# Patient Record
Sex: FEMALE | Race: WHITE | NOT HISPANIC OR LATINO | Employment: OTHER | URBAN - METROPOLITAN AREA
[De-identification: names, ages, dates, MRNs, and addresses within clinical notes are randomized per-mention and may not be internally consistent; named-entity substitution may affect disease eponyms.]

---

## 2017-02-03 ENCOUNTER — GENERIC CONVERSION - ENCOUNTER (OUTPATIENT)
Dept: OTHER | Facility: OTHER | Age: 82
End: 2017-02-03

## 2017-02-13 ENCOUNTER — ALLSCRIPTS OFFICE VISIT (OUTPATIENT)
Dept: OTHER | Facility: OTHER | Age: 82
End: 2017-02-13

## 2017-02-13 DIAGNOSIS — R73.01 IMPAIRED FASTING GLUCOSE: ICD-10-CM

## 2017-02-16 ENCOUNTER — GENERIC CONVERSION - ENCOUNTER (OUTPATIENT)
Dept: OTHER | Facility: OTHER | Age: 82
End: 2017-02-16

## 2017-07-24 ENCOUNTER — GENERIC CONVERSION - ENCOUNTER (OUTPATIENT)
Dept: OTHER | Facility: OTHER | Age: 82
End: 2017-07-24

## 2017-08-15 ENCOUNTER — GENERIC CONVERSION - ENCOUNTER (OUTPATIENT)
Dept: OTHER | Facility: OTHER | Age: 82
End: 2017-08-15

## 2017-08-21 ENCOUNTER — ALLSCRIPTS OFFICE VISIT (OUTPATIENT)
Dept: OTHER | Facility: OTHER | Age: 82
End: 2017-08-21

## 2017-08-21 DIAGNOSIS — Z13.820 ENCOUNTER FOR SCREENING FOR OSTEOPOROSIS: ICD-10-CM

## 2017-08-30 ENCOUNTER — GENERIC CONVERSION - ENCOUNTER (OUTPATIENT)
Dept: OTHER | Facility: OTHER | Age: 82
End: 2017-08-30

## 2017-09-08 ENCOUNTER — GENERIC CONVERSION - ENCOUNTER (OUTPATIENT)
Dept: OTHER | Facility: OTHER | Age: 82
End: 2017-09-08

## 2017-11-09 ENCOUNTER — GENERIC CONVERSION - ENCOUNTER (OUTPATIENT)
Dept: OTHER | Facility: OTHER | Age: 82
End: 2017-11-09

## 2017-11-16 ENCOUNTER — GENERIC CONVERSION - ENCOUNTER (OUTPATIENT)
Dept: OTHER | Facility: OTHER | Age: 82
End: 2017-11-16

## 2018-01-12 NOTE — MISCELLANEOUS
Message   Recorded as Task   Date: 02/29/2016 09:42 AM, Created By: Ambrocio Miranda   Task Name: Medical Complaint Callback   Assigned To: [address] [place] [address]   Regarding Patient: Melvin Glynn, Status: Active   Comment:    Regla Staples - 29 Feb 2016 9:42 AM     TASK CREATED  Caller: Self; Medical Complaint; [phone] (Home)  reporting that her R hand is still swollen and numb    please advise   Alfreda Briseno - 29 Feb 2016 9:48 AM     TASK REPLIED TO: Previously Assigned To [address] [place] [address]  should make appt to see ortho then   Sharmaine Francisco - 29 Feb 2016 10:08 AM     TASK EDITED  Gave numbers to [facility]        Active Problems    1  Acute pain of right wrist (719 43) (M25 531)   2  Colonoscopy (Fiberoptic) Screening   3  Diastolic dysfunction (706 3) (I51 9)   4  Dyslipidemia (272 4) (E78 5)   5  Dysphagia (787 20) (R13 10)   6  Encounter for screening mammogram for malignant neoplasm of breast (V76 12)   (Z12 31)   7  Esophageal reflux (530 81) (K21 9)   8  Glaucoma (365 9) (H40 9)   9  Hashimoto's thyroiditis (245 2) (E06 3)   10  Hypertension (401 9) (I10)   11  Impaired fasting glucose (790 21) (R73 01)   12  Insomnia (780 52) (G47 00)   13  Left ventricular hypertrophy (429 3) (I51 7)   14  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 tuberculosis (795 51) (R76 11)   15  Non-toxic uninodular goiter (241 0) (E04 1)   16  Osteopenia (733 90) (M85 80)   17  Peripheral arterial disease (443 9) (I73 9)    Current Meds   1  AmLODIPine Besylate 10 MG Oral Tablet; take 1 tablet by mouth every day; Therapy: 52RBE9353 to (Evaluate:16Jun2016)  Requested for: 43FLI1815; Last   Rx:22Jun2015 Ordered   2  Aspirin 81 MG Oral Tablet; TAKE 1 TABLET DAILY; Therapy: 78MWI6768 to (Evaluate:18Oct2012); Last Rx:26Hug3551 Ordered   3  Atorvastatin Calcium 10 MG Oral Tablet; take 1 tablet by mouth every day;    Therapy: 65LAA3236 to (Evaluate:15Jun2016)  Requested for: 74VYF6254; Last   Rx:23Mar2015 Ordered   4  Biotin 1000 MCG Oral Tablet; 2 TABS DAILY; Therapy: 35QWZ9698 to Recorded   5  Calcium 500 MG Oral Tablet; 1 tab PO BID; Therapy: 72OUF8964 to (Last Rx:43Dkv3688) Ordered   6  Centrum Silver Oral Tablet; TAKE 1 TABLET DAILY; Therapy: 93EQN4829 to Recorded   7  Cilostazol 50 MG Oral Tablet; 1 tab po q day; Therapy: 27BDJ2806 to Recorded   8  Dorzolamide HCl-Timolol Mal 22 3-6 8 MG/ML Ophthalmic Solution; Therapy: 79RDU2015 to (Evaluate:55Rpm2956)  Requested for: 04BXM4753 Recorded   9  EQL Flax Seed Oil 1000 MG Oral Capsule; TAKE AS DIRECTED; Therapy: 43VNN9932 to Recorded   10  Fish Oil 1000 MG Oral Capsule; TAKE 1 CAPSULE DAILY; Therapy: 54EWS0131 to Recorded   11  Lisinopril 40 MG Oral Tablet; TAKE 1/2  TABLET BY MOUTH EVERY DAY; Therapy: 07MRB8770 to ([facility])  Requested for: 63SVY2986 Recorded   12  Metoprolol Tartrate 25 MG Oral Tablet; TAKE 1 & 1/2 TABLET BY MOUTH TWICE A DAY; Therapy: 48CWU6785 to (Evaluate:16Jun2016)  Requested for: 36ODD6015; Last    Rx:22Jun2015 Ordered   13  Omeprazole 20 MG Oral Capsule Delayed Release; TAKE ONE CAPSULE BY MOUTH    DAILY AS NEEDED; Therapy: 83CSN6070 to (Evaluate:77Vzk7913)  Requested for: 72Csf7266 Recorded   14  Synthroid 100 MCG Oral Tablet; TAKE ONE TABLET BY MOUTH EVERY DAY EXCEPT    1/2 tab on Sundays only; Therapy: 95XHD3661 to (Evaluate:60Hwb4946)  Requested for: 08Kmj8983 Recorded   15  Vitamin D3 1000 UNIT Oral Tablet; TAKE 1 TABLET DAILY; Therapy: 75RXL0975 to (Evaluate:91Fbx3627); Last Rx:51Hto4038 Ordered    Allergies    1   No Known Drug Allergies    Signatures   Electronically signed by : Janell Feliz DO; Feb 29 2016 10:20AM EST                       (Author)

## 2018-01-13 VITALS
HEIGHT: 58 IN | HEART RATE: 68 BPM | DIASTOLIC BLOOD PRESSURE: 58 MMHG | TEMPERATURE: 98.5 F | WEIGHT: 126 LBS | SYSTOLIC BLOOD PRESSURE: 106 MMHG | BODY MASS INDEX: 26.45 KG/M2

## 2018-01-14 VITALS
WEIGHT: 125 LBS | HEIGHT: 58 IN | HEART RATE: 66 BPM | DIASTOLIC BLOOD PRESSURE: 60 MMHG | TEMPERATURE: 97.7 F | SYSTOLIC BLOOD PRESSURE: 118 MMHG | BODY MASS INDEX: 26.24 KG/M2

## 2018-02-19 RX ORDER — DORZOLAMIDE HYDROCHLORIDE AND TIMOLOL MALEATE 20; 5 MG/ML; MG/ML
SOLUTION/ DROPS OPHTHALMIC
COMMUNITY
Start: 2011-05-28

## 2018-02-19 RX ORDER — LISINOPRIL 10 MG/1
1 TABLET ORAL DAILY
COMMUNITY
Start: 2017-12-29 | End: 2019-03-17 | Stop reason: SDUPTHER

## 2018-02-19 RX ORDER — CALCIUM CARBONATE 500(1250)
1 TABLET ORAL 3 TIMES WEEKLY
COMMUNITY
Start: 2012-09-18

## 2018-02-19 RX ORDER — MELATONIN
1 DAILY
COMMUNITY
Start: 2012-09-18

## 2018-02-27 ENCOUNTER — OFFICE VISIT (OUTPATIENT)
Dept: FAMILY MEDICINE CLINIC | Facility: HOSPITAL | Age: 83
End: 2018-02-27
Payer: MEDICARE

## 2018-02-27 VITALS
HEIGHT: 58 IN | SYSTOLIC BLOOD PRESSURE: 110 MMHG | HEART RATE: 70 BPM | WEIGHT: 128 LBS | TEMPERATURE: 96.8 F | DIASTOLIC BLOOD PRESSURE: 60 MMHG | BODY MASS INDEX: 26.87 KG/M2

## 2018-02-27 DIAGNOSIS — C43.59 MALIGNANT MELANOMA OF TORSO EXCLUDING BREAST (HCC): ICD-10-CM

## 2018-02-27 DIAGNOSIS — I10 HYPERTENSION, UNSPECIFIED TYPE: ICD-10-CM

## 2018-02-27 DIAGNOSIS — K64.9 BLEEDING HEMORRHOID: Primary | ICD-10-CM

## 2018-02-27 DIAGNOSIS — I73.9 PERIPHERAL ARTERIAL DISEASE (HCC): ICD-10-CM

## 2018-02-27 PROCEDURE — 99214 OFFICE O/P EST MOD 30 MIN: CPT | Performed by: INTERNAL MEDICINE

## 2018-02-27 NOTE — PROGRESS NOTES
Assessment/Plan:    Hypertension  Bp at goal, con't current meds, med list updated    Peripheral arterial disease ([facility] )  Symptomatically controlled with Pletal, on ASA/Statin, does not smoke, con't meds and f/u as per vascular doc    Malignant melanoma of torso excluding breast ([facility] )  Will obtain records from Dr Viktor Costa office, con't f/u as per Derm/Plastics       Diagnoses and all orders for this visit:    Bleeding hemorrhoid  Comments:  Currently having rubber band ligation with Dr Cale Valdivia - con't f/u as directed    Hypertension, unspecified type    Peripheral arterial disease ([facility] )    Malignant melanoma of torso excluding breast ([facility] )      Mammo UTD till Aug 2018 - order at next appt    No further Dexa needed as nml in 2015 at age 80    No further [place] needed    UTD on immunizations    Bw usually done with Endo and Cardio has both appts this summer when labs due - hold off on ordering for now - will review at next appt and order any if needed at that time    Subjective:      Patient ID: Hali Wyman is a 80 y o  female  HPI Pt here for routine follow up appt    She had some bleeding from her hemorrhoids recently and is have them addressed with rubber band ligation with Dr Cale Valdivia  She has had some benefit already  No further colo needed d/t her age  BP at goal today and meds were reviewed and no changes have occurred  She denies missing doses of meds or SE with the meds  She does not check her BP outside the office  She notes no frequent Ha's/dizziness/double vision/CP  She con't to follow with the vascular doctor once a year for her PAD  She notes no current leg pain at all  She is on ASA/Pletal and Atorvastatin  She does not smoke  She con't to follow with Dr Viktor Costa for her skin exam   She had a recent [address] removed from her R cheek  She states she had a melanoma removed from her back last year  She is following with them at least every 6 mos       Mammo done Aug 2017 - [facility] 1    Dexa 2015 was nml    Review of Systems   Constitutional: Negative for chills, fatigue, fever and unexpected weight change  HENT: Negative for congestion and sore throat  Eyes: Negative for pain and discharge  Respiratory: Negative for cough, shortness of breath and wheezing  Cardiovascular: Negative for chest pain, palpitations and leg swelling  Gastrointestinal: Positive for anal bleeding  Negative for abdominal pain, constipation, diarrhea, nausea and vomiting  Endocrine: Negative for polydipsia and polyuria  Genitourinary: Negative for difficulty urinating and dysuria  Musculoskeletal: Positive for arthralgias  Negative for back pain and neck pain  R arm pain just above the elbow when she sleeps on it at [facility]   Skin: Negative for rash and wound  Neurological: Negative for dizziness, syncope, light-headedness and headaches  Hematological: Negative for adenopathy  Psychiatric/Behavioral: Negative for behavioral problems and confusion  Objective:      /60   Pulse 70   Temp (!) 96 8 °F (36 °C)   Ht 4' 10" (1 473 m)   Wt 58 1 kg (128 lb)   BMI 26 75 kg/m²          Physical Exam   Constitutional: She appears well-developed and well-nourished  No distress  HENT:   Head: Normocephalic and atraumatic  Mouth/Throat: No oropharyngeal exudate  Eyes: Pupils are equal, round, and reactive to light  Right eye exhibits no discharge  Left eye exhibits no discharge  Neck: Neck supple  No tracheal deviation present  Cardiovascular: Normal rate, regular rhythm and normal heart sounds  Exam reveals no friction rub  No murmur heard  Pulmonary/Chest: Effort normal and breath sounds normal  No respiratory distress  She has no wheezes  She has no rales  Abdominal: Soft  She exhibits no distension  There is no tenderness  There is no guarding  Musculoskeletal: She exhibits no edema  Neurological: She is alert  She exhibits normal muscle tone  Skin: Skin is warm   No rash noted    Psychiatric: She has a normal mood and affect  Her behavior is normal    Nursing note and vitals reviewed

## 2018-02-27 NOTE — ASSESSMENT & PLAN NOTE
Symptomatically controlled with Pletal, on ASA/Statin, does not smoke, con't meds and f/u as per vascular doc

## 2018-05-08 ENCOUNTER — OFFICE VISIT (OUTPATIENT)
Dept: FAMILY MEDICINE CLINIC | Facility: HOSPITAL | Age: 83
End: 2018-05-08
Payer: MEDICARE

## 2018-05-08 ENCOUNTER — HOSPITAL ENCOUNTER (OUTPATIENT)
Dept: RADIOLOGY | Facility: HOSPITAL | Age: 83
Discharge: HOME | End: 2018-05-08
Payer: MEDICARE

## 2018-05-08 VITALS
DIASTOLIC BLOOD PRESSURE: 68 MMHG | HEART RATE: 75 BPM | WEIGHT: 127 LBS | BODY MASS INDEX: 26.66 KG/M2 | TEMPERATURE: 97.9 F | SYSTOLIC BLOOD PRESSURE: 100 MMHG | HEIGHT: 58 IN

## 2018-05-08 DIAGNOSIS — M25.511 ACUTE PAIN OF RIGHT SHOULDER: Primary | ICD-10-CM

## 2018-05-08 DIAGNOSIS — M25.511 ACUTE PAIN OF RIGHT SHOULDER: ICD-10-CM

## 2018-05-08 PROBLEM — C44.91 BCC (BASAL CELL CARCINOMA): Status: RESOLVED | Noted: 2018-05-08 | Resolved: 2018-05-08

## 2018-05-08 PROBLEM — C44.92 SCCA (SQUAMOUS CELL CARCINOMA) OF SKIN: Status: RESOLVED | Noted: 2018-05-08 | Resolved: 2018-05-08

## 2018-05-08 PROCEDURE — 73030 X-RAY EXAM OF SHOULDER: CPT

## 2018-05-08 PROCEDURE — 99213 OFFICE O/P EST LOW 20 MIN: CPT | Performed by: INTERNAL MEDICINE

## 2018-05-08 NOTE — PROGRESS NOTES
Assessment/Plan:     Diagnoses and all orders for this visit:    Acute pain of right shoulder  Comments:  D/t duration of symptoms will start with Xray of shoulder, suspect this is degenerative joint dz or poss tendonitis, encouraged Tylenol as has some benefit, as well as heat/ice/gentle stretches, if not better in 2 wks will need to see [facility]  Orders:  -     XR shoulder 2+ vw right; Future          Subjective:      Patient ID: Jason Galvez is a 80 y o  female  HPI Pt here today with c/o R upper lateral arm pain x 3 mos  She is not able to id a specific fall/injury/new activity  The pain is described as sharp and is intermittent  The pain occurs only when she moves her arm out to the side and to the back  She has pain when she tries to get dressed and when she irons  She has pain when she lays on her R side in bed as well  The pain does not radiate most of the time but she will have a rare episode it will go down into the forearm/wrist area  She notes no numbness/tingling but she does feel weak in the R hand  She is not dropping objects but feels she has a hard time picking up anything heavy with that hand  She has tried OTC Asper Cream and Salon Pas with some relief  She is using Tylenol which does help as well  She notes no h/o shoulder/RUE problems in the past and denies h/o surgery  Review of Systems   Constitutional: Negative for chills and fever  Musculoskeletal: Positive for arthralgias  Negative for joint swelling  Skin: Negative for rash and wound  Neurological: Positive for weakness  Negative for numbness  Objective:    /68   Pulse 75   Temp 97 9 °F (36 6 °C)   Ht 4' 10" (1 473 m)   Wt 57 6 kg (127 lb)   BMI 26 54 kg/m²      Physical Exam   Cardiovascular: Normal rate, regular rhythm and normal heart sounds  No murmur heard  Pulmonary/Chest: Effort normal and breath sounds normal  She has no wheezes  Musculoskeletal: She exhibits no deformity     No pain with palp of spinous processes of cervical spine, no pain with palp of clavicle or spine of scapula, + crepitus with PROM of R shoulder, some tenderness lateral distal bicep muscle, dec A ROM with internal rotation d/t pain, + drop arm test, 4/5 RUE arm strength   Neurological: She is alert  She exhibits normal muscle tone  Sensation intact to light touch B/L UE   Skin: Skin is warm and dry  No rash noted  No pallor  Psychiatric: She has a normal mood and affect  Her behavior is normal    Nursing note and vitals reviewed

## 2018-06-06 DIAGNOSIS — I10 ESSENTIAL HYPERTENSION: Primary | ICD-10-CM

## 2018-06-06 RX ORDER — AMLODIPINE BESYLATE 10 MG/1
TABLET ORAL
Qty: 90 TABLET | Refills: 1 | Status: SHIPPED | OUTPATIENT
Start: 2018-06-06 | End: 2019-03-31 | Stop reason: SDUPTHER

## 2018-07-09 LAB — HBA1C MFR BLD HPLC: 5.9 %

## 2018-07-27 ENCOUNTER — OFFICE VISIT (OUTPATIENT)
Dept: ENDOCRINOLOGY | Facility: HOSPITAL | Age: 83
End: 2018-07-27
Payer: MEDICARE

## 2018-07-27 ENCOUNTER — TELEPHONE (OUTPATIENT)
Dept: ENDOCRINOLOGY | Facility: HOSPITAL | Age: 83
End: 2018-07-27

## 2018-07-27 VITALS
HEIGHT: 58 IN | SYSTOLIC BLOOD PRESSURE: 116 MMHG | DIASTOLIC BLOOD PRESSURE: 62 MMHG | BODY MASS INDEX: 26.07 KG/M2 | WEIGHT: 124.2 LBS | HEART RATE: 62 BPM

## 2018-07-27 DIAGNOSIS — E04.1 THYROID NODULE: ICD-10-CM

## 2018-07-27 DIAGNOSIS — E06.3 HASHIMOTO'S THYROIDITIS: Primary | ICD-10-CM

## 2018-07-27 DIAGNOSIS — Z86.39 HISTORY OF HYPERPARATHYROIDISM: ICD-10-CM

## 2018-07-27 PROCEDURE — 99204 OFFICE O/P NEW MOD 45 MIN: CPT | Performed by: INTERNAL MEDICINE

## 2018-07-27 RX ORDER — LEVOTHYROXINE SODIUM 0.1 MG/1
TABLET ORAL
Qty: 90 TABLET | Refills: 3 | Status: SHIPPED | OUTPATIENT
Start: 2018-07-27 | End: 2019-10-18 | Stop reason: SDUPTHER

## 2018-07-27 NOTE — TELEPHONE ENCOUNTER
Pt had a thyroid nodule biopsy in the past   Can we acquire the results from her [facility] chart? Thanks

## 2018-07-27 NOTE — PROGRESS NOTES
7/27/2018    Assessment/Plan      Diagnoses and all orders for this visit:    Hashimoto's thyroiditis  -     levothyroxine (SYNTHROID) 100 mcg tablet; 1 tab daily  BRAND NECESSARY  -     US thyroid; Future  -     TSH, 3rd generation Lab Collect; Future  -     T4, free Lab Collect; Future  -     Comprehensive metabolic panel Lab Collect; Future    History of hyperparathyroidism  -     Comprehensive metabolic panel Lab Collect; Future  -     PTH, intact- Lab Collect; Future  -     Vitamin D 25 hydroxy Lab Collect; Future    Thyroid nodule  -     US thyroid; Future        Assessment/Plan:  1  Hypothyroidism due to Hashimoto's thyroiditis:  Clinically and biochemically euthyroid on Synthroid brand 100 mcg daily  I refilled this prescription for her  We will plan to recheck blood work next year before next appointment  2   Thyroid nodule: This was reportedly biopsied and was benign in the past   I will acquire this biopsy report  Will update her with an ultrasound now and call her with the results  If this looks stable, we will discuss repeat ultrasound testing if warranted at her next appointment  3   History of hyperparathyroidism status post surgery: Will continue to monitor calcium and PTH levels periodically the next being with her next appointment  Addendum:  12/23/2004 at [facility]:  Fine-needle aspiration biopsy of 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  It does not state specifically which nodule was biopsy  CC:   Hypothyroidism and thyroid nodule    History of Present Illness     HPI: Shahram Trotter is a 80y o  year old female with history of hypertension, hyperlipidemia, hypothyroidism due to Hashimoto's thyroiditis, thyroid nodule, history of hyperparathyroidism who presents to [facility] care  Previous patient of Dr Marlo Sanders  She has had a history of hypothyroidism due to Hashimoto's thyroiditis    Overall she feels well and denies symptoms of hypothyroidism and hyperthyroidism  Denies neck compressive symptoms  Denies known family history of thyroid disorders or nodules or cancer to her knowledge  She denies history of head or neck radiation  She takes Synthroid brand 100 mcg daily  She also reports history of hyperparathyroidism  She states it took 2 surgeries to cure her primary hyperparathyroidism  The 2nd surgery was at the [place] [address]  Review of Systems   Constitutional: Negative for chills, fatigue and fever  HENT: Negative for trouble swallowing and voice change  Eyes: Negative for visual disturbance  Respiratory: Negative for shortness of breath  Cardiovascular: Negative for chest pain, palpitations and leg swelling  Gastrointestinal: Negative for abdominal pain, nausea and vomiting  Endocrine: Negative for cold intolerance, heat intolerance, polydipsia and polyuria  Musculoskeletal: Negative for arthralgias and myalgias  Skin: Negative for rash  Neurological: Negative for dizziness, tremors and weakness  Hematological: Negative for adenopathy  Psychiatric/Behavioral: Negative for agitation and confusion         Historical Information   Past Medical History:   Diagnosis Date    BCC (basal cell carcinoma)     Diastolic dysfunction     Dyslipidemia     Dysphagia     Glaucoma     Hashimoto's thyroiditis     Hyperparathyroidism ([address] )     Hypertension     Impaired fasting glucose     Insomnia     Non-toxic uninodular goiter     Osteopenia     SCCA (squamous cell carcinoma) of skin      Past Surgical History:   Procedure Laterality Date    CATARACT EXTRACTION, BILATERAL      COLONOSCOPY      Complete, Onset - 10/25/05 - 10 years     HYSTERECTOMY      PARATHYROIDECTOMY      DC WRIST [employer] Guitar LIG Right 5/2/2016    Procedure: RELEASE CARPAL TUNNEL ENDOSCOPIC;  Surgeon: Barb Huffman MD;  Location: [facility];  Service: Orthopedics    TONSILLECTOMY       Social History   History   Alcohol Use  Yes     Comment: rarely, social      History   Drug Use No     History   Smoking Status    Never Smoker   Smokeless Tobacco    Never Used     Family History:   Family History   Problem Relation Age of Onset    Stroke Father         CVA    Ovarian cancer Mother        Meds/Allergies   Current Outpatient Prescriptions   Medication Sig Dispense Refill    amLODIPine (NORVASC) 10 mg tablet TAKE 1 TABLET BY MOUTH EVERY DAY 90 tablet 1    aspirin 81 mg chewable tablet Chew 81 mg daily   atorvastatin (LIPITOR) 10 mg tablet Take 10 mg by mouth daily   Biotin 1000 MCG tablet Take 1,000 mcg by mouth 2 (two) times a day   Calcium 500 MG tablet Take 1 tablet by mouth 2 (two) times a day      cholecalciferol (VITAMIN D3) 1,000 units tablet Take 1 tablet by mouth daily      cilostazol (PLETAL) 50 mg tablet Take 50 mg by mouth 2 (two) times a day   dorzolamide-timolol (COSOPT) 22 3-6 8 MG/ML ophthalmic solution Apply to eye      lisinopril (ZESTRIL) 10 mg tablet Take 1 tablet by mouth daily      metoprolol tartrate (LOPRESSOR) 25 mg tablet TAKE 1 & 1/2 TABLET BY MOUTH TWICE A  tablet 1    Multiple Vitamins-Minerals (CENTRUM SILVER ADULT 50+ PO) Take by mouth   Omega-3 Fatty Acids (FISH OIL) 1,000 mg Take 1,000 mg by mouth daily   Flaxseed, Linseed, (FLAX SEED OIL) 1000 MG CAPS Take by mouth   levothyroxine (SYNTHROID) 100 mcg tablet 1 tab daily  BRAND NECESSARY  90 tablet 3     No current facility-administered medications for this visit  Allergies   Allergen Reactions    Cyclogyl [Cyclopentolate Hcl]     Phenylephrine     Pred Forte [Prednisolone Acetate]        Objective   Vitals: Blood pressure 116/62, pulse 62, height 4' 10" (1 473 m), weight 56 3 kg (124 lb 3 2 oz)  Invasive Devices          No matching active lines, drains, or airways          Physical Exam   Constitutional: She is oriented to person, place, and time   She appears well-developed and well-nourished  No distress  HENT:   Head: Normocephalic and atraumatic  Mouth/Throat: No oropharyngeal exudate  Eyes: Conjunctivae and EOM are normal  Pupils are equal, round, and reactive to light  No scleral icterus  Neck: Normal range of motion  Neck supple  No thyromegaly present  Cardiovascular: Normal rate and regular rhythm  No murmur heard  Pulmonary/Chest: Effort normal and breath sounds normal  She has no wheezes  Abdominal: Soft  Bowel sounds are normal  She exhibits no distension  There is no tenderness  Musculoskeletal: Normal range of motion  She exhibits no edema  Neurological: She is alert and oriented to person, place, and time  She exhibits normal muscle tone  Skin: Skin is warm and dry  No rash noted  She is not diaphoretic  Psychiatric: She has a normal mood and affect  Her behavior is normal        The history was obtained from the review of the chart and from the patient  Lab Results:      Recent Results (from the past 50324 hour(s))   Hemoglobin A1C    Collection Time: 07/09/18 12:00 AM   Result Value Ref Range    EXT Hemoglobin A1C 5 9      Other labs from [facility] on 07/09/2018:  TSH 0 563, free thyroxine index 10 3, total T4 9 23, T3 uptake 0 9, sodium 141, potassium 4 9, BUN 22, creatinine 1 15, GFR 45, glucose 94, calcium 10 1, albumin 4 3, bilirubin 0 8, AST 13, ALT 11, alk phos 71, total cholesterol 161, triglycerides 150, LDL 85, HDL 46      Future Appointments  Date Time Provider Cassandra Parekh   8/27/2018 9:20 AM DO DR GERALDINE Hinson [facility]

## 2018-07-27 NOTE — LETTER
July 27, 2018     Kaylee Lyle, [address]  [address] [facility]  [address] [address] [address]    Patient: Dru Cordero   YOB: 1929   Date of Visit: 7/27/2018       Dear Dr Valerio Sharma: Thank you for referring Dru Cordero to me for evaluation  Below are my notes for this consultation  If you have questions, please do not hesitate to call me  I look forward to following your patient along with you  Sincerely,        Felix Dominguez DO        CC: No Recipients  Felix Dominguez DO  7/27/2018 12:05 PM  Sign at close encounter  7/27/2018    Assessment/Plan      Diagnoses and all orders for this visit:    Hashimoto's thyroiditis  -     levothyroxine (SYNTHROID) 100 mcg tablet; 1 tab daily  BRAND NECESSARY  -     US thyroid; Future  -     TSH, 3rd generation Lab Collect; Future  -     T4, free Lab Collect; Future  -     Comprehensive metabolic panel Lab Collect; Future    History of hyperparathyroidism  -     Comprehensive metabolic panel Lab Collect; Future  -     PTH, intact- Lab Collect; Future  -     Vitamin D 25 hydroxy Lab Collect; Future    Thyroid nodule  -     US thyroid; Future        Assessment/Plan:  1  Hypothyroidism due to Hashimoto's thyroiditis:  Clinically and biochemically euthyroid on Synthroid brand 100 mcg daily  I refilled this prescription for her  We will plan to recheck blood work next year before next appointment  2   Thyroid nodule: This was reportedly biopsied and was benign in the past   I will acquire this biopsy report  Will update her with an ultrasound now and call her with the results  If this looks stable, we will discuss repeat ultrasound testing if warranted at her next appointment  3   History of hyperparathyroidism status post surgery: Will continue to monitor calcium and PTH levels periodically the next being with her next appointment        CC:   Hypothyroidism and thyroid nodule    History of Present Illness     HPI: Dru Cordero is a 80 y o  year old female with history of hypertension, hyperlipidemia, hypothyroidism due to Hashimoto's thyroiditis, thyroid nodule, history of hyperparathyroidism who presents to establish care  Previous patient of Dr Swathi Oden  She has had a history of hypothyroidism due to Hashimoto's thyroiditis  Overall she feels well and denies symptoms of hypothyroidism and hyperthyroidism  Denies neck compressive symptoms  Denies known family history of thyroid disorders or nodules or cancer to her knowledge  She denies history of head or neck radiation  She takes Synthroid brand 100 mcg daily  She also reports history of hyperparathyroidism  She states it took 2 surgeries to cure her primary hyperparathyroidism  The 2nd surgery was at the [place] [id number]  Review of Systems   Constitutional: Negative for chills, fatigue and fever  HENT: Negative for trouble swallowing and voice change  Eyes: Negative for visual disturbance  Respiratory: Negative for shortness of breath  Cardiovascular: Negative for chest pain, palpitations and leg swelling  Gastrointestinal: Negative for abdominal pain, nausea and vomiting  Endocrine: Negative for cold intolerance, heat intolerance, polydipsia and polyuria  Musculoskeletal: Negative for arthralgias and myalgias  Skin: Negative for rash  Neurological: Negative for dizziness, tremors and weakness  Hematological: Negative for adenopathy  Psychiatric/Behavioral: Negative for agitation and confusion         Historical Information   Past Medical History:   Diagnosis Date    BCC (basal cell carcinoma)     Diastolic dysfunction     Dyslipidemia     Dysphagia     Glaucoma     Hashimoto's thyroiditis     Hyperparathyroidism ([address] )     Hypertension     Impaired fasting glucose     Insomnia     Non-toxic uninodular goiter     Osteopenia     SCCA (squamous cell carcinoma) of skin      Past Surgical History:   Procedure Laterality Date    CATARACT EXTRACTION, BILATERAL  COLONOSCOPY      Complete, Onset - 10/25/05 - 10 years     HYSTERECTOMY      PARATHYROIDECTOMY      [facility] WRIST Irvin July LIG Right 5/2/2016    Procedure: RELEASE CARPAL TUNNEL ENDOSCOPIC;  Surgeon: Glendale Apley, MD;  Location: [facility];  Service: Orthopedics    TONSILLECTOMY       Social History   History   Alcohol Use    Yes     Comment: rarely, social      History   Drug Use No     History   Smoking Status    Never Smoker   Smokeless Tobacco    Never Used     Family History:   Family History   Problem Relation Age of Onset    Stroke Father         CVA    Ovarian cancer Mother        Meds/Allergies   Current Outpatient Prescriptions   Medication Sig Dispense Refill    amLODIPine (NORVASC) 10 mg tablet TAKE 1 TABLET BY MOUTH EVERY DAY 90 tablet 1    aspirin 81 mg chewable tablet Chew 81 mg daily   atorvastatin (LIPITOR) 10 mg tablet Take 10 mg by mouth daily   Biotin 1000 MCG tablet Take 1,000 mcg by mouth 2 (two) times a day   Calcium 500 MG tablet Take 1 tablet by mouth 2 (two) times a day      cholecalciferol (VITAMIN D3) 1,000 units tablet Take 1 tablet by mouth daily      cilostazol (PLETAL) 50 mg tablet Take 50 mg by mouth 2 (two) times a day   dorzolamide-timolol (COSOPT) 22 3-6 8 MG/ML ophthalmic solution Apply to eye      lisinopril (ZESTRIL) 10 mg tablet Take 1 tablet by mouth daily      metoprolol tartrate (LOPRESSOR) 25 mg tablet TAKE 1 & 1/2 TABLET BY MOUTH TWICE A  tablet 1    Multiple Vitamins-Minerals (CENTRUM SILVER ADULT 50+ PO) Take by mouth   Omega-3 Fatty Acids (FISH OIL) 1,000 mg Take 1,000 mg by mouth daily   Flaxseed, Linseed, (FLAX SEED OIL) 1000 MG CAPS Take by mouth   levothyroxine (SYNTHROID) 100 mcg tablet 1 tab daily  BRAND NECESSARY  90 tablet 3     No current facility-administered medications for this visit        Allergies   Allergen Reactions    Cyclogyl [Cyclopentolate Hcl]     Phenylephrine     Pred Forte [Prednisolone Acetate]        Objective   Vitals: Blood pressure 116/62, pulse 62, height 4' 10" (1 473 m), weight 56 3 kg (124 lb 3 2 oz)  Invasive Devices          No matching active lines, drains, or airways          Physical Exam   Constitutional: She is oriented to person, place, and time  She appears well-developed and well-nourished  No distress  HENT:   Head: Normocephalic and atraumatic  Mouth/Throat: No oropharyngeal exudate  Eyes: Conjunctivae and EOM are normal  Pupils are equal, round, and reactive to light  No scleral icterus  Neck: Normal range of motion  Neck supple  No thyromegaly present  Cardiovascular: Normal rate and regular rhythm  No murmur heard  Pulmonary/Chest: Effort normal and breath sounds normal  She has no wheezes  Abdominal: Soft  Bowel sounds are normal  She exhibits no distension  There is no tenderness  Musculoskeletal: Normal range of motion  She exhibits no edema  Neurological: She is alert and oriented to person, place, and time  She exhibits normal muscle tone  Skin: Skin is warm and dry  No rash noted  She is not diaphoretic  Psychiatric: She has a normal mood and affect  Her behavior is normal        The history was obtained from the review of the chart and from the patient  Lab Results:      Recent Results (from the past [address]21 hour(s))   Hemoglobin A1C    Collection Time: 07/09/18 12:00 AM   Result Value Ref Range    EXT Hemoglobin A1C 5 9      Other labs from [facility] on 07/09/2018:  TSH 0 563, free thyroxine index 10 3, total T4 9 23, T3 uptake 0 9, sodium 141, potassium 4 9, BUN 22, creatinine 1 15, GFR 45, glucose 94, calcium 10 1, albumin 4 3, bilirubin 0 8, AST 13, ALT 11, alk phos 71, total cholesterol 161, triglycerides 150, LDL 85, HDL 46      Future Appointments  Date Time Provider Cassandra Parekh   8/27/2018 9:20 AM DO DR GERALDINE [facility]

## 2018-08-15 ENCOUNTER — TELEPHONE (OUTPATIENT)
Dept: ENDOCRINOLOGY | Facility: HOSPITAL | Age: 83
End: 2018-08-15

## 2018-08-15 DIAGNOSIS — E04.2 MULTIPLE THYROID NODULES: Primary | ICD-10-CM

## 2018-08-15 NOTE — TELEPHONE ENCOUNTER
I am planning to repeat a thyroid ultrasound for the patient in February of 2019  Can we mail her the order form? Thanks  I reviewed the results with her already  Reviewed ultrasound report with the patient from [facility] done on 08/01/2018  Results are listed below  The patient did have a biopsy of a thyroid nodule in 2004, but from the report and records I cannot tell which nodule was biopsied  I called [facility] and they do not have any recent ultrasounds even going back 10 years  In any case, I think the most reasonable option in this 30-year-old female would be to monitor the nodules closely with ultrasound surveillance  I would repeat an ultrasound in 6 months  Thyroid ultrasound from [facility] on 08/01/2018: In the right lobe of the thyroid inferiorly there is a 1 8 x 1 1 x 1 cm nodule  There is a calcification in the right lobe measuring 4 mm  In the left lobe there is a dominant mass measuring 1 5 x 1 1 x 0 9 cm  There is also a 2nd dominant nodule measuring 1 5 x 1 1 x 1 3 cm  Medially in left lobe near the isthmus there is a 7 mm hyperechoic nodule

## 2018-08-27 ENCOUNTER — OFFICE VISIT (OUTPATIENT)
Dept: FAMILY MEDICINE CLINIC | Facility: HOSPITAL | Age: 83
End: 2018-08-27
Payer: MEDICARE

## 2018-08-27 VITALS
HEIGHT: 58 IN | HEART RATE: 57 BPM | WEIGHT: 126 LBS | DIASTOLIC BLOOD PRESSURE: 60 MMHG | SYSTOLIC BLOOD PRESSURE: 108 MMHG | TEMPERATURE: 96.7 F | BODY MASS INDEX: 26.45 KG/M2

## 2018-08-27 DIAGNOSIS — I10 ESSENTIAL HYPERTENSION: ICD-10-CM

## 2018-08-27 DIAGNOSIS — E04.1 THYROID NODULE: ICD-10-CM

## 2018-08-27 DIAGNOSIS — R73.01 IMPAIRED FASTING GLUCOSE: ICD-10-CM

## 2018-08-27 DIAGNOSIS — I73.9 PERIPHERAL ARTERIAL DISEASE (HCC): Primary | ICD-10-CM

## 2018-08-27 PROBLEM — E21.3 HYPERPARATHYROIDISM (HCC): Status: ACTIVE | Noted: 2018-08-27

## 2018-08-27 PROCEDURE — 99214 OFFICE O/P EST MOD 30 MIN: CPT | Performed by: INTERNAL MEDICINE

## 2018-08-27 NOTE — ASSESSMENT & PLAN NOTE
Has had bx in past - did meet criteria for bx again - has repeat US order from Endo for 6 mos - cont' f/u and imaging as per Endo

## 2018-08-27 NOTE — PROGRESS NOTES
Assessment/Plan:    Impaired fasting glucose  FBS wnl but A1c still up a bit at 5 8 - urged low sugar/carb diet and keep active- recheck BW in 6 mos - order given    Thyroid nodule  Has had bx in past - did meet criteria for bx again - has repeat US order from Endo for 6 mos - cont' f/u and imaging as per Endo    Hypertension  BP at goal, con't current meds    Peripheral arterial disease (HCC)  No current symptoms on Cilostazol, on ASA and statin as well, call with new/worse symptoms       Diagnoses and all orders for this visit:    Peripheral arterial disease ([address] )    Thyroid nodule    Impaired fasting glucose  -     Glucose, fasting; Future  -     Hemoglobin A1C; Future    Essential hypertension      No further mammo needed    No further Dexa needed    No further [place] needed    Subjective:      Patient ID: Shawnee Knox is a 80 y o  female  HPI Pt here for follow up appt     Pt had f/u with Cardio last month for her HTN/PVD/diastolic dysfunction  BP good today  She notes no recent HA/dizziness/double vision  Her recent BW ordered by Cardio was reviewed  She notes no recent CP/palp/SOB/edema  No changes to her meds were made  She was told to f/u in 1 yr  She had her thyroid US done and it did meet criteria for biopsy  She has had a biopsy in the past but it was a number of years ago  She saw Dr Onesimo Kauffman in July and was told to f/u with thyroid US in 6 mos  She notes no change in voice/hoarseness/difficulty swallowing  FBS was nml on recent labs but A1C still up a bit at 5 9  Def of nml vs IFG vs DM was d/w pt in detail  Diet/exercise was reviewed - wgt down 2 lbs from Feb 2018  [place] 2017 -no further needed d/t age    [de-identified] done Aug 2017    Dexa - nml in 2015 - no further needed    Review of Systems   Constitutional: Negative for chills, fatigue and fever  HENT: Negative for congestion and sore throat  Eyes: Negative for pain and discharge     Respiratory: Negative for cough, shortness of breath and wheezing  Cardiovascular: Negative for chest pain, palpitations and leg swelling  Gastrointestinal: Negative for abdominal pain, constipation, diarrhea and nausea  Endocrine: Negative for polydipsia and polyuria  Genitourinary: Negative for difficulty urinating and dysuria  Musculoskeletal: Positive for arthralgias  Negative for back pain and neck pain  Skin: Negative for rash and wound  Neurological: Negative for dizziness, syncope, light-headedness and headaches  Hematological: Negative for adenopathy  Psychiatric/Behavioral: Negative for behavioral problems and confusion  Objective:    /60   Pulse 57   Temp (!) 96 7 °F (35 9 °C)   Ht 4' 10" (1 473 m)   Wt 57 2 kg (126 lb)   BMI 26 33 kg/m²      Physical Exam   Constitutional: She appears well-developed and well-nourished  No distress  HENT:   Head: Normocephalic and atraumatic  Eyes: Conjunctivae are normal  Right eye exhibits no discharge  Left eye exhibits no discharge  Neck: Neck supple  No tracheal deviation present  Cardiovascular: Normal rate, regular rhythm and normal heart sounds  Exam reveals no friction rub  No murmur heard  Pulmonary/Chest: Effort normal and breath sounds normal  No respiratory distress  She has no wheezes  She has no rales  Abdominal: Soft  She exhibits no distension  There is no tenderness  There is no guarding  Musculoskeletal: She exhibits no edema  Neurological: She is alert  She exhibits normal muscle tone  Skin: Skin is warm  No rash noted  Psychiatric: She has a normal mood and affect  Her behavior is normal    Nursing note and vitals reviewed

## 2018-08-27 NOTE — ASSESSMENT & PLAN NOTE
FBS wnl but A1c still up a bit at 5 8 - urged low sugar/carb diet and keep active- recheck BW in 6 mos - order given

## 2019-02-26 LAB — HBA1C MFR BLD HPLC: 6 %

## 2019-03-07 ENCOUNTER — OFFICE VISIT (OUTPATIENT)
Dept: FAMILY MEDICINE CLINIC | Facility: HOSPITAL | Age: 84
End: 2019-03-07
Payer: MEDICARE

## 2019-03-07 VITALS
HEART RATE: 60 BPM | BODY MASS INDEX: 26.97 KG/M2 | HEIGHT: 57 IN | TEMPERATURE: 97.8 F | SYSTOLIC BLOOD PRESSURE: 118 MMHG | DIASTOLIC BLOOD PRESSURE: 62 MMHG | WEIGHT: 125 LBS

## 2019-03-07 DIAGNOSIS — E04.1 THYROID NODULE: ICD-10-CM

## 2019-03-07 DIAGNOSIS — I10 ESSENTIAL HYPERTENSION: ICD-10-CM

## 2019-03-07 DIAGNOSIS — K21.9 GASTROESOPHAGEAL REFLUX DISEASE WITHOUT ESOPHAGITIS: ICD-10-CM

## 2019-03-07 DIAGNOSIS — E21.3 HYPERPARATHYROIDISM (HCC): ICD-10-CM

## 2019-03-07 DIAGNOSIS — C43.59 MALIGNANT MELANOMA OF TORSO EXCLUDING BREAST (HCC): ICD-10-CM

## 2019-03-07 DIAGNOSIS — Z00.00 MEDICARE ANNUAL WELLNESS VISIT, SUBSEQUENT: ICD-10-CM

## 2019-03-07 DIAGNOSIS — R73.01 IMPAIRED FASTING GLUCOSE: Primary | ICD-10-CM

## 2019-03-07 DIAGNOSIS — I73.9 PERIPHERAL ARTERIAL DISEASE (HCC): ICD-10-CM

## 2019-03-07 PROCEDURE — 99214 OFFICE O/P EST MOD 30 MIN: CPT | Performed by: INTERNAL MEDICINE

## 2019-03-07 PROCEDURE — G0439 PPPS, SUBSEQ VISIT: HCPCS | Performed by: INTERNAL MEDICINE

## 2019-03-07 RX ORDER — RANITIDINE 150 MG/1
150 TABLET ORAL 2 TIMES DAILY
Qty: 60 TABLET | Refills: 5 | Status: SHIPPED | OUTPATIENT
Start: 2019-03-07 | End: 2019-09-17

## 2019-03-07 NOTE — PATIENT INSTRUCTIONS
Obesity   AMBULATORY CARE:   Obesity  is when your body mass index (BMI) is greater than 30  Your healthcare provider will use your height and weight to measure your BMI  The risks of obesity include  many health problems, such as injuries or physical disability  You may need tests to check for the following:  · Diabetes     · High blood pressure or high cholesterol     · Heart disease     · Gallbladder or liver disease     · Cancer of the colon, breast, prostate, liver, or kidney     · Sleep apnea     · Arthritis or gout  Seek care immediately if:   · You have a severe headache, confusion, or difficulty speaking  · You have weakness on one side of your body  · You have chest pain, sweating, or shortness of breath  Contact your healthcare provider if:   · You have symptoms of gallbladder or liver disease, such as pain in your upper abdomen  · You have knee or hip pain and discomfort while walking  · You have symptoms of diabetes, such as intense hunger and thirst, and frequent urination  · You have symptoms of sleep apnea, such as snoring or daytime sleepiness  · You have questions or concerns about your condition or care  Treatment for obesity  focuses on helping you lose weight to improve your health  Even a small decrease in BMI can reduce the risk for many health problems  Your healthcare provider will help you set a weight-loss goal   · Lifestyle changes  are the first step in treating obesity  These include making healthy food choices and getting regular physical activity  Your healthcare provider may suggest a weight-loss program that involves coaching, education, and therapy  · Medicine  may help you lose weight when it is used with a healthy diet and physical activity  · Surgery  can help you lose weight if you are very obese and have other health problems  There are several types of weight-loss surgery  Ask your healthcare provider for more information    Be successful losing weight:   · Set small, realistic goals  An example of a small goal is to walk for 20 minutes 5 days a week  Anther goal is to lose 5% of your body weight  · Tell friends, family members, and coworkers about your goals  and ask for their support  Ask a friend to lose weight with you, or join a weight-loss support group  · Identify foods or triggers that may cause you to overeat , and find ways to avoid them  Remove tempting high-calorie foods from your home and workplace  Place a bowl of fresh fruit on your kitchen counter  If stress causes you to eat, then find other ways to cope with stress  · Keep a diary to track what you eat and drink  Also write down how many minutes of physical activity you do each day  Weigh yourself once a week and record it in your diary  Eating changes: You will need to eat 500 to 1,000 fewer calories each day than you currently eat to lose 1 to 2 pounds a week  The following changes will help you cut calories:  · Eat smaller portions  Use small plates, no larger than 9 inches in diameter  Fill your plate half full of fruits and vegetables  Measure your food using measuring cups until you know what a serving size looks like  · Eat 3 meals and 1 or 2 snacks each day  Plan your meals in advance  Jacqueline Licona and eat at home most of the time  Eat slowly  · Eat fruits and vegetables at every meal   They are low in calories and high in fiber, which makes you feel full  Do not add butter, margarine, or cream sauce to vegetables  Use herbs to season steamed vegetables  · Eat less fat and fewer fried foods  Eat more baked or grilled chicken and fish  These protein sources are lower in calories and fat than red meat  Limit fast food  Dress your salads with olive oil and vinegar instead of bottled dressing  · Limit the amount of sugar you eat  Do not drink sugary beverages  Limit alcohol  Activity changes:  Physical activity is good for your body in many ways   It helps you burn calories and build strong muscles  It decreases stress and depression, and improves your mood  It can also help you sleep better  Talk to your healthcare provider before you begin an exercise program   · Exercise for at least 30 minutes 5 days a week  Start slowly  Set aside time each day for physical activity that you enjoy and that is convenient for you  It is best to do both weight training and an activity that increases your heart rate, such as walking, bicycling, or swimming  · Find ways to be more active  Do yard work and housecleaning  Walk up the stairs instead of using elevators  Spend your leisure time going to events that require walking, such as outdoor festivals or fairs  This extra physical activity can help you lose weight and keep it off  Follow up with your healthcare provider as directed: You may need to meet with a dietitian  Write down your questions so you remember to ask them during your visits  © 2017 [address] Information is for End User's use only and may not be sold, redistributed or otherwise used for commercial purposes  All illustrations and images included in CareNotes® are the copyrighted property of [employer]  or Ruddy Jiang  The above information is an  only  It is not intended as medical advice for individual conditions or treatments  Talk to your doctor, nurse or pharmacist before following any medical regimen to see if it is safe and effective for you  Urinary Incontinence   WHAT YOU NEED TO KNOW:   What is urinary incontinence? Urinary incontinence (UI) is when you lose control of your bladder  What causes UI? UI occurs because your bladder cannot store or empty urine properly  The following are the most common types of UI:  · Stress incontinence  is when you leak urine due to increased bladder pressure  This may happen when you cough, sneeze, or exercise       · Urge incontinence  is when you feel the need to urinate right away and leak urine accidentally  · Mixed incontinence  is when you have both stress and urge UI  What are the signs and symptoms of UI?   · You feel like your bladder does not empty completely when you urinate  · You urinate often and need to urinate immediately  · You leak urine when you sleep, or you wake up with the urge to urinate  · You leak urine when you cough, sneeze, exercise, or laugh  How is UI diagnosed? Your healthcare provider will ask how often you leak urine and whether you have stress or urge symptoms  Tell him which medicines you take, how often you urinate, and how much liquid you drink each day  You may need any of the following tests:  · Urine tests  may show infection or kidney function  · A pelvic exam  may be done to check for blockages  A pelvic exam will also show if your bladder, uterus, or other organs have moved out of place  · An x-ray, ultrasound, or CT  may show problems with parts of your urinary system  You may be given contrast liquid to help your organs show up better in the pictures  Tell the healthcare provider if you have ever had an allergic reaction to contrast liquid  Do not enter the MRI room with anything metal  Metal can cause serious injury  Tell the healthcare provider if you have any metal in or on your body  · A bladder scan  will show how much urine is left in your bladder after you urinate  You will be asked to urinate and then healthcare providers will use a small ultrasound machine to check the urine left in your bladder  · Cystometry  is used to check the function of your urinary system  Your healthcare provider checks the pressure in your bladder while filling it with fluid  Your bladder pressure may also be tested when your bladder is full and while you urinate  How is UI treated? · Medicines  can help strengthen your bladder control      · Electrical stimulation  is used to send a small amount of electrical energy to your pelvic floor muscles  This helps control your bladder function  Electrodes may be placed outside your body or in your rectum  For women, the electrodes may be placed in the vagina  · A bulking agent  may be injected into the wall of your urethra to make it thicker  This helps keep your urethra closed and decreases urine leakage  · Devices  such as a clamp, pessary, or tampon may help stop urine leaks  Ask your healthcare provider for more information about these and other devices  · Surgery  may be needed if other treatments do not work  Several types of surgery can help improve your bladder control  Ask your healthcare provider for more information about the surgery you may need  How can I manage my symptoms? · Do pelvic muscle exercises often  Your pelvic muscles help you stop urinating  Squeeze these muscles tight for 5 seconds, then relax for 5 seconds  Gradually work up to squeezing for 10 seconds  Do 3 sets of 15 repetitions a day, or as directed  This will help strengthen your pelvic muscles and improve bladder control  · A catheter  may be used to help empty your bladder  A catheter is a tiny, plastic tube that is put into your bladder to drain your urine  Your healthcare provider may tell you to use a catheter to prevent your bladder from getting too full and leaking urine  · Keep a UI record  Write down how often you leak urine and how much you leak  Make a note of what you were doing when you leaked urine  · Train your bladder  Go to the bathroom at set times, such as every 2 hours, even if you do not feel the urge to go  You can also try to hold your urine when you feel the urge to go  For example, hold your urine for 5 minutes when you feel the urge to go  As that becomes easier, hold your urine for 10 minutes  · Drink liquids as directed  Ask your healthcare provider how much liquid to drink each day and which liquids are best for you   You may need to limit the amount of liquid you drink to help control your urine leakage  Limit or do not have drinks that contain caffeine or alcohol  Do not drink any liquid right before you go to bed  · Prevent constipation  Eat a variety of high-fiber foods  Good examples are high-fiber cereals, beans, vegetables, and whole-grain breads  Prune juice may help make your bowel movement softer  Walking is the best way to trigger your intestines to have a bowel movement  · Exercise regularly and maintain a healthy weight  Ask your healthcare provider how much you should weigh and about the best exercise plan for you  Weight loss and exercise will decrease pressure on your bladder and help you control your leakage  Ask him to help you create a weight loss plan if you are overweight  When should I seek immediate care? · You have severe pain  · You are confused or cannot think clearly  When should I contact my healthcare provider? · You have a fever  · You see blood in your urine  · You have pain when you urinate  · You have new or worse pain, even after treatment  · Your mouth feels dry or you have vision changes  · Your urine is cloudy or smells bad  · You have questions or concerns about your condition or care  CARE AGREEMENT:   You have the right to help plan your care  Learn about your health condition and how it may be treated  Discuss treatment options with your caregivers to decide what care you want to receive  You always have the right to refuse treatment  The above information is an  only  It is not intended as medical advice for individual conditions or treatments  Talk to your doctor, nurse or pharmacist before following any medical regimen to see if it is safe and effective for you  © 2017 [address] Information is for End User's use only and may not be sold, redistributed or otherwise used for commercial purposes   All illustrations and images included in CareNotes® are the copyrighted property of [employer] M [employer] Inc  or Ruddy Jiang  Cigarette Smoking and Your Health   AMBULATORY CARE:   Risks to your health if you smoke:  Nicotine and other chemicals found in tobacco damage every cell in your body  Even if you are a light smoker, you have an increased risk for cancer, heart disease, and lung disease  If you are pregnant or have diabetes, smoking increases your risk for complications  Benefits to your health if you stop smoking:   · You decrease respiratory symptoms such as coughing, wheezing, and shortness of breath  · You reduce your risk for cancers of the lung, mouth, throat, kidney, bladder, pancreas, stomach, and cervix  If you already have cancer, you increase the benefits of chemotherapy  You also reduce your risk for cancer returning or a second cancer from developing  · You reduce your risk for heart disease, blood clots, heart attack, and stroke  · You reduce your risk for lung infections, and diseases such as pneumonia, asthma, chronic bronchitis, and emphysema  · Your circulation improves  More oxygen can be delivered to your body  If you have diabetes, you lower your risk for complications, such as kidney, artery, and eye diseases  You also lower your risk for nerve damage  Nerve damage can lead to amputations, poor vision, and blindness  · You improve your body's ability to heal and to fight infections  Benefits to the health of others if you stop smoking:  Tobacco is harmful to nonsmokers who breathe in your secondhand smoke  The following are ways the health of others around you may improve when you stop smoking:  · You lower the risks for lung cancer and heart disease in nonsmoking adults  · If you are pregnant, you lower the risk for miscarriage, early delivery, low birth weight, and stillbirth  You also lower your baby's risk for SIDS, obesity, developmental delay, and neurobehavioral problems, such as ADHD  · If you have children, you lower their risk for ear infections, colds, pneumonia, bronchitis, and asthma  For more information and support to stop smoking:   · Smokefree  gov  Phone: 5- 303 - 106-5292  Web Address: www smokefree  gov  Follow up with your healthcare provider as directed:  Write down your questions so you remember to ask them during your visits  © 2017 [address] Information is for End User's use only and may not be sold, redistributed or otherwise used for commercial purposes  All illustrations and images included in CareNotes® are the copyrighted property of A [employer] M [employer] Inc  or Ruddy Jiang  The above information is an  only  It is not intended as medical advice for individual conditions or treatments  Talk to your doctor, nurse or pharmacist before following any medical regimen to see if it is safe and effective for you  Fall Prevention   AMBULATORY CARE:   Fall prevention  includes ways to make your home and other areas safer  It also includes ways you can move more carefully to prevent a fall  Health conditions that cause changes in your blood pressure, vision, or muscle strength and coordination may increase your risk for falls  Medicines may also increase your risk for falls if they make you dizzy, weak, or sleepy  Call 911 or have someone else call if:   · You have fallen and are unconscious  · You have fallen and cannot move part of your body  Contact your healthcare provider if:   · You have fallen and have pain or a headache  · You have questions or concerns about your condition or care  Fall prevention tips:   · Stand or sit up slowly  This may help you keep your balance and prevent falls  · Use assistive devices as directed  Your healthcare provider may suggest that you use a cane or walker to help you keep your balance  You may need to have grab bars put in your bathroom near the toilet or in the shower      · Wear shoes that fit well and have soles that   Wear shoes both inside and outside  Use slippers with good   Do not wear shoes with high heels  · Wear a personal alarm  This is a device that allows you to call 911 if you fall and need help  Ask your healthcare provider for more information  · Stay active  Exercise can help strengthen your muscles and improve your balance  Your healthcare provider may recommend water aerobics or walking  He or she may also recommend physical therapy to improve your coordination  Never start an exercise program without talking to your healthcare provider first      · Manage your medical conditions  Keep all appointments with your healthcare providers  Visit your eye doctor as directed  Home safety tips:   · Add items to prevent falls in the bathroom  Put nonslip strips on your bath or shower floor to prevent you from slipping  Use a bath mat if you do not have carpet in the bathroom  This will prevent you from falling when you step out of the bath or shower  Use a shower seat so you do not need to stand while you shower  Sit on the toilet or a chair in your bathroom to dry yourself and put on clothing  This will prevent you from losing your balance from drying or dressing yourself while you are standing  · Keep paths clear  Remove books, shoes, and other objects from walkways and stairs  Place cords for telephones and lamps out of the way so that you do not need to walk over them  Tape them down if you cannot move them  Remove small rugs  If you cannot remove a rug, secure it with double-sided tape  This will prevent you from tripping  · Install bright lights in your home  Use night lights to help light paths to the bathroom or kitchen  Always turn on the light before you start walking  · Keep items you use often on shelves within reach  Do not use a step stool to help you reach an item  · Paint or place reflective tape on the edges of your stairs    This will help you see the stairs better  Follow up with your healthcare provider as directed:  Write down your questions so you remember to ask them during your visits  © 2017 [address] Information is for End User's use only and may not be sold, redistributed or otherwise used for commercial purposes  All illustrations and images included in CareNotes® are the copyrighted property of A [employer] M [employer]  or Ruddy Jiang  The above information is an  only  It is not intended as medical advice for individual conditions or treatments  Talk to your doctor, nurse or pharmacist before following any medical regimen to see if it is safe and effective for you  Advance Directives   WHAT YOU NEED TO KNOW:   What are advance directives? Advance directives are legal documents that state your wishes and plans for medical care  These plans are made ahead of time in case you lose your ability to make decisions for yourself  Advance directives can apply to any medical decision, such as the treatments you want, and if you want to donate organs  What are the types of advance directives? There are many types of advance directives, and each state has rules about how to use them  You may choose a combination of any of the following:  · Living will: This is a written record of the treatment you want  You can also choose which treatments you do not want, which to limit, and which to stop at a certain time  This includes surgery, medicine, IV fluid, and tube feedings  · Durable power of  for healthcare [facility]): This is a written record that states who you want to make healthcare choices for you when you are unable to make them for yourself  This person, called a proxy, is usually a family member or a friend  You may choose more than 1 proxy  · Do not resuscitate (DNR) order:  A DNR order is used in case your heart stops beating or you stop breathing   It is a request not to have certain forms of treatment, such as CPR  A DNR order may be included in other types of advance directives  · Medical directive: This covers the care that you want if you are in a coma, near death, or unable to make decisions for yourself  You can list the treatments you want for each condition  Treatment may include pain medicine, surgery, blood transfusions, dialysis, IV or tube feedings, and a ventilator (breathing machine)  · Values history: This document has questions about your views, beliefs, and how you feel and think about life  This information can help others choose the care that you would choose  Why are advance directives important? An advance directive helps you control your care  Although spoken wishes may be used, it is better to have your wishes written down  Spoken wishes can be misunderstood, or not followed  Treatments may be given even if you do not want them  An advance directive may make it easier for your family to make difficult choices about your care  How do I decide what to put in my advance directives? · Make informed decisions:  Make sure you fully understand treatments or care you may receive  Think about the benefits and problems your decisions could cause for you or your family  Talk to healthcare providers if you have concerns or questions before you write down your wishes  You may also want to talk with your [employer] or , or a   Check your state laws to make sure that what you put in your advance directive is legal      · Sign all forms:  Sign and date your advance directive when you have finished  You may also need 2 witnesses to sign the forms  Witnesses cannot be your doctor or his staff, your spouse, heirs or beneficiaries, people you owe money to, or your chosen proxy  Talk to your family, proxy, and healthcare providers about your advance directive  Give each person a copy, and keep one for yourself in a place you can get to easily   Do not keep it hidden or locked away  · Review and revise your plans: You can revise your advance directive at any time, as long as you are able to make decisions  Review your plan every year, and when there are changes in your life, or your health  When you make changes, let your family, proxy, and healthcare providers know  Give each a new copy  Where can I find more information? · [employer]  [address] [place] , [place] [address]  Phone: 3- [phone] - [phone]  Phone: 4- 576 - [phone]  Web Address: http://www  aafp org  · [address] [facility])  [address] [address] [facility], [address] [facility] , [address] [place] [address]  Phone: [phone]  Phone: [phone]  Web Address: Padmini barajas  CARE AGREEMENT:   You have the right to help plan your care  To help with this plan, you must learn about your health condition and treatment options  You must also learn about advance directives and how they are used  Work with your healthcare providers to decide what care will be used to treat you  You always have the right to refuse treatment  The above information is an  only  It is not intended as medical advice for individual conditions or treatments  Talk to your doctor, nurse or pharmacist before following any medical regimen to see if it is safe and effective for you  © 2017 [address] [employer] Information is for End User's use only and may not be sold, redistributed or otherwise used for commercial purposes  All illustrations and images included in CareNotes® are the copyrighted property of A D A M [employer]  or Ruddy Jiang

## 2019-03-07 NOTE — ASSESSMENT & PLAN NOTE
Encouraged to con't low sugar/carb diet and keep active, recheck BW in 6mo - A1C order given to be done with cardiology labs in Aug

## 2019-03-07 NOTE — ASSESSMENT & PLAN NOTE
Notes recent GERD symptoms - better with TUMS, will try a trial of Zantac bid - if symptoms resolve can go to q am and then stop  - call with new/worse symptoms/abd pain/blood in stool or dark stools

## 2019-03-07 NOTE — ASSESSMENT & PLAN NOTE
Pt con't to follow with Dr Viktor Costa every 6 mos - she just had a [address] removed in July but denies any recent melanoma's removed, she wears sunscreen

## 2019-03-07 NOTE — PROGRESS NOTES
Assessment/Plan:    Impaired fasting glucose  Encouraged to con't low sugar/carb diet and keep active, recheck BW in 6mo - A1C order given to be done with cardiology labs in Aug    Peripheral arterial disease ([place] [address] )  Pt w/o current LE symptoms and recent SHIMON showed no significant blockage, pt was taken off her Cilostazol, she is on ASA/statin    Malignant melanoma of torso excluding breast ([place] [address] )  Pt con't to follow with Dr Denilson Singleton every 6 mos - she just had a [address] removed in July but denies any recent melanoma's removed, she wears sunscreen    Thyroid nodule  Nodules being followed with BW/US every 6 mos with Dr Olivia Mckenzie - cont' f/u as directed    Hyperparathyroidism ([place] [address] )  No current symptoms, has f/u Bw with Endo every 6 mos    Hypertension  BP at goal, cont' current meds    Esophageal reflux  Notes recent GERD symptoms - better with TUMS, will try a trial of Zantac bid - if symptoms resolve can go to q am and then stop  - call with new/worse symptoms/abd pain/blood in stool or dark stools       Diagnoses and all orders for this visit:    Impaired fasting glucose  -     Hemoglobin A1C; Future    Peripheral arterial disease (HCC)    Thyroid nodule    Hyperparathyroidism (HCC)    Malignant melanoma of torso excluding breast ([place] [address] )    Essential hypertension    Gastroesophageal reflux disease without esophagitis  -     ranitidine (ZANTAC) 150 mg tablet; Take 1 tablet (150 mg total) by mouth 2 (two) times a day      Sigmoidoscopy was done 2/17 - no further colon cancer screening needed d/t age    [de-identified] - 8/17 - no further needed    Dexa 8/15 - Normal - no further needed    Subjective:      Patient ID: Rachid Hernandez is a 80 y o  female  HPI Pt here for f/u appt/BW results and subsequent AWV    BW results were d/w pt in detail: FBS/A1C 84/6 0  Def of nml vs IFG vs DM was d/w pt in detail  Diet/exercise was reviewed - wgt down 2 lbs from May 2018    She was asked if she watches her sugars/carbs and she states "at times"  She was doing chair yoga but then the class was cancelled  She does walk more in the nicer weather  Pt saw vascular for f/u PVD in Nov 2018  She had her CUS as well as SHIMON done and no significant LE blockages were noted in either study  She was on Cilostazole but this was stopped at her last appt  She is taking ASA and Lipitor  She notes no pain in her legs with ambulation  She notes no stroke/TIA symptoms  She has h/o melanoma and has an appt with Dr Nicola Prescott every 6 mos  She had a BCC removed in July 2018  She is good about wearing sun screen when she is out in the sun  She saw Endo in July for her thyroid and h/o hyperparathyroidism  She had her thyroid US in Aug and still had nodules that were significant enough to warrant biopsy  This was not done as they had been biopsied in the past  She was told to f/u with BW/US and appt in 6 mos  BP at goal today and meds were reviewed and no changes have occurred  She denies missing doses of meds or SE with the meds  She does not check her BP outside the office  She notes no frequent Ha's/dizziness/double vision/CP  She has had intermittent GERD symptoms  A known trigger is overeating  She has tried OTC TUMS with some benefit  Sigmoidoscopy was done 2/17 - no further colon cancer screening needed d/t age    [de-identified] - 8/17 - no further needed    Dexa 8/15 - Normal - no further needed    Review of Systems   Constitutional: Negative for chills and fever  HENT: Negative for congestion and sore throat  Eyes: Negative for pain and discharge  Respiratory: Negative for cough, shortness of breath and wheezing  Cardiovascular: Negative for chest pain, palpitations and leg swelling  Gastrointestinal: Negative for abdominal pain, blood in stool, constipation, diarrhea, nausea and vomiting  Endocrine: Negative for polydipsia and polyuria  Genitourinary: Negative for difficulty urinating and dysuria     Musculoskeletal: Positive for arthralgias  Negative for back pain and neck pain  Skin: Negative for rash and wound  Neurological: Negative for dizziness, syncope, light-headedness and headaches  Hematological: Negative for adenopathy  Psychiatric/Behavioral: Negative for behavioral problems and confusion  Objective:    /62 (BP Location: Right arm, Patient Position: Sitting, Cuff Size: Standard)   Pulse 60   Temp 97 8 °F (36 6 °C)   Ht 4' 9" (1 448 m)   Wt 56 7 kg (125 lb)   BMI 27 05 kg/m²      Physical Exam   Constitutional: She appears well-developed and well-nourished  No distress  HENT:   Head: Normocephalic and atraumatic  Right Ear: External ear normal    Left Ear: External ear normal    Mouth/Throat: Oropharynx is clear and moist    B/L hearing aids removed for exam   Eyes: Conjunctivae are normal  Right eye exhibits no discharge  Left eye exhibits no discharge  Neck: Neck supple  No tracheal deviation present  Cardiovascular: Normal rate, regular rhythm and normal heart sounds  Exam reveals no friction rub  No murmur heard  Neg carotid bruits B/L   Pulmonary/Chest: Effort normal and breath sounds normal  No respiratory distress  She has no wheezes  She has no rales  Abdominal: Soft  She exhibits no distension  There is no tenderness  There is no guarding  Musculoskeletal: She exhibits no edema  Lymphadenopathy:     She has no cervical adenopathy  Neurological: She is alert  She exhibits normal muscle tone  Skin: Skin is warm and dry  No rash noted  Psychiatric: She has a normal mood and affect  Her behavior is normal    Nursing note and vitals reviewed

## 2019-03-07 NOTE — PROGRESS NOTES
Assessment and Plan:  Problem List Items Addressed This Visit        Digestive    Esophageal reflux     Notes recent GERD symptoms - better with TUMS, will try a trial of Zantac bid - if symptoms resolve can go to q am and then stop  - call with new/worse symptoms/abd pain/blood in stool or dark stools         Relevant Medications    ranitidine (ZANTAC) 150 mg tablet       Endocrine    Impaired fasting glucose - Primary     Encouraged to con't low sugar/carb diet and keep active, recheck BW in 6mo - A1C order given to be done with cardiology labs in Aug         Relevant Orders    Hemoglobin A1C    Thyroid nodule     Nodules being followed with BW/US every 6 mos with Dr Jade Calvo - cont' f/u as directed         Hyperparathyroidism ([facility] )     No current symptoms, has f/u Bw with Endo every 6 mos            Cardiovascular and Mediastinum    Hypertension     BP at goal, cont' current meds         Peripheral arterial disease ([facility] )     Pt w/o current LE symptoms and recent SHIMON showed no significant blockage, pt was taken off her Cilostazol, she is on ASA/statin            Musculoskeletal and Integument    Malignant melanoma of torso excluding breast ([facility] )     Pt con't to follow with Dr Katty Conde every 6 mos - she just had a [address] removed in July but denies any recent melanoma's removed, she wears sunscreen           Other Visit Diagnoses     Medicare annual wellness visit, subsequent            Health Maintenance Due   Topic Date Due    Depression Screening PHQ  10/04/1929    Medicare Annual Wellness Visit (AWV)  10/04/1929    BMI: Followup Plan  10/04/1947    DTaP,Tdap,and Td Vaccines (1 - Tdap) 10/04/1950    Fall Risk  10/04/1994    Urinary Incontinence Screening  10/04/1994    INFLUENZA VACCINE  07/01/2018         HPI:  Patient Active Problem List   Diagnosis    De Quervain's tenosynovitis, right    Diastolic dysfunction    Dyslipidemia    Esophageal reflux    Glaucoma    Hashimoto's thyroiditis    Hypertension    Impaired fasting glucose    Insomnia    Left ventricular hypertrophy    Mixed urge and stress incontinence    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 tuberculosis    Thyroid nodule    Peripheral arterial disease (HCC)    Malignant melanoma of torso excluding breast ([facility] )    [facility]athyroidism ([facility] )     Past Medical History:   Diagnosis Date    BCC (basal cell carcinoma)     Diastolic dysfunction     Dyslipidemia     Dysphagia     Glaucoma     Hashimoto's thyroiditis     Hyperparathyroidism ([facility] )     Hypertension     Impaired fasting glucose     Insomnia     Osteopenia     SCCA (squamous cell carcinoma) of skin      Past Surgical History:   Procedure Laterality Date    CATARACT EXTRACTION, BILATERAL      COLONOSCOPY      Complete, Onset - 10/25/05 - 10 years     HYSTERECTOMY      PARATHYROIDECTOMY      [place] WRIST ARTHROSCOP,RELEASE Bedelia Brakeman LIG Right 5/2/2016    Procedure: RELEASE CARPAL TUNNEL ENDOSCOPIC;  Surgeon: Ramiro Saldivar MD;  Location:  MAIN OR;  Service: Orthopedics    TONSILLECTOMY       Family History   Problem Relation Age of Onset    Stroke Father         CVA    Ovarian cancer Mother      Social History     Tobacco Use   Smoking Status Never Smoker   Smokeless Tobacco Never Used     Social History     Substance and Sexual Activity   Alcohol Use Yes    Comment: rarely, social       Social History     Substance and Sexual Activity   Drug Use No         Current Outpatient Medications   Medication Sig Dispense Refill    amLODIPine (NORVASC) 10 mg tablet TAKE 1 TABLET BY MOUTH EVERY DAY 90 tablet 1    aspirin 81 mg chewable tablet Chew 81 mg daily   atorvastatin (LIPITOR) 10 mg tablet Take 10 mg by mouth daily   Biotin 1000 MCG tablet Take 1,000 mcg by mouth 2 (two) times a day        Calcium 500 MG tablet Take 1 tablet by mouth daily        cholecalciferol (VITAMIN D3) 1,000 units tablet Take 1 tablet by mouth daily      dorzolamide-timolol (COSOPT) 22 3-6 8 MG/ML ophthalmic solution Apply to eye      Flaxseed, Linseed, (FLAX SEED OIL) 1000 MG CAPS Take by mouth   levothyroxine (SYNTHROID) 100 mcg tablet 1 tab daily  BRAND NECESSARY  90 tablet 3    lisinopril (ZESTRIL) 10 mg tablet Take 1 tablet by mouth daily      metoprolol tartrate (LOPRESSOR) 25 mg tablet TAKE 1 & 1/2 TABLET BY MOUTH TWICE A  tablet 1    Multiple Vitamins-Minerals (CENTRUM SILVER ADULT 50+ PO) Take by mouth   Omega-3 Fatty Acids (FISH OIL) 1,000 mg Take 1,000 mg by mouth daily   ranitidine (ZANTAC) 150 mg tablet Take 1 tablet (150 mg total) by mouth 2 (two) times a day 60 tablet 5     No current facility-administered medications for this visit  Allergies   Allergen Reactions    Cyclogyl [Cyclopentolate Hcl]     Phenylephrine     Pred Forte [Prednisolone Acetate]      Immunization History   Administered Date(s) Administered    INFLUENZA 12/09/2004, 09/29/2014, 10/16/2015, 09/20/2016, 10/06/2017    Influenza Split High Dose Preservative Free IM 09/27/2012, 09/29/2014, 10/16/2015, 09/20/2016, 10/12/2018    Pneumococcal Conjugate 13-Valent 10/16/2015    Pneumococcal Polysaccharide PPV23 06/14/2000, 04/16/2007    Zoster 05/01/2013       Patient Care Team:  Debbie Frederick DO as PCP - General  Sajan Angelo DO as PCP - Endocrinology (Endocrinology)  MD Dariana Grant DO as Cardiologist (Cardiology)  Anthony Araujo MD as Consulting Physician (Ophthalmology)  Monica Davis MD as Consulting Physician (Vascular Surgery)    Medicare Screening Tests and Risk Assessments:  Janeth Huang is here for her Subsequent Wellness visit  Last Medicare Wellness visit information reviewed, patient interviewed and updates made to the record today  Health Risk Assessment:  Patient rates overall health as very good  Patient feels that their physical health rating is Same  Eyesight was rated as Same   Hearing was rated as Same  Patient feels that their emotional and mental health rating is Same  Pain experienced by patient in the last 7 days has been None  Patient states that she has experienced no weight loss or gain in last 6 months  Emotional/Mental Health:  Patient has been feeling nervous/anxious  PHQ-9 Depression Screening:    Frequency of the following problems over the past two weeks:      1  Little interest or pleasure in doing things: 0 - not at all      2  Feeling down, depressed, or hopeless: 0 - not at all  PHQ-2 Score: 0          Broken Bones/Falls: Fall Risk Assessment:    In the past year, patient has experienced: No history of falling in past year  visual disturbance    Bladder/Bowel:  Patient has leaked urine accidently in the last six months  Patient reports no loss of bowel control  Immunizations:  Patient has had a flu vaccination within the last year  Patient has received a pneumonia shot  Patient has received a shingles shot  Patient has not received tetanus/diphtheria shot  Home Safety:  Patient does not have trouble with stairs inside or outside of their home  Patient currently reports that there are safety hazards present in home , working smoke alarms, working carbon monoxide detectors  Preventative Screenings:   Breast cancer screening performed, 8/30/2017  colon cancer screen completed, 2/16/2017  cholesterol screen completed, 8/8/2018  glaucoma eye exam completed,     Nutrition:  Current diet: Regular, Limited junk food and No Added Salt with servings of the following:    Medications:  Patient is currently taking over-the-counter supplements  List of OTC medications includes: D and calcium, fish oil, multivitamin  Patient is able to manage medications  Lifestyle Choices:  Patient reports no tobacco use  Patient has not smoked or used tobacco in the past   Patient reports alcohol use  Alcohol use per week: 0-1  Patient drives a vehicle    Patient wears seat belt  Current level of exercise of physical activity described by patient as: walks when weather is nice  Activities of Daily Living:  Can get out of bed by his or her self, able to dress self, able to make own meals, able to do own shopping, able to bathe self, can do own laundry/housekeeping, can manage own money, pay bills and track expenses    Previous Hospitalizations:  No hospitalization or ED visit in past 12 months        Advanced Directives:  Patient has decided on a power of   Patient has spoken to designated power of   Patient has completed advanced directive          Preventative Screening/Counseling:      Cardiovascular:      General: Risks and Benefits Discussed and Screening Current      Counseling: Healthy Diet, Healthy Weight and Improve Exercise Tolerance          Diabetes:      General: Risks and Benefits Discussed and Screening Current      Counseling: Improve Physical Activity, Healthy Weight and Healthy Diet          Colorectal Cancer:      General: Risks and Benefits Discussed and Screening Not Indicated          Breast Cancer:      General: Risks and Benefits Discussed and Screening Not Indicated          Cervical Cancer:      General: Risks and Benefits Discussed and Screening Not Indicated          Osteoporosis:      General: Risks and Benefits Discussed and Screening Not Indicated          AAA:      General: Risks and Benefits Discussed and Screening Not Indicated          Glaucoma:      General: Risks and Benefits Discussed and Screening Current          HIV:      General: Risks and Benefits Discussed and Patient Declines          Hepatitis C:      General: Risks and Benefits Discussed and Patient Declines        Advanced Directives:   Patient has living will for healthcare, has durable POA for healthcare,     Immunizations:      Influenza: Risks & Benefits Discussed      Pneumococcal: Risks & Benefits Discussed and Lifetime Vaccine Completed Shingrix: Risks & Benefits Discussed and Shingrix Vaccine Needed Today      Hepatitis B (Medium to high risk patients): Risks & Benefits Discussed and Vaccine Status Unknown      Zostavax: Risks & Benefits Discussed and Zostavax Vaccine UTD      TD: Risks & Benefits Discussed and Patient Declines      Other Preventative Counseling (Non-Medicare): Increase physical activity, Car/seat belt/driving safety reviewed and Sunscreen use           Visual Acuity Screening    Right eye Left eye Both eyes   Without correction:      With correction: 20/40 20/40 20/40       Physical Exam:  Review of Systems   Constitutional: Negative for chills, fever and unexpected weight change  HENT: Negative for congestion and sore throat  Eyes: Negative for pain, discharge and visual disturbance  Respiratory: Negative for cough, shortness of breath and wheezing  Cardiovascular: Negative for chest pain, palpitations and leg swelling  Gastrointestinal: Negative for abdominal pain, blood in stool, bowel incontinence, constipation, diarrhea, nausea and vomiting  Endocrine: Negative for polydipsia and polyuria  Genitourinary: Negative for difficulty urinating and dysuria  Musculoskeletal: Positive for arthralgias  Negative for back pain and neck pain  Skin: Negative for rash and wound  Neurological: Negative for dizziness, syncope, light-headedness and headaches  Hematological: Negative for adenopathy  Psychiatric/Behavioral: Negative for behavioral problems and confusion  The patient is not nervous/anxious  Vitals:    03/07/19 1346   BP: 118/62   BP Location: Right arm   Patient Position: Sitting   Cuff Size: Standard   Pulse: 60   Temp: 97 8 °F (36 6 °C)   Weight: 56 7 kg (125 lb)   Height: 4' 9" (1 448 m)   Body mass index is 27 05 kg/m²  Physical Exam   Constitutional: She appears well-developed and well-nourished  No distress  HENT:   Head: Atraumatic     Right Ear: External ear normal    Left Ear: External ear normal    Mouth/Throat: Oropharynx is clear and moist    B/L hearing aides removed for exam   Eyes: Conjunctivae are normal  Right eye exhibits no discharge  Left eye exhibits no discharge  Neck: Neck supple  No tracheal deviation present  Cardiovascular: Normal rate, regular rhythm and normal heart sounds  No murmur heard  Neg carotid bruits B/L   Pulmonary/Chest: Effort normal and breath sounds normal  No respiratory distress  She has no wheezes  She has no rales  Abdominal: Soft  There is no tenderness  There is no guarding  Musculoskeletal: She exhibits no edema or deformity  Lymphadenopathy:     She has no cervical adenopathy  Neurological: She is alert  She exhibits normal muscle tone  Skin: Skin is warm and dry  No rash noted  Psychiatric: She has a normal mood and affect  Her behavior is normal  Judgment normal    Nursing note and vitals reviewed

## 2019-03-07 NOTE — ASSESSMENT & PLAN NOTE
Pt w/o current LE symptoms and recent SHIMON showed no significant blockage, pt was taken off her Cilostazol, she is on ASA/statin

## 2019-03-17 DIAGNOSIS — E78.5 DYSLIPIDEMIA: Primary | ICD-10-CM

## 2019-03-17 DIAGNOSIS — I10 ESSENTIAL HYPERTENSION: ICD-10-CM

## 2019-03-17 RX ORDER — LISINOPRIL 10 MG/1
TABLET ORAL
Qty: 90 TABLET | Refills: 0 | Status: SHIPPED | OUTPATIENT
Start: 2019-03-17 | End: 2019-06-12 | Stop reason: SDUPTHER

## 2019-03-17 RX ORDER — ATORVASTATIN CALCIUM 10 MG/1
TABLET, FILM COATED ORAL
Qty: 90 TABLET | Refills: 0 | Status: SHIPPED | OUTPATIENT
Start: 2019-03-17 | End: 2019-06-17 | Stop reason: SDUPTHER

## 2019-03-31 DIAGNOSIS — I10 ESSENTIAL HYPERTENSION: ICD-10-CM

## 2019-03-31 RX ORDER — AMLODIPINE BESYLATE 10 MG/1
TABLET ORAL
Qty: 90 TABLET | Refills: 1 | Status: SHIPPED | OUTPATIENT
Start: 2019-03-31 | End: 2019-09-26 | Stop reason: SDUPTHER

## 2019-06-12 DIAGNOSIS — I10 ESSENTIAL HYPERTENSION: ICD-10-CM

## 2019-06-12 RX ORDER — LISINOPRIL 10 MG/1
TABLET ORAL
Qty: 90 TABLET | Refills: 0 | Status: SHIPPED | OUTPATIENT
Start: 2019-06-12 | End: 2019-09-14 | Stop reason: SDUPTHER

## 2019-06-17 DIAGNOSIS — E78.5 DYSLIPIDEMIA: ICD-10-CM

## 2019-06-17 RX ORDER — ATORVASTATIN CALCIUM 10 MG/1
TABLET, FILM COATED ORAL
Qty: 90 TABLET | Refills: 1 | Status: SHIPPED | OUTPATIENT
Start: 2019-06-17 | End: 2019-12-11 | Stop reason: SDUPTHER

## 2019-07-30 ENCOUNTER — OFFICE VISIT (OUTPATIENT)
Dept: FAMILY MEDICINE CLINIC | Facility: HOSPITAL | Age: 84
End: 2019-07-30
Payer: MEDICARE

## 2019-07-30 ENCOUNTER — HOSPITAL ENCOUNTER (OUTPATIENT)
Dept: NON INVASIVE DIAGNOSTICS | Facility: HOSPITAL | Age: 84
Discharge: HOME | End: 2019-07-30
Payer: MEDICARE

## 2019-07-30 ENCOUNTER — OFFICE VISIT (OUTPATIENT)
Dept: ENDOCRINOLOGY | Facility: HOSPITAL | Age: 84
End: 2019-07-30
Payer: MEDICARE

## 2019-07-30 VITALS
SYSTOLIC BLOOD PRESSURE: 122 MMHG | TEMPERATURE: 98 F | HEIGHT: 57 IN | BODY MASS INDEX: 27.18 KG/M2 | HEART RATE: 64 BPM | DIASTOLIC BLOOD PRESSURE: 68 MMHG | WEIGHT: 126 LBS

## 2019-07-30 VITALS
HEART RATE: 68 BPM | BODY MASS INDEX: 27.4 KG/M2 | SYSTOLIC BLOOD PRESSURE: 100 MMHG | DIASTOLIC BLOOD PRESSURE: 50 MMHG | WEIGHT: 127 LBS | HEIGHT: 57 IN

## 2019-07-30 DIAGNOSIS — M79.89 PAIN AND SWELLING OF LEFT LOWER EXTREMITY: ICD-10-CM

## 2019-07-30 DIAGNOSIS — E03.8 HYPOTHYROIDISM DUE TO HASHIMOTO'S THYROIDITIS: ICD-10-CM

## 2019-07-30 DIAGNOSIS — M79.605 PAIN AND SWELLING OF LEFT LOWER EXTREMITY: Primary | ICD-10-CM

## 2019-07-30 DIAGNOSIS — M79.89 PAIN AND SWELLING OF LEFT LOWER EXTREMITY: Primary | ICD-10-CM

## 2019-07-30 DIAGNOSIS — E06.3 HYPOTHYROIDISM DUE TO HASHIMOTO'S THYROIDITIS: ICD-10-CM

## 2019-07-30 DIAGNOSIS — M79.605 PAIN AND SWELLING OF LEFT LOWER EXTREMITY: ICD-10-CM

## 2019-07-30 DIAGNOSIS — Z86.39 HISTORY OF HYPERPARATHYROIDISM: ICD-10-CM

## 2019-07-30 DIAGNOSIS — E04.1 THYROID NODULE: Primary | ICD-10-CM

## 2019-07-30 PROCEDURE — 99214 OFFICE O/P EST MOD 30 MIN: CPT | Performed by: INTERNAL MEDICINE

## 2019-07-30 PROCEDURE — 93971 EXTREMITY STUDY: CPT

## 2019-07-30 NOTE — PROGRESS NOTES
7/30/2019    Assessment/Plan      Diagnoses and all orders for this visit:    Thyroid nodule  -     US thyroid; Future    Hypothyroidism due to Hashimoto's thyroiditis  -     TSH, 3rd generation Lab Collect; Future  -     T4, free Lab Collect; Future    History of hyperparathyroidism  -     Comprehensive metabolic panel Lab Collect; Future  -     Vitamin D 25 hydroxy Lab Collect; Future  -     PTH, intact- Lab Collect; Future        Assessment/Plan:  1  Hypothyroidism due to Hashimoto's thyroiditis:  Clinically and biochemically doing well on current regimen  Follow-up in 1 year with another TSH and free T4 before that appointment  2  Thyroid nodules: The dominant right-sided nodule looks stable on recent ultrasound  The dominant left-sided nodule has grown slightly and only 1 dimension  I have suggested we repeat an ultrasound in September which will be 6 months from prior ultrasound  We will call her with the results  If this looks stable, we will in repeat an ultrasound prior to her next appointment which will be in 1 year  3  History of hyperparathyroidism status post 2 surgeries:  Recent CMP, vitamin-D, PTH level looks stable  Recheck labs in 1 year  CC: Follow-up    History of Present Illness     HPI: Valentine Werner is a 80y o  year old female presents for a follow-up appointment of hypothyroidism due to Hashimoto's thyroiditis, thyroid nodule, history of hyperparathyroidism  She is maintained on Synthroid brand 100 mcg daily  She also has a history of a thyroid nodule without any family history of thyroid nodules or cancer to her knowledge and no personal history of head or neck radiation  In the past she had 2 surgeries to cure primary hyperparathyroidism with a 2nd being at the [place] [id number]  Regarding her thyroid nodule in 2004 at [facility] she had a fine-needle aspiration biopsy of a thyroid nodule with results consistent with cellulitis goiter    The report did not state which nodule specifically biopsy  She presents today overall feeling well without any new health issues or symptoms of concern  No neck compressive symptoms  Review of Systems   Constitutional: Negative for fatigue  HENT: Negative for trouble swallowing and voice change  Eyes: Negative for visual disturbance  Respiratory: Negative for shortness of breath  Cardiovascular: Negative for palpitations and leg swelling  Gastrointestinal: Negative for abdominal pain, nausea and vomiting  Endocrine: Negative for polydipsia and polyuria  Musculoskeletal: Negative for arthralgias and myalgias  Skin: Negative for rash  Neurological: Negative for dizziness, tremors and weakness  Hematological: Negative for adenopathy  Psychiatric/Behavioral: Negative for agitation and confusion         Historical Information   Past Medical History:   Diagnosis Date    BCC (basal cell carcinoma)     Diastolic dysfunction     Dyslipidemia     Dysphagia     Glaucoma     Hashimoto's thyroiditis     Hyperparathyroidism ([address] )     Hypertension     Impaired fasting glucose     Insomnia     Osteopenia     SCCA (squamous cell carcinoma) of skin      Past Surgical History:   Procedure Laterality Date    CATARACT EXTRACTION, BILATERAL      COLONOSCOPY      Complete, Onset - 10/25/05 - 10 years     HYSTERECTOMY      PARATHYROIDECTOMY      AL WRIST Ladean Ryder LIG Right 5/2/2016    Procedure: RELEASE CARPAL TUNNEL ENDOSCOPIC;  Surgeon: Kiana Prajapati MD;  Location: [facility];  Service: Orthopedics    TONSILLECTOMY       Social History   Social History     Substance and Sexual Activity   Alcohol Use Yes    Comment: rarely, social      Social History     Substance and Sexual Activity   Drug Use No     Social History     Tobacco Use   Smoking Status Never Smoker   Smokeless Tobacco Never Used     Family History:   Family History   Problem Relation Age of Onset    Stroke Father         CVA    Ovarian cancer Mother        Meds/Allergies   Current Outpatient Medications   Medication Sig Dispense Refill    amLODIPine (NORVASC) 10 mg tablet TAKE 1 TABLET BY MOUTH EVERY DAY 90 tablet 1    aspirin 81 mg chewable tablet Chew 81 mg daily   atorvastatin (LIPITOR) 10 mg tablet TAKE 1 TABLET BY MOUTH EVERY DAY 90 tablet 1    Biotin 1000 MCG tablet Take 1,000 mcg by mouth 2 (two) times a day   Calcium 500 MG tablet Take 1 tablet by mouth daily        cholecalciferol (VITAMIN D3) 1,000 units tablet Take 1 tablet by mouth daily      dorzolamide-timolol (COSOPT) 22 3-6 8 MG/ML ophthalmic solution Apply to eye      levothyroxine (SYNTHROID) 100 mcg tablet 1 tab daily  BRAND NECESSARY  90 tablet 3    lisinopril (ZESTRIL) 10 mg tablet TAKE 1 TABLET DAILY  90 tablet 0    metoprolol tartrate (LOPRESSOR) 25 mg tablet TAKE 1 & 1/2 TABLET BY MOUTH TWICE A  tablet 1    Multiple Vitamins-Minerals (CENTRUM SILVER ADULT 50+ PO) Take by mouth   Omega-3 Fatty Acids (FISH OIL) 1,000 mg Take 1,000 mg by mouth daily   ranitidine (ZANTAC) 150 mg tablet Take 1 tablet (150 mg total) by mouth 2 (two) times a day 60 tablet 5    Flaxseed, Linseed, (FLAX SEED OIL) 1000 MG CAPS Take by mouth  No current facility-administered medications for this visit  Allergies   Allergen Reactions    Cyclogyl [Cyclopentolate Hcl]     Phenylephrine     Pred Forte [Prednisolone Acetate]        Objective   Vitals: Blood pressure 100/50, pulse 68, height 4' 9" (1 448 m), weight 57 6 kg (127 lb)  Invasive Devices     None                 Physical Exam   Constitutional: She is oriented to person, place, and time  She appears well-developed and well-nourished  No distress  HENT:   Head: Normocephalic and atraumatic  Neck: Normal range of motion  Neck supple  No thyromegaly present  Cardiovascular: Normal rate and regular rhythm  Pulmonary/Chest: Effort normal and breath sounds normal  No respiratory distress  Abdominal: Soft  Bowel sounds are normal    Musculoskeletal: Normal range of motion  She exhibits no edema  Neurological: She is alert and oriented to person, place, and time  She exhibits normal muscle tone  Skin: Skin is warm and dry  No rash noted  She is not diaphoretic  Psychiatric: She has a normal mood and affect  Her behavior is normal    Vitals reviewed  The history was obtained from the review of the chart and from the patient  Lab Results:      Recent Results (from the past 98263 hour(s))   Hemoglobin A1C    Collection Time: 07/09/18 12:00 AM   Result Value Ref Range    Hemoglobin A1C 5 9    Hemoglobin A1C    Collection Time: 02/26/19 12:00 AM   Result Value Ref Range    Hemoglobin A1C 6 0      Recent ultrasound from March of 2019 [facility] she does dominant nodule now measuring 1 8 cm in largest dimension in previously 1 5 cm  Recent labs from [facility] on 07/16/2019:  Sodium 143, potassium 4 8, BUN 23, creatinine 0 99, GFR 53, calcium 10 1, albumin 3 9, liver function within normal limits, TSH 0 504, free T4 1 93, 25 hydroxy vitamin-D 61 5, PTH 48  Future Appointments   Date Time Provider Cassandra Parekh   7/30/2019 12:30 PM QU [facility] 1 [facility]   9/12/2019 10:00 AM DO DR GERALDINE [facility]       Portions of the record may have been created with voice recognition software  Occasional wrong word or "sound a like" substitutions may have occurred due to the inherent limitations of voice recognition software  Read the chart carefully and recognize, using context, where substitutions have occurred

## 2019-07-30 NOTE — PROGRESS NOTES
Assessment/Plan:     Diagnoses and all orders for this visit:    Pain and swelling of left lower extremity  Comments:  More consistent with ruptured Baker's cyst which pt has a h/o but need to eval for DVT/SVT as well, OA of knee is in differential as well, Vascular called and pt scheduled for LLE venous dupex at 1230 this afternoon - will treat accordingly, advised to go to ER with sudden SOB/CP/hemoptysis/dizziness  Orders:  -     VAS lower limb venous duplex study, unilateral/limited; Future          Subjective:      Patient ID: Phillip Monday is a 80 y o  female  HPI Pt here with c/o L posterior knee pain x 2 mos  She notes no specific fall/injury/new acitivty  She states she could have done something to her knee during chair yoga but does not recall a specific event  She has never had knee problems or surgery  The pain is the back of the knee and is a "dull ache" and she states "my leg feels heavy"  Symptoms are worse walking up and down steps  She notes resting helps the pain  She notes no pain at hip or ankle  She notes the knee has felt it may give out but she has not actually fallen  She notes no locking up  She notes the whole leg and foot are swollen  She notes intermittent pain in the calf  She has had no recent long car trips or pane trips prior to the symptoms starting  She has never personally had a blood clot and other then her fathers family with mult strokes she is not aware of any family h/o clots  She has tried an OTC cream with some benefit  She denies any cough/SOB/CP/hemoptysis  Review of Systems   Constitutional: Negative for chills and fever  HENT: Negative for congestion and sore throat  Respiratory: Negative for cough, shortness of breath and wheezing  Cardiovascular: Positive for leg swelling  Negative for chest pain and palpitations  Gastrointestinal: Negative for abdominal pain, diarrhea and nausea     Genitourinary: Negative for difficulty urinating and dysuria  Musculoskeletal: Positive for arthralgias and joint swelling  Negative for back pain and neck pain  Skin: Negative for rash and wound  Neurological: Negative for dizziness, numbness and headaches  Hematological: Negative for adenopathy  Psychiatric/Behavioral: Negative for behavioral problems and confusion  Objective:    /68 (BP Location: Right arm, Patient Position: Sitting, Cuff Size: Standard)   Pulse 64   Temp 98 °F (36 7 °C)   Ht 4' 9" (1 448 m)   Wt 57 2 kg (126 lb)   BMI 27 27 kg/m²      Physical Exam   Constitutional: She appears well-developed and well-nourished  No distress  HENT:   Head: Normocephalic and atraumatic  Eyes: Conjunctivae are normal  Right eye exhibits no discharge  Left eye exhibits no discharge  Neck: Neck supple  No tracheal deviation present  Cardiovascular: Normal rate and regular rhythm  Exam reveals no friction rub  No murmur heard  Trace LLE edema to just below patella   Pulmonary/Chest: Effort normal and breath sounds normal  No respiratory distress  She has no wheezes  She has no rales  Abdominal: Soft  There is no tenderness  There is no guarding  Musculoskeletal:   Ambulates with slight limp unassisted,   L knee: + effusion but no warmth/erythema, neg ant/post drawer and neg valgus/varus stress, + tenderness to palp at popliteal fossa and proximal gastroc region   Neurological: She is alert  She exhibits normal muscle tone  Skin: Skin is warm and dry  No rash noted  Psychiatric: She has a normal mood and affect  Her behavior is normal    Nursing note and vitals reviewed

## 2019-07-31 PROCEDURE — 93971 EXTREMITY STUDY: CPT | Performed by: SURGERY

## 2019-08-27 LAB — HBA1C MFR BLD HPLC: 5.6 %

## 2019-09-13 DIAGNOSIS — I10 ESSENTIAL HYPERTENSION: ICD-10-CM

## 2019-09-14 DIAGNOSIS — I10 ESSENTIAL HYPERTENSION: ICD-10-CM

## 2019-09-15 RX ORDER — LISINOPRIL 10 MG/1
TABLET ORAL
Qty: 90 TABLET | Refills: 1 | Status: SHIPPED | OUTPATIENT
Start: 2019-09-15 | End: 2020-03-07

## 2019-09-16 DIAGNOSIS — E04.1 THYROID NODULE: Primary | ICD-10-CM

## 2019-09-17 ENCOUNTER — OFFICE VISIT (OUTPATIENT)
Dept: FAMILY MEDICINE CLINIC | Facility: HOSPITAL | Age: 84
End: 2019-09-17
Payer: MEDICARE

## 2019-09-17 VITALS
WEIGHT: 126 LBS | SYSTOLIC BLOOD PRESSURE: 128 MMHG | BODY MASS INDEX: 27.18 KG/M2 | HEART RATE: 56 BPM | TEMPERATURE: 97.8 F | HEIGHT: 57 IN | DIASTOLIC BLOOD PRESSURE: 70 MMHG

## 2019-09-17 DIAGNOSIS — E78.5 DYSLIPIDEMIA: ICD-10-CM

## 2019-09-17 DIAGNOSIS — E21.3 HYPERPARATHYROIDISM (HCC): ICD-10-CM

## 2019-09-17 DIAGNOSIS — I10 ESSENTIAL HYPERTENSION: ICD-10-CM

## 2019-09-17 DIAGNOSIS — E66.3 OVERWEIGHT (BMI 25.0-29.9): ICD-10-CM

## 2019-09-17 DIAGNOSIS — E06.3 HASHIMOTO'S THYROIDITIS: ICD-10-CM

## 2019-09-17 DIAGNOSIS — R73.01 IMPAIRED FASTING GLUCOSE: Primary | ICD-10-CM

## 2019-09-17 PROCEDURE — 99214 OFFICE O/P EST MOD 30 MIN: CPT | Performed by: INTERNAL MEDICINE

## 2019-09-17 NOTE — PATIENT INSTRUCTIONS
Weight Management   AMBULATORY CARE:   Why it is important to manage your weight:  Being overweight increases your risk of health conditions such as heart disease, high blood pressure, type 2 diabetes, and certain types of cancer  It can also increase your risk for osteoarthritis, sleep apnea, and other respiratory problems  Aim for a slow, steady weight loss  Even a small amount of weight loss can lower your risk of health problems  How to lose weight safely:  A safe and healthy way to lose weight is to eat fewer calories and get regular exercise  You can lose up about 1 pound a week by decreasing the number of calories you eat by 500 calories each day  You can decrease calories by eating smaller portion sizes or by cutting out high-calorie foods  Read labels to find out how many calories are in the foods you eat  You can also burn calories with exercise such as walking, swimming, or biking  You will be more likely to keep weight off if you make these changes part of your lifestyle  Healthy meal plan for weight management:  A healthy meal plan includes a variety of foods, contains fewer calories, and helps you stay healthy  A healthy meal plan includes the following:  · Eat whole-grain foods more often  A healthy meal plan should contain fiber  Fiber is the part of grains, fruits, and vegetables that is not broken down by your body  Whole-grain foods are healthy and provide extra fiber in your diet  Some examples of whole-grain foods are whole-wheat breads and pastas, oatmeal, brown rice, and bulgur  · Eat a variety of vegetables every day  Include dark, leafy greens such as spinach, kale, zaki greens, and mustard greens  Eat yellow and orange vegetables such as carrots, sweet potatoes, and winter squash  · Eat a variety of fruits every day  Choose fresh or canned fruit (canned in its own juice or light syrup) instead of juice  Fruit juice has very little or no fiber  · Eat low-fat dairy foods  Drink fat-free (skim) milk or 1% milk  Eat fat-free yogurt and low-fat cottage cheese  Try low-fat cheeses such as mozzarella and other reduced-fat cheeses  · Choose meat and other protein foods that are low in fat  Choose beans or other legumes such as split peas or lentils  Choose fish, skinless poultry (chicken or turkey), or lean cuts of red meat (beef or pork)  Before you cook meat or poultry, cut off any visible fat  · Use less fat and oil  Try baking foods instead of frying them  Add less fat, such as margarine, sour cream, regular salad dressing and mayonnaise to foods  Eat fewer high-fat foods  Some examples of high-fat foods include french fries, doughnuts, ice cream, and cakes  · Eat fewer sweets  Limit foods and drinks that are high in sugar  This includes candy, cookies, regular soda, and sweetened drinks  Ways to decrease calories:   · Eat smaller portions  ¨ Use a small plate with smaller servings  ¨ Do not eat second helpings  ¨ When you eat at a restaurant, ask for a box and place half of your meal in the box before you eat  ¨ Share an entrée with someone else  · Replace high-calorie snacks with healthy, low-calorie snacks  ¨ Choose fresh fruit, vegetables, fat-free rice cakes, or air-popped popcorn instead of potato chips, nuts, or chocolate  ¨ Choose water or calorie-free drinks instead of soda or sweetened drinks  · Eat regular meals  Skipping meals can lead to overeating later in the day  Eat a healthy snack in place of a meal if you do not have time to eat a regular meal      · Do not shop for groceries when you are hungry  You may be more likely to make unhealthy food choices  Take a grocery list of healthy foods and shop after you have eaten  Exercise:  Exercise at least 30 minutes per day on most days of the week  Some examples of exercise include walking, biking, dancing, and swimming   You can also fit in more physical activity by taking the stairs instead of the elevator or parking farther away from stores  Ask your healthcare provider about the best exercise plan for you  Other things to consider as you try to lose weight:   · Be aware of situations that may give you the urge to overeat, such as eating while watching television  Find ways to avoid these situations  For example, read a book, go for a walk, or do crafts  · Meet with a weight loss support group or friends who are also trying to lose weight  This may help you stay motivated to continue working on your weight loss goals  © 2017 [facility] Information is for End User's use only and may not be sold, redistributed or otherwise used for commercial purposes  All illustrations and images included in CareNotes® are the copyrighted property of [employer]  or Ruddy Jiang  The above information is an  only  It is not intended as medical advice for individual conditions or treatments  Talk to your doctor, nurse or pharmacist before following any medical regimen to see if it is safe and effective for you  Heart Healthy Diet   AMBULATORY CARE:   A heart healthy diet  is an eating plan low in total fat, unhealthy fats, and sodium (salt)  A heart healthy diet helps decrease your risk for heart disease and stroke  Limit the amount of fat you eat to 25% to 35% of your total daily calories  Limit sodium to less than 2,300 mg each day  Healthy fats:  Healthy fats can help improve cholesterol levels  The risk for heart disease is decreased when cholesterol levels are normal  Choose healthy fats, such as the following:  · Unsaturated fat  is found in foods such as soybean, canola, olive, corn, and safflower oils  It is also found in soft tub margarine that is made with liquid vegetable oil  · Omega-3 fat  is found in certain fish, such as salmon, tuna, and trout, and in walnuts and flaxseed    Unhealthy fats:  Unhealthy fats can cause unhealthy cholesterol levels in your blood and increase your risk of heart disease  Limit unhealthy fats, such as the following:  · Cholesterol  is found in animal foods, such as eggs and lobster, and in dairy products made from whole milk  Limit cholesterol to less than 300 milligrams (mg) each day  You may need to limit cholesterol to 200 mg each day if you have heart disease  · Saturated fat  is found in meats, such as godinez and hamburger  It is also found in chicken or turkey skin, whole milk, and butter  Limit saturated fat to less than 7% of your total daily calories  Limit saturated fat to less than 6% if you have heart disease or are at increased risk for it  · Trans fat  is found in packaged foods, such as potato chips and cookies  It is also in hard margarine, some fried foods, and shortening  Avoid trans fats as much as possible    Heart healthy foods and drinks to include:  Ask your dietitian or healthcare provider how many servings to have from each of the following food groups:  · Grains:      ¨ Whole-wheat breads, cereals, and pastas, and brown rice    ¨ Low-fat, low-sodium crackers and chips    · Vegetables:      ¨ Broccoli, green beans, green peas, and spinach    ¨ Collards, kale, and lima beans    ¨ Carrots, sweet potatoes, tomatoes, and peppers    ¨ Canned vegetables with no salt added    · Fruits:      ¨ Bananas, peaches, pears, and pineapple    ¨ Grapes, raisins, and dates    ¨ Oranges, tangerines, grapefruit, orange juice, and grapefruit juice    ¨ Apricots, mangoes, melons, and papaya    ¨ Raspberries and strawberries    ¨ Canned fruit with no added sugar    · Low-fat dairy products:      ¨ Nonfat (skim) milk, 1% milk, and low-fat almond, cashew, or soy milks fortified with calcium    ¨ Low-fat cheese, regular or frozen yogurt, and cottage cheese    · Meats and proteins , such as lean cuts of beef and pork (loin, leg, round), skinless chicken and turkey, legumes, soy products, egg whites, and nuts  Foods and drinks to limit or avoid:  Ask your dietitian or healthcare provider about these and other foods that are high in unhealthy fat, sodium, and sugar:  · Snack or packaged foods , such as frozen dinners, cookies, macaroni and cheese, and cereals with more than 300 mg of sodium per serving    · Canned or dry mixes  for cakes, soups, sauces, or gravies    · Vegetables with added sodium , such as instant potatoes, vegetables with added sauces, or regular canned vegetables    · Other foods high in sodium , such as ketchup, barbecue sauce, salad dressing, pickles, olives, soy sauce, and miso    · High-fat dairy foods  such as whole or 2% milk, cream cheese, or sour cream, and cheeses     · High-fat protein foods  such as high-fat cuts of beef (T-bone steaks, ribs), chicken or turkey with skin, and organ meats, such as liver    · Cured or smoked meats , such as hot dogs, godinez, and sausage    · Unhealthy fats and oils , such as butter, stick margarine, shortening, and cooking oils such as coconut or palm oil    · Food and drinks high in sugar , such as soft drinks (soda), sports drinks, sweetened tea, candy, cake, cookies, pies, and doughnuts  Other diet guidelines to follow:   · Eat more foods containing omega-3 fats  Eat fish high in omega-3 fats at least 2 times a week  · Limit alcohol  Too much alcohol can damage your heart and raise your blood pressure  Women should limit alcohol to 1 drink a day  Men should limit alcohol to 2 drinks a day  A drink of alcohol is 12 ounces of beer, 5 ounces of wine, or 1½ ounces of liquor  · Choose low-sodium foods  High-sodium foods can lead to high blood pressure  Add little or no salt to food you prepare  Use herbs and spices in place of salt  · Eat more fiber  to help lower cholesterol levels  Eat at least 5 servings of fruits and vegetables each day  Eat 3 ounces of whole-grain foods each day  Legumes (beans) are also a good source of fiber    Lifestyle guidelines: · Do not smoke  Nicotine and other chemicals in cigarettes and cigars can cause lung and heart damage  Ask your healthcare provider for information if you currently smoke and need help to quit  E-cigarettes or smokeless tobacco still contain nicotine  Talk to your healthcare provider before you use these products  · Exercise regularly  to help you maintain a healthy weight and improve your blood pressure and cholesterol levels  Ask your healthcare provider about the best exercise plan for you  Do not start an exercise program without asking your healthcare provider  Follow up with your healthcare provider as directed:  Write down your questions so you remember to ask them during your visits  © 2017 [address] Information is for End User's use only and may not be sold, redistributed or otherwise used for commercial purposes  All illustrations and images included in CareNotes® are the copyrighted property of [employer]  or Ruddy Jiang  The above information is an  only  It is not intended as medical advice for individual conditions or treatments  Talk to your doctor, nurse or pharmacist before following any medical regimen to see if it is safe and effective for you

## 2019-09-17 NOTE — PROGRESS NOTES
Assessment/Plan:    Impaired fasting glucose  A1C improved and just wnl at 5 6 - encouraged watch sugars/carbs and increase activity and get wgt down approx 5-8 lbs, recheck BW in 6 mo - order given, if nml again will go back to annual labs    Dyslipidemia  FLP at goal, con't current statin, again diet/exercise/wgt loss encouraged    Hypertension  BP at goal, con't current meds, diet/exercise/wgt loss encouraged    Hashimoto's thyroiditis  On levothyroxine as per Endo, con't meds and labs as per Endo    Hyperparathyroidism ([facility] [address] )  Order for BW given by endo, con't current meds       Diagnoses and all orders for this visit:    Impaired fasting glucose  -     Glucose, fasting; Future  -     Hemoglobin A1C; Future    Dyslipidemia    Essential hypertension    Hashimoto's thyroiditis    Hyperparathyroidism ([facility] [address] )    Overweight (BMI 25 0-29  9)    BMI 27 0-27 9,adult  Comments:  Diet/exercise/mild wgt loss encouraged      Colonoscopy no longer needed d/t age    Mammo 2017 - pt deferring further imaging    Dexa - 8/15 - nml    BW 8/19      Subjective:      Patient ID: Robbi Mortensen is a 80 y o  female  HPI Pt here for follow up appt and BW results    BW results were d/w pt in detail: A1C 5 6, FLP at goal and CBC wnl  Def of nml vs IFG vs DM was d/w pt in detail  Diet/exercise was reviewed - wgt up 1 lb from March 2019  She was asked if she was watching sugars and carbs and she states "while I try"  She does do chair yoga for 1 hr once a week  BMI was reviewed  Goal FLP was d/w pt in detail  Diet/exercise reviewed as noted above  She is taking her Atorvastatin daily as directed w/o Se  She notes no stroke/TIA symptoms/CP  BP at goal today and meds were reviewed and no changes have occurred  She denies missing doses of meds or SE with the meds  She does not check her BP outside the office  She notes no frequent Ha's/dizziness/double vision/CP       Pt con't to follow with Endo for her thyroid nodule and hypothyroidism d/t hashimoto's thyroiditis  She has h/o hyperparathyroidism and routine labs were ordered  No changes to her thyroid meds were made and an order for thyroid US was given  Pt saw Cardio the end of Aug   Her FLP was reviewed and no meds were changed  She was told to f/u in 1 yr  She currently denies CP/palp/SOB/orthopnea  She still notes mild LLE edema but it has not changed  She is elevating the LE at rest and is watching the sodium in her diet  Colonoscopy no longer needed d/t age    Mammo 2017 - pt deferring further imaging    Dexa - 8/15 - nml    BW 8/19      Review of Systems   Constitutional: Negative for chills, fever and unexpected weight change  HENT: Negative for congestion and sore throat  Eyes: Negative for pain and visual disturbance  Respiratory: Negative for cough, shortness of breath and wheezing  Cardiovascular: Positive for leg swelling  Negative for chest pain and palpitations  Gastrointestinal: Negative for abdominal pain, blood in stool, constipation, diarrhea and nausea  Endocrine: Negative for polydipsia and polyuria  Genitourinary: Negative for difficulty urinating and dysuria  Musculoskeletal: Negative for back pain and neck pain  Skin: Negative for rash and wound  Neurological: Negative for dizziness, light-headedness and headaches  Hematological: Negative for adenopathy  Psychiatric/Behavioral: Negative for behavioral problems and confusion  Objective:    /70 (BP Location: Right arm, Patient Position: Sitting, Cuff Size: Standard)   Pulse 56   Temp 97 8 °F (36 6 °C)   Ht 4' 9" (1 448 m)   Wt 57 2 kg (126 lb)   BMI 27 27 kg/m²      Physical Exam   Constitutional: She appears well-developed and well-nourished  No distress  HENT:   Head: Normocephalic and atraumatic  Eyes: Conjunctivae are normal  Right eye exhibits no discharge  Left eye exhibits no discharge  Neck: Neck supple  No tracheal deviation present  Cardiovascular: Normal rate, regular rhythm and normal heart sounds  Exam reveals no friction rub  No murmur heard  Pulmonary/Chest: Effort normal and breath sounds normal  No respiratory distress  She has no wheezes  She has no rales  Abdominal: Soft  She exhibits no distension  There is no tenderness  There is no guarding  Musculoskeletal: She exhibits no edema  Neurological: She is alert  She exhibits normal muscle tone  Skin: Skin is warm  No rash noted  Psychiatric: She has a normal mood and affect  Her behavior is normal    Nursing note and vitals reviewed  BMI Counseling: Body mass index is 27 27 kg/m²  The BMI is above normal  Nutrition recommendations include reducing portion sizes, 3-5 servings of fruits/vegetables daily, consuming healthier snacks, moderation in carbohydrate intake, increasing intake of lean protein and reducing intake of saturated fat and trans fat  Exercise recommendations include exercising 3-5 times per week

## 2019-09-17 NOTE — ASSESSMENT & PLAN NOTE
A1C improved and just wnl at 5 6 - encouraged watch sugars/carbs and increase activity and get wgt down approx 5-8 lbs, recheck BW in 6 mo - order given, if nml again will go back to annual labs

## 2019-09-26 DIAGNOSIS — I10 ESSENTIAL HYPERTENSION: ICD-10-CM

## 2019-09-26 RX ORDER — AMLODIPINE BESYLATE 10 MG/1
TABLET ORAL
Qty: 90 TABLET | Refills: 1 | Status: SHIPPED | OUTPATIENT
Start: 2019-09-26 | End: 2020-03-21

## 2019-10-18 DIAGNOSIS — E06.3 HASHIMOTO'S THYROIDITIS: ICD-10-CM

## 2019-10-18 RX ORDER — LEVOTHYROXINE SODIUM 100 MCG
TABLET ORAL
Qty: 90 TABLET | Refills: 3 | Status: SHIPPED | OUTPATIENT
Start: 2019-10-18 | End: 2020-08-06

## 2019-12-11 DIAGNOSIS — E78.5 DYSLIPIDEMIA: ICD-10-CM

## 2019-12-11 RX ORDER — ATORVASTATIN CALCIUM 10 MG/1
TABLET, FILM COATED ORAL
Qty: 90 TABLET | Refills: 1 | Status: SHIPPED | OUTPATIENT
Start: 2019-12-11 | End: 2020-06-01

## 2020-03-02 ENCOUNTER — OFFICE VISIT (OUTPATIENT)
Dept: FAMILY MEDICINE CLINIC | Facility: HOSPITAL | Age: 85
End: 2020-03-02
Payer: MEDICARE

## 2020-03-02 VITALS
HEIGHT: 57 IN | WEIGHT: 128 LBS | DIASTOLIC BLOOD PRESSURE: 62 MMHG | TEMPERATURE: 99 F | BODY MASS INDEX: 27.61 KG/M2 | SYSTOLIC BLOOD PRESSURE: 118 MMHG | HEART RATE: 68 BPM | OXYGEN SATURATION: 95 %

## 2020-03-02 DIAGNOSIS — J98.8 BACTERIAL RESPIRATORY INFECTION: Primary | ICD-10-CM

## 2020-03-02 DIAGNOSIS — B96.89 BACTERIAL RESPIRATORY INFECTION: Primary | ICD-10-CM

## 2020-03-02 PROCEDURE — 99213 OFFICE O/P EST LOW 20 MIN: CPT | Performed by: INTERNAL MEDICINE

## 2020-03-02 PROCEDURE — 1160F RVW MEDS BY RX/DR IN RCRD: CPT | Performed by: INTERNAL MEDICINE

## 2020-03-02 PROCEDURE — 4040F PNEUMOC VAC/ADMIN/RCVD: CPT | Performed by: INTERNAL MEDICINE

## 2020-03-02 PROCEDURE — 3074F SYST BP LT 130 MM HG: CPT | Performed by: INTERNAL MEDICINE

## 2020-03-02 PROCEDURE — 3078F DIAST BP <80 MM HG: CPT | Performed by: INTERNAL MEDICINE

## 2020-03-02 PROCEDURE — 1036F TOBACCO NON-USER: CPT | Performed by: INTERNAL MEDICINE

## 2020-03-02 PROCEDURE — 3008F BODY MASS INDEX DOCD: CPT | Performed by: INTERNAL MEDICINE

## 2020-03-02 RX ORDER — BENZONATATE 100 MG/1
100 CAPSULE ORAL 3 TIMES DAILY PRN
Qty: 30 CAPSULE | Refills: 0 | Status: SHIPPED | OUTPATIENT
Start: 2020-03-02 | End: 2020-03-17

## 2020-03-02 RX ORDER — CEFUROXIME AXETIL 250 MG/1
250 TABLET ORAL EVERY 12 HOURS SCHEDULED
Qty: 14 TABLET | Refills: 0 | Status: SHIPPED | OUTPATIENT
Start: 2020-03-02 | End: 2020-03-09

## 2020-03-02 NOTE — PROGRESS NOTES
Assessment/Plan:     Diagnoses and all orders for this visit:    Bacterial respiratory infection  Comments:  D/t severity of symptoms and abnormal exam will start Ceftin bid x 7 days and rx Tessalon Perles for cough,   Rest/fluids/lozenges/hot tea/garles were recommended; d/w pt that cough can last 4-6 wks but call with new/worsening symptoms OR if not any better by end of abx    Orders:  -     cefuroxime (CEFTIN) 250 mg tablet; Take 1 tablet (250 mg total) by mouth every 12 (twelve) hours for 7 days  -     benzonatate (TESSALON PERLES) 100 mg capsule; Take 1 capsule (100 mg total) by mouth 3 (three) times a day as needed for cough          Subjective:      Patient ID: Vanessa Quijano is a 80 y o  female  HPI Pt here with c/o 5 days of cough  She notes no nasal congestion/runny nose and notes very little post nasal drip  She notes no ST/ear pain/N/V/D/urinary symptoms/rashes/HA's/sinus pain  She has some wheezing with the cough but no SOB or F/C  She has had no sick contacts/recent travel  She has not taken anything other then honey and Vicks  Review of Systems   Constitutional: Negative for chills and fever  HENT: Negative for congestion, ear pain and sore throat  Eyes: Negative for redness and itching  Respiratory: Positive for cough and wheezing  Negative for shortness of breath  Cardiovascular: Negative for chest pain and palpitations  Gastrointestinal: Negative for diarrhea and nausea  Genitourinary: Negative for difficulty urinating and dysuria  Musculoskeletal: Negative for arthralgias and myalgias  Skin: Negative for rash and wound  Neurological: Negative for dizziness and headaches  Hematological: Negative for adenopathy  Psychiatric/Behavioral: Negative for behavioral problems and confusion           Objective:    /62 (BP Location: Right arm, Patient Position: Sitting, Cuff Size: Standard)   Pulse 68   Temp 99 °F (37 2 °C)   Ht 4' 9" (1 448 m)   Wt 58 1 kg (128 lb)   SpO2 95%   BMI 27 70 kg/m²      Physical Exam   Constitutional: She appears well-developed and well-nourished  No distress  HENT:   Head: Normocephalic and atraumatic  Mouth/Throat: Oropharynx is clear and moist  No oropharyngeal exudate  B/L TM occluded by cerumen   Eyes: Conjunctivae are normal  Right eye exhibits no discharge  Left eye exhibits no discharge  Neck: Neck supple  No tracheal deviation present  Cardiovascular: Normal rate, regular rhythm and normal heart sounds  Exam reveals no friction rub  No murmur heard  Pulmonary/Chest: Effort normal  No respiratory distress  She has wheezes  She has rales  Abdominal: Soft  She exhibits no distension  There is no tenderness  There is no rebound and no guarding  Musculoskeletal: She exhibits no edema  Lymphadenopathy:     She has no cervical adenopathy  Neurological: She is alert  She exhibits normal muscle tone  Skin: Skin is warm and dry  No rash noted  Psychiatric: She has a normal mood and affect  Her behavior is normal    Nursing note and vitals reviewed

## 2020-03-03 LAB — HBA1C MFR BLD HPLC: 5.9 %

## 2020-03-04 ENCOUNTER — TELEPHONE (OUTPATIENT)
Dept: FAMILY MEDICINE CLINIC | Facility: HOSPITAL | Age: 85
End: 2020-03-04

## 2020-03-04 NOTE — TELEPHONE ENCOUNTER
Pt saw Dr Danny Benjamin on Monday  Antibiotic not working, 99 7 fever, coughing, running nose, tired  Can she get something from [facility]    PCB

## 2020-03-04 NOTE — TELEPHONE ENCOUNTER
Spoke to patient  Started antibiotic on Monday  Recommended letting the antibiotic work and call when finished if not better  Patient agreeable

## 2020-03-07 DIAGNOSIS — I10 ESSENTIAL HYPERTENSION: ICD-10-CM

## 2020-03-07 RX ORDER — LISINOPRIL 10 MG/1
TABLET ORAL
Qty: 90 TABLET | Refills: 1 | Status: SHIPPED | OUTPATIENT
Start: 2020-03-07 | End: 2020-08-30

## 2020-03-17 ENCOUNTER — OFFICE VISIT (OUTPATIENT)
Dept: FAMILY MEDICINE CLINIC | Facility: HOSPITAL | Age: 85
End: 2020-03-17
Payer: MEDICARE

## 2020-03-17 VITALS
TEMPERATURE: 97.8 F | BODY MASS INDEX: 26.75 KG/M2 | HEIGHT: 57 IN | DIASTOLIC BLOOD PRESSURE: 62 MMHG | SYSTOLIC BLOOD PRESSURE: 110 MMHG | OXYGEN SATURATION: 90 % | HEART RATE: 61 BPM | WEIGHT: 124 LBS

## 2020-03-17 DIAGNOSIS — C43.59 MALIGNANT MELANOMA OF TORSO EXCLUDING BREAST (HCC): ICD-10-CM

## 2020-03-17 DIAGNOSIS — E04.1 THYROID NODULE: ICD-10-CM

## 2020-03-17 DIAGNOSIS — Z00.00 MEDICARE ANNUAL WELLNESS VISIT, SUBSEQUENT: ICD-10-CM

## 2020-03-17 DIAGNOSIS — E78.5 DYSLIPIDEMIA: ICD-10-CM

## 2020-03-17 DIAGNOSIS — R73.01 IMPAIRED FASTING GLUCOSE: Primary | ICD-10-CM

## 2020-03-17 DIAGNOSIS — I10 ESSENTIAL HYPERTENSION: ICD-10-CM

## 2020-03-17 PROCEDURE — 1160F RVW MEDS BY RX/DR IN RCRD: CPT | Performed by: INTERNAL MEDICINE

## 2020-03-17 PROCEDURE — 3074F SYST BP LT 130 MM HG: CPT | Performed by: INTERNAL MEDICINE

## 2020-03-17 PROCEDURE — 1125F AMNT PAIN NOTED PAIN PRSNT: CPT | Performed by: INTERNAL MEDICINE

## 2020-03-17 PROCEDURE — 99214 OFFICE O/P EST MOD 30 MIN: CPT | Performed by: INTERNAL MEDICINE

## 2020-03-17 PROCEDURE — 4040F PNEUMOC VAC/ADMIN/RCVD: CPT | Performed by: INTERNAL MEDICINE

## 2020-03-17 PROCEDURE — G0438 PPPS, INITIAL VISIT: HCPCS | Performed by: INTERNAL MEDICINE

## 2020-03-17 PROCEDURE — 1036F TOBACCO NON-USER: CPT | Performed by: INTERNAL MEDICINE

## 2020-03-17 PROCEDURE — 1170F FXNL STATUS ASSESSED: CPT | Performed by: INTERNAL MEDICINE

## 2020-03-17 PROCEDURE — 3078F DIAST BP <80 MM HG: CPT | Performed by: INTERNAL MEDICINE

## 2020-03-17 PROCEDURE — 3008F BODY MASS INDEX DOCD: CPT | Performed by: INTERNAL MEDICINE

## 2020-03-17 NOTE — PROGRESS NOTES
Assessment/Plan:    Impaired fasting glucose  Slightly improved, urged to con't low sugar/carb diet and increase yoga to 3 days a week, recheck BW in 6 mo - order given    Hypertension  BP at goal, con't current meds    Thyroid nodule  Has order from Endo to repeat US in July    Malignant melanoma of torso excluding breast ([facility] [address] )  Follows with Dr Nemo Duncan once a year - pt notes no new skin lesions, encouraged sun avoidance or sun screen use    Dyslipidemia  Order for FLP given to be done with next labs, con't current statin for now       Diagnoses and all orders for this visit:    Impaired fasting glucose  -     CBC and differential; Future  -     Comprehensive metabolic panel; Future  -     Hemoglobin A1C; Future  -     Lipid panel; Future    Essential hypertension  -     CBC and differential; Future  -     Comprehensive metabolic panel; Future  -     Hemoglobin A1C; Future  -     Lipid panel; Future    Medicare annual wellness visit, subsequent  -     CBC and differential; Future  -     Comprehensive metabolic panel; Future  -     Hemoglobin A1C; Future  -     Lipid panel; Future    Dyslipidemia  -     CBC and differential; Future  -     Comprehensive metabolic panel; Future  -     Hemoglobin A1C; Future  -     Lipid panel; Future    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)  -     CBC and differential; Future  -     Comprehensive metabolic panel; Future  -     Hemoglobin A1C; Future  -     Lipid panel; Future    Thyroid nodule      Colonoscopy no longer needed d/t age     Mammo 2017 - pt deferring further imaging    Dexa - 8/15 - nml    She has finished her pneumonia series     She is agreeable to [facility]      Subjective:      Patient ID: Valentino Carls is a 80 y o  female  HPI Pt here for follow up appt and BW results and AWV    BW results were d/w pt in detail: FBS/A1C 109/5 9  Def of nml vs IFG vs DM was d/w pt in detail  Diet/exercise was reviewed - wgt down 2 lbs from Sept 2019    She feels she is watching sugars and carbs but admits she could be better with the junk food  She does chair yoga 1 day a week for an hour  BP at goal today and meds were reviewed and no changes have occurred  She denies missing doses of meds or SE with the meds  She does check her BP outside the office and states it is 120's/60's  She notes no frequent Ha's/dizziness/double vision/CP  Pt con't to follow with Dr Janel Tony for her hypothyroidism and thyroid nodule  She has a repeat US of thyroid set for July  She takes her Synthroid in the am with her lisinopril  Notes no new skin lesions  Follows with Dr Vee Mendoza every year  Wears sun screen if out in sun for prolonged periods of time  She finished abx from recent URI  She still notes a mild runny nose and a rare cough  She notes no F/C/ and notes no SE with the abx  Colonoscopy no longer needed d/t age     Mammo 2017 - pt deferring further imaging    Dexa - 8/15 - nml    She has finished her pneumonia series     She is agreeable to [facility]      Review of Systems   Constitutional: Negative for chills, fever and unexpected weight change  HENT: Positive for hearing loss and rhinorrhea  Negative for congestion and sore throat  Eyes: Negative for pain and visual disturbance  Respiratory: Positive for cough  Negative for shortness of breath and wheezing  Cardiovascular: Positive for leg swelling  Negative for chest pain and palpitations  Gastrointestinal: Negative for abdominal pain, blood in stool, constipation, diarrhea, nausea and vomiting  Endocrine: Negative for polydipsia and polyuria  Genitourinary: Negative for difficulty urinating, dysuria, vaginal bleeding and vaginal pain  Musculoskeletal: Negative for back pain and neck pain  Skin: Negative for rash and wound  Neurological: Negative for dizziness, light-headedness and headaches  Hematological: Negative for adenopathy     Psychiatric/Behavioral: Negative for behavioral problems and confusion  Objective:    /62 (BP Location: Right arm, Patient Position: Sitting, Cuff Size: Standard)   Pulse 61   Temp 97 8 °F (36 6 °C)   Ht 4' 9" (1 448 m)   Wt 56 2 kg (124 lb)   SpO2 90%   BMI 26 83 kg/m²      Physical Exam   Constitutional: She appears well-developed and well-nourished  No distress  HENT:   Head: Normocephalic and atraumatic  Right Ear: External ear normal    Left Ear: External ear normal    Mouth/Throat: Oropharynx is clear and moist    Eyes: Conjunctivae are normal  Right eye exhibits no discharge  Left eye exhibits no discharge  Neck: Neck supple  No tracheal deviation present  Cardiovascular: Normal rate, regular rhythm and normal heart sounds  Exam reveals no friction rub  No murmur heard  Pulmonary/Chest: Effort normal and breath sounds normal  No respiratory distress  She has no wheezes  She has no rales  Abdominal: Soft  She exhibits no distension  There is no tenderness  There is no rebound and no guarding  Musculoskeletal: She exhibits no edema  Lymphadenopathy:     She has no cervical adenopathy  Neurological: She is alert  She exhibits normal muscle tone  Skin: Skin is warm and dry  No rash noted  Psychiatric: She has a normal mood and affect  Her behavior is normal    Nursing note and vitals reviewed

## 2020-03-17 NOTE — PROGRESS NOTES
Assessment and Plan:     Problem List Items Addressed This Visit        Endocrine    Impaired fasting glucose - Primary       Cardiovascular and Mediastinum    Hypertension      Other Visit Diagnoses     Medicare annual wellness visit, subsequent               Preventive health issues were discussed with patient, and age appropriate screening tests were ordered as noted in patient's After Visit Summary  Personalized health advice and appropriate referrals for health education or preventive services given if needed, as noted in patient's After Visit Summary  History of Present Illness:     Patient presents for Welcome to Medicare visit  Patient Care Team:  Louisa Rachel DO as PCP - General  Angela Patel DO as PCP - Endocrinology (Endocrinology)  MD Nick Odonnell DO as Cardiologist (Cardiology)  Alec Lino MD as Consulting Physician (Ophthalmology)  Jennifer Vargas MD as Consulting Physician (Vascular Surgery)     Review of Systems:     Review of Systems   Constitutional: Negative for chills, fever and unexpected weight change  HENT: Positive for hearing loss and rhinorrhea  Negative for congestion and sore throat  Eyes: Negative for pain and visual disturbance  Respiratory: Positive for cough  Negative for shortness of breath and wheezing  Cardiovascular: Positive for leg swelling  Negative for chest pain and palpitations  Gastrointestinal: Negative for abdominal pain, blood in stool, constipation, diarrhea, nausea and vomiting  Endocrine: Negative for polydipsia and polyuria  Genitourinary: Negative for difficulty urinating, dysuria, hematuria, vaginal bleeding and vaginal pain  Musculoskeletal: Negative for back pain and neck pain  Skin: Negative for rash and wound  Neurological: Negative for dizziness, syncope, light-headedness and headaches  Hematological: Negative for adenopathy     Psychiatric/Behavioral: Negative for behavioral problems and confusion        Problem List:     Patient Active Problem List   Diagnosis    De Quervain's tenosynovitis, right    Diastolic dysfunction    Dyslipidemia    Esophageal reflux    Glaucoma    Hashimoto's thyroiditis    Hypertension    Impaired fasting glucose    Insomnia    Left ventricular hypertrophy    Mixed urge and stress incontinence    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 tuberculosis    Thyroid nodule    Peripheral arterial disease (HCC)    Malignant melanoma of torso excluding breast ([facility] )    Hyperparathyroidism ([facility] )      Past Medical and Surgical History:     Past Medical History:   Diagnosis Date    BCC (basal cell carcinoma)     Diastolic dysfunction     Dyslipidemia     Dysphagia     Glaucoma     Hashimoto's thyroiditis     [facility]     Hypertension     Impaired fasting glucose     Insomnia     Osteopenia     SCCA (squamous cell carcinoma) of skin      Past Surgical History:   Procedure Laterality Date    CATARACT EXTRACTION, BILATERAL      COLONOSCOPY      Complete, Onset - 10/25/05 - 10 years     HYSTERECTOMY      PARATHYROIDECTOMY      RI WRIST ARTHROSCOP,RELEASE Lawerance Bloodgood LIG Right 5/2/2016    Procedure: RELEASE CARPAL TUNNEL ENDOSCOPIC;  Surgeon: Todd Yung MD;  Location: [facility];  Service: Orthopedics    TONSILLECTOMY        Family History:     Family History   Problem Relation Age of Onset    Stroke Father         CVA    Ovarian cancer Mother       Social History:        Social History     Socioeconomic History    Marital status: Single     Spouse name: None    Number of children: None    Years of education: None    Highest education level: None   Occupational History    Occupation: Retired   Social Needs    Financial resource strain: None    Food insecurity:     Worry: None     Inability: None    Transportation needs:     Medical: None     Non-medical: None   Tobacco Use    Smoking status: Never Smoker    Smokeless tobacco: Never Used   Substance and Sexual Activity    Alcohol use: Yes     Comment: rarely, social     Drug use: No    Sexual activity: None   Lifestyle    Physical activity:     Days per week: None     Minutes per session: None    Stress: None   Relationships    Social connections:     Talks on phone: None     Gets together: None     Attends [employer] service: None     Active member of club or organization: None     Attends meetings of clubs or organizations: None     Relationship status: None    Intimate partner violence:     Fear of current or ex partner: None     Emotionally abused: None     Physically abused: None     Forced sexual activity: None   Other Topics Concern    None   Social History Narrative     per [employer]       Medications and Allergies:     Current Outpatient Medications   Medication Sig Dispense Refill    amLODIPine (NORVASC) 10 mg tablet TAKE 1 TABLET BY MOUTH EVERY DAY 90 tablet 1    aspirin 81 mg chewable tablet Chew 81 mg daily   atorvastatin (LIPITOR) 10 mg tablet TAKE 1 TABLET BY MOUTH EVERY DAY 90 tablet 1    Biotin 1000 MCG tablet Take 1,000 mcg by mouth 2 (two) times a day   Calcium 500 MG tablet Take 1 tablet by mouth daily        cholecalciferol (VITAMIN D3) 1,000 units tablet Take 1 tablet by mouth daily      dorzolamide-timolol (COSOPT) 22 3-6 8 MG/ML ophthalmic solution Apply to eye      lisinopril (ZESTRIL) 10 mg tablet TAKE 1 TABLET DAILY  90 tablet 1    metoprolol tartrate (LOPRESSOR) 25 mg tablet TAKE 1 & 1/2 TABLET BY MOUTH TWICE A  tablet 1    Multiple Vitamins-Minerals (CENTRUM SILVER ADULT 50+ PO) Take by mouth   Omega-3 Fatty Acids (FISH OIL) 1,000 mg Take 1,000 mg by mouth daily   SYNTHROID 100 MCG tablet TAKE 1 TABLET BY MOUTH DAILY  BRAND NECESSARY  90 tablet 3     No current facility-administered medications for this visit        Allergies   Allergen Reactions    Cyclogyl [Cyclopentolate Hcl]     Phenylephrine     Pred Forte [Prednisolone Acetate]       Immunizations:     Immunization History   Administered Date(s) Administered    INFLUENZA 12/09/2004, 09/29/2014, 10/16/2015, 09/20/2016, 10/06/2017    Influenza Split High Dose Preservative Free IM 09/27/2012, 09/29/2014, 10/16/2015, 09/20/2016, 10/12/2018    Pneumococcal Conjugate 13-Valent 10/16/2015    Pneumococcal Polysaccharide PPV23 06/14/2000, 04/16/2007    Zoster 05/01/2013    influenza, trivalent, adjuvanted 10/11/2019      Health Maintenance: There are no preventive care reminders to display for this patient  Topic Date Due    DTaP,Tdap,and Td Vaccines (1 - Tdap) 10/04/1940    Influenza Vaccine  07/01/2019      Medicare Screening Tests and Risk Assessments:     Checo Alegria is here for her Subsequent Wellness visit  Last Medicare Wellness visit information reviewed, patient interviewed and updates made to the record today  Health Risk Assessment:   Patient rates overall health as good  Patient feels that their physical health rating is same  Eyesight was rated as same  Hearing was rated as slightly worse  Patient feels that their emotional and mental health rating is same  Pain experienced in the last 7 days has been none  Patient states that she has experienced no weight loss or gain in last 6 months  Hearing slightly - she has hearing aides B/L    Depression Screening:   PHQ-2 Score: 0      Fall Risk Screening: In the past year, patient has experienced: no history of falling in past year      Urinary Incontinence Screening:   Patient has leaked urine accidently in the last six months  She notes some leakage with urgency    Home Safety:  Patient does not have trouble with stairs inside or outside of their home  Patient has working smoke alarms and has working carbon monoxide detector  Home safety hazards include: none  Nutrition:   Current diet is Regular, No Added Salt and Limited junk food       Medications: Patient is currently taking over-the-counter supplements  OTC medications include: see medication list  Patient is able to manage medications  Activities of Daily Living (ADLs)/Instrumental Activities of Daily Living (IADLs):   Walk and transfer into and out of bed and chair?: Yes  Dress and groom yourself?: Yes    Bathe or shower yourself?: Yes    Feed yourself? Yes  Do your laundry/housekeeping?: Yes  Manage your money, pay your bills and track your expenses?: Yes  Make your own meals?: Yes    Do your own shopping?: Yes    Previous Hospitalizations:   Any hospitalizations or ED visits within the last 12 months?: No      Advance Care Planning:   Living will: Yes    Durable POA for healthcare: Yes    Advanced directive: Yes    Advanced directive counseling given: Yes      Cognitive Screening:   Provider or family/friend/caregiver concerned regarding cognition?: No    PREVENTIVE SCREENINGS      Cardiovascular Screening:    General: Risks and Benefits Discussed and Screening Current      Diabetes Screening:     General: Screening Current and Risks and Benefits Discussed      Colorectal Cancer Screening:     General: Screening Not Indicated and Risks and Benefits Discussed      Breast Cancer Screening:     General: Risks and Benefits Discussed and Patient Declines      Cervical Cancer Screening:    General: Screening Not Indicated and Risks and Benefits Discussed      Osteoporosis Screening:    General: Risks and Benefits Discussed and Screening Not Indicated      Abdominal Aortic Aneurysm (AAA) Screening:        General: Risks and Benefits Discussed and Screening Not Indicated      Lung Cancer Screening:     General: Screening Not Indicated and Risks and Benefits Discussed      Hepatitis C Screening:    General: Risks and Benefits Discussed and Screening Not Indicated    Hep C Screening Accepted: No     Other Counseling Topics:   Car/seat belt/driving safety and regular weightbearing exercise        Visual Acuity Screening    Right eye Left eye Both eyes   Without correction:      With correction: 20/40 20/40 20/30        Physical Exam:     /62 (BP Location: Right arm, Patient Position: Sitting, Cuff Size: Standard)   Pulse 61   Temp 97 8 °F (36 6 °C)   Ht 4' 9" (1 448 m)   Wt 56 2 kg (124 lb)   SpO2 90%   BMI 26 83 kg/m²     Physical Exam   Constitutional: She appears well-developed and well-nourished  No distress  HENT:   Head: Normocephalic and atraumatic  Right Ear: External ear normal    Left Ear: External ear normal    Mouth/Throat: No oropharyngeal exudate  Eyes: Conjunctivae are normal  Right eye exhibits no discharge  Left eye exhibits no discharge  Neck: Neck supple  No tracheal deviation present  Cardiovascular: Normal rate, regular rhythm and normal heart sounds  Exam reveals no friction rub  No murmur heard  Pulmonary/Chest: Effort normal and breath sounds normal  No respiratory distress  She has no wheezes  She has no rales  Abdominal: Soft  She exhibits no distension  There is no tenderness  There is no rebound and no guarding  Musculoskeletal: She exhibits no edema  Lymphadenopathy:     She has no cervical adenopathy  Neurological: She is alert  She exhibits normal muscle tone  Skin: Skin is warm and dry  No rash noted  Psychiatric: She has a normal mood and affect  Her behavior is normal    Nursing note and vitals reviewed        Fariba Avila DO

## 2020-03-17 NOTE — ASSESSMENT & PLAN NOTE
Follows with Dr Sajan Ledbetter once a year - pt notes no new skin lesions, encouraged sun avoidance or sun screen use

## 2020-03-17 NOTE — ASSESSMENT & PLAN NOTE
Slightly improved, urged to con't low sugar/carb diet and increase yoga to 3 days a week, recheck BW in 6 mo - order given

## 2020-03-17 NOTE — PATIENT INSTRUCTIONS
Medicare Preventive Visit Patient Instructions  Thank you for completing your Welcome to Medicare Visit or Medicare Annual Wellness Visit today  Your next wellness visit will be due in one year (3/17/2021)  The screening/preventive services that you may require over the next 5-10 years are detailed below  Some tests may not apply to you based off risk factors and/or age  Screening tests ordered at today's visit but not completed yet may show as past due  Also, please note that scanned in results may not display below  Preventive Screenings:  Service Recommendations Previous Testing/Comments   Colorectal Cancer Screening  * Colonoscopy    * Fecal Occult Blood Test (FOBT)/Fecal Immunochemical Test (FIT)  * Fecal DNA/Cologuard Test  * Flexible Sigmoidoscopy Age: 54-65 years old   Colonoscopy: every 10 years (may be performed more frequently if at higher risk)  OR  FOBT/FIT: every 1 year  OR  Cologuard: every 3 years  OR  Sigmoidoscopy: every 5 years  Screening may be recommended earlier than age 48 if at higher risk for colorectal cancer  Also, an individualized decision between you and your healthcare provider will decide whether screening between the ages of 74-80 would be appropriate  Colonoscopy: Not on file  FOBT/FIT: Not on file  Cologuard: Not on file  Sigmoidoscopy: Not on file    Screening Not Indicated     Breast Cancer Screening Age: 36 years old  Frequency: every 1-2 years  Not required if history of left and right mastectomy Mammogram: Not on file       Cervical Cancer Screening Between the ages of 21-29, pap smear recommended once every 3 years  Between the ages of 33-67, can perform pap smear with HPV co-testing every 5 years     Recommendations may differ for women with a history of total hysterectomy, cervical cancer, or abnormal pap smears in past  Pap Smear: Not on file    Screening Not Indicated   Hepatitis C Screening Once for adults born between 1945 and 1965  More frequently in patients at high risk for Hepatitis C Hep C Antibody: Not on file       Diabetes Screening 1-2 times per year if you're at risk for diabetes or have pre-diabetes Fasting glucose: No results in last 5 years   A1C: 5 9    Screening Current   Cholesterol Screening Once every 5 years if you don't have a lipid disorder  May order more often based on risk factors  Lipid panel: Not on file         Other Preventive Screenings Covered by Medicare:  1  Abdominal Aortic Aneurysm (AAA) Screening: covered once if your at risk  You're considered to be at risk if you have a family history of AAA  2  Lung Cancer Screening: covers low dose CT scan once per year if you meet all of the following conditions: (1) Age 50-69; (2) No signs or symptoms of lung cancer; (3) Current smoker or have quit smoking within the last 15 years; (4) You have a tobacco smoking history of at least 30 pack years (packs per day multiplied by number of years you smoked); (5) You get a written order from a healthcare provider  3  Glaucoma Screening: covered annually if you're considered high risk: (1) You have diabetes OR (2) Family history of glaucoma OR (3)  aged 48 and older OR (3)  American aged 72 and older  3  Osteoporosis Screening: covered every 2 years if you meet one of the following conditions: (1) You're estrogen deficient and at risk for osteoporosis based off medical history and other findings; (2) Have a vertebral abnormality; (3) On glucocorticoid therapy for more than 3 months; (4) Have primary hyperparathyroidism; (5) On osteoporosis medications and need to assess response to drug therapy  · Last bone density test (DXA Scan): Not on file  5  HIV Screening: covered annually if you're between the age of 12-76  Also covered annually if you are younger than 13 and older than 72 with risk factors for HIV infection  For pregnant patients, it is covered up to 3 times per pregnancy      Immunizations:  Immunization Recommendations will:  This is a written record of the treatment you want  You can also choose which treatments you do not want, which to limit, and which to stop at a certain time  This includes surgery, medicine, IV fluid, and tube feedings  · Durable power of  for healthcare [facility]): This is a written record that states who you want to make healthcare choices for you when you are unable to make them for yourself  This person, called a proxy, is usually a family member or a friend  You may choose more than 1 proxy  · Do not resuscitate (DNR) order:  A DNR order is used in case your heart stops beating or you stop breathing  It is a request not to have certain forms of treatment, such as CPR  A DNR order may be included in other types of advance directives  · Medical directive: This covers the care that you want if you are in a coma, near death, or unable to make decisions for yourself  You can list the treatments you want for each condition  Treatment may include pain medicine, surgery, blood transfusions, dialysis, IV or tube feedings, and a ventilator (breathing machine)  · Values history: This document has questions about your views, beliefs, and how you feel and think about life  This information can help others choose the care that you would choose  Why are advance directives important? An advance directive helps you control your care  Although spoken wishes may be used, it is better to have your wishes written down  Spoken wishes can be misunderstood, or not followed  Treatments may be given even if you do not want them  An advance directive may make it easier for your family to make difficult choices about your care  Urinary Incontinence   Urinary incontinence (UI)  is when you lose control of your bladder  UI develops because your bladder cannot store or empty urine properly  The 3 most common types of UI are stress incontinence, urge incontinence, or both    Medicines:   · May be given to help strengthen your bladder control  Report any side effects of medication to your healthcare provider  Do pelvic muscle exercises often:  Your pelvic muscles help you stop urinating  Squeeze these muscles tight for 5 seconds, then relax for 5 seconds  Gradually work up to squeezing for 10 seconds  Do 3 sets of 15 repetitions a day, or as directed  This will help strengthen your pelvic muscles and improve bladder control  Train your bladder:  Go to the bathroom at set times, such as every 2 hours, even if you do not feel the urge to go  You can also try to hold your urine when you feel the urge to go  For example, hold your urine for 5 minutes when you feel the urge to go  As that becomes easier, hold your urine for 10 minutes  Self-care:   · Keep a UI record  Write down how often you leak urine and how much you leak  Make a note of what you were doing when you leaked urine  · Drink liquids as directed  You may need to limit the amount of liquid you drink to help control your urine leakage  Do not drink any liquid right before you go to bed  Limit or do not have drinks that contain caffeine or alcohol  · Prevent constipation  Eat a variety of high-fiber foods  Good examples are high-fiber cereals, beans, vegetables, and whole-grain breads  Walking is the best way to trigger your intestines to have a bowel movement  · Exercise regularly and maintain a healthy weight  Weight loss and exercise will decrease pressure on your bladder and help you control your leakage  · Use a catheter as directed  to help empty your bladder  A catheter is a tiny, plastic tube that is put into your bladder to drain your urine  · Go to behavior therapy as directed  Behavior therapy may be used to help you learn to control your urge to urinate      Weight Management   Why it is important to manage your weight:  Being overweight increases your risk of health conditions such as heart disease, high blood pressure, type 2 diabetes, and certain types of cancer  It can also increase your risk for osteoarthritis, sleep apnea, and other respiratory problems  Aim for a slow, steady weight loss  Even a small amount of weight loss can lower your risk of health problems  How to lose weight safely:  A safe and healthy way to lose weight is to eat fewer calories and get regular exercise  You can lose up about 1 pound a week by decreasing the number of calories you eat by 500 calories each day  Healthy meal plan for weight management:  A healthy meal plan includes a variety of foods, contains fewer calories, and helps you stay healthy  A healthy meal plan includes the following:  · Eat whole-grain foods more often  A healthy meal plan should contain fiber  Fiber is the part of grains, fruits, and vegetables that is not broken down by your body  Whole-grain foods are healthy and provide extra fiber in your diet  Some examples of whole-grain foods are whole-wheat breads and pastas, oatmeal, brown rice, and bulgur  · Eat a variety of vegetables every day  Include dark, leafy greens such as spinach, kale, zaki greens, and mustard greens  Eat yellow and orange vegetables such as carrots, sweet potatoes, and winter squash  · Eat a variety of fruits every day  Choose fresh or canned fruit (canned in its own juice or light syrup) instead of juice  Fruit juice has very little or no fiber  · Eat low-fat dairy foods  Drink fat-free (skim) milk or 1% milk  Eat fat-free yogurt and low-fat cottage cheese  Try low-fat cheeses such as mozzarella and other reduced-fat cheeses  · Choose meat and other protein foods that are low in fat  Choose beans or other legumes such as split peas or lentils  Choose fish, skinless poultry (chicken or turkey), or lean cuts of red meat (beef or pork)  Before you cook meat or poultry, cut off any visible fat  · Use less fat and oil  Try baking foods instead of frying them   Add less fat, such as margarine, sour cream, regular salad dressing and mayonnaise to foods  Eat fewer high-fat foods  Some examples of high-fat foods include french fries, doughnuts, ice cream, and cakes  · Eat fewer sweets  Limit foods and drinks that are high in sugar  This includes candy, cookies, regular soda, and sweetened drinks  Exercise:  Exercise at least 30 minutes per day on most days of the week  Some examples of exercise include walking, biking, dancing, and swimming  You can also fit in more physical activity by taking the stairs instead of the elevator or parking farther away from stores  Ask your healthcare provider about the best exercise plan for you  © Copyright [employer] 2018 Information is for End User's use only and may not be sold, redistributed or otherwise used for commercial purposes   All illustrations and images included in CareNotes® are the copyrighted property of [employer]  or [address] [place] [address]

## 2020-03-21 DIAGNOSIS — I10 ESSENTIAL HYPERTENSION: ICD-10-CM

## 2020-03-21 RX ORDER — AMLODIPINE BESYLATE 10 MG/1
TABLET ORAL
Qty: 90 TABLET | Refills: 1 | Status: SHIPPED | OUTPATIENT
Start: 2020-03-21 | End: 2020-09-16

## 2020-04-30 LAB — EXT SARS-COV-2: NOT DETECTED

## 2020-06-01 DIAGNOSIS — E78.5 DYSLIPIDEMIA: ICD-10-CM

## 2020-06-01 RX ORDER — ATORVASTATIN CALCIUM 10 MG/1
TABLET, FILM COATED ORAL
Qty: 90 TABLET | Refills: 1 | Status: SHIPPED | OUTPATIENT
Start: 2020-06-01 | End: 2020-12-09

## 2020-07-10 LAB — HBA1C MFR BLD HPLC: 5.8 %

## 2020-08-06 ENCOUNTER — OFFICE VISIT (OUTPATIENT)
Dept: ENDOCRINOLOGY | Facility: HOSPITAL | Age: 85
End: 2020-08-06
Payer: MEDICARE

## 2020-08-06 VITALS
SYSTOLIC BLOOD PRESSURE: 130 MMHG | HEIGHT: 57 IN | WEIGHT: 123.2 LBS | DIASTOLIC BLOOD PRESSURE: 64 MMHG | HEART RATE: 64 BPM | BODY MASS INDEX: 26.58 KG/M2 | TEMPERATURE: 97.8 F

## 2020-08-06 DIAGNOSIS — Z86.39 HISTORY OF HYPERPARATHYROIDISM: ICD-10-CM

## 2020-08-06 DIAGNOSIS — E06.3 HYPOTHYROIDISM DUE TO HASHIMOTO'S THYROIDITIS: ICD-10-CM

## 2020-08-06 DIAGNOSIS — E04.1 THYROID NODULE: Primary | ICD-10-CM

## 2020-08-06 DIAGNOSIS — E03.8 HYPOTHYROIDISM DUE TO HASHIMOTO'S THYROIDITIS: ICD-10-CM

## 2020-08-06 DIAGNOSIS — E06.3 HASHIMOTO'S THYROIDITIS: ICD-10-CM

## 2020-08-06 PROCEDURE — 99214 OFFICE O/P EST MOD 30 MIN: CPT | Performed by: INTERNAL MEDICINE

## 2020-08-06 PROCEDURE — 1160F RVW MEDS BY RX/DR IN RCRD: CPT | Performed by: INTERNAL MEDICINE

## 2020-08-06 PROCEDURE — 3008F BODY MASS INDEX DOCD: CPT | Performed by: INTERNAL MEDICINE

## 2020-08-06 PROCEDURE — 3075F SYST BP GE 130 - 139MM HG: CPT | Performed by: INTERNAL MEDICINE

## 2020-08-06 PROCEDURE — 3078F DIAST BP <80 MM HG: CPT | Performed by: INTERNAL MEDICINE

## 2020-08-06 PROCEDURE — 4040F PNEUMOC VAC/ADMIN/RCVD: CPT | Performed by: INTERNAL MEDICINE

## 2020-08-06 PROCEDURE — 1036F TOBACCO NON-USER: CPT | Performed by: INTERNAL MEDICINE

## 2020-08-06 RX ORDER — LEVOTHYROXINE SODIUM 88 UG/1
TABLET ORAL
Qty: 90 TABLET | Refills: 1 | Status: SHIPPED | OUTPATIENT
Start: 2020-08-06 | End: 2021-06-02

## 2020-08-06 NOTE — PROGRESS NOTES
8/6/2020    Assessment/Plan      Diagnoses and all orders for this visit:    Thyroid nodule  -     US thyroid; Future    Hashimoto's thyroiditis    Hypothyroidism due to Hashimoto's thyroiditis  -     TSH, 3rd generation; Future  -     T4, free; Future  -     levothyroxine (Synthroid) 88 mcg tablet; 1 tab daily  BRAND NECESSARY  -     TSH, 3rd generation; Future  -     T4, free; Future    History of hyperparathyroidism  -     Comprehensive metabolic panel; Future  -     PTH, intact; Future  -     Vitamin D 25 hydroxy; Future        Assessment/Plan:  1  Hypothyroidism:  I will reduce her Synthroid dose to 88 mcg daily  She does have some 100 mcg tablets lab so I will reduce that dose to 1 tablet 6 days a week and no tablet on Sundays which will average out to about 86 mcg per day  I asked her to repeat a TSH and free T4 in about 2 months and we will call her with the results  As long as these levels look stable, we will see her back in the office in 1 year from now  2  Thyroid nodules:  Ultrasound from September of 2019 shows stability of the nodules  Plan to repeat an ultrasound prior to next appointment which will be in 1 year from now  3  History of hyperparathyroidism:  Calcium and PTH remained stable  Repeat labs as ordered above prior to next appointment  CC: Follow-up    History of Present Illness     HPI: Rachid Hernandez is a 80y o  year old female with history of hypothyroidism due Hashimoto's thyroiditis, thyroid nodule, history of hyperparathyroidism  For hypothyroidism, she continues on Synthroid brand 100 mcg daily  In the past, she had 2 surgeries to care primary hyperparathyroidism with 2nd being at the [place] [address]  Regarding her thyroid nodule and [facility] she had a fine-needle aspiration biopsy with results consistent with goiter  She presents today overall feeling well  She denies any palpitations, tachycardia, tremor, shakiness  No neck compressive symptoms    No recent kidney stones  Review of Systems   Constitutional: Negative for fatigue  HENT: Negative for trouble swallowing and voice change  Eyes: Negative for visual disturbance  Respiratory: Negative for shortness of breath  Cardiovascular: Negative for palpitations and leg swelling  Gastrointestinal: Negative for abdominal pain, nausea and vomiting  Endocrine: Negative for polydipsia and polyuria  Musculoskeletal: Negative for arthralgias and myalgias  Skin: Negative for rash  Neurological: Negative for dizziness, tremors and weakness  Hematological: Negative for adenopathy  Psychiatric/Behavioral: Negative for agitation and confusion         Historical Information   Past Medical History:   Diagnosis Date    BCC (basal cell carcinoma)     Diastolic dysfunction     Dyslipidemia     Dysphagia     Glaucoma     Hashimoto's thyroiditis     [facility]par[facility]roidism ([facility] [address] )     Hypertension     Impaired fasting glucose     Insomnia     Osteopenia     SCCA (squamous cell carcinoma) of skin      Past Surgical History:   Procedure Laterality Date    CATARACT EXTRACTION, BILATERAL      COLONOSCOPY      Complete, Onset - 10/25/05 - 10 years     HYSTERECTOMY      PARATHYROIDECTOMY      NV WRIST [place] Karin LIG Right 5/2/2016    Procedure: RELEASE CARPAL TUNNEL ENDOSCOPIC;  Surgeon: Jearldine Goodell, MD;  Location: [facility] OR;  Service: Orthopedics    TONSILLECTOMY       Social History   Social History     Substance and Sexual Activity   Alcohol Use Yes    Comment: rarely, social      Social History     Substance and Sexual Activity   Drug Use No     Social History     Tobacco Use   Smoking Status Never Smoker   Smokeless Tobacco Never Used     Family History:   Family History   Problem Relation Age of Onset    Stroke Father         CVA    Ovarian cancer Mother        Meds/Allergies   Current Outpatient Medications   Medication Sig Dispense Refill    amLODIPine (NORVASC) 10 mg tablet TAKE 1 TABLET BY MOUTH EVERY DAY 90 tablet 1    aspirin 81 mg chewable tablet Chew 81 mg daily   atorvastatin (LIPITOR) 10 mg tablet TAKE 1 TABLET BY MOUTH EVERY DAY 90 tablet 1    Biotin 1000 MCG tablet Take 1,000 mcg by mouth 2 (two) times a day   Calcium 500 MG tablet Take 1 tablet by mouth daily        cholecalciferol (VITAMIN D3) 1,000 units tablet Take 1 tablet by mouth daily      dorzolamide-timolol (COSOPT) 22 3-6 8 MG/ML ophthalmic solution Apply to eye      lisinopril (ZESTRIL) 10 mg tablet TAKE 1 TABLET DAILY  90 tablet 1    metoprolol tartrate (LOPRESSOR) 25 mg tablet TAKE 1 & 1/2 TABLET BY MOUTH TWICE A DAY (Patient taking differently: Take 25 mg by mouth every 12 (twelve) hours ) 270 tablet 1    Multiple Vitamins-Minerals (CENTRUM SILVER ADULT 50+ PO) Take by mouth   Omega-3 Fatty Acids (FISH OIL) 1,000 mg Take 1,000 mg by mouth daily   levothyroxine (Synthroid) 88 mcg tablet 1 tab daily  BRAND NECESSARY  90 tablet 1     No current facility-administered medications for this visit  Allergies   Allergen Reactions    Cyclogyl [Cyclopentolate Hcl]     Phenylephrine     Pred Forte [Prednisolone Acetate]        Objective   Vitals: Blood pressure 130/64, pulse 64, temperature 97 8 °F (36 6 °C), height 4' 9" (1 448 m), weight 55 9 kg (123 lb 3 2 oz)  Invasive Devices     None                 Physical Exam   Constitutional: She is oriented to person, place, and time  She appears well-developed  No distress  HENT:   Head: Normocephalic and atraumatic  Mouth/Throat: No oropharyngeal exudate  Eyes: Pupils are equal, round, and reactive to light  Conjunctivae are normal    Neck: Normal range of motion  Neck supple  No thyromegaly present  Cardiovascular: Normal rate and regular rhythm  Pulmonary/Chest: Effort normal and breath sounds normal  No respiratory distress  Abdominal: Soft  Bowel sounds are normal    Musculoskeletal: Normal range of motion  Neurological: She is alert and oriented to person, place, and time  She exhibits normal muscle tone  Skin: Skin is warm and dry  No rash noted  She is not diaphoretic  Psychiatric: Her behavior is normal    Vitals reviewed  The history was obtained from the review of the chart and from the patient  Lab Results:       Labs from [address] [place] [address] from 07/10/2020:  PTH 58, calcium 9 8, hemoglobin 14 3, sodium 142, potassium 4 5, BUN 24, creatinine 0 98, GFR 53, liver function within normal limits, A1c 5 8, TSH 0 34, free    Future Appointments   Date Time Provider Cassandra Parekh   9/17/2020  9:20 AM DO DR GERALDINE [facility]       Portions of the record may have been created with voice recognition software  Occasional wrong word or "sound a like" substitutions may have occurred due to the inherent limitations of voice recognition software  Read the chart carefully and recognize, using context, where substitutions have occurred

## 2020-08-06 NOTE — PATIENT INSTRUCTIONS
Change Synthroid 100 mcg dose to 1 tablet 6 days of the week and NO tablet on Sundays  Once you are out of 100 mcg tablets, switch to Synthroid 88 mcg tablets and take 1 tablet all 7 days of the week  Repeat thyroid blood work in about 2 months

## 2020-08-30 DIAGNOSIS — I10 ESSENTIAL HYPERTENSION: ICD-10-CM

## 2020-08-30 RX ORDER — LISINOPRIL 10 MG/1
TABLET ORAL
Qty: 90 TABLET | Refills: 1 | Status: SHIPPED | OUTPATIENT
Start: 2020-08-30 | End: 2020-12-09

## 2020-09-16 DIAGNOSIS — I10 ESSENTIAL HYPERTENSION: ICD-10-CM

## 2020-09-16 RX ORDER — AMLODIPINE BESYLATE 10 MG/1
TABLET ORAL
Qty: 90 TABLET | Refills: 1 | Status: SHIPPED | OUTPATIENT
Start: 2020-09-16 | End: 2021-03-11

## 2020-09-17 ENCOUNTER — OFFICE VISIT (OUTPATIENT)
Dept: FAMILY MEDICINE CLINIC | Facility: HOSPITAL | Age: 85
End: 2020-09-17
Payer: MEDICARE

## 2020-09-17 VITALS
SYSTOLIC BLOOD PRESSURE: 124 MMHG | BODY MASS INDEX: 26.97 KG/M2 | HEART RATE: 60 BPM | TEMPERATURE: 96.5 F | HEIGHT: 57 IN | DIASTOLIC BLOOD PRESSURE: 50 MMHG | WEIGHT: 125 LBS

## 2020-09-17 DIAGNOSIS — E78.5 DYSLIPIDEMIA: ICD-10-CM

## 2020-09-17 DIAGNOSIS — I10 ESSENTIAL HYPERTENSION: Primary | ICD-10-CM

## 2020-09-17 DIAGNOSIS — E06.3 HASHIMOTO'S THYROIDITIS: ICD-10-CM

## 2020-09-17 DIAGNOSIS — E21.3 HYPERPARATHYROIDISM (HCC): ICD-10-CM

## 2020-09-17 DIAGNOSIS — E66.3 OVERWEIGHT: ICD-10-CM

## 2020-09-17 DIAGNOSIS — I73.9 PERIPHERAL ARTERIAL DISEASE (HCC): ICD-10-CM

## 2020-09-17 PROCEDURE — 99214 OFFICE O/P EST MOD 30 MIN: CPT | Performed by: INTERNAL MEDICINE

## 2020-09-17 RX ORDER — A/SINGAPORE/GP1908/2015 IVR-180 (AN A/MICHIGAN/45/2015 (H1N1)PDM09-LIKE VIRUS, A/HONG KONG/4801/2014, NYMC X-263B (H3N2) (AN A/HONG KONG/4801/2014-LIKE VIRUS), AND B/BRISBANE/60/2008, WILD TYPE (A B/BRISBANE/60/2008-LIKE VIRUS) 15; 15; 15 UG/.5ML; UG/.5ML; UG/.5ML
INJECTION, SUSPENSION INTRAMUSCULAR
COMMUNITY
Start: 2020-09-02 | End: 2021-09-17

## 2020-09-17 NOTE — PATIENT INSTRUCTIONS
Weight Management   AMBULATORY CARE:   Why it is important to manage your weight:  Being overweight increases your risk of health conditions such as heart disease, high blood pressure, type 2 diabetes, and certain types of cancer  It can also increase your risk for osteoarthritis, sleep apnea, and other respiratory problems  Aim for a slow, steady weight loss  Even a small amount of weight loss can lower your risk of health problems  How to lose weight safely:  A safe and healthy way to lose weight is to eat fewer calories and get regular exercise  You can lose up about 1 pound a week by decreasing the number of calories you eat by 500 calories each day  You can decrease calories by eating smaller portion sizes or by cutting out high-calorie foods  Read labels to find out how many calories are in the foods you eat  You can also burn calories with exercise such as walking, swimming, or biking  You will be more likely to keep weight off if you make these changes part of your lifestyle  Healthy meal plan for weight management:  A healthy meal plan includes a variety of foods, contains fewer calories, and helps you stay healthy  A healthy meal plan includes the following:  · Eat whole-grain foods more often  A healthy meal plan should contain fiber  Fiber is the part of grains, fruits, and vegetables that is not broken down by your body  Whole-grain foods are healthy and provide extra fiber in your diet  Some examples of whole-grain foods are whole-wheat breads and pastas, oatmeal, brown rice, and bulgur  · Eat a variety of vegetables every day  Include dark, leafy greens such as spinach, kale, zaki greens, and mustard greens  Eat yellow and orange vegetables such as carrots, sweet potatoes, and winter squash  · Eat a variety of fruits every day  Choose fresh or canned fruit (canned in its own juice or light syrup) instead of juice  Fruit juice has very little or no fiber  · Eat low-fat dairy foods  Drink fat-free (skim) milk or 1% milk  Eat fat-free yogurt and low-fat cottage cheese  Try low-fat cheeses such as mozzarella and other reduced-fat cheeses  · Choose meat and other protein foods that are low in fat  Choose beans or other legumes such as split peas or lentils  Choose fish, skinless poultry (chicken or turkey), or lean cuts of red meat (beef or pork)  Before you cook meat or poultry, cut off any visible fat  · Use less fat and oil  Try baking foods instead of frying them  Add less fat, such as margarine, sour cream, regular salad dressing and mayonnaise to foods  Eat fewer high-fat foods  Some examples of high-fat foods include french fries, doughnuts, ice cream, and cakes  · Eat fewer sweets  Limit foods and drinks that are high in sugar  This includes candy, cookies, regular soda, and sweetened drinks  Ways to decrease calories:   · Eat smaller portions  ¨ Use a small plate with smaller servings  ¨ Do not eat second helpings  ¨ When you eat at a restaurant, ask for a box and place half of your meal in the box before you eat  ¨ Share an entrée with someone else  · Replace high-calorie snacks with healthy, low-calorie snacks  ¨ Choose fresh fruit, vegetables, fat-free rice cakes, or air-popped popcorn instead of potato chips, nuts, or chocolate  ¨ Choose water or calorie-free drinks instead of soda or sweetened drinks  · Eat regular meals  Skipping meals can lead to overeating later in the day  Eat a healthy snack in place of a meal if you do not have time to eat a regular meal      · Do not shop for groceries when you are hungry  You may be more likely to make unhealthy food choices  Take a grocery list of healthy foods and shop after you have eaten  Exercise:  Exercise at least 30 minutes per day on most days of the week  Some examples of exercise include walking, biking, dancing, and swimming   You can also fit in more physical activity by taking the stairs instead of the elevator or parking farther away from stores  Ask your healthcare provider about the best exercise plan for you  Other things to consider as you try to lose weight:   · Be aware of situations that may give you the urge to overeat, such as eating while watching television  Find ways to avoid these situations  For example, read a book, go for a walk, or do crafts  · Meet with a weight loss support group or friends who are also trying to lose weight  This may help you stay motivated to continue working on your weight loss goals  © 2017 [facility] [facility] Information is for End User's use only and may not be sold, redistributed or otherwise used for commercial purposes  All illustrations and images included in CareNotes® are the copyrighted property of A D A M , Inc  or Ruddy Jiang  The above information is an  only  It is not intended as medical advice for individual conditions or treatments  Talk to your doctor, nurse or pharmacist before following any medical regimen to see if it is safe and effective for you  Heart Healthy Diet   AMBULATORY CARE:   A heart healthy diet  is an eating plan low in total fat, unhealthy fats, and sodium (salt)  A heart healthy diet helps decrease your risk for heart disease and stroke  Limit the amount of fat you eat to 25% to 35% of your total daily calories  Limit sodium to less than 2,300 mg each day  Healthy fats:  Healthy fats can help improve cholesterol levels  The risk for heart disease is decreased when cholesterol levels are normal  Choose healthy fats, such as the following:  · Unsaturated fat  is found in foods such as soybean, canola, olive, corn, and safflower oils  It is also found in soft tub margarine that is made with liquid vegetable oil  · Omega-3 fat  is found in certain fish, such as salmon, tuna, and trout, and in walnuts and flaxseed    Unhealthy fats:  Unhealthy fats can cause unhealthy cholesterol levels in your blood and increase your risk of heart disease  Limit unhealthy fats, such as the following:  · Cholesterol  is found in animal foods, such as eggs and lobster, and in dairy products made from whole milk  Limit cholesterol to less than 300 milligrams (mg) each day  You may need to limit cholesterol to 200 mg each day if you have heart disease  · Saturated fat  is found in meats, such as godinez and hamburger  It is also found in chicken or turkey skin, whole milk, and butter  Limit saturated fat to less than 7% of your total daily calories  Limit saturated fat to less than 6% if you have heart disease or are at increased risk for it  · Trans fat  is found in packaged foods, such as potato chips and cookies  It is also in hard margarine, some fried foods, and shortening  Avoid trans fats as much as possible    Heart healthy foods and drinks to include:  Ask your dietitian or healthcare provider how many servings to have from each of the following food groups:  · Grains:      ¨ Whole-wheat breads, cereals, and pastas, and brown rice    ¨ Low-fat, low-sodium crackers and chips    · Vegetables:      ¨ Broccoli, green beans, green peas, and spinach    ¨ Collards, kale, and lima beans    ¨ Carrots, sweet potatoes, tomatoes, and peppers    ¨ Canned vegetables with no salt added    · Fruits:      ¨ Bananas, peaches, pears, and pineapple    ¨ Grapes, raisins, and dates    ¨ Oranges, tangerines, grapefruit, orange juice, and grapefruit juice    ¨ Apricots, mangoes, melons, and papaya    ¨ Raspberries and strawberries    ¨ Canned fruit with no added sugar    · Low-fat dairy products:      ¨ Nonfat (skim) milk, 1% milk, and low-fat almond, cashew, or soy milks fortified with calcium    ¨ Low-fat cheese, regular or frozen yogurt, and cottage cheese    · Meats and proteins , such as lean cuts of beef and pork (loin, leg, round), skinless chicken and turkey, legumes, soy products, egg whites, and nuts  Foods and drinks to limit or avoid:  Ask your dietitian or healthcare provider about these and other foods that are high in unhealthy fat, sodium, and sugar:  · Snack or packaged foods , such as frozen dinners, cookies, macaroni and cheese, and cereals with more than 300 mg of sodium per serving    · Canned or dry mixes  for cakes, soups, sauces, or gravies    · Vegetables with added sodium , such as instant potatoes, vegetables with added sauces, or regular canned vegetables    · Other foods high in sodium , such as ketchup, barbecue sauce, salad dressing, pickles, olives, soy sauce, and miso    · High-fat dairy foods  such as whole or 2% milk, cream cheese, or sour cream, and cheeses     · High-fat protein foods  such as high-fat cuts of beef (T-bone steaks, ribs), chicken or turkey with skin, and organ meats, such as liver    · Cured or smoked meats , such as hot dogs, godinez, and sausage    · Unhealthy fats and oils , such as butter, stick margarine, shortening, and cooking oils such as coconut or palm oil    · Food and drinks high in sugar , such as soft drinks (soda), sports drinks, sweetened tea, candy, cake, cookies, pies, and doughnuts  Other diet guidelines to follow:   · Eat more foods containing omega-3 fats  Eat fish high in omega-3 fats at least 2 times a week  · Limit alcohol  Too much alcohol can damage your heart and raise your blood pressure  Women should limit alcohol to 1 drink a day  Men should limit alcohol to 2 drinks a day  A drink of alcohol is 12 ounces of beer, 5 ounces of wine, or 1½ ounces of liquor  · Choose low-sodium foods  High-sodium foods can lead to high blood pressure  Add little or no salt to food you prepare  Use herbs and spices in place of salt  · Eat more fiber  to help lower cholesterol levels  Eat at least 5 servings of fruits and vegetables each day  Eat 3 ounces of whole-grain foods each day  Legumes (beans) are also a good source of fiber    Lifestyle guidelines: · Do not smoke  Nicotine and other chemicals in cigarettes and cigars can cause lung and heart damage  Ask your healthcare provider for information if you currently smoke and need help to quit  E-cigarettes or smokeless tobacco still contain nicotine  Talk to your healthcare provider before you use these products  · Exercise regularly  to help you maintain a healthy weight and improve your blood pressure and cholesterol levels  Ask your healthcare provider about the best exercise plan for you  Do not start an exercise program without asking your healthcare provider  Follow up with your healthcare provider as directed:  Write down your questions so you remember to ask them during your visits  © 2017 [address] [employer] Information is for End User's use only and may not be sold, redistributed or otherwise used for commercial purposes  All illustrations and images included in CareNotes® are the copyrighted property of A [employer]  or Ruddy Jiang  The above information is an  only  It is not intended as medical advice for individual conditions or treatments  Talk to your doctor, nurse or pharmacist before following any medical regimen to see if it is safe and effective for you

## 2020-09-17 NOTE — PROGRESS NOTES
Assessment/Plan:    Hypertension  Bp running a bit low recently - Cardio decreased Metoprolol - med list updated today, con't current meds and f/u as per Cardio, call with consistently elevated BP readings at home (>140/90), encouraged to keep active and follow healthy diet and get wgt back down    Peripheral arterial disease ([facility] [address] )  Has tired legs with walking but no pain, nml SHIMON 11/18, on ASA and statin, call with new/worse symptoms - hold on any imaging for now    Dyslipidemia  HDL a bit low but LDL/TC/TG at goal, con't current statin, encouraged to keep active and watch diet and get wgt down a bit, recheck FLP annually - usually done by Cardio    Hashimoto's thyroiditis  FT4 recently elevated - Endo adjusted levothyroxine - med list UTD, has repeat BW as per Endo    [facility] )  Ca a bit elevated, has repeat BW as per Endo       Diagnoses and all orders for this visit:    Essential hypertension  -     metoprolol tartrate (LOPRESSOR) 25 mg tablet; Take 1 tablet (25 mg total) by mouth daily    Peripheral arterial disease (HCC)    Dyslipidemia    Overweight    BMI 27 0-27 9,adult  Comments:  Healthy diet, increase exercise and mild wgt loss encouraged    Hyperparathyroidism ([facility] )    Hashimoto's thyroiditis    Other orders  -     Fluad Quadrivalent 0 5 ML PRSY; PHARMACY ADMINISTERED      Colonoscopy - no longer needed d/t age    Mammo/Dexa - no longer needed d/t age    Had flu vaccine this year    Had first dose of Shingrix      Subjective:      Patient ID: Flaco Alcaraz is a 80 y o  female  HPI Pt here for follow up appt    Pt had BW in July 2020 - results reviewed: BUN/Cr 24/0 98, Ca 9 9, HDL 45, A1C 5 8 and FT 2 01  Rest of BW was wnl  Pt saw Dr Jean Pierre Robertson of [facility] last week  Her BP was a bit low so they said to decrease Metoprolol to 25 mg 1 tab once daily  She has checked her BP at home since the decrease and she is averaging mid 120's/60's    She notes no recent CP/palp/SOB/increased edema/orthopnea  She notes no pain in her legs with ambulation  She states she cannot walk as far as she used to as her "legs get tired"  SHIMON 11/18 was nml B/L  She notes no HA's/dizziness/double vision with the BP  Goal FLP was d/w pt in detail  Diet/exercise reviewed - she is less active during [employer] pandemic  She feels her diet is healthy and balanced  She is taking her Atorvastatin daily as directed w/o Se  She notes no stroke/TIA symptoms/CP  Her FT4 was elevated as was her calcium  Her TSH was wnl  Her levothyroxine was decreased to 88 mcg daily  She has repeat BW for hyperparathyroidism and thyroid studies  Colonoscopy - no longer needed d/t age    Mammo/Dexa - no longer needed d/t age    Had flu vaccine this year    Had first dose of Shingrix        Review of Systems   Constitutional: Negative for chills, fever and unexpected weight change  HENT: Negative for congestion and sore throat  Eyes: Negative for pain and visual disturbance  Respiratory: Negative for cough and shortness of breath  Cardiovascular: Positive for leg swelling  Negative for chest pain and palpitations  Gastrointestinal: Negative for abdominal pain, blood in stool, constipation, diarrhea, nausea and vomiting  Endocrine: Negative for polydipsia and polyuria  Genitourinary: Negative for difficulty urinating and dysuria  Musculoskeletal: Negative for back pain and neck pain  Skin: Negative for rash and wound  Neurological: Negative for dizziness, light-headedness and headaches  Hematological: Negative for adenopathy  Psychiatric/Behavioral: Negative for behavioral problems and confusion  Objective:    /50   Pulse 60   Temp (!) 96 5 °F (35 8 °C) (Tympanic)   Ht 4' 9" (1 448 m)   Wt 56 7 kg (125 lb)   BMI 27 05 kg/m²      Physical Exam  Vitals signs and nursing note reviewed  Constitutional:       General: She is not in acute distress  Appearance: Normal appearance   She is well-developed  She is not ill-appearing  HENT:      Head: Normocephalic and atraumatic  Eyes:      General:         Right eye: No discharge  Left eye: No discharge  Conjunctiva/sclera: Conjunctivae normal    Neck:      Musculoskeletal: Neck supple  Trachea: No tracheal deviation  Cardiovascular:      Rate and Rhythm: Normal rate and regular rhythm  Heart sounds: Normal heart sounds  No murmur  No friction rub  Pulmonary:      Effort: Pulmonary effort is normal  No respiratory distress  Breath sounds: Normal breath sounds  No wheezing, rhonchi or rales  Abdominal:      General: There is no distension  Palpations: Abdomen is soft  Tenderness: There is no abdominal tenderness  There is no guarding or rebound  Musculoskeletal:      Right lower leg: No edema  Left lower leg: Edema present  Skin:     General: Skin is warm  Findings: No rash  Neurological:      General: No focal deficit present  Mental Status: She is alert  Motor: No abnormal muscle tone  Gait: Gait normal    Psychiatric:         Mood and Affect: Mood normal          Behavior: Behavior normal          Thought Content: Thought content normal          Judgment: Judgment normal          BMI Counseling: Body mass index is 27 05 kg/m²  The BMI is above normal  Nutrition recommendations include reducing portion sizes, 3-5 servings of fruits/vegetables daily, consuming healthier snacks, moderation in carbohydrate intake, increasing intake of lean protein and reducing intake of saturated fat and trans fat  Exercise recommendations include exercising 3-5 times per week

## 2020-09-17 NOTE — ASSESSMENT & PLAN NOTE
Bp running a bit low recently - Cardio decreased Metoprolol - med list updated today, con't current meds and f/u as per Cardio, call with consistently elevated BP readings at home (>140/90), encouraged to keep active and follow healthy diet and get wgt back down

## 2020-09-17 NOTE — ASSESSMENT & PLAN NOTE
HDL a bit low but LDL/TC/TG at goal, con't current statin, encouraged to keep active and watch diet and get wgt down a bit, recheck FLP annually - usually done by Cardio

## 2020-09-17 NOTE — ASSESSMENT & PLAN NOTE
Has tired legs with walking but no pain, nml SHIMON 11/18, on ASA and statin, call with new/worse symptoms - hold on any imaging for now

## 2020-12-09 DIAGNOSIS — I10 ESSENTIAL HYPERTENSION: ICD-10-CM

## 2020-12-09 DIAGNOSIS — E78.5 DYSLIPIDEMIA: ICD-10-CM

## 2020-12-09 RX ORDER — LISINOPRIL 10 MG/1
TABLET ORAL
Qty: 90 TABLET | Refills: 1 | Status: SHIPPED | OUTPATIENT
Start: 2020-12-09 | End: 2021-06-03

## 2020-12-09 RX ORDER — ATORVASTATIN CALCIUM 10 MG/1
TABLET, FILM COATED ORAL
Qty: 90 TABLET | Refills: 1 | Status: SHIPPED | OUTPATIENT
Start: 2020-12-09 | End: 2021-06-03

## 2021-01-23 ENCOUNTER — IMMUNIZATIONS (OUTPATIENT)
Dept: FAMILY MEDICINE CLINIC | Facility: HOSPITAL | Age: 86
End: 2021-01-23

## 2021-01-23 DIAGNOSIS — Z23 ENCOUNTER FOR IMMUNIZATION: Primary | ICD-10-CM

## 2021-01-23 PROCEDURE — 0011A SARS-COV-2 / COVID-19 MRNA VACCINE (MODERNA) 100 MCG: CPT

## 2021-01-23 PROCEDURE — 91301 SARS-COV-2 / COVID-19 MRNA VACCINE (MODERNA) 100 MCG: CPT

## 2021-02-20 ENCOUNTER — IMMUNIZATIONS (OUTPATIENT)
Dept: FAMILY MEDICINE CLINIC | Facility: HOSPITAL | Age: 86
End: 2021-02-20

## 2021-02-20 DIAGNOSIS — Z23 ENCOUNTER FOR IMMUNIZATION: Primary | ICD-10-CM

## 2021-02-20 PROCEDURE — 0012A SARS-COV-2 / COVID-19 MRNA VACCINE (MODERNA) 100 MCG: CPT

## 2021-02-20 PROCEDURE — 91301 SARS-COV-2 / COVID-19 MRNA VACCINE (MODERNA) 100 MCG: CPT

## 2021-02-25 DIAGNOSIS — I10 ESSENTIAL HYPERTENSION: ICD-10-CM

## 2021-03-11 DIAGNOSIS — I10 ESSENTIAL HYPERTENSION: ICD-10-CM

## 2021-03-11 RX ORDER — AMLODIPINE BESYLATE 10 MG/1
TABLET ORAL
Qty: 90 TABLET | Refills: 1 | Status: SHIPPED | OUTPATIENT
Start: 2021-03-11 | End: 2021-03-19

## 2021-03-19 ENCOUNTER — OFFICE VISIT (OUTPATIENT)
Dept: FAMILY MEDICINE CLINIC | Facility: HOSPITAL | Age: 86
End: 2021-03-19
Payer: MEDICARE

## 2021-03-19 VITALS
BODY MASS INDEX: 26.71 KG/M2 | DIASTOLIC BLOOD PRESSURE: 50 MMHG | WEIGHT: 123.8 LBS | HEIGHT: 57 IN | SYSTOLIC BLOOD PRESSURE: 116 MMHG | HEART RATE: 66 BPM | TEMPERATURE: 97 F

## 2021-03-19 DIAGNOSIS — E21.3 HYPERPARATHYROIDISM (HCC): ICD-10-CM

## 2021-03-19 DIAGNOSIS — I10 ESSENTIAL HYPERTENSION: ICD-10-CM

## 2021-03-19 DIAGNOSIS — E78.5 DYSLIPIDEMIA: ICD-10-CM

## 2021-03-19 DIAGNOSIS — E66.3 OVERWEIGHT (BMI 25.0-29.9): ICD-10-CM

## 2021-03-19 DIAGNOSIS — C43.59 MALIGNANT MELANOMA OF TORSO EXCLUDING BREAST (HCC): ICD-10-CM

## 2021-03-19 DIAGNOSIS — Z00.00 MEDICARE ANNUAL WELLNESS VISIT, SUBSEQUENT: ICD-10-CM

## 2021-03-19 DIAGNOSIS — R73.01 IMPAIRED FASTING GLUCOSE: ICD-10-CM

## 2021-03-19 DIAGNOSIS — I51.7 LEFT VENTRICULAR HYPERTROPHY: Primary | ICD-10-CM

## 2021-03-19 DIAGNOSIS — I73.9 PERIPHERAL ARTERIAL DISEASE (HCC): ICD-10-CM

## 2021-03-19 PROCEDURE — 1123F ACP DISCUSS/DSCN MKR DOCD: CPT | Performed by: INTERNAL MEDICINE

## 2021-03-19 PROCEDURE — 99214 OFFICE O/P EST MOD 30 MIN: CPT | Performed by: INTERNAL MEDICINE

## 2021-03-19 PROCEDURE — G0439 PPPS, SUBSEQ VISIT: HCPCS | Performed by: INTERNAL MEDICINE

## 2021-03-19 RX ORDER — AMLODIPINE BESYLATE 10 MG/1
5 TABLET ORAL DAILY
Qty: 90 TABLET | Refills: 1
Start: 2021-03-19 | End: 2021-09-02

## 2021-03-19 NOTE — PROGRESS NOTES
Assessment and Plan:     Problem List Items Addressed This Visit        Cardiovascular and Mediastinum    Hypertension    Left ventricular hypertrophy - Primary    Peripheral arterial disease ([facility] )        BMI Counseling: Body mass index is 26 79 kg/m²  The BMI is above normal  Nutrition recommendations include decreasing portion sizes, encouraging healthy choices of fruits and vegetables, consuming healthier snacks, moderation in carbohydrate intake, increasing intake of lean protein and reducing intake of saturated and trans fat  Exercise recommendations include exercising 3-5 times per week  No pharmacotherapy was ordered  Preventive health issues were discussed with patient, and age appropriate screening tests were ordered as noted in patient's After Visit Summary  Personalized health advice and appropriate referrals for health education or preventive services given if needed, as noted in patient's After Visit Summary       History of Present Illness:     Patient presents for Medicare Annual Wellness visit    Patient Care Team:  Marissa Triana DO as PCP - General  Luna Tilley DO as PCP - Endocrinology (Endocrinology)  MD Ryan Eaton DO as Cardiologist (Cardiology)  Gabby Wilder MD as Consulting Physician (Ophthalmology)  Luis Alberto Mandujano MD as Consulting Physician (Vascular Surgery)  Kimmy Silva MD (Plastic Surgery)     Problem List:     Patient Active Problem List   Diagnosis    De Quervain's tenosynovitis, right    Diastolic dysfunction    Dyslipidemia    Esophageal reflux    Glaucoma    Hashimoto's thyroiditis    Hypertension    Impaired fasting glucose    Insomnia    Left ventricular hypertrophy    Mixed urge and stress incontinence    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 tuberculosis    Thyroid nodule    Peripheral arterial disease ([facility] )    Malignant melanoma of torso excluding breast ([facility] )    Hyperparathyroidism [facility])      Past Medical and Surgical History:     Past Medical History:   Diagnosis Date    BCC (basal cell carcinoma)     Diastolic dysfunction     Dyslipidemia     Dysphagia     Glaucoma     Hashimoto's thyroiditis     Hyperparathyroidism ([address] )     Hypertension     Impaired fasting glucose     Insomnia     Osteopenia     SCCA (squamous cell carcinoma) of skin      Past Surgical History:   Procedure Laterality Date    CATARACT EXTRACTION, BILATERAL      COLONOSCOPY      Complete, Onset - 10/25/05 - 10 years     HYSTERECTOMY      PARATHYROIDECTOMY      [place]ST [place] LIG Right 5/2/2016    Procedure: RELEASE CARPAL TUNNEL ENDOSCOPIC;  Surgeon: Jarrod Hernandez MD;  Location: [facility] OR;  Service: Orthopedics    TONSILLECTOMY        Family History:     Family History   Problem Relation Age of Onset    Stroke Father         CVA    Ovarian cancer Mother       Social History:     E-Cigarette/Vaping    E-Cigarette Use Never User      E-Cigarette/Vaping Substances    Nicotine No     Flavoring No      Social History     Socioeconomic History    Marital status: Single     Spouse name: None    Number of children: None    Years of education: None    Highest education level: None   Occupational History    Occupation: Retired   Social Needs    Financial resource strain: None    Food insecurity     Worry: None     Inability: None    Transportation needs     Medical: None     Non-medical: None   Tobacco Use    Smoking status: Never Smoker    Smokeless tobacco: Never Used   Substance and Sexual Activity    Alcohol use: Yes     Frequency: Monthly or less     Comment: rarely, social     Drug use: No    Sexual activity: None   Lifestyle    Physical activity     Days per week: None     Minutes per session: None    Stress: None   Relationships    Social connections     Talks on phone: None     Gets together: None     Attends [employer] service: None Active member of club or organization: None     Attends meetings of clubs or organizations: None     Relationship status: None    Intimate partner violence     Fear of current or ex partner: None     Emotionally abused: None     Physically abused: None     Forced sexual activity: None   Other Topics Concern    None   Social History Narrative     per [employer]       Medications and Allergies:     Current Outpatient Medications   Medication Sig Dispense Refill    amLODIPine (NORVASC) 10 mg tablet TAKE 1 TABLET BY MOUTH EVERY DAY 90 tablet 1    aspirin 81 mg chewable tablet Chew 81 mg daily   atorvastatin (LIPITOR) 10 mg tablet TAKE 1 TABLET BY MOUTH EVERY DAY 90 tablet 1    Biotin 1000 MCG tablet Take 1,000 mcg by mouth 2 (two) times a day   Calcium 500 MG tablet Take 1 tablet by mouth daily        cholecalciferol (VITAMIN D3) 1,000 units tablet Take 1 tablet by mouth daily      dorzolamide-timolol (COSOPT) 22 3-6 8 MG/ML ophthalmic solution Apply to eye      Fluad Quadrivalent 0 5 ML [facility] ADMINISTERED      levothyroxine (Synthroid) 88 mcg tablet 1 tab daily  BRAND NECESSARY  90 tablet 1    lisinopril (ZESTRIL) 10 mg tablet TAKE 1 TABLET BY MOUTH EVERY DAY 90 tablet 1    metoprolol tartrate (LOPRESSOR) 25 mg tablet Take 1 tablet (25 mg total) by mouth 2 (two) times a day 180 tablet 1    Multiple Vitamins-Minerals (CENTRUM SILVER ADULT 50+ PO) Take by mouth   Omega-3 Fatty Acids (FISH OIL) 1,000 mg Take 1,000 mg by mouth daily  No current facility-administered medications for this visit        Allergies   Allergen Reactions    Cyclogyl [Cyclopentolate Hcl]     Phenylephrine     Pred Forte [Prednisolone Acetate]       Immunizations:     Immunization History   Administered Date(s) Administered    INFLUENZA 12/09/2004, 09/29/2014, 10/16/2015, 09/20/2016, 10/06/2017    Influenza Split High Dose Preservative Free IM 09/27/2012, 09/29/2014, 10/16/2015, 09/20/2016, 10/12/2018    Pneumococcal Conjugate 13-Valent 10/16/2015    Pneumococcal Polysaccharide PPV23 06/14/2000, 04/16/2007    SARS-CoV-2 / COVID-19 mRNA IM (Moderna) 01/23/2021, 02/20/2021    Zoster 05/01/2013    Zoster Vaccine Recombinant 07/24/2020, 01/09/2021    influenza, trivalent, adjuvanted 10/11/2019      Health Maintenance: There are no preventive care reminders to display for this patient  Topic Date Due    DTaP,Tdap,and Td Vaccines (1 - Tdap) Never done    Influenza Vaccine (1) 09/01/2020      Medicare Health Risk Assessment:     There were no vitals taken for this visit  Gloria Ledesma is here for her Subsequent Wellness visit  Last Medicare Wellness visit information reviewed, patient interviewed and updates made to the record today  Health Risk Assessment:   Patient rates overall health as good  Patient feels that their physical health rating is same  Patient is satisfied with their life  Eyesight was rated as same  Hearing was rated as same  Patient feels that their emotional and mental health rating is same  Patients states they are never, rarely angry  Patient states they are sometimes unusually tired/fatigued  Pain experienced in the last 7 days has been none  Patient states that she has experienced no weight loss or gain in last 6 months  Depression Screening:   PHQ-2 Score: 0      Fall Risk Screening: In the past year, patient has experienced: no history of falling in past year      Urinary Incontinence Screening:   Patient has leaked urine accidently in the last six months  Some leakage with urge incontinence    Home Safety:  Patient has trouble with stairs inside or outside of their home  Patient has working smoke alarms and has working carbon monoxide detector  Home safety hazards include: loose rugs on the floor  Nutrition:   Current diet is Regular  Medications:   Patient is currently taking over-the-counter supplements   OTC medications include: see medication list  Patient is able to manage medications  Activities of Daily Living (ADLs)/Instrumental Activities of Daily Living (IADLs):   Walk and transfer into and out of bed and chair?: Yes  Dress and groom yourself?: Yes    Bathe or shower yourself?: Yes    Feed yourself? Yes  Do your laundry/housekeeping?: Yes  Manage your money, pay your bills and track your expenses?: Yes  Make your own meals?: Yes    Do your own shopping?: Yes    Previous Hospitalizations:   Any hospitalizations or ED visits within the last 12 months?: No      Advance Care Planning:   Living will: Yes    Durable POA for healthcare: Yes    Advanced directive: Yes      Cognitive Screening:   Provider or family/friend/caregiver concerned regarding cognition?: No    PREVENTIVE SCREENINGS      Cardiovascular Screening:    General: Risks and Benefits Discussed and Screening Current      Diabetes Screening:     General: Screening Current and Risks and Benefits Discussed      Colorectal Cancer Screening:     General: Screening Not Indicated and Risks and Benefits Discussed      Breast Cancer Screening:     General: Risks and Benefits Discussed and Screening Not Indicated      Cervical Cancer Screening:    General: Screening Not Indicated and Risks and Benefits Discussed      Osteoporosis Screening:    General: Risks and Benefits Discussed and Patient Declines      Abdominal Aortic Aneurysm (AAA) Screening:        General: Risks and Benefits Discussed and Screening Not Indicated      Lung Cancer Screening:     General: Screening Not Indicated and Risks and Benefits Discussed      Hepatitis C Screening:    General: Risks and Benefits Discussed and Screening Not Indicated    Screening, Brief Intervention, and Referral to Treatment (SBIRT)    Screening  Typical number of drinks in a day: 0  Typical number of drinks in a week: 1  Interpretation: Low risk drinking behavior      Single Item Drug Screening:  How often have you used an illegal drug (including marijuana) or a prescription medication for non-medical reasons in the past year? never    Single Item Drug Screen Score: 0  Interpretation: Negative screen for possible drug use disorder    Other Counseling Topics:   Car/seat belt/driving safety and regular weightbearing exercise         Kristina Romero, DO

## 2021-03-19 NOTE — PROGRESS NOTES
Assessment/Plan:    Hypertension  Bp at goal despite recent decrease in Amlodipine, med list up dated, con't current regimen and f/u as per Cardio, call with CV symptoms    Left ventricular hypertrophy  Good BP control encouraged, con't current meds    Peripheral arterial disease ([facility] )  No current LE symptoms, on statin and ASA    Hyperparathyroidism ([facility] )  Following with Endo - had OV in 8/20, has labs for 8/21, con't meds and labs and f/u as per Endo    Impaired fasting glucose  BW due in Aug - order given, encouraged healthy diet/exercise/healthy wgt encouraged    Malignant melanoma of torso excluding breast ([facility] )  No current new skin lesions, sun screen use encouraged, con't regular f/u as per Dr Katharine Sewell, call w/any skin concerns    Dyslipidemia  FLP due in Aug - order given, con't current statin for now, diet/exercise/healthy wgt encouraged       Diagnoses and all orders for this visit:    Left ventricular hypertrophy  -     CBC and differential    Peripheral arterial disease (HCC)  -     CBC and differential    Essential hypertension  -     amLODIPine (NORVASC) 10 mg tablet; Take 0 5 tablets (5 mg total) by mouth daily  -     CBC and differential    Hyperparathyroidism (HCC)  -     CBC and differential    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)  -     CBC and differential    Dyslipidemia  -     Lipid panel    Impaired fasting glucose  -     Hemoglobin A1C    Medicare annual wellness visit, subsequent    BMI 26 0-26 9,adult    Overweight (BMI 25 0-29  9)      Colonoscopy - no longer needed d/t age    Mammo/Dexa - no longer needed d/t age    Has had both COVID vaccine's    BW/FLP 7/20        Subjective:      Patient ID: Tj Forrest is a 80 y o  female  HPI Pt here for follow up appt and AWV    Pt saw Cardio (Dr Marissa Sim) yesterday for f/u HTN/LVH/PAD  She was noting increase in LLE edema and had her Amlodipine decreased to 5 mg daily    BP at goal today and meds were reviewed and no changes have occurred  She denies missing doses of meds or SE with the meds  She does not check her BP outside the office  She notes no frequent Ha's/dizziness/double vision/CP  Wgt down 2 lbs from Sept  BMI reviewed  Diet and exercise reviewed  Pt states she saw Derm (Dr Kiana Friedman) for f/u melanoma  She notes no recent biopsies or skin changes  She wears sun screen "usually"  She saw Endo in Aug 2020 for f/u hypothyroidism and hyperparathyroidism - OV note reviewed  She has had some mild hair loss but no other thyroid or hyperparathyroid symptoms  She has labs ordered for Aug 2021  Colonoscopy - no longer needed d/t age    Mammo/Dexa - no longer needed d/t age    Has had both COVID vaccine's    BW/FLP 7/20    Review of Systems   Constitutional: Negative for chills, fatigue, fever and unexpected weight change  HENT: Positive for hearing loss  Negative for congestion and sore throat  Eyes: Negative for pain and visual disturbance  Respiratory: Negative for cough, shortness of breath and wheezing  Cardiovascular: Positive for leg swelling  Negative for chest pain and palpitations  Gastrointestinal: Negative for abdominal pain, blood in stool, constipation, diarrhea, nausea and vomiting  Endocrine: Negative for polydipsia and polyuria  Genitourinary: Negative for difficulty urinating, dysuria, hematuria, vaginal bleeding and vaginal pain  Musculoskeletal: Negative for back pain and neck pain  Skin: Negative for rash and wound  Neurological: Negative for dizziness, light-headedness and headaches  Hematological: Negative for adenopathy  Psychiatric/Behavioral: Negative for behavioral problems, confusion and dysphoric mood  Objective:    /50   Pulse 66   Temp (!) 97 °F (36 1 °C)   Ht 4' 9" (1 448 m)   Wt 56 2 kg (123 lb 12 8 oz)   BMI 26 79 kg/m²      Physical Exam  Vitals signs and nursing note reviewed     Constitutional:       General: She is not in acute distress  Appearance: She is well-developed  She is not ill-appearing  HENT:      Head: Normocephalic and atraumatic  Right Ear: Tympanic membrane normal  There is no impacted cerumen  Left Ear: Tympanic membrane normal  There is no impacted cerumen  Ears:      Comments: B/L hearing aides removed for ENT exam  Eyes:      General:         Right eye: No discharge  Left eye: No discharge  Conjunctiva/sclera: Conjunctivae normal    Neck:      Musculoskeletal: Neck supple  Vascular: No carotid bruit  Trachea: No tracheal deviation  Cardiovascular:      Rate and Rhythm: Normal rate and regular rhythm  Heart sounds: Normal heart sounds  No murmur  No friction rub  Pulmonary:      Effort: Pulmonary effort is normal  No respiratory distress  Breath sounds: Normal breath sounds  No wheezing, rhonchi or rales  Abdominal:      General: There is no distension  Palpations: Abdomen is soft  Tenderness: There is no abdominal tenderness  There is no guarding or rebound  Musculoskeletal:      Right lower leg: No edema  Left lower leg: Edema present  Skin:     General: Skin is warm  Coloration: Skin is not pale  Findings: No rash  Neurological:      General: No focal deficit present  Mental Status: She is alert  Motor: No abnormal muscle tone  Gait: Gait normal    Psychiatric:         Mood and Affect: Mood normal          Behavior: Behavior normal          Thought Content:  Thought content normal          Judgment: Judgment normal

## 2021-03-19 NOTE — ASSESSMENT & PLAN NOTE
Bp at goal despite recent decrease in Amlodipine, med list up dated, con't current regimen and f/u as per Cardio, call with CV symptoms

## 2021-03-19 NOTE — PATIENT INSTRUCTIONS
Medicare Preventive Visit Patient Instructions  Thank you for completing your Welcome to Medicare Visit or Medicare Annual Wellness Visit today  Your next wellness visit will be due in one year (3/20/2022)  The screening/preventive services that you may require over the next 5-10 years are detailed below  Some tests may not apply to you based off risk factors and/or age  Screening tests ordered at today's visit but not completed yet may show as past due  Also, please note that scanned in results may not display below  Preventive Screenings:  Service Recommendations Previous Testing/Comments   Colorectal Cancer Screening  * Colonoscopy    * Fecal Occult Blood Test (FOBT)/Fecal Immunochemical Test (FIT)  * Fecal DNA/Cologuard Test  * Flexible Sigmoidoscopy Age: 54-65 years old   Colonoscopy: every 10 years (may be performed more frequently if at higher risk)  OR  FOBT/FIT: every 1 year  OR  Cologuard: every 3 years  OR  Sigmoidoscopy: every 5 years  Screening may be recommended earlier than age 48 if at higher risk for colorectal cancer  Also, an individualized decision between you and your healthcare provider will decide whether screening between the ages of 74-80 would be appropriate  Colonoscopy: Not on file  FOBT/FIT: Not on file  Cologuard: Not on file  Sigmoidoscopy: Not on file    Screening Not Indicated     Breast Cancer Screening Age: 36 years old  Frequency: every 1-2 years  Not required if history of left and right mastectomy Mammogram: Not on file        Cervical Cancer Screening Between the ages of 21-29, pap smear recommended once every 3 years  Between the ages of 33-67, can perform pap smear with HPV co-testing every 5 years     Recommendations may differ for women with a history of total hysterectomy, cervical cancer, or abnormal pap smears in past  Pap Smear: Not on file    Screening Not Indicated   Hepatitis C Screening Once for adults born between 1945 and 1965  More frequently in patients at high risk for Hepatitis C Hep C Antibody: Not on file        Diabetes Screening 1-2 times per year if you're at risk for diabetes or have pre-diabetes Fasting glucose: No results in last 5 years   A1C: 5 8    Screening Current   Cholesterol Screening Once every 5 years if you don't have a lipid disorder  May order more often based on risk factors  Lipid panel: Not on file          Other Preventive Screenings Covered by Medicare:  1  Abdominal Aortic Aneurysm (AAA) Screening: covered once if your at risk  You're considered to be at risk if you have a family history of AAA  2  Lung Cancer Screening: covers low dose CT scan once per year if you meet all of the following conditions: (1) Age 50-69; (2) No signs or symptoms of lung cancer; (3) Current smoker or have quit smoking within the last 15 years; (4) You have a tobacco smoking history of at least 30 pack years (packs per day multiplied by number of years you smoked); (5) You get a written order from a healthcare provider  3  Glaucoma Screening: covered annually if you're considered high risk: (1) You have diabetes OR (2) Family history of glaucoma OR (3)  aged 48 and older OR (3)  American aged 72 and older  3  Osteoporosis Screening: covered every 2 years if you meet one of the following conditions: (1) You're estrogen deficient and at risk for osteoporosis based off medical history and other findings; (2) Have a vertebral abnormality; (3) On glucocorticoid therapy for more than 3 months; (4) Have primary hyperparathyroidism; (5) On osteoporosis medications and need to assess response to drug therapy  · Last bone density test (DXA Scan): Not on file  5  HIV Screening: covered annually if you're between the age of 12-76  Also covered annually if you are younger than 13 and older than 72 with risk factors for HIV infection  For pregnant patients, it is covered up to 3 times per pregnancy      Immunizations:  Immunization Recommendations Influenza Vaccine Annual influenza vaccination during flu season is recommended for all persons aged >= 6 months who do not have contraindications   Pneumococcal Vaccine (Prevnar and Pneumovax)  * Prevnar = PCV13  * Pneumovax = PPSV23   Adults 25-60 years old: 1-3 doses may be recommended based on certain risk factors  Adults 72 years old: Prevnar (PCV13) vaccine recommended followed by Pneumovax (PPSV23) vaccine  If already received PPSV23 since turning 65, then PCV13 recommended at least one year after PPSV23 dose  Hepatitis B Vaccine 3 dose series if at intermediate or high risk (ex: diabetes, end stage renal disease, liver disease)   Tetanus (Td) Vaccine - COST NOT COVERED BY MEDICARE PART B Following completion of primary series, a booster dose should be given every 10 years to maintain immunity against tetanus  Td may also be given as tetanus wound prophylaxis  Tdap Vaccine - COST NOT COVERED BY MEDICARE PART B Recommended at least once for all adults  For pregnant patients, recommended with each pregnancy  Shingles Vaccine (Shingrix) - COST NOT COVERED BY MEDICARE PART B  2 shot series recommended in those aged 48 and above     Health Maintenance Due:  There are no preventive care reminders to display for this patient  Immunizations Due:      Topic Date Due    DTaP,Tdap,and Td Vaccines (1 - Tdap) Never done    Influenza Vaccine (1) 09/01/2020     Advance Directives   What are advance directives? Advance directives are legal documents that state your wishes and plans for medical care  These plans are made ahead of time in case you lose your ability to make decisions for yourself  Advance directives can apply to any medical decision, such as the treatments you want, and if you want to donate organs  What are the types of advance directives? There are many types of advance directives, and each state has rules about how to use them   You may choose a combination of any of the following:  · Living will:  This is a written record of the treatment you want  You can also choose which treatments you do not want, which to limit, and which to stop at a certain time  This includes surgery, medicine, IV fluid, and tube feedings  · Durable power of  for healthcare [place] [facility]): This is a written record that states who you want to make healthcare choices for you when you are unable to make them for yourself  This person, called a proxy, is usually a family member or a friend  You may choose more than 1 proxy  · Do not resuscitate (DNR) order:  A DNR order is used in case your heart stops beating or you stop breathing  It is a request not to have certain forms of treatment, such as CPR  A DNR order may be included in other types of advance directives  · Medical directive: This covers the care that you want if you are in a coma, near death, or unable to make decisions for yourself  You can list the treatments you want for each condition  Treatment may include pain medicine, surgery, blood transfusions, dialysis, IV or tube feedings, and a ventilator (breathing machine)  · Values history: This document has questions about your views, beliefs, and how you feel and think about life  This information can help others choose the care that you would choose  Why are advance directives important? An advance directive helps you control your care  Although spoken wishes may be used, it is better to have your wishes written down  Spoken wishes can be misunderstood, or not followed  Treatments may be given even if you do not want them  An advance directive may make it easier for your family to make difficult choices about your care  Urinary Incontinence   Urinary incontinence (UI)  is when you lose control of your bladder  UI develops because your bladder cannot store or empty urine properly  The 3 most common types of UI are stress incontinence, urge incontinence, or both    Medicines:   · May be given to help strengthen your bladder control  Report any side effects of medication to your healthcare provider  Do pelvic muscle exercises often:  Your pelvic muscles help you stop urinating  Squeeze these muscles tight for 5 seconds, then relax for 5 seconds  Gradually work up to squeezing for 10 seconds  Do 3 sets of 15 repetitions a day, or as directed  This will help strengthen your pelvic muscles and improve bladder control  Train your bladder:  Go to the bathroom at set times, such as every 2 hours, even if you do not feel the urge to go  You can also try to hold your urine when you feel the urge to go  For example, hold your urine for 5 minutes when you feel the urge to go  As that becomes easier, hold your urine for 10 minutes  Self-care:   · Keep a UI record  Write down how often you leak urine and how much you leak  Make a note of what you were doing when you leaked urine  · Drink liquids as directed  You may need to limit the amount of liquid you drink to help control your urine leakage  Do not drink any liquid right before you go to bed  Limit or do not have drinks that contain caffeine or alcohol  · Prevent constipation  Eat a variety of high-fiber foods  Good examples are high-fiber cereals, beans, vegetables, and whole-grain breads  Walking is the best way to trigger your intestines to have a bowel movement  · Exercise regularly and maintain a healthy weight  Weight loss and exercise will decrease pressure on your bladder and help you control your leakage  · Use a catheter as directed  to help empty your bladder  A catheter is a tiny, plastic tube that is put into your bladder to drain your urine  · Go to behavior therapy as directed  Behavior therapy may be used to help you learn to control your urge to urinate      Weight Management   Why it is important to manage your weight:  Being overweight increases your risk of health conditions such as heart disease, high blood pressure, type 2 diabetes, and certain types of cancer  It can also increase your risk for osteoarthritis, sleep apnea, and other respiratory problems  Aim for a slow, steady weight loss  Even a small amount of weight loss can lower your risk of health problems  How to lose weight safely:  A safe and healthy way to lose weight is to eat fewer calories and get regular exercise  You can lose up about 1 pound a week by decreasing the number of calories you eat by 500 calories each day  Healthy meal plan for weight management:  A healthy meal plan includes a variety of foods, contains fewer calories, and helps you stay healthy  A healthy meal plan includes the following:  · Eat whole-grain foods more often  A healthy meal plan should contain fiber  Fiber is the part of grains, fruits, and vegetables that is not broken down by your body  Whole-grain foods are healthy and provide extra fiber in your diet  Some examples of whole-grain foods are whole-wheat breads and pastas, oatmeal, brown rice, and bulgur  · Eat a variety of vegetables every day  Include dark, leafy greens such as spinach, kale, zaki greens, and mustard greens  Eat yellow and orange vegetables such as carrots, sweet potatoes, and winter squash  · Eat a variety of fruits every day  Choose fresh or canned fruit (canned in its own juice or light syrup) instead of juice  Fruit juice has very little or no fiber  · Eat low-fat dairy foods  Drink fat-free (skim) milk or 1% milk  Eat fat-free yogurt and low-fat cottage cheese  Try low-fat cheeses such as mozzarella and other reduced-fat cheeses  · Choose meat and other protein foods that are low in fat  Choose beans or other legumes such as split peas or lentils  Choose fish, skinless poultry (chicken or turkey), or lean cuts of red meat (beef or pork)  Before you cook meat or poultry, cut off any visible fat  · Use less fat and oil  Try baking foods instead of frying them   Add less fat, such as margarine, sour cream, regular salad dressing and mayonnaise to foods  Eat fewer high-fat foods  Some examples of high-fat foods include french fries, doughnuts, ice cream, and cakes  · Eat fewer sweets  Limit foods and drinks that are high in sugar  This includes candy, cookies, regular soda, and sweetened drinks  Exercise:  Exercise at least 30 minutes per day on most days of the week  Some examples of exercise include walking, biking, dancing, and swimming  You can also fit in more physical activity by taking the stairs instead of the elevator or parking farther away from stores  Ask your healthcare provider about the best exercise plan for you  © Copyright [address] 2018 Information is for End User's use only and may not be sold, redistributed or otherwise used for commercial purposes  All illustrations and images included in CareNotes® are the copyrighted property of [employer]  or [facility] Preventive Visit Patient Instructions  Thank you for completing your Welcome to Medicare Visit or Medicare Annual Wellness Visit today  Your next wellness visit will be due in one year (3/20/2022)  The screening/preventive services that you may require over the next 5-10 years are detailed below  Some tests may not apply to you based off risk factors and/or age  Screening tests ordered at today's visit but not completed yet may show as past due  Also, please note that scanned in results may not display below    Preventive Screenings:  Service Recommendations Previous Testing/Comments   Colorectal Cancer Screening  * Colonoscopy    * Fecal Occult Blood Test (FOBT)/Fecal Immunochemical Test (FIT)  * Fecal DNA/Cologuard Test  * Flexible Sigmoidoscopy Age: 54-65 years old   Colonoscopy: every 10 years (may be performed more frequently if at higher risk)  OR  FOBT/FIT: every 1 year  OR  Cologuard: every 3 years  OR  Sigmoidoscopy: every 5 years  Screening may be recommended earlier than age 48 if at higher risk for colorectal cancer  Also, an individualized decision between you and your healthcare provider will decide whether screening between the ages of 74-80 would be appropriate  Colonoscopy: Not on file  FOBT/FIT: Not on file  Cologuard: Not on file  Sigmoidoscopy: Not on file    Screening Not Indicated     Breast Cancer Screening Age: 36 years old  Frequency: every 1-2 years  Not required if history of left and right mastectomy Mammogram: Not on file        Cervical Cancer Screening Between the ages of 21-29, pap smear recommended once every 3 years  Between the ages of 33-67, can perform pap smear with HPV co-testing every 5 years  Recommendations may differ for women with a history of total hysterectomy, cervical cancer, or abnormal pap smears in past  Pap Smear: Not on file    Screening Not Indicated   Hepatitis C Screening Once for adults born between 1945 and 1965  More frequently in patients at high risk for Hepatitis C Hep C Antibody: Not on file        Diabetes Screening 1-2 times per year if you're at risk for diabetes or have pre-diabetes Fasting glucose: No results in last 5 years   A1C: 5 8    Screening Current   Cholesterol Screening Once every 5 years if you don't have a lipid disorder  May order more often based on risk factors  Lipid panel: Not on file          Other Preventive Screenings Covered by Medicare:  6  Abdominal Aortic Aneurysm (AAA) Screening: covered once if your at risk  You're considered to be at risk if you have a family history of AAA  7  Lung Cancer Screening: covers low dose CT scan once per year if you meet all of the following conditions: (1) Age 50-69; (2) No signs or symptoms of lung cancer; (3) Current smoker or have quit smoking within the last 15 years; (4) You have a tobacco smoking history of at least 30 pack years (packs per day multiplied by number of years you smoked); (5) You get a written order from a healthcare provider    8  Glaucoma Screening: covered annually if you're considered high risk: (1) You have diabetes OR (2) Family history of glaucoma OR (3)  aged 48 and older OR (4)  American aged 72 and older  5  Osteoporosis Screening: covered every 2 years if you meet one of the following conditions: (1) You're estrogen deficient and at risk for osteoporosis based off medical history and other findings; (2) Have a vertebral abnormality; (3) On glucocorticoid therapy for more than 3 months; (4) Have primary hyperparathyroidism; (5) On osteoporosis medications and need to assess response to drug therapy  · Last bone density test (DXA Scan): Not on file  10  HIV Screening: covered annually if you're between the age of 12-76  Also covered annually if you are younger than 13 and older than 72 with risk factors for HIV infection  For pregnant patients, it is covered up to 3 times per pregnancy  Immunizations:  Immunization Recommendations   Influenza Vaccine Annual influenza vaccination during flu season is recommended for all persons aged >= 6 months who do not have contraindications   Pneumococcal Vaccine (Prevnar and Pneumovax)  * Prevnar = PCV13  * Pneumovax = PPSV23   Adults 25-60 years old: 1-3 doses may be recommended based on certain risk factors  Adults 72 years old: Prevnar (PCV13) vaccine recommended followed by Pneumovax (PPSV23) vaccine  If already received PPSV23 since turning 65, then PCV13 recommended at least one year after PPSV23 dose  Hepatitis B Vaccine 3 dose series if at intermediate or high risk (ex: diabetes, end stage renal disease, liver disease)   Tetanus (Td) Vaccine - COST NOT COVERED BY MEDICARE PART B Following completion of primary series, a booster dose should be given every 10 years to maintain immunity against tetanus  Td may also be given as tetanus wound prophylaxis  Tdap Vaccine - COST NOT COVERED BY MEDICARE PART B Recommended at least once for all adults   For pregnant patients, recommended with each pregnancy  Shingles Vaccine (Shingrix) - COST NOT COVERED BY MEDICARE PART B  2 shot series recommended in those aged 48 and above     Health Maintenance Due:  There are no preventive care reminders to display for this patient  Immunizations Due:      Topic Date Due    DTaP,Tdap,and Td Vaccines (1 - Tdap) Never done    Influenza Vaccine (1) 09/01/2020     Advance Directives   What are advance directives? Advance directives are legal documents that state your wishes and plans for medical care  These plans are made ahead of time in case you lose your ability to make decisions for yourself  Advance directives can apply to any medical decision, such as the treatments you want, and if you want to donate organs  What are the types of advance directives? There are many types of advance directives, and each state has rules about how to use them  You may choose a combination of any of the following:  · Living will: This is a written record of the treatment you want  You can also choose which treatments you do not want, which to limit, and which to stop at a certain time  This includes surgery, medicine, IV fluid, and tube feedings  · Durable power of  for healthcare [place] [facility]): This is a written record that states who you want to make healthcare choices for you when you are unable to make them for yourself  This person, called a proxy, is usually a family member or a friend  You may choose more than 1 proxy  · Do not resuscitate (DNR) order:  A DNR order is used in case your heart stops beating or you stop breathing  It is a request not to have certain forms of treatment, such as CPR  A DNR order may be included in other types of advance directives  · Medical directive: This covers the care that you want if you are in a coma, near death, or unable to make decisions for yourself  You can list the treatments you want for each condition   Treatment may include pain medicine, surgery, blood transfusions, dialysis, IV or tube feedings, and a ventilator (breathing machine)  · Values history: This document has questions about your views, beliefs, and how you feel and think about life  This information can help others choose the care that you would choose  Why are advance directives important? An advance directive helps you control your care  Although spoken wishes may be used, it is better to have your wishes written down  Spoken wishes can be misunderstood, or not followed  Treatments may be given even if you do not want them  An advance directive may make it easier for your family to make difficult choices about your care  Urinary Incontinence   Urinary incontinence (UI)  is when you lose control of your bladder  UI develops because your bladder cannot store or empty urine properly  The 3 most common types of UI are stress incontinence, urge incontinence, or both  Medicines:   · May be given to help strengthen your bladder control  Report any side effects of medication to your healthcare provider  Do pelvic muscle exercises often:  Your pelvic muscles help you stop urinating  Squeeze these muscles tight for 5 seconds, then relax for 5 seconds  Gradually work up to squeezing for 10 seconds  Do 3 sets of 15 repetitions a day, or as directed  This will help strengthen your pelvic muscles and improve bladder control  Train your bladder:  Go to the bathroom at set times, such as every 2 hours, even if you do not feel the urge to go  You can also try to hold your urine when you feel the urge to go  For example, hold your urine for 5 minutes when you feel the urge to go  As that becomes easier, hold your urine for 10 minutes  Self-care:   · Keep a UI record  Write down how often you leak urine and how much you leak  Make a note of what you were doing when you leaked urine  · Drink liquids as directed  You may need to limit the amount of liquid you drink to help control your urine leakage   Do not drink any liquid right before you go to bed  Limit or do not have drinks that contain caffeine or alcohol  · Prevent constipation  Eat a variety of high-fiber foods  Good examples are high-fiber cereals, beans, vegetables, and whole-grain breads  Walking is the best way to trigger your intestines to have a bowel movement  · Exercise regularly and maintain a healthy weight  Weight loss and exercise will decrease pressure on your bladder and help you control your leakage  · Use a catheter as directed  to help empty your bladder  A catheter is a tiny, plastic tube that is put into your bladder to drain your urine  · Go to behavior therapy as directed  Behavior therapy may be used to help you learn to control your urge to urinate  Weight Management   Why it is important to manage your weight:  Being overweight increases your risk of health conditions such as heart disease, high blood pressure, type 2 diabetes, and certain types of cancer  It can also increase your risk for osteoarthritis, sleep apnea, and other respiratory problems  Aim for a slow, steady weight loss  Even a small amount of weight loss can lower your risk of health problems  How to lose weight safely:  A safe and healthy way to lose weight is to eat fewer calories and get regular exercise  You can lose up about 1 pound a week by decreasing the number of calories you eat by 500 calories each day  Healthy meal plan for weight management:  A healthy meal plan includes a variety of foods, contains fewer calories, and helps you stay healthy  A healthy meal plan includes the following:  · Eat whole-grain foods more often  A healthy meal plan should contain fiber  Fiber is the part of grains, fruits, and vegetables that is not broken down by your body  Whole-grain foods are healthy and provide extra fiber in your diet  Some examples of whole-grain foods are whole-wheat breads and pastas, oatmeal, brown rice, and bulgur    · Eat a variety of vegetables every day  Include dark, leafy greens such as spinach, kale, zaki greens, and mustard greens  Eat yellow and orange vegetables such as carrots, sweet potatoes, and winter squash  · Eat a variety of fruits every day  Choose fresh or canned fruit (canned in its own juice or light syrup) instead of juice  Fruit juice has very little or no fiber  · Eat low-fat dairy foods  Drink fat-free (skim) milk or 1% milk  Eat fat-free yogurt and low-fat cottage cheese  Try low-fat cheeses such as mozzarella and other reduced-fat cheeses  · Choose meat and other protein foods that are low in fat  Choose beans or other legumes such as split peas or lentils  Choose fish, skinless poultry (chicken or turkey), or lean cuts of red meat (beef or pork)  Before you cook meat or poultry, cut off any visible fat  · Use less fat and oil  Try baking foods instead of frying them  Add less fat, such as margarine, sour cream, regular salad dressing and mayonnaise to foods  Eat fewer high-fat foods  Some examples of high-fat foods include french fries, doughnuts, ice cream, and cakes  · Eat fewer sweets  Limit foods and drinks that are high in sugar  This includes candy, cookies, regular soda, and sweetened drinks  Exercise:  Exercise at least 30 minutes per day on most days of the week  Some examples of exercise include walking, biking, dancing, and swimming  You can also fit in more physical activity by taking the stairs instead of the elevator or parking farther away from stores  Ask your healthcare provider about the best exercise plan for you  © Copyright [employer] 2018 Information is for End User's use only and may not be sold, redistributed or otherwise used for commercial purposes   All illustrations and images included in CareNotes® are the copyrighted property of A D A M , Inc  or [address] [place] [address] [employer] [facility]

## 2021-03-19 NOTE — ASSESSMENT & PLAN NOTE
No current new skin lesions, sun screen use encouraged, con't regular f/u as per Dr Maria Esther Dawn, call w/any skin concerns

## 2021-06-02 DIAGNOSIS — I10 ESSENTIAL HYPERTENSION: ICD-10-CM

## 2021-06-02 DIAGNOSIS — E03.8 HYPOTHYROIDISM DUE TO HASHIMOTO'S THYROIDITIS: ICD-10-CM

## 2021-06-02 DIAGNOSIS — E78.5 DYSLIPIDEMIA: ICD-10-CM

## 2021-06-02 DIAGNOSIS — E06.3 HYPOTHYROIDISM DUE TO HASHIMOTO'S THYROIDITIS: ICD-10-CM

## 2021-06-02 RX ORDER — LEVOTHYROXINE SODIUM 88 MCG
TABLET ORAL
Qty: 90 TABLET | Refills: 1 | Status: SHIPPED | OUTPATIENT
Start: 2021-06-02 | End: 2021-10-23

## 2021-06-03 RX ORDER — ATORVASTATIN CALCIUM 10 MG/1
TABLET, FILM COATED ORAL
Qty: 90 TABLET | Refills: 1 | Status: SHIPPED | OUTPATIENT
Start: 2021-06-03 | End: 2021-11-29

## 2021-06-03 RX ORDER — LISINOPRIL 10 MG/1
TABLET ORAL
Qty: 90 TABLET | Refills: 1 | Status: SHIPPED | OUTPATIENT
Start: 2021-06-03 | End: 2022-02-01

## 2021-08-03 ENCOUNTER — LAB (OUTPATIENT)
Dept: LAB | Facility: HOSPITAL | Age: 86
End: 2021-08-03
Attending: INTERNAL MEDICINE
Payer: MEDICARE

## 2021-08-03 DIAGNOSIS — Z86.39 HISTORY OF HYPERPARATHYROIDISM: ICD-10-CM

## 2021-08-03 DIAGNOSIS — E06.3 HYPOTHYROIDISM DUE TO HASHIMOTO'S THYROIDITIS: ICD-10-CM

## 2021-08-03 DIAGNOSIS — E03.8 HYPOTHYROIDISM DUE TO HASHIMOTO'S THYROIDITIS: ICD-10-CM

## 2021-08-03 LAB
25(OH)D3 SERPL-MCNC: 55.7 NG/ML (ref 30–100)
ALBUMIN SERPL BCP-MCNC: 3.1 G/DL (ref 3.5–5)
ALP SERPL-CCNC: 61 U/L (ref 46–116)
ALT SERPL W P-5'-P-CCNC: 17 U/L (ref 12–78)
ANION GAP SERPL CALCULATED.3IONS-SCNC: 5 MMOL/L (ref 4–13)
AST SERPL W P-5'-P-CCNC: 10 U/L (ref 5–45)
BASOPHILS # BLD AUTO: 0.07 THOUSANDS/ΜL (ref 0–0.1)
BASOPHILS NFR BLD AUTO: 1 % (ref 0–1)
BILIRUB SERPL-MCNC: 0.54 MG/DL (ref 0.2–1)
BUN SERPL-MCNC: 24 MG/DL (ref 5–25)
CALCIUM ALBUM COR SERPL-MCNC: 10.1 MG/DL (ref 8.3–10.1)
CALCIUM SERPL-MCNC: 9.4 MG/DL (ref 8.3–10.1)
CHLORIDE SERPL-SCNC: 108 MMOL/L (ref 100–108)
CHOLEST SERPL-MCNC: 148 MG/DL (ref 50–200)
CO2 SERPL-SCNC: 26 MMOL/L (ref 21–32)
CREAT SERPL-MCNC: 0.98 MG/DL (ref 0.6–1.3)
EOSINOPHIL # BLD AUTO: 0.43 THOUSAND/ΜL (ref 0–0.61)
EOSINOPHIL NFR BLD AUTO: 7 % (ref 0–6)
ERYTHROCYTE [DISTWIDTH] IN BLOOD BY AUTOMATED COUNT: 14.5 % (ref 11.6–15.1)
EST. AVERAGE GLUCOSE BLD GHB EST-MCNC: 111 MG/DL
GFR SERPL CREATININE-BSD FRML MDRD: 51 ML/MIN/1.73SQ M
GLUCOSE P FAST SERPL-MCNC: 107 MG/DL (ref 65–99)
HBA1C MFR BLD: 5.5 %
HCT VFR BLD AUTO: 38.4 % (ref 34.8–46.1)
HDLC SERPL-MCNC: 43 MG/DL
HGB BLD-MCNC: 11.6 G/DL (ref 11.5–15.4)
IMM GRANULOCYTES # BLD AUTO: 0.01 THOUSAND/UL (ref 0–0.2)
IMM GRANULOCYTES NFR BLD AUTO: 0 % (ref 0–2)
LDLC SERPL CALC-MCNC: 73 MG/DL (ref 0–100)
LYMPHOCYTES # BLD AUTO: 1.33 THOUSANDS/ΜL (ref 0.6–4.47)
LYMPHOCYTES NFR BLD AUTO: 22 % (ref 14–44)
MCH RBC QN AUTO: 27.5 PG (ref 26.8–34.3)
MCHC RBC AUTO-ENTMCNC: 30.2 G/DL (ref 31.4–37.4)
MCV RBC AUTO: 91 FL (ref 82–98)
MONOCYTES # BLD AUTO: 0.59 THOUSAND/ΜL (ref 0.17–1.22)
MONOCYTES NFR BLD AUTO: 10 % (ref 4–12)
NEUTROPHILS # BLD AUTO: 3.76 THOUSANDS/ΜL (ref 1.85–7.62)
NEUTS SEG NFR BLD AUTO: 60 % (ref 43–75)
NONHDLC SERPL-MCNC: 105 MG/DL
NRBC BLD AUTO-RTO: 0 /100 WBCS
PLATELET # BLD AUTO: 237 THOUSANDS/UL (ref 149–390)
PMV BLD AUTO: 12 FL (ref 8.9–12.7)
POTASSIUM SERPL-SCNC: 4.2 MMOL/L (ref 3.5–5.3)
PROT SERPL-MCNC: 6.6 G/DL (ref 6.4–8.2)
PTH-INTACT SERPL-MCNC: 63 PG/ML (ref 18.4–80.1)
RBC # BLD AUTO: 4.22 MILLION/UL (ref 3.81–5.12)
SODIUM SERPL-SCNC: 139 MMOL/L (ref 136–145)
T4 FREE SERPL-MCNC: 1.4 NG/DL (ref 0.76–1.46)
TRIGL SERPL-MCNC: 161 MG/DL
TSH SERPL DL<=0.05 MIU/L-ACNC: 0.73 UIU/ML (ref 0.36–3.74)
WBC # BLD AUTO: 6.19 THOUSAND/UL (ref 4.31–10.16)

## 2021-08-03 PROCEDURE — 83036 HEMOGLOBIN GLYCOSYLATED A1C: CPT | Performed by: INTERNAL MEDICINE

## 2021-08-03 PROCEDURE — 85025 COMPLETE CBC W/AUTO DIFF WBC: CPT | Performed by: INTERNAL MEDICINE

## 2021-08-03 PROCEDURE — 82306 VITAMIN D 25 HYDROXY: CPT

## 2021-08-03 PROCEDURE — 36415 COLL VENOUS BLD VENIPUNCTURE: CPT | Performed by: INTERNAL MEDICINE

## 2021-08-03 PROCEDURE — 84439 ASSAY OF FREE THYROXINE: CPT

## 2021-08-03 PROCEDURE — 80053 COMPREHEN METABOLIC PANEL: CPT

## 2021-08-03 PROCEDURE — 84443 ASSAY THYROID STIM HORMONE: CPT

## 2021-08-03 PROCEDURE — 83970 ASSAY OF PARATHORMONE: CPT

## 2021-08-03 PROCEDURE — 80061 LIPID PANEL: CPT | Performed by: INTERNAL MEDICINE

## 2021-08-07 ENCOUNTER — HOSPITAL ENCOUNTER (OUTPATIENT)
Dept: ULTRASOUND IMAGING | Facility: HOSPITAL | Age: 86
Discharge: HOME | End: 2021-08-07
Attending: INTERNAL MEDICINE
Payer: MEDICARE

## 2021-08-07 DIAGNOSIS — E04.1 THYROID NODULE: ICD-10-CM

## 2021-08-07 PROCEDURE — 76536 US EXAM OF HEAD AND NECK: CPT

## 2021-08-12 ENCOUNTER — TELEPHONE (OUTPATIENT)
Dept: ENDOCRINOLOGY | Facility: HOSPITAL | Age: 86
End: 2021-08-12

## 2021-08-12 NOTE — TELEPHONE ENCOUNTER
Noted   Suspicious nodules noted on thyroid ultrasound  She does have an upcoming appointment on August 16, 2021 to discuss results

## 2021-08-16 ENCOUNTER — OFFICE VISIT (OUTPATIENT)
Dept: ENDOCRINOLOGY | Facility: HOSPITAL | Age: 86
End: 2021-08-16
Payer: MEDICARE

## 2021-08-16 VITALS
DIASTOLIC BLOOD PRESSURE: 52 MMHG | SYSTOLIC BLOOD PRESSURE: 108 MMHG | HEIGHT: 57 IN | WEIGHT: 123.2 LBS | HEART RATE: 67 BPM | BODY MASS INDEX: 26.58 KG/M2

## 2021-08-16 DIAGNOSIS — E21.3 HYPERPARATHYROIDISM (HCC): ICD-10-CM

## 2021-08-16 DIAGNOSIS — E03.9 HYPOTHYROIDISM, UNSPECIFIED TYPE: ICD-10-CM

## 2021-08-16 DIAGNOSIS — E04.1 THYROID NODULE: Primary | ICD-10-CM

## 2021-08-16 PROCEDURE — 99214 OFFICE O/P EST MOD 30 MIN: CPT | Performed by: INTERNAL MEDICINE

## 2021-08-16 NOTE — PROGRESS NOTES
8/16/2021    Assessment/Plan      Diagnoses and all orders for this visit:    Thyroid nodule  -     US thyroid; Future    Hyperparathyroidism ([facility] )  -     Comprehensive metabolic panel; Future  -     PTH, intact; Future  -     Vitamin D 25 hydroxy; Future    Hypothyroidism, unspecified type  -     TSH, 3rd generation; Future  -     T4, free; Future        Assessment/Plan:    1  Hypothyroidism:  Clinically and biochemically doing well on current regimen  Follow-up in 1 year with labs as ordered above just prior  2  History of hyperparathyroidism:  Recent calcium, vitamin-D, PTH are normal   Monitor annually  3  Thyroid nodules:  Recent ultrasound done at [address] [place] [address]  Will reach out to Radiology to ensure comparison looks stable from prior ultrasounds  Plan to continue to monitor conservatively  CC: Follow-up    History of Present Illness     HPI: Wili Buchanan is a 80y o  year old female with history of   Hypothyroidism due to Hashimoto's thyroiditis, thyroid nodule, history of hyperparathyroidism  She presents for a follow-up appointment  For hypothyroidism she continues on Synthroid brand 100 mcg daily  In the past she had 2 surgeries for primary hyperparathyroidism with the 2nd at the [place] [address]  Regarding history of thyroid nodules, in 2004 at [facility] she had a fine-needle aspiration biopsy with results consistent with goiter  She presents today overall feeling well  No new health issues or symptoms of concern  No neck compressive symptoms  Review of Systems   Constitutional: Negative for fatigue  HENT: Negative for trouble swallowing and voice change  Eyes: Negative for visual disturbance  Respiratory: Negative for shortness of breath  Cardiovascular: Negative for palpitations and leg swelling  Gastrointestinal: Negative for abdominal pain, nausea and vomiting  Endocrine: Negative for polydipsia and polyuria     Musculoskeletal: Negative for arthralgias and myalgias  Skin: Negative for rash  Neurological: Negative for dizziness, tremors and weakness  Hematological: Negative for adenopathy  Psychiatric/Behavioral: Negative for agitation and confusion  Historical Information   Past Medical History:   Diagnosis Date    BCC (basal cell carcinoma)     Diastolic dysfunction     Dyslipidemia     Dysphagia     Glaucoma     Hashimoto's thyroiditis     Hyperparathyroidism ([address] )     Hypertension     Impaired fasting glucose     Insomnia     Osteopenia     SCCA (squamous cell carcinoma) of skin      Past Surgical History:   Procedure Laterality Date    CATARACT EXTRACTION, BILATERAL      COLONOSCOPY      Complete, Onset - 10/25/05 - 10 years     HYSTERECTOMY      PARATHYROIDECTOMY      NE WRIST Tiny Nisha LIG Right 5/2/2016    Procedure: RELEASE CARPAL TUNNEL ENDOSCOPIC;  Surgeon: Ashwini Lewis MD;  Location:  MAIN OR;  Service: Orthopedics    TONSILLECTOMY       Social History   Social History     Substance and Sexual Activity   Alcohol Use Yes    Comment: rarely, social      Social History     Substance and Sexual Activity   Drug Use No     Social History     Tobacco Use   Smoking Status Never Smoker   Smokeless Tobacco Never Used     Family History:   Family History   Problem Relation Age of Onset    Stroke Father         CVA    Ovarian cancer Mother        Meds/Allergies   Current Outpatient Medications   Medication Sig Dispense Refill    amLODIPine (NORVASC) 10 mg tablet Take 0 5 tablets (5 mg total) by mouth daily 90 tablet 1    aspirin 81 mg chewable tablet Chew 81 mg daily   atorvastatin (LIPITOR) 10 mg tablet TAKE 1 TABLET BY MOUTH EVERY DAY 90 tablet 1    Biotin 1000 MCG tablet Take 1,000 mcg by mouth 2 (two) times a day        Calcium 500 MG tablet Take 1 tablet by mouth 3 (three) times a week       cholecalciferol (VITAMIN D3) 1,000 units tablet Take 1 tablet by mouth daily      dorzolamide-timolol (COSOPT) 22 3-6 8 MG/ML ophthalmic solution Apply to eye      Fluad Quadrivalent 0 5 ML PRSY PHARMAC[facility] ADMINISTERED      lisinopril (ZESTRIL) 10 mg tablet TAKE 1 TABLET BY MOUTH EVERY DAY 90 tablet 1    metoprolol tartrate (LOPRESSOR) 25 mg tablet Take 1 tablet (25 mg total) by mouth daily 90 tablet 1    Multiple Vitamins-Minerals (CENTRUM SILVER ADULT 50+ PO) Take by mouth   Omega-3 Fatty Acids (FISH OIL) 1,000 mg Take 1,000 mg by mouth daily   Synthroid 88 MCG tablet TAKE 1 TABLET BY MOUTH DAILY  90 tablet 1     No current facility-administered medications for this visit  Allergies   Allergen Reactions    Cyclogyl [Cyclopentolate Hcl]     Phenylephrine     Pred Forte [Prednisolone Acetate]        Objective   Vitals: Blood pressure 108/52, pulse 67, height 4' 9" (1 448 m), weight 55 9 kg (123 lb 3 2 oz)  Invasive Devices     None                 Physical Exam  Vitals reviewed  Constitutional:       General: She is not in acute distress  Appearance: She is well-developed  She is not diaphoretic  HENT:      Head: Normocephalic and atraumatic  Eyes:      Conjunctiva/sclera: Conjunctivae normal       Pupils: Pupils are equal, round, and reactive to light  Neck:      Thyroid: No thyromegaly  Cardiovascular:      Rate and Rhythm: Normal rate and regular rhythm  Pulmonary:      Effort: Pulmonary effort is normal  No respiratory distress  Breath sounds: Normal breath sounds  Abdominal:      General: Bowel sounds are normal       Palpations: Abdomen is soft  Musculoskeletal:         General: Normal range of motion  Cervical back: Normal range of motion and neck supple  Skin:     General: Skin is warm and dry  Findings: No rash  Neurological:      Mental Status: She is alert and oriented to person, place, and time  Motor: No abnormal muscle tone     Psychiatric:         Behavior: Behavior normal          The history was obtained from the review of the chart and from the patient  Lab Results:      Component      Latest Ref Rng & Units 8/3/2021   Sodium      136 - 145 mmol/L 139   Potassium      3 5 - 5 3 mmol/L 4 2   Chloride      100 - 108 mmol/L 108   CO2      21 - 32 mmol/L 26   Anion Gap      4 - 13 mmol/L 5   BUN      5 - 25 mg/dL 24   Creatinine      0 60 - 1 30 mg/dL 0 98   GLUCOSE FASTING      65 - 99 mg/dL 107 (H)   Calcium      8 3 - 10 1 mg/dL 9 4   CORRECTED CALCIUM      8 3 - 10 1 mg/dL 10 1   AST      5 - 45 U/L 10   ALT      12 - 78 U/L 17   Alkaline Phosphatase      46 - 116 U/L 61   Total Protein      6 4 - 8 2 g/dL 6 6   Albumin      3 5 - 5 0 g/dL 3 1 (L)   TOTAL BILIRUBIN      0 20 - 1 00 mg/dL 0 54   eGFR      ml/min/1 73sq m 51   TSH 3RD GENERATON      0 358 - 3 740 uIU/mL 0 728   Free T4      0 76 - 1 46 ng/dL 1 40   PARATHYROID HORMONE      18 4 - 80 1 pg/mL 63 0   Vit D, 25-Hydroxy      30 0 - 100 0 ng/mL 55 7     8/7/2021:  THYROID ULTRASOUND     INDICATION:    E04 1: Nontoxic single thyroid nodule  History of parathyroid surgery; patient on thyroid medication       COMPARISON:  None     TECHNIQUE:   Ultrasound of the thyroid was performed with a high frequency linear transducer in transverse and sagittal planes including volumetric imaging sweeps as well as traditional still imaging technique      FINDINGS:  Thyroid parenchyma is diffusely heterogeneous in echotexture      Right lobe:  2 9 x 1 5 x 1 4 cm  2 9 mL  Left lobe:  2 4 x 1 1 x 1 3 cm  1 6 mL  Isthmus:  0 4 cm      Nodule #1  Image 47  Left upper pole measuring 1 4 x 0 8 x 1 cm  COMPOSITION:  2 points, solid or almost completely solid   ECHOGENICITY:  1 point, hyperechoic or isoechoic  SHAPE:  0 points, wider-than-tall  MARGIN: 0 points, smooth  ECHOGENIC FOCI:  0 points, none or large comet-tail artifacts  TI-RADS Classification: TR 3 (3 points), FNA if >2 5 cm  Follow if >1 5 cm      Nodule #2  Image 52  Left mid pole measuring 2 x 0 8 x 1 1 cm    COMPOSITION:  2 points, solid or almost completely solid   ECHOGENICITY:  2 points, hypoechoic  SHAPE:  0 points, wider-than-tall  MARGIN: 0 points, smooth  ECHOGENIC FOCI:  0 points, none or large comet-tail artifacts  TI-RADS Classification: TR 4 (4-6 points), FNA if > 1 5 cm  Follow if > 1cm      Nodule #3  Image 14  Right midpole measuring 2 x 1 2 x 1 2 cm  The transverse dimension is remeasured on image 191, series 1000   COMPOSITION:  2 points, solid or almost completely solid   ECHOGENICITY:  2 points, hypoechoic  SHAPE:  0 points, wider-than-tall  MARGIN: 0 points, smooth  ECHOGENIC FOCI:  0 points, none or large comet-tail artifacts  TI-RADS Classification: TR 4 (4-6 points), FNA if > 1 5 cm  Follow if > 1cm      Small echogenic area inferior to the lower pole on the right may represent a small lymph node and could be extrathyroidal versus an exophytic thyroid nodule  This measures up to 6 mm in size       There are additional nodules of lesser size and/or TI-RADS score  These do not necessitate additional evaluation based on ACR criteria       IMPRESSION:     The following meet current ACR criteria for recommending ultrasound guided biopsy:         The 2 cm left midpole nodule  (Image number 52) (CRITERIA: TR 4, Moderately suspicious  FNA if > 1 5 cm      The 2 cm right midpole nodule  (Image number 14) (CRITERIA: TR 4, Moderately suspicious  FNA if > 1 5 cm            Reference: ACR Thyroid Imaging, Reporting and Data System (TI-RADS): White Paper of the [employer]  J AM Keely Radiol 8851;47:[id number]  (additional recommendations based on American Thyroid Association 2015 guidelines )        Workstation performed: [id number]       Future Appointments   Date Time Provider Cassandra Parekh   9/17/2021  9:20 AM Buanjum Pickup, DO [facility]  [facility]       Portions of the record may have been created with voice recognition software   Occasional wrong word or "sound a like"

## 2021-08-23 ENCOUNTER — OFFICE VISIT (OUTPATIENT)
Dept: FAMILY MEDICINE CLINIC | Facility: HOSPITAL | Age: 86
End: 2021-08-23
Payer: MEDICARE

## 2021-08-23 VITALS
WEIGHT: 124 LBS | TEMPERATURE: 98.8 F | HEART RATE: 82 BPM | SYSTOLIC BLOOD PRESSURE: 122 MMHG | HEIGHT: 57 IN | BODY MASS INDEX: 26.75 KG/M2 | DIASTOLIC BLOOD PRESSURE: 60 MMHG

## 2021-08-23 DIAGNOSIS — M25.561 ACUTE PAIN OF RIGHT KNEE: Primary | ICD-10-CM

## 2021-08-23 DIAGNOSIS — M79.661 RIGHT CALF PAIN: ICD-10-CM

## 2021-08-23 PROCEDURE — 99214 OFFICE O/P EST MOD 30 MIN: CPT | Performed by: INTERNAL MEDICINE

## 2021-08-23 NOTE — PROGRESS NOTES
Assessment/Plan:     Diagnoses and all orders for this visit:    Acute pain of right knee  Comments:  Likely strain vs DJD, will eval for Baker's cyst and DVT - venous duplex ordered, advised ice/rest, if not better will consider Xray and/or Ortho eval, call w/persistent or new or worse symptoms    Right calf pain  Comments:  Had trip to [facility] but no other risk factors, will check venous duplex and follow closely, to ED or call with any red flag DVT symptoms - reviewed w/pt in detail  Orders:  -     VAS lower limb venous duplex study, complete bilateral; Future      Dexa 8/15 - nml     8/21        Subjective:      Patient ID: Valentine Werner is a 80 y o  female  HPI Pt here with c/o R knee pain since Thursday (5 days)  Pt noting pain starting Thursday with R ant and post knee pain  Pain is described as "just a pain" and felt "my leg was so heavy"  The pain is intermittent  She has no pain at rest/sitting but has the pain with standing and walking  She notes pain with going up and down the stairs  Pain radiates from back of knee to the calf but the pain at the front of the knee is medial and does not radiate  She notes no redness/swelling/warmth/palp cord to the R knee or calf region  She notes the knee has locked up but does not feel weak and has not given out  She has been using heating pad on the knee w/benefit  She has not used any OTC meds  The pain has improved since Thursday but has not resolved  She notes h/o Marquez's cyst on LLE but no previous R knee issues  She denies low back pain/hip pain or pain at R ankle/foot  She denies personal h/o DVT/PE  She denies known family h/o DVT/PE  She has had no recent surgeries but had a fall last week (missed a step and went flat on her face - denies knee injury at that time)  She drove to the [employer] last weekend (2 hrs) but no other immobilization noted       Dexa 8/15 - nml     8/21    Review of Systems   Constitutional: Negative for chills and fever    HENT: Negative for sore throat  Respiratory: Negative for cough, shortness of breath and wheezing  Cardiovascular: Negative for chest pain, palpitations and leg swelling  Musculoskeletal: Positive for arthralgias and gait problem  Negative for joint swelling  Skin: Negative for rash and wound  Neurological: Negative for dizziness, weakness, light-headedness and numbness  Hematological: Does not bruise/bleed easily  Psychiatric/Behavioral: Negative for behavioral problems and confusion  Objective:    /60   Pulse 82   Temp 98 8 °F (37 1 °C) (Tympanic)   Ht 4' 9" (1 448 m)   Wt 56 2 kg (124 lb)   BMI 26 83 kg/m²      Physical Exam  Vitals and nursing note reviewed  Constitutional:       General: She is not in acute distress  Appearance: She is well-developed  She is not ill-appearing  HENT:      Head: Normocephalic and atraumatic  Eyes:      General:         Right eye: No discharge  Left eye: No discharge  Conjunctiva/sclera: Conjunctivae normal    Neck:      Trachea: No tracheal deviation  Cardiovascular:      Rate and Rhythm: Normal rate and regular rhythm  Heart sounds: Normal heart sounds  No murmur heard  No friction rub  Pulmonary:      Effort: Pulmonary effort is normal  No respiratory distress  Breath sounds: Normal breath sounds  No wheezing, rhonchi or rales  Musculoskeletal:      Cervical back: Neck supple  Comments: Lumbar spine: no pain with palp of spinous processes of lumbar spine  R knee: no effusion/redness/warmth, no popping/clicking/significant crepitus with PROM, neg ant/post drawer test  R popliteal fossa and R calf tender to palp, no erythema/warmth/plap cord, + Gloria's sign   Skin:     General: Skin is warm  Coloration: Skin is not pale  Findings: No bruising, erythema or rash  Neurological:      General: No focal deficit present  Mental Status: She is alert  Mental status is at baseline  Motor: No abnormal muscle tone  Gait: Gait normal    Psychiatric:         Mood and Affect: Mood normal          Behavior: Behavior normal          Thought Content:  Thought content normal          Judgment: Judgment normal

## 2021-08-24 ENCOUNTER — TELEPHONE (OUTPATIENT)
Dept: ENDOCRINOLOGY | Facility: HOSPITAL | Age: 86
End: 2021-08-24

## 2021-08-24 NOTE — TELEPHONE ENCOUNTER
Can we obtain CD of images of prior thyroid ultrasounds from 2019 and 2020 from [facility]? Thanks

## 2021-08-27 ENCOUNTER — HOSPITAL ENCOUNTER (OUTPATIENT)
Dept: NON INVASIVE DIAGNOSTICS | Facility: HOSPITAL | Age: 86
Discharge: HOME | End: 2021-08-27
Payer: MEDICARE

## 2021-08-27 DIAGNOSIS — M79.661 RIGHT CALF PAIN: ICD-10-CM

## 2021-08-27 PROCEDURE — 93970 EXTREMITY STUDY: CPT

## 2021-08-27 PROCEDURE — 93970 EXTREMITY STUDY: CPT | Performed by: INTERNAL MEDICINE

## 2021-09-02 DIAGNOSIS — I10 ESSENTIAL HYPERTENSION: ICD-10-CM

## 2021-09-02 RX ORDER — AMLODIPINE BESYLATE 10 MG/1
TABLET ORAL
Qty: 90 TABLET | Refills: 1 | Status: SHIPPED | OUTPATIENT
Start: 2021-09-02 | End: 2021-09-17 | Stop reason: SDUPTHER

## 2021-09-17 ENCOUNTER — OFFICE VISIT (OUTPATIENT)
Dept: FAMILY MEDICINE CLINIC | Facility: HOSPITAL | Age: 86
End: 2021-09-17
Payer: MEDICARE

## 2021-09-17 VITALS
WEIGHT: 121.6 LBS | TEMPERATURE: 96.8 F | HEIGHT: 57 IN | SYSTOLIC BLOOD PRESSURE: 102 MMHG | DIASTOLIC BLOOD PRESSURE: 58 MMHG | BODY MASS INDEX: 26.24 KG/M2 | HEART RATE: 68 BPM

## 2021-09-17 DIAGNOSIS — R13.19 ESOPHAGEAL DYSPHAGIA: ICD-10-CM

## 2021-09-17 DIAGNOSIS — E03.9 HYPOTHYROIDISM, UNSPECIFIED TYPE: ICD-10-CM

## 2021-09-17 DIAGNOSIS — K21.9 GASTROESOPHAGEAL REFLUX DISEASE WITHOUT ESOPHAGITIS: ICD-10-CM

## 2021-09-17 DIAGNOSIS — E78.5 DYSLIPIDEMIA: ICD-10-CM

## 2021-09-17 DIAGNOSIS — E21.3 HYPERPARATHYROIDISM (HCC): ICD-10-CM

## 2021-09-17 DIAGNOSIS — I10 ESSENTIAL HYPERTENSION: ICD-10-CM

## 2021-09-17 DIAGNOSIS — R73.01 IMPAIRED FASTING GLUCOSE: Primary | ICD-10-CM

## 2021-09-17 PROCEDURE — 99214 OFFICE O/P EST MOD 30 MIN: CPT | Performed by: INTERNAL MEDICINE

## 2021-09-17 RX ORDER — AMLODIPINE BESYLATE 5 MG/1
5 TABLET ORAL DAILY
Start: 2021-09-17

## 2021-09-17 RX ORDER — FAMOTIDINE 20 MG/1
20 TABLET, FILM COATED ORAL EVERY MORNING
Qty: 30 TABLET | Refills: 1 | Status: SHIPPED | OUTPATIENT
Start: 2021-09-17 | End: 2021-10-10

## 2021-09-17 NOTE — PROGRESS NOTES
Assessment/Plan:    Impaired fasting glucose  FBS mildly elevated but A1C wnl, con't healthy diet and keep active, con't annual labs    Dyslipidemia  TG up a bit and HDL a bit low - urged healthy diet and keep active, con't current statin, recheck FLP annually    Hypothyroidism  Clinically euthyroid and TFT"s wnl, con't meds and f/u as per Endo    Hyperparathyroidism ([facility] )  Con't labs and f/u as per Endo    Esophageal reflux  With intermittent reflux and worsening dysphagia will start Pepcid 20 mg 1 tab PO q am, will follow    Hypertension  BP at goal, med list updated, con't current regimen       Diagnoses and all orders for this visit:    Impaired fasting glucose    Dyslipidemia    Hypothyroidism, unspecified type    Hyperparathyroidism ([facility] )    Essential hypertension  -     amLODIPine (NORVASC) 5 mg tablet; Take 1 tablet (5 mg total) by mouth daily    Esophageal dysphagia  Comments:  Years of dysphagia but recent worsening - warrents swallow study, will star Pepcid 20 mg PO q am as well, call with new/worse symptoms, will follow  Orders:  -     famotidine (PEPCID) 20 mg tablet; Take 1 tablet (20 mg total) by mouth every morning  -     FL barium swallow ROUTINE; Future    Gastroesophageal reflux disease without esophagitis      Dexa 8/15 - nml    No further mammo needed    She had her flu vaccine this year already        Subjective:      Patient ID: Valentine Werner is a 80 y o  female  HPI Pt here fro follow up appt and BW results    BW results were d/w pt in detail: FBS/A1C 107/5 5, albumin 3 1, rest of CMP/CBC/TSH/FT were wnl, FLP with  and HDL 43 but TC and LDL were both at goal     Def of nml vs IFG vs DM was d/w pt in detail  Diet/exercise was reviewed - wgt down 2 lbs  She does not feel she eats a lot of sweets or carbs  She does no formal exercise but is very active  Goal FLP was d/w pt in detail  Diet/exercise reviewed as noted above  She is taking her Atorvastatin daily as directed w/o Se  She notes no stroke/TIA symptoms/CP  Pt is taking their thyroid medication daily w/o any other meds and prior to eating  She had PT/VITD also done for Dr Deng Harkins for her hyperparathyroidism  She denies any significant wgt changes/fatigue/C/D/tremor/palp/hair loss or skin changes  BP at goal today and meds were reviewed and of note her amlodipine was decreased by Cardio (Dr Nalini Walton) in June d/t low BP  Med list updated today  She denies missing doses of meds or SE with the meds  She does not check her BP outside the office  She notes no frequent Ha's/dizziness/double vision/CP  She denies any recent falls or excessive fatigue  Pt has noted some issues with swallowing meats/dry breads for years - noted recently it seemed a bit worse and she started taking OTC supplement for swallowing issues and it has helped  She notes no pain with swallowing and denies pills/liquids getting stuck  She denies change in voice/hoarseness/changes in stools/blood in stool/black stools  Dexa 8/15 - nml    No further mammo needed    She had her flu vaccine this year already      Review of Systems   Constitutional: Negative for chills, fatigue, fever and unexpected weight change  HENT: Positive for trouble swallowing  Negative for congestion, sore throat and voice change  Eyes: Negative for pain and visual disturbance  Respiratory: Negative for cough, shortness of breath and wheezing  Cardiovascular: Negative for chest pain and palpitations  Gastrointestinal: Negative for abdominal pain, blood in stool, constipation, diarrhea, nausea and vomiting  Endocrine: Negative for polydipsia and polyuria  Genitourinary: Negative for difficulty urinating and dysuria  Musculoskeletal: Negative for back pain and neck pain  Skin: Negative for rash and wound  Neurological: Negative for dizziness, light-headedness and headaches  Hematological: Negative for adenopathy     Psychiatric/Behavioral: Negative for behavioral problems and confusion  Objective:    /58   Pulse 68   Temp (!) 96 8 °F (36 °C) (Tympanic)   Ht 4' 9" (1 448 m)   Wt 55 2 kg (121 lb 9 6 oz)   BMI 26 31 kg/m²      Physical Exam  Vitals and nursing note reviewed  Constitutional:       General: She is not in acute distress  Appearance: She is well-developed  She is not ill-appearing  HENT:      Head: Normocephalic and atraumatic  Eyes:      General:         Right eye: No discharge  Left eye: No discharge  Conjunctiva/sclera: Conjunctivae normal    Neck:      Trachea: No tracheal deviation  Cardiovascular:      Rate and Rhythm: Normal rate and regular rhythm  Heart sounds: Normal heart sounds  No murmur heard  No friction rub  Pulmonary:      Effort: Pulmonary effort is normal  No respiratory distress  Breath sounds: Normal breath sounds  No wheezing, rhonchi or rales  Abdominal:      General: There is no distension  Palpations: Abdomen is soft  Tenderness: There is no abdominal tenderness  There is no guarding or rebound  Musculoskeletal:         General: No deformity or signs of injury  Cervical back: Neck supple  Lymphadenopathy:      Cervical: No cervical adenopathy  Skin:     General: Skin is warm  Coloration: Skin is not pale  Findings: No rash  Neurological:      General: No focal deficit present  Mental Status: She is alert  Mental status is at baseline  Motor: No abnormal muscle tone  Gait: Gait normal    Psychiatric:         Mood and Affect: Mood normal          Behavior: Behavior normal          Thought Content:  Thought content normal          Judgment: Judgment normal

## 2021-09-17 NOTE — ASSESSMENT & PLAN NOTE
TG up a bit and HDL a bit low - urged healthy diet and keep active, con't current statin, recheck FLP annually

## 2021-09-27 ENCOUNTER — HOSPITAL ENCOUNTER (OUTPATIENT)
Dept: RADIOLOGY | Facility: HOSPITAL | Age: 86
Discharge: HOME | End: 2021-09-27
Payer: MEDICARE

## 2021-09-27 DIAGNOSIS — R13.19 ESOPHAGEAL DYSPHAGIA: ICD-10-CM

## 2021-09-27 PROCEDURE — 74220 X-RAY XM ESOPHAGUS 1CNTRST: CPT

## 2021-10-10 DIAGNOSIS — R13.19 ESOPHAGEAL DYSPHAGIA: ICD-10-CM

## 2021-10-10 RX ORDER — FAMOTIDINE 20 MG/1
TABLET, FILM COATED ORAL
Qty: 30 TABLET | Refills: 1 | Status: SHIPPED | OUTPATIENT
Start: 2021-10-10 | End: 2022-03-17

## 2021-10-23 DIAGNOSIS — E06.3 HYPOTHYROIDISM DUE TO HASHIMOTO'S THYROIDITIS: ICD-10-CM

## 2021-10-23 DIAGNOSIS — E03.8 HYPOTHYROIDISM DUE TO HASHIMOTO'S THYROIDITIS: ICD-10-CM

## 2021-10-23 RX ORDER — LEVOTHYROXINE SODIUM 88 MCG
TABLET ORAL
Qty: 90 TABLET | Refills: 1 | Status: SHIPPED | OUTPATIENT
Start: 2021-10-23 | End: 2022-05-18

## 2021-11-28 DIAGNOSIS — E78.5 DYSLIPIDEMIA: ICD-10-CM

## 2021-11-29 RX ORDER — ATORVASTATIN CALCIUM 10 MG/1
TABLET, FILM COATED ORAL
Qty: 90 TABLET | Refills: 1 | Status: SHIPPED | OUTPATIENT
Start: 2021-11-29 | End: 2022-05-27

## 2022-01-31 DIAGNOSIS — I10 ESSENTIAL HYPERTENSION: ICD-10-CM

## 2022-02-01 RX ORDER — LISINOPRIL 10 MG/1
TABLET ORAL
Qty: 90 TABLET | Refills: 1 | Status: SHIPPED | OUTPATIENT
Start: 2022-02-01 | End: 2022-07-07

## 2022-03-09 ENCOUNTER — RA CDI HCC (OUTPATIENT)
Dept: OTHER | Facility: HOSPITAL | Age: 87
End: 2022-03-09

## 2022-03-09 NOTE — PROGRESS NOTES
[facility]  coding opportunities       Chart reviewed, no opportunity found: CHART REVIEWED, NO OPPORTUNITY FOUND                        Patients insurance company: [employer]

## 2022-03-17 ENCOUNTER — OFFICE VISIT (OUTPATIENT)
Dept: FAMILY MEDICINE CLINIC | Facility: HOSPITAL | Age: 87
End: 2022-03-17
Payer: MEDICARE

## 2022-03-17 VITALS
WEIGHT: 123.6 LBS | BODY MASS INDEX: 26.66 KG/M2 | SYSTOLIC BLOOD PRESSURE: 116 MMHG | DIASTOLIC BLOOD PRESSURE: 70 MMHG | TEMPERATURE: 97 F | HEIGHT: 57 IN | HEART RATE: 66 BPM

## 2022-03-17 DIAGNOSIS — I10 PRIMARY HYPERTENSION: Primary | ICD-10-CM

## 2022-03-17 DIAGNOSIS — C43.59 MALIGNANT MELANOMA OF TORSO EXCLUDING BREAST (HCC): ICD-10-CM

## 2022-03-17 DIAGNOSIS — I73.9 PERIPHERAL ARTERIAL DISEASE (HCC): ICD-10-CM

## 2022-03-17 DIAGNOSIS — E03.9 HYPOTHYROIDISM, UNSPECIFIED TYPE: ICD-10-CM

## 2022-03-17 DIAGNOSIS — E21.3 HYPERPARATHYROIDISM (HCC): ICD-10-CM

## 2022-03-17 PROCEDURE — 99214 OFFICE O/P EST MOD 30 MIN: CPT | Performed by: INTERNAL MEDICINE

## 2022-03-17 NOTE — PROGRESS NOTES
Assessment/Plan:    Hyperparathyroidism ([facility] )  Has BW ordered by Endo, has f/u scheduled, con't labs/tx and f/u as per Endo    Hypothyroidism  Taking thyroid med daily and has some fatigue but I suspect this is more d/t deconditioning, con't meds and labs and f/u as per Endo, call with new/worse symptoms    Hypertension  BP great today, con't current meds, recheck in 6 mos    Peripheral arterial disease ([place] [address] )  No current claudication symptoms, LE SHIMON nml 2018, on ASA and statin, call with new/worse symptoms    Malignant melanoma of torso excluding breast ([place] [address] )  Has f/u with Dr Naomy Hillman every 6 mos, importance of sunscreen and avoidance of peak sun time reviewed       Diagnoses and all orders for this visit:    Primary hypertension    Peripheral arterial disease ([place] [address] )    Hyperparathyroidism ([place] [address] )    Hypothyroidism, unspecified type    Malignant melanoma of torso excluding breast ([place] [address] )      BW 8/21    Subjective:      Patient ID: Lorraine Johnson is a 80 y o  female  HPI Pt here for follow up appt    Pt saw Cardio (Dr Rao Vargas) for f/u in Jan for HTN/PVD - OV note reviewed  A progressive AS murmur was noted an and Echo was ordered  She had Echo in 2/22 and no significant AS or MR/MS was noted, mild LVH was present, and EF was 71%  She had no meds changed and was told to f/u in 6 mos  Her last SHIMON for PVD was in 2018 and was normal  Pt notes no claudication symptoms  BP at goal today and meds were reviewed and no changes have occurred  She denies missing doses of meds or SE with the meds  She does check her BP outside the office and states it correlates with readings today in the office  She notes no frequent Ha's/dizziness/double vision/CP  She con't to follow with Endo (Dr Rashad Espinosa) for her thyroid dz and hyperparathyroidism  She has BW ordered in the computer  Pt with h/o melanoma  She follows with Dr Naomy Hillman every 6 mos and notes no skin concerns    She is good with sun screen use and avoiding sun  She stopped taking Pepcid  She is taking 3 tsp of aloe juice and notes no issues with pyrosis/regurgiation of food/dificulty swallowing/abd pain/blood in stool/black stools  BW 8/21    Review of Systems   Constitutional: Positive for fatigue  Negative for chills, fever and unexpected weight change  HENT: Negative for congestion and trouble swallowing  Eyes: Negative for pain and visual disturbance  Respiratory: Negative for cough, shortness of breath and wheezing  Cardiovascular: Negative for chest pain, palpitations and leg swelling  Gastrointestinal: Negative for abdominal pain, blood in stool, constipation, diarrhea, nausea and vomiting  Endocrine: Negative for polydipsia and polyuria  Genitourinary: Negative for difficulty urinating, dysuria and hematuria  Musculoskeletal: Positive for arthralgias  Negative for back pain and neck pain  Skin: Negative for rash and wound  Neurological: Negative for dizziness and headaches  Hematological: Does not bruise/bleed easily  Psychiatric/Behavioral: Negative for behavioral problems and confusion  Objective:    /70   Pulse 66   Temp (!) 97 °F (36 1 °C)   Ht 4' 9" (1 448 m)   Wt 56 1 kg (123 lb 9 6 oz)   BMI 26 75 kg/m²      Physical Exam  Vitals and nursing note reviewed  Constitutional:       General: She is not in acute distress  Appearance: She is well-developed  HENT:      Head: Normocephalic and atraumatic  Eyes:      General:         Right eye: No discharge  Left eye: No discharge  Conjunctiva/sclera: Conjunctivae normal    Neck:      Trachea: No tracheal deviation  Cardiovascular:      Rate and Rhythm: Normal rate and regular rhythm  Heart sounds: Murmur heard  No friction rub  Comments: Trace LLE edema  Pulmonary:      Effort: Pulmonary effort is normal  No respiratory distress  Breath sounds: Normal breath sounds  No wheezing, rhonchi or rales     Abdominal: General: There is no distension  Palpations: Abdomen is soft  Tenderness: There is no abdominal tenderness  There is no guarding or rebound  Musculoskeletal:         General: No deformity or signs of injury  Cervical back: Neck supple  Skin:     General: Skin is warm  Coloration: Skin is not pale  Findings: No rash  Neurological:      General: No focal deficit present  Mental Status: She is alert  Mental status is at baseline  Motor: No abnormal muscle tone  Psychiatric:         Mood and Affect: Mood normal          Behavior: Behavior normal          Thought Content:  Thought content normal          Judgment: Judgment normal

## 2022-03-17 NOTE — ASSESSMENT & PLAN NOTE
BP great today, con't current meds, recheck in 6 mos
Has BW ordered by Endo, has f/u scheduled, con't labs/tx and f/u as per Endo
Has f/u with Dr Katty Conde every 6 mos, importance of sunscreen and avoidance of peak sun time reviewed
No current claudication symptoms, LE SHIMON nml 2018, on ASA and statin, call with new/worse symptoms
Taking thyroid med daily and has some fatigue but I suspect this is more d/t deconditioning, con't meds and labs and f/u as per Endo, call with new/worse symptoms
General Sunscreen Counseling: Educated on sun protection with sunscreen and clothing. Reapply if out for longer \\nContinue spf 15+
Detail Level: Generalized

## 2022-05-18 DIAGNOSIS — E06.3 HYPOTHYROIDISM DUE TO HASHIMOTO'S THYROIDITIS: ICD-10-CM

## 2022-05-18 DIAGNOSIS — E03.8 HYPOTHYROIDISM DUE TO HASHIMOTO'S THYROIDITIS: ICD-10-CM

## 2022-05-18 RX ORDER — LEVOTHYROXINE SODIUM 88 MCG
TABLET ORAL
Qty: 90 TABLET | Refills: 1 | Status: SHIPPED | OUTPATIENT
Start: 2022-05-18

## 2022-05-27 DIAGNOSIS — E78.5 DYSLIPIDEMIA: ICD-10-CM

## 2022-05-27 RX ORDER — ATORVASTATIN CALCIUM 10 MG/1
TABLET, FILM COATED ORAL
Qty: 90 TABLET | Refills: 1 | Status: SHIPPED | OUTPATIENT
Start: 2022-05-27

## 2022-07-07 DIAGNOSIS — I10 ESSENTIAL HYPERTENSION: ICD-10-CM

## 2022-07-07 RX ORDER — LISINOPRIL 10 MG/1
TABLET ORAL
Qty: 90 TABLET | Refills: 1 | Status: SHIPPED | OUTPATIENT
Start: 2022-07-07

## 2022-08-26 ENCOUNTER — HOSPITAL ENCOUNTER (OUTPATIENT)
Dept: ULTRASOUND IMAGING | Facility: HOSPITAL | Age: 87
End: 2022-08-26
Attending: INTERNAL MEDICINE
Payer: MEDICARE

## 2022-08-26 DIAGNOSIS — E04.1 THYROID NODULE: ICD-10-CM

## 2022-08-26 PROCEDURE — 76536 US EXAM OF HEAD AND NECK: CPT

## 2022-09-19 ENCOUNTER — OFFICE VISIT (OUTPATIENT)
Dept: ENDOCRINOLOGY | Facility: HOSPITAL | Age: 87
End: 2022-09-19
Payer: MEDICARE

## 2022-09-19 ENCOUNTER — OFFICE VISIT (OUTPATIENT)
Dept: FAMILY MEDICINE CLINIC | Facility: HOSPITAL | Age: 87
End: 2022-09-19
Payer: MEDICARE

## 2022-09-19 VITALS
TEMPERATURE: 97.6 F | SYSTOLIC BLOOD PRESSURE: 126 MMHG | HEART RATE: 68 BPM | WEIGHT: 124.8 LBS | HEIGHT: 57 IN | BODY MASS INDEX: 26.93 KG/M2 | DIASTOLIC BLOOD PRESSURE: 62 MMHG

## 2022-09-19 VITALS
HEART RATE: 82 BPM | HEIGHT: 57 IN | BODY MASS INDEX: 26.93 KG/M2 | DIASTOLIC BLOOD PRESSURE: 62 MMHG | WEIGHT: 124.8 LBS | SYSTOLIC BLOOD PRESSURE: 128 MMHG

## 2022-09-19 DIAGNOSIS — Z23 ENCOUNTER FOR IMMUNIZATION: ICD-10-CM

## 2022-09-19 DIAGNOSIS — E03.8 HYPOTHYROIDISM DUE TO HASHIMOTO'S THYROIDITIS: Primary | ICD-10-CM

## 2022-09-19 DIAGNOSIS — E66.3 OVERWEIGHT (BMI 25.0-29.9): ICD-10-CM

## 2022-09-19 DIAGNOSIS — I73.9 PERIPHERAL ARTERIAL DISEASE (HCC): Primary | ICD-10-CM

## 2022-09-19 DIAGNOSIS — N18.30 STAGE 3 CHRONIC KIDNEY DISEASE, UNSPECIFIED WHETHER STAGE 3A OR 3B CKD (HCC): ICD-10-CM

## 2022-09-19 DIAGNOSIS — E06.3 HYPOTHYROIDISM DUE TO HASHIMOTO'S THYROIDITIS: Primary | ICD-10-CM

## 2022-09-19 DIAGNOSIS — E04.1 THYROID NODULE: ICD-10-CM

## 2022-09-19 DIAGNOSIS — N39.46 MIXED URGE AND STRESS INCONTINENCE: ICD-10-CM

## 2022-09-19 DIAGNOSIS — I10 PRIMARY HYPERTENSION: ICD-10-CM

## 2022-09-19 DIAGNOSIS — E78.5 DYSLIPIDEMIA: ICD-10-CM

## 2022-09-19 DIAGNOSIS — Z00.00 MEDICARE ANNUAL WELLNESS VISIT, SUBSEQUENT: ICD-10-CM

## 2022-09-19 DIAGNOSIS — E21.3 HYPERPARATHYROIDISM (HCC): ICD-10-CM

## 2022-09-19 PROCEDURE — 90662 IIV NO PRSV INCREASED AG IM: CPT | Performed by: INTERNAL MEDICINE

## 2022-09-19 PROCEDURE — 99214 OFFICE O/P EST MOD 30 MIN: CPT | Performed by: INTERNAL MEDICINE

## 2022-09-19 PROCEDURE — G0008 ADMIN INFLUENZA VIRUS VAC: HCPCS | Performed by: INTERNAL MEDICINE

## 2022-09-19 PROCEDURE — G0439 PPPS, SUBSEQ VISIT: HCPCS | Performed by: INTERNAL MEDICINE

## 2022-09-19 RX ORDER — ATORVASTATIN CALCIUM 20 MG/1
20 TABLET, FILM COATED ORAL DAILY
COMMUNITY
Start: 2022-07-28

## 2022-09-19 RX ORDER — LATANOPROST 50 UG/ML
SOLUTION/ DROPS OPHTHALMIC
COMMUNITY
Start: 2022-09-15

## 2022-09-19 NOTE — PROGRESS NOTES
9/20/2022    Assessment/Plan      Diagnoses and all orders for this visit:    Hypothyroidism due to Hashimoto's thyroiditis  -     TSH, 3rd generation Lab Collect; Future  -     T4, free Lab Collect; Future  -     Vitamin D 25 hydroxy Lab Collect; Future  -     Phosphorus Lab Collect; Future  -     Magnesium Lab Collect; Future  -     PTH, intact Lab Collect Lab Collect; Future  -     Comprehensive metabolic panel Lab Collect; Future    Hyperparathyroidism ([address] )  -     TSH, 3rd generation Lab Collect; Future  -     T4, free Lab Collect; Future  -     Vitamin D 25 hydroxy Lab Collect; Future  -     Phosphorus Lab Collect; Future  -     Magnesium Lab Collect; Future  -     PTH, intact Lab Collect Lab Collect; Future  -     Comprehensive metabolic panel Lab Collect; Future    Thyroid nodule  -     TSH, 3rd generation Lab Collect; Future  -     T4, free Lab Collect; Future  -     Vitamin D 25 hydroxy Lab Collect; Future  -     Phosphorus Lab Collect; Future  -     Magnesium Lab Collect; Future  -     PTH, intact Lab Collect Lab Collect; Future  -     Comprehensive metabolic panel Lab Collect; Future        Assessment/Plan:  1  Hypothyroidism  Thyroid function tests are normal signifying biochemical euthyroidism  She will continue the same Synthroid 88 mcg daily  2  Thyroid nodule  Thyroid ultrasound shows 3 nodules all the same in size  She is having no compressive thyroid symptoms  I will repeat a thyroid ultrasound in several years  3  Hyperparathyroidism  Is post surgery twice for hyperparathyroidism  Current calcium is high normal to just above normal but parathyroid hormone level is normal   This may be her normal calcium  She will continue to work on drinking plenty of water  Twenty-five hydroxy vitamin-D level is normal and she will continue the same vitamin-D replacement      I have asked her to follow up in 1 year with preceding TSH, free T4, CMP, magnesium, phosphorus, intact parathyroid hormone level, and 25 hydroxy vitamin-D level  CC:  Hypothyroid, thyroid nodule, hyperparathyroid follow-up    History of Present Illness     HPI: Joel English is a 80y o  year old female with history of hypothyroidism due to Hashimoto's thyroiditis, thyroid nodule, history of hyperparathyroidism for follow-up visit  She has hypothyroidism and takes Synthroid brand 88 mcg daily  This occurred after surgery for her parathyroid  She is always somewhat cold  She denies palpitation, heat intolerance, fatigue, weight changes, or tremors  She has some dry skin as she has aged and some hair loss and brittle nails  She denies diarrhea or constipation, anxiety or depression  She has some insomnia with good nights and bad nights of sleep  She denies diplopia  She has had hyperparathyroidism and required 2 surgeries for primary hyperparathyroidism with a 2nd 1 at the [facility] 1st surgery 12/1985 and 2nd one was July 2002  Calcium has been maintained in the upper normal range since surgery  She has polyuria but no polydipsia  She is nocturia 2-3 times a night  She denies fatigue or constipation  She denies mental confusion  She has a history of thyroid nodules and had a fine-needle aspiration biopsy in 2004 at [facility] which was benign and consistent with a goiter  Last thyroid ultrasound in August 2021 did show multiple thyroid nodules all stable in size  There were 2 T rad 4 thyroid nodules in the right and the left lobe of the thyroid  These both measured 2 cm  These have been present since at least 2018  She denies compressive thyroid symptoms or difficulties with swallowing  Review of Systems   Constitutional: Negative for fatigue and unexpected weight change  HENT: Negative for trouble swallowing  Eyes: Negative for visual disturbance  Wears glasses  No diplopia  Respiratory: Positive for shortness of breath  Negative for chest tightness  SOB with activity  Cardiovascular: Negative for chest pain and palpitations  Gastrointestinal: Negative for abdominal pain, constipation, diarrhea and nausea  Endocrine: Positive for cold intolerance and polyuria  Negative for heat intolerance and polydipsia  Nocturia 2-3 times or none  Musculoskeletal: Negative for back pain  Skin: Negative for rash  Some dry skin as she has aged  Some hair loss  Has brittle nails  Neurological: Negative for dizziness, tremors, light-headedness and headaches  Psychiatric/Behavioral: Positive for sleep disturbance  Negative for confusion and dysphoric mood  The patient is not nervous/anxious  Some good nights and bad nights with waking every few hours          Historical Information   Past Medical History:   Diagnosis Date    BCC (basal cell carcinoma)     Diastolic dysfunction     Dyslipidemia     Dysphagia     Glaucoma     Hashimoto's thyroiditis     Hyperparathyroidism ([address] )     Hypertension     Impaired fasting glucose     Insomnia     Osteopenia     SCCA (squamous cell carcinoma) of skin      Past Surgical History:   Procedure Laterality Date    CATARACT EXTRACTION, BILATERAL      COLONOSCOPY      Complete, Onset - 10/25/05 - 10 years     HYSTERECTOMY      PARATHYROIDECTOMY      1st 1985 and 2nd 2002    TX WRIST Vanessa Endow LIG Right 05/02/2016    Procedure: RELEASE CARPAL TUNNEL ENDOSCOPIC;  Surgeon: Claudy Craig MD;  Location: [facility] OR;  Service: Orthopedics    TONSILLECTOMY       Social History   Social History     Substance and Sexual Activity   Alcohol Use Yes    Comment: rarely, social      Social History     Substance and Sexual Activity   Drug Use No     Social History     Tobacco Use   Smoking Status Never Smoker   Smokeless Tobacco Never Used     Family History:   Family History   Problem Relation Age of Onset    Stroke Father         CVA    Ovarian cancer Mother        Meds/Allergies   Current Outpatient Medications   Medication Sig Dispense Refill    amLODIPine (NORVASC) 5 mg tablet Take 1 tablet (5 mg total) by mouth daily      aspirin 81 mg chewable tablet Chew 81 mg every other day      atorvastatin (LIPITOR) 20 mg tablet Take 20 mg by mouth daily      Biotin 1000 MCG tablet Take 1,000 mcg by mouth 2 (two) times a day   Calcium 500 MG tablet Take 1 tablet by mouth 3 (three) times a week       cholecalciferol (VITAMIN D3) 1,000 units tablet Take 1 tablet by mouth daily      dorzolamide-timolol (COSOPT) 22 3-6 8 MG/ML ophthalmic solution Apply to eye      latanoprost (XALATAN) 0 005 % ophthalmic solution       lisinopril (ZESTRIL) 10 mg tablet TAKE 1 TABLET BY MOUTH EVERY DAY 90 tablet 1    metoprolol tartrate (LOPRESSOR) 25 mg tablet Take 1 tablet (25 mg total) by mouth daily 90 tablet 1    Multiple Vitamins-Minerals (CENTRUM SILVER ADULT 50+ PO) Take by mouth   Omega-3 Fatty Acids (FISH OIL) 1,000 mg Take 1,000 mg by mouth daily   Synthroid 88 MCG tablet TAKE 1 TABLET BY MOUTH DAILY  90 tablet 1     No current facility-administered medications for this visit  Allergies   Allergen Reactions    Cyclogyl [Cyclopentolate Hcl]     Phenylephrine     Pred Forte [Prednisolone Acetate]        Objective   Vitals: Blood pressure 128/62, pulse 82, height 4' 9" (1 448 m), weight 56 6 kg (124 lb 12 8 oz)  Invasive Devices  Report    None                 Physical Exam  Vitals reviewed  Constitutional:       Appearance: Normal appearance  She is well-developed  HENT:      Head: Normocephalic and atraumatic  Eyes:      Extraocular Movements: Extraocular movements intact  Conjunctiva/sclera: Conjunctivae normal       Comments: No lid lag, stare, proptosis, or periorbital edema  Neck:      Thyroid: No thyromegaly  Vascular: No carotid bruit  Comments: Healed anterior neck scar   2 cm left thyroid nodule and irregular thyroid to palpation in general   No bruits over the thyroid gland  Cardiovascular:      Rate and Rhythm: Normal rate and regular rhythm  Heart sounds: Normal heart sounds  No murmur heard  Pulmonary:      Effort: Pulmonary effort is normal       Breath sounds: Normal breath sounds  No wheezing  Abdominal:      Palpations: Abdomen is soft  Musculoskeletal:         General: No deformity  Cervical back: Normal range of motion and neck supple  Right lower leg: No edema  Left lower leg: No edema  Comments: No tremor of the outstretched hands  Lymphadenopathy:      Cervical: No cervical adenopathy  Skin:     General: Skin is warm and dry  Findings: No rash  Neurological:      Mental Status: She is alert and oriented to person, place, and time  Deep Tendon Reflexes: Reflexes are normal and symmetric  Comments: Patellar deep tendon reflexes normal          The history was obtained from the review of the chart and from the patient  Lab Results:      Blood work done on 07/05/2022 at [address] [place] [address] showed a CMP with a glucose of 97 fasting, calcium 10 2 with an albumin of 4, BUN 28, creatinine 1 03, GFR 50, BUN/creatinine ratio 27 2, but was otherwise normal     Twenty-five hydroxy vitamin-D level is 60 3  Intact parathyroid hormone level is 41  TSH is 1 18 with a free T4 of 1 78      Lab Results   Component Value Date    CREATININE 0 98 08/03/2021    BUN 24 08/03/2021    K 4 2 08/03/2021     08/03/2021    CO2 26 08/03/2021     eGFR   Date Value Ref Range Status   08/03/2021 51 ml/min/1 73sq m Final       Lab Results   Component Value Date    HDL 43 08/03/2021    TRIG 161 (H) 08/03/2021       Lab Results   Component Value Date    ALT 17 08/03/2021    AST 10 08/03/2021    ALKPHOS 61 08/03/2021       Lab Results   Component Value Date    FREET4 1 40 08/03/2021       Thyroid ultrasound:     THYROID ULTRASOUND performed on 08/26/2022      INDICATION:    E04 1: Nontoxic single thyroid nodule      COMPARISON: 8/7/2021     TECHNIQUE:  Ultrasound of the thyroid was performed with a high frequency linear transducer in transverse and sagittal planes including volumetric imaging sweeps as well as traditional still imaging technique      FINDINGS:  Thyroid parenchyma is diffusely heterogeneous in echotexture with focal nodule(s) as described below      Right lobe: 3 2 x 1 6 x 1 5 cm  Volume 3 7 mL  Left lobe:  2 1 x 1 2 x 1 4 cm  Volume 1 7 mL  Isthmus: 0 3  cm      Nodule #1  Image 15  RIGHT midgland nodule measuring 2 x 1 3 x 1 3 cm  Given differences in measuring technique, no significant change from prior  COMPOSITION:  2 points, solid or almost completely solid   ECHOGENICITY:  2 points, hypoechoic  SHAPE:  0 points, wider-than-tall  MARGIN: 0 points, smooth  ECHOGENIC FOCI:  0 points, none or large comet-tail artifacts  TI-RADS Classification: TR 4 (4-6 points), FNA if > 1 5 cm  Follow if > 1cm      Nodule #2  Image 53  LEFT upper pole nodule measuring 1 5 x 0 8 x 0 9 cm  Given differences in measuring technique, no significant change from prior  COMPOSITION:  2 points, solid or almost completely solid   ECHOGENICITY:  1 point, hyperechoic or isoechoic  SHAPE:  0 points, wider-than-tall  MARGIN: 0 points, smooth  ECHOGENIC FOCI:  0 points, none or large comet-tail artifacts  TI-RADS Classification: TR 3 (3 points), FNA if >2 5 cm  Follow if >1 5 cm      Nodule #3  Image 58  LEFT midgland nodule measuring 1 9 x 1 3 x 1 3 cm  Given differences in measuring technique, no significant change from prior  COMPOSITION:  2 points, solid or almost completely solid   ECHOGENICITY:  2 points, hypoechoic  SHAPE:  0 points, wider-than-tall  MARGIN: 0 points, smooth  ECHOGENIC FOCI:  0 points, none or large comet-tail artifacts  TI-RADS Classification: TR 4 (4-6 points), FNA if > 1 5 cm  Follow if > 1cm      There are additional nodules of lesser size and/or TI-RADS score    These do not necessitate additional evaluation based on ACR criteria          IMPRESSION:     Stable thyroid nodules      The following again meet current ACR criteria for recommending ultrasound-guided biopsy:      The 2 cm right midgland nodule (#1)  (Image number 15) (CRITERIA: TR 4, Moderately suspicious  FNA if > 1 5 cm      The 1 9 cm left midgland nodule (#3)  (Image number 58) (CRITERIA: TR 4, Moderately suspicious    FNA if > 1 5 cm          Future Appointments   Date Time Provider Cassandra Parekh   3/20/2023 11:20 AM DO JONNY Cifuentes [facility]   9/25/2023 10:00 AM Gennaro Locke MD [facility]

## 2022-09-19 NOTE — PROGRESS NOTES
Assessment and Plan:     Problem List Items Addressed This Visit        Cardiovascular and Mediastinum    Hypertension     BP at goal, con't current meds, healthy diet and keeping active encouraged, will follow         Peripheral arterial disease ([facility] ) - Primary     Repeat LEAD neg for significant PVD, on ASA and statin, will follow            Genitourinary    Stage 3 chronic kidney disease, unspecified whether stage 3a or 3b CKD ([facility] )       Other    Dyslipidemia     LDL elevated - Lipitor just increased by Cardio - med list updated, con't meds and labs as per Cardio, will obtain copy of FLP, healthy diet and keeping active encouraged         Mixed urge and stress incontinence     Hold off any any anticholinergics d/t her glaucoma, lifestyle/dietary changes reviewed as were Kegel exercises, call with any concern of infection           Other Visit Diagnoses     Medicare annual wellness visit, subsequent        BMI 27 0-27 9,adult        Overweight (BMI 25 0-29 9)        healthy diet and regular exercise encouraged        BMI Counseling: Body mass index is 27 01 kg/m²  The BMI is above normal  Nutrition recommendations include decreasing portion sizes, encouraging healthy choices of fruits and vegetables, consuming healthier snacks, moderation in carbohydrate intake, increasing intake of lean protein and reducing intake of saturated and trans fat  Exercise recommendations include exercising 3-5 times per week  Rationale for BMI follow-up plan is due to patient being overweight or obese  Depression Screening and Follow-up Plan: Patient was screened for depression during today's encounter  They screened negative with a PHQ-2 score of 0  Urinary Incontinence Plan of Care: counseling topics discussed: practice Kegel (pelvic floor strengthening) exercises, use restroom every 2 hours, limit alcohol, caffeine, spicy foods, and acidic foods and limiting fluid intake 3-4 hours before bed         Preventive health issues were discussed with patient, and age appropriate screening tests were ordered as noted in patient's After Visit Summary  Personalized health advice and appropriate referrals for health education or preventive services given if needed, as noted in patient's After Visit Summary  History of Present Illness:     Patient presents for a Medicare Wellness Visit    HPI Pt here for follow up appt and AWV    Pt saw Cardio (Dr Catherine Aldridge) in July for f/u PVD and dyslipidemia - OV note reviewed  She had her Lipitor increased to 20 mg 1 tab PO q day  She was on vacation last week and had some leg pain while walking  As per Cardio note LEAD 2017 and 2018 showed no PVD  Med list updated  BP at goal today and BP meds were reviewed and no changes have occurred  She denies missing doses of meds or SE with the meds  She does not check her BP outside the office  She notes no frequent HA's/dizziness/double vision/CP  BMI reviewed - she is up 3 lbs from Sept 21  Diet and exercise reviewed    She con't to have stress and urge incontinence  Patient Care Team:  Tino Arceo DO as PCP - General  Guero Abarca DO as PCP - Endocrinology (Endocrinology)  MD Janet Sandhu DO as Cardiologist (Cardiology)  Domitila Haines MD as Consulting Physician (Ophthalmology)  Ceferino Ledbetter MD (Plastic Surgery)     Review of Systems:     Review of Systems   Constitutional: Negative for chills, fever and unexpected weight change  HENT: Negative for congestion and ear pain  Eyes: Negative for pain and visual disturbance  Respiratory: Negative for cough and shortness of breath  Cardiovascular: Positive for leg swelling  Negative for chest pain and palpitations  Gastrointestinal: Negative for abdominal pain, blood in stool, constipation, diarrhea, nausea and vomiting  Endocrine: Negative for polydipsia and polyuria     Genitourinary: Negative for difficulty urinating, dysuria, hematuria, vaginal bleeding and vaginal pain  Musculoskeletal: Positive for arthralgias and back pain  Negative for neck pain  Skin: Negative for rash and wound  Neurological: Negative for dizziness, light-headedness and headaches  Hematological: Bruises/bleeds easily  Psychiatric/Behavioral: Negative for dysphoric mood and sleep disturbance          Problem List:     Patient Active Problem List   Diagnosis    De Quervain's tenosynovitis, right    Diastolic dysfunction    Dyslipidemia    Esophageal reflux    Glaucoma    Hashimoto's thyroiditis    Hypertension    Impaired fasting glucose    Insomnia    Left ventricular hypertrophy    Mixed urge and stress incontinence    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 tuberculosis    Thyroid nodule    Peripheral arterial disease (HCC)    Malignant melanoma of torso excluding breast ([facility] )    [facility]    Hypothyroidism    Stage 3 chronic kidney disease, unspecified whether stage 3a or 3b CKD ([facility] )      Past Medical and Surgical History:     Past Medical History:   Diagnosis Date    BCC (basal cell carcinoma)     Diastolic dysfunction     Dyslipidemia     Dysphagia     Glaucoma     Hashimoto's thyroiditis     Hyperparathyroidism ([facility] )     Hypertension     Impaired fasting glucose     Insomnia     Osteopenia     SCCA (squamous cell carcinoma) of skin      Past Surgical History:   Procedure Laterality Date    CATARACT EXTRACTION, BILATERAL      COLONOSCOPY      Complete, Onset - 10/25/05 - 10 years     HYSTERECTOMY      PARATHYROIDECTOMY      DC WRIST ARTHROSCOP,RELEASE Yuri Beavers LIG Right 5/2/2016    Procedure: RELEASE CARPAL TUNNEL ENDOSCOPIC;  Surgeon: Sherry Talbot MD;  [facility]: [facility] OR;  Service: Orthopedics    TONSILLECTOMY        Family History:     Family History   Problem Relation Age of Onset    Stroke Father         CVA    Ovarian cancer Mother       Social History:     Social History Socioeconomic History    Marital status: Single     Spouse name: None    Number of children: None    Years of education: None    Highest education level: None   Occupational History    Occupation: Retired   Tobacco Use    Smoking status: Never Smoker    Smokeless tobacco: Never Used   Vaping Use    Vaping Use: Never used   Substance and Sexual Activity    Alcohol use: Yes     Comment: rarely, social     Drug use: No    Sexual activity: None   Other Topics Concern    None   Social History Narrative     per [employer]      Social Determinants of Health     Financial Resource Strain: Low Risk     Difficulty of Paying Living Expenses: Not hard at all   Food Insecurity: Not on file   Transportation Needs: No Transportation Needs    Lack of Transportation (Medical): No    Lack of Transportation (Non-Medical): No   Physical Activity: Not on file   Stress: Not on file   Social Connections: Not on file   Intimate Partner Violence: Not on file   Housing Stability: Not on file      Medications and Allergies:     Current Outpatient Medications   Medication Sig Dispense Refill    amLODIPine (NORVASC) 5 mg tablet Take 1 tablet (5 mg total) by mouth daily      aspirin 81 mg chewable tablet Chew 81 mg daily   atorvastatin (LIPITOR) 20 mg tablet Take 20 mg by mouth daily      Biotin 1000 MCG tablet Take 1,000 mcg by mouth 2 (two) times a day   Calcium 500 MG tablet Take 1 tablet by mouth 3 (three) times a week       cholecalciferol (VITAMIN D3) 1,000 units tablet Take 1 tablet by mouth daily      dorzolamide-timolol (COSOPT) 22 3-6 8 MG/ML ophthalmic solution Apply to eye      latanoprost (XALATAN) 0 005 % ophthalmic solution       lisinopril (ZESTRIL) 10 mg tablet TAKE 1 TABLET BY MOUTH EVERY DAY 90 tablet 1    metoprolol tartrate (LOPRESSOR) 25 mg tablet Take 1 tablet (25 mg total) by mouth daily 90 tablet 1    Multiple Vitamins-Minerals (CENTRUM SILVER ADULT 50+ PO) Take by mouth   Omega-3 Fatty Acids (FISH OIL) 1,000 mg Take 1,000 mg by mouth daily   Synthroid 88 MCG tablet TAKE 1 TABLET BY MOUTH DAILY  90 tablet 1     No current facility-administered medications for this visit  Allergies   Allergen Reactions    Cyclogyl [Cyclopentolate Hcl]     Phenylephrine     Pred Forte [Prednisolone Acetate]       Immunizations:     Immunization History   Administered Date(s) Administered    COVID-19 MODERNA VACC 0 5 ML IM 01/23/2021, 02/20/2021    INFLUENZA 12/09/2004, 09/29/2014, 10/16/2015, 09/20/2016, 10/06/2017, 09/03/2021    Influenza Split High Dose Preservative Free IM 09/27/2012, 09/29/2014, 10/16/2015, 09/20/2016, 10/12/2018    Pneumococcal Conjugate 13-Valent 10/16/2015    Pneumococcal Polysaccharide PPV23 06/14/2000, 04/16/2007    Zoster 05/01/2013    Zoster Vaccine Recombinant 07/24/2020, 01/09/2021, 01/09/2021    influenza, trivalent, adjuvanted 10/11/2019      Health Maintenance: There are no preventive care reminders to display for this patient  Topic Date Due    COVID-19 Vaccine (3 - Booster for Moderna series) 07/20/2021    Influenza Vaccine (1) 09/01/2022      Medicare Screening Tests and Risk Assessments:     Radha Pace is here for her Subsequent Wellness visit  Last Medicare Wellness visit information reviewed, patient interviewed and updates made to the record today  Health Risk Assessment:   Patient rates overall health as good  Patient feels that their physical health rating is same  Patient is satisfied with their life  Eyesight was rated as slightly worse  Hearing was rated as same  Patient feels that their emotional and mental health rating is same  Patients states they are never, rarely angry  Patient states they are sometimes unusually tired/fatigued  Pain experienced in the last 7 days has been some  Patient's pain rating has been 5/10  Patient states that she has experienced no weight loss or gain in last 6 months   Eyesight worse with pressures going up a bit in her L eye only - on a new eye drop    Depression Screening:   PHQ-2 Score: 0      Fall Risk Screening: In the past year, patient has experienced: no history of falling in past year      Urinary Incontinence Screening:   Patient has leaked urine accidently in the last six months  Notes both urge and stress incontinence    Home Safety:  Patient has trouble with stairs inside or outside of their home  Patient has working smoke alarms and has working carbon monoxide detector  Home safety hazards include: loose rugs on the floor  Nutrition:   Current diet is Regular  Medications:   Patient is currently taking over-the-counter supplements  OTC medications include: see medication list  Patient is able to manage medications  Activities of Daily Living (ADLs)/Instrumental Activities of Daily Living (IADLs):   Walk and transfer into and out of bed and chair?: Yes  Dress and groom yourself?: Yes    Bathe or shower yourself?: Yes    Feed yourself? Yes  Do your laundry/housekeeping?: Yes  Manage your money, pay your bills and track your expenses?: Yes  Make your own meals?: Yes    Do your own shopping?: Yes    Previous Hospitalizations:   Any hospitalizations or ED visits within the last 12 months?: No      Advance Care Planning:   Living will: Yes    Durable POA for healthcare:  Yes    Advanced directive: Yes      PREVENTIVE SCREENINGS      Cardiovascular Screening:    General: Screening Current and Risks and Benefits Discussed      Diabetes Screening:     General: Risks and Benefits Discussed and Screening Current      Colorectal Cancer Screening:     General: Screening Not Indicated and Risks and Benefits Discussed      Breast Cancer Screening:     General: Risks and Benefits Discussed and Screening Not Indicated      Cervical Cancer Screening:    General: Screening Not Indicated and Risks and Benefits Discussed      Osteoporosis Screening:    General: Risks and Benefits Discussed and Screening Not Indicated      Abdominal Aortic Aneurysm (AAA) Screening:        General: Risks and Benefits Discussed and Screening Not Indicated      Lung Cancer Screening:     General: Screening Not Indicated and Risks and Benefits Discussed      Hepatitis C Screening:    General: Risks and Benefits Discussed and Screening Not Indicated    Screening, Brief Intervention, and Referral to Treatment (SBIRT)    Screening  Typical number of drinks in a day: 0  Typical number of drinks in a week: 0  Interpretation: Low risk drinking behavior  Single Item Drug Screening:  How often have you used an illegal drug (including marijuana) or a prescription medication for non-medical reasons in the past year? never    Single Item Drug Screen Score: 0  Interpretation: Negative screen for possible drug use disorder    Other Counseling Topics:   Car/seat belt/driving safety and regular weightbearing exercise  Visual Acuity Screening    Right eye Left eye Both eyes   Without correction:      With correction: 20/40 20/50 20/40        Physical Exam:     /62   Pulse 68   Temp 97 6 °F (36 4 °C) (Tympanic)   Ht 4' 9" (1 448 m)   Wt 56 6 kg (124 lb 12 8 oz)   BMI 27 01 kg/m²     Physical Exam  Vitals and nursing note reviewed  Constitutional:       General: She is not in acute distress  Appearance: She is well-developed  She is not ill-appearing  HENT:      Head: Normocephalic and atraumatic  Right Ear: External ear normal       Left Ear: External ear normal       Ears:      Comments: B/L hearing aids in place  Eyes:      General:         Right eye: No discharge  Left eye: No discharge  Conjunctiva/sclera: Conjunctivae normal    Neck:      Thyroid: No thyromegaly  Trachea: No tracheal deviation  Cardiovascular:      Rate and Rhythm: Normal rate and regular rhythm  Heart sounds: Murmur heard  Pulmonary:      Effort: Pulmonary effort is normal  No respiratory distress        Breath sounds: Normal breath sounds  No wheezing, rhonchi or rales  Abdominal:      General: There is no distension  Palpations: Abdomen is soft  Tenderness: There is no abdominal tenderness  There is no guarding or rebound  Musculoskeletal:         General: No deformity or signs of injury  Cervical back: Neck supple  Lymphadenopathy:      Cervical: No cervical adenopathy  Skin:     General: Skin is warm and dry  Coloration: Skin is not pale  Findings: No rash  Neurological:      General: No focal deficit present  Mental Status: She is alert  Motor: No abnormal muscle tone  Gait: Gait normal    Psychiatric:         Mood and Affect: Mood normal          Behavior: Behavior normal          Thought Content:  Thought content normal          Judgment: Judgment normal           Vane Res, DO

## 2022-09-19 NOTE — ASSESSMENT & PLAN NOTE
Hold off any any anticholinergics d/t her glaucoma, lifestyle/dietary changes reviewed as were Kegel exercises, call with any concern of infection

## 2022-09-19 NOTE — PATIENT INSTRUCTIONS
The blood work for the thyroid is normal     Continue the same synthroid 88 mcg daily  The vitamin D is normal     Continue the same vitamin D  The calcium is high normal to just above normal  The parathyroid is normal     Continue the calcium and drinking plenty of water  The thyroid ultrasound shows 3 nodules all the same in size  Follow up in 1 year with blood work

## 2022-09-19 NOTE — PATIENT INSTRUCTIONS
Medicare Preventive Visit Patient Instructions  Thank you for completing your Welcome to Medicare Visit or Medicare Annual Wellness Visit today  Your next wellness visit will be due in one year (9/20/2023)  The screening/preventive services that you may require over the next 5-10 years are detailed below  Some tests may not apply to you based off risk factors and/or age  Screening tests ordered at today's visit but not completed yet may show as past due  Also, please note that scanned in results may not display below  Preventive Screenings:  Service Recommendations Previous Testing/Comments   Colorectal Cancer Screening  * Colonoscopy    * Fecal Occult Blood Test (FOBT)/Fecal Immunochemical Test (FIT)  * Fecal DNA/Cologuard Test  * Flexible Sigmoidoscopy Age: 39-70 years old   Colonoscopy: every 10 years (may be performed more frequently if at higher risk)  OR  FOBT/FIT: every 1 year  OR  Cologuard: every 3 years  OR  Sigmoidoscopy: every 5 years  Screening may be recommended earlier than age 39 if at higher risk for colorectal cancer  Also, an individualized decision between you and your healthcare provider will decide whether screening between the ages of 74-80 would be appropriate  Colonoscopy: Not on file  FOBT/FIT: Not on file  Cologuard: Not on file  Sigmoidoscopy: Not on file    Screening Not Indicated     Breast Cancer Screening Age: 36 years old  Frequency: every 1-2 years  Not required if history of left and right mastectomy Mammogram: Not on file        Cervical Cancer Screening Between the ages of 21-29, pap smear recommended once every 3 years  Between the ages of 33-67, can perform pap smear with HPV co-testing every 5 years     Recommendations may differ for women with a history of total hysterectomy, cervical cancer, or abnormal pap smears in past  Pap Smear: Not on file    Screening Not Indicated   Hepatitis C Screening Once for adults born between 1945 and 1965  More frequently in patients at high risk for Hepatitis C Hep C Antibody: Not on file        Diabetes Screening 1-2 times per year if you're at risk for diabetes or have pre-diabetes Fasting glucose: 107 mg/dL (8/3/2021)  A1C: 5 5 % (8/3/2021)      Cholesterol Screening Once every 5 years if you don't have a lipid disorder  May order more often based on risk factors  Lipid panel: 08/03/2021    Screening Current     Other Preventive Screenings Covered by Medicare:  1  Abdominal Aortic Aneurysm (AAA) Screening: covered once if your at risk  You're considered to be at risk if you have a family history of AAA  2  Lung Cancer Screening: covers low dose CT scan once per year if you meet all of the following conditions: (1) Age 50-69; (2) No signs or symptoms of lung cancer; (3) Current smoker or have quit smoking within the last 15 years; (4) You have a tobacco smoking history of at least 20 pack years (packs per day multiplied by number of years you smoked); (5) You get a written order from a healthcare provider  3  Glaucoma Screening: covered annually if you're considered high risk: (1) You have diabetes OR (2) Family history of glaucoma OR (3)  aged 48 and older OR (3)  American aged 72 and older  3  Osteoporosis Screening: covered every 2 years if you meet one of the following conditions: (1) You're estrogen deficient and at risk for osteoporosis based off medical history and other findings; (2) Have a vertebral abnormality; (3) On glucocorticoid therapy for more than 3 months; (4) Have primary hyperparathyroidism; (5) On osteoporosis medications and need to assess response to drug therapy  · Last bone density test (DXA Scan): Not on file  5  HIV Screening: covered annually if you're between the age of 12-76  Also covered annually if you are younger than 13 and older than 72 with risk factors for HIV infection  For pregnant patients, it is covered up to 3 times per pregnancy      Immunizations:  Immunization Recommendations   Influenza Vaccine Annual influenza vaccination during flu season is recommended for all persons aged >= 6 months who do not have contraindications   Pneumococcal Vaccine   * Pneumococcal conjugate vaccine = PCV13 (Prevnar 13), PCV15 (Vaxneuvance), PCV20 (Prevnar 20)  * Pneumococcal polysaccharide vaccine = PPSV23 (Pneumovax) Adults 25-60 years old: 1-3 doses may be recommended based on certain risk factors  Adults 72 years old: 1-2 doses may be recommended based off what pneumonia vaccine you previously received   Hepatitis B Vaccine 3 dose series if at intermediate or high risk (ex: diabetes, end stage renal disease, liver disease)   Tetanus (Td) Vaccine - COST NOT COVERED BY MEDICARE PART B Following completion of primary series, a booster dose should be given every 10 years to maintain immunity against tetanus  Td may also be given as tetanus wound prophylaxis  Tdap Vaccine - COST NOT COVERED BY MEDICARE PART B Recommended at least once for all adults  For pregnant patients, recommended with each pregnancy  Shingles Vaccine (Shingrix) - COST NOT COVERED BY MEDICARE PART B  2 shot series recommended in those aged 48 and above     Health Maintenance Due:  There are no preventive care reminders to display for this patient  Immunizations Due:      Topic Date Due    COVID-19 Vaccine (3 - Booster for Moderna series) 07/20/2021    Influenza Vaccine (1) 09/01/2022     Advance Directives   What are advance directives? Advance directives are legal documents that state your wishes and plans for medical care  These plans are made ahead of time in case you lose your ability to make decisions for yourself  Advance directives can apply to any medical decision, such as the treatments you want, and if you want to donate organs  What are the types of advance directives? There are many types of advance directives, and each state has rules about how to use them   You may choose a combination of any of the following:  · Living will: This is a written record of the treatment you want  You can also choose which treatments you do not want, which to limit, and which to stop at a certain time  This includes surgery, medicine, IV fluid, and tube feedings  · Durable power of  for healthcare [facility]): This is a written record that states who you want to make healthcare choices for you when you are unable to make them for yourself  This person, called a proxy, is usually a family member or a friend  You may choose more than 1 proxy  · Do not resuscitate (DNR) order:  A DNR order is used in case your heart stops beating or you stop breathing  It is a request not to have certain forms of treatment, such as CPR  A DNR order may be included in other types of advance directives  · Medical directive: This covers the care that you want if you are in a coma, near death, or unable to make decisions for yourself  You can list the treatments you want for each condition  Treatment may include pain medicine, surgery, blood transfusions, dialysis, IV or tube feedings, and a ventilator (breathing machine)  · Values history: This document has questions about your views, beliefs, and how you feel and think about life  This information can help others choose the care that you would choose  Why are advance directives important? An advance directive helps you control your care  Although spoken wishes may be used, it is better to have your wishes written down  Spoken wishes can be misunderstood, or not followed  Treatments may be given even if you do not want them  An advance directive may make it easier for your family to make difficult choices about your care  Urinary Incontinence   Urinary incontinence (UI)  is when you lose control of your bladder  UI develops because your bladder cannot store or empty urine properly  The 3 most common types of UI are stress incontinence, urge incontinence, or both    Medicines:   · May be given to help strengthen your bladder control  Report any side effects of medication to your healthcare provider  Do pelvic muscle exercises often:  Your pelvic muscles help you stop urinating  Squeeze these muscles tight for 5 seconds, then relax for 5 seconds  Gradually work up to squeezing for 10 seconds  Do 3 sets of 15 repetitions a day, or as directed  This will help strengthen your pelvic muscles and improve bladder control  Train your bladder:  Go to the bathroom at set times, such as every 2 hours, even if you do not feel the urge to go  You can also try to hold your urine when you feel the urge to go  For example, hold your urine for 5 minutes when you feel the urge to go  As that becomes easier, hold your urine for 10 minutes  Self-care:   · Keep a UI record  Write down how often you leak urine and how much you leak  Make a note of what you were doing when you leaked urine  · Drink liquids as directed  You may need to limit the amount of liquid you drink to help control your urine leakage  Do not drink any liquid right before you go to bed  Limit or do not have drinks that contain caffeine or alcohol  · Prevent constipation  Eat a variety of high-fiber foods  Good examples are high-fiber cereals, beans, vegetables, and whole-grain breads  Walking is the best way to trigger your intestines to have a bowel movement  · Exercise regularly and maintain a healthy weight  Weight loss and exercise will decrease pressure on your bladder and help you control your leakage  · Use a catheter as directed  to help empty your bladder  A catheter is a tiny, plastic tube that is put into your bladder to drain your urine  · Go to behavior therapy as directed  Behavior therapy may be used to help you learn to control your urge to urinate      Weight Management   Why it is important to manage your weight:  Being overweight increases your risk of health conditions such as heart disease, high blood pressure, type 2 diabetes, and certain types of cancer  It can also increase your risk for osteoarthritis, sleep apnea, and other respiratory problems  Aim for a slow, steady weight loss  Even a small amount of weight loss can lower your risk of health problems  How to lose weight safely:  A safe and healthy way to lose weight is to eat fewer calories and get regular exercise  You can lose up about 1 pound a week by decreasing the number of calories you eat by 500 calories each day  Healthy meal plan for weight management:  A healthy meal plan includes a variety of foods, contains fewer calories, and helps you stay healthy  A healthy meal plan includes the following:  · Eat whole-grain foods more often  A healthy meal plan should contain fiber  Fiber is the part of grains, fruits, and vegetables that is not broken down by your body  Whole-grain foods are healthy and provide extra fiber in your diet  Some examples of whole-grain foods are whole-wheat breads and pastas, oatmeal, brown rice, and bulgur  · Eat a variety of vegetables every day  Include dark, leafy greens such as spinach, kale, zaki greens, and mustard greens  Eat yellow and orange vegetables such as carrots, sweet potatoes, and winter squash  · Eat a variety of fruits every day  Choose fresh or canned fruit (canned in its own juice or light syrup) instead of juice  Fruit juice has very little or no fiber  · Eat low-fat dairy foods  Drink fat-free (skim) milk or 1% milk  Eat fat-free yogurt and low-fat cottage cheese  Try low-fat cheeses such as mozzarella and other reduced-fat cheeses  · Choose meat and other protein foods that are low in fat  Choose beans or other legumes such as split peas or lentils  Choose fish, skinless poultry (chicken or turkey), or lean cuts of red meat (beef or pork)  Before you cook meat or poultry, cut off any visible fat  · Use less fat and oil  Try baking foods instead of frying them   Add less fat, such as margarine, sour cream, regular salad dressing and mayonnaise to foods  Eat fewer high-fat foods  Some examples of high-fat foods include french fries, doughnuts, ice cream, and cakes  · Eat fewer sweets  Limit foods and drinks that are high in sugar  This includes candy, cookies, regular soda, and sweetened drinks  Exercise:  Exercise at least 30 minutes per day on most days of the week  Some examples of exercise include walking, biking, dancing, and swimming  You can also fit in more physical activity by taking the stairs instead of the elevator or parking farther away from stores  Ask your healthcare provider about the best exercise plan for you  © Copyright [employer] 2018 Information is for End User's use only and may not be sold, redistributed or otherwise used for commercial purposes  All illustrations and images included in CareNotes® are the copyrighted property of [employer]  or [facility] Preventive Visit Patient Instructions  Thank you for completing your Welcome to Medicare Visit or Medicare Annual Wellness Visit today  Your next wellness visit will be due in one year (9/20/2023)  The screening/preventive services that you may require over the next 5-10 years are detailed below  Some tests may not apply to you based off risk factors and/or age  Screening tests ordered at today's visit but not completed yet may show as past due  Also, please note that scanned in results may not display below    Preventive Screenings:  Service Recommendations Previous Testing/Comments   Colorectal Cancer Screening  * Colonoscopy    * Fecal Occult Blood Test (FOBT)/Fecal Immunochemical Test (FIT)  * Fecal DNA/Cologuard Test  * Flexible Sigmoidoscopy Age: 39-70 years old   Colonoscopy: every 10 years (may be performed more frequently if at higher risk)  OR  FOBT/FIT: every 1 year  OR  Cologuard: every 3 years  OR  Sigmoidoscopy: every 5 years  Screening may be recommended earlier than age 45 if at higher risk for colorectal cancer  Also, an individualized decision between you and your healthcare provider will decide whether screening between the ages of 74-80 would be appropriate  Colonoscopy: Not on file  FOBT/FIT: Not on file  Cologuard: Not on file  Sigmoidoscopy: Not on file    Screening Not Indicated     Breast Cancer Screening Age: 36 years old  Frequency: every 1-2 years  Not required if history of left and right mastectomy Mammogram: Not on file        Cervical Cancer Screening Between the ages of 21-29, pap smear recommended once every 3 years  Between the ages of 33-67, can perform pap smear with HPV co-testing every 5 years  Recommendations may differ for women with a history of total hysterectomy, cervical cancer, or abnormal pap smears in past  Pap Smear: Not on file    Screening Not Indicated   Hepatitis C Screening Once for adults born between 1945 and 1965  More frequently in patients at high risk for Hepatitis C Hep C Antibody: Not on file        Diabetes Screening 1-2 times per year if you're at risk for diabetes or have pre-diabetes Fasting glucose: 107 mg/dL (8/3/2021)  A1C: 5 5 % (8/3/2021)      Cholesterol Screening Once every 5 years if you don't have a lipid disorder  May order more often based on risk factors  Lipid panel: 08/03/2021    Screening Current     Other Preventive Screenings Covered by Medicare:  6  Abdominal Aortic Aneurysm (AAA) Screening: covered once if your at risk  You're considered to be at risk if you have a family history of AAA  7  Lung Cancer Screening: covers low dose CT scan once per year if you meet all of the following conditions: (1) Age 50-69; (2) No signs or symptoms of lung cancer; (3) Current smoker or have quit smoking within the last 15 years; (4) You have a tobacco smoking history of at least 20 pack years (packs per day multiplied by number of years you smoked); (5) You get a written order from a healthcare provider    8  Glaucoma Screening: covered annually if you're considered high risk: (1) You have diabetes OR (2) Family history of glaucoma OR (3)  aged 48 and older OR (3)  American aged 72 and older  5  Osteoporosis Screening: covered every 2 years if you meet one of the following conditions: (1) You're estrogen deficient and at risk for osteoporosis based off medical history and other findings; (2) Have a vertebral abnormality; (3) On glucocorticoid therapy for more than 3 months; (4) Have primary hyperparathyroidism; (5) On osteoporosis medications and need to assess response to drug therapy  · Last bone density test (DXA Scan): Not on file  10  HIV Screening: covered annually if you're between the age of 12-76  Also covered annually if you are younger than 13 and older than 72 with risk factors for HIV infection  For pregnant patients, it is covered up to 3 times per pregnancy  Immunizations:  Immunization Recommendations   Influenza Vaccine Annual influenza vaccination during flu season is recommended for all persons aged >= 6 months who do not have contraindications   Pneumococcal Vaccine   * Pneumococcal conjugate vaccine = PCV13 (Prevnar 13), PCV15 (Vaxneuvance), PCV20 (Prevnar 20)  * Pneumococcal polysaccharide vaccine = PPSV23 (Pneumovax) Adults 25-60 years old: 1-3 doses may be recommended based on certain risk factors  Adults 72 years old: 1-2 doses may be recommended based off what pneumonia vaccine you previously received   Hepatitis B Vaccine 3 dose series if at intermediate or high risk (ex: diabetes, end stage renal disease, liver disease)   Tetanus (Td) Vaccine - COST NOT COVERED BY MEDICARE PART B Following completion of primary series, a booster dose should be given every 10 years to maintain immunity against tetanus  Td may also be given as tetanus wound prophylaxis  Tdap Vaccine - COST NOT COVERED BY MEDICARE PART B Recommended at least once for all adults   For pregnant patients, recommended with each pregnancy  Shingles Vaccine (Shingrix) - COST NOT COVERED BY MEDICARE PART B  2 shot series recommended in those aged 48 and above     Health Maintenance Due:  There are no preventive care reminders to display for this patient  Immunizations Due:      Topic Date Due    COVID-19 Vaccine (3 - Booster for Moderna series) 07/20/2021    Influenza Vaccine (1) 09/01/2022     Advance Directives   What are advance directives? Advance directives are legal documents that state your wishes and plans for medical care  These plans are made ahead of time in case you lose your ability to make decisions for yourself  Advance directives can apply to any medical decision, such as the treatments you want, and if you want to donate organs  What are the types of advance directives? There are many types of advance directives, and each state has rules about how to use them  You may choose a combination of any of the following:  · Living will: This is a written record of the treatment you want  You can also choose which treatments you do not want, which to limit, and which to stop at a certain time  This includes surgery, medicine, IV fluid, and tube feedings  · Durable power of  for healthcare [facility]): This is a written record that states who you want to make healthcare choices for you when you are unable to make them for yourself  This person, called a proxy, is usually a family member or a friend  You may choose more than 1 proxy  · Do not resuscitate (DNR) order:  A DNR order is used in case your heart stops beating or you stop breathing  It is a request not to have certain forms of treatment, such as CPR  A DNR order may be included in other types of advance directives  · Medical directive: This covers the care that you want if you are in a coma, near death, or unable to make decisions for yourself  You can list the treatments you want for each condition   Treatment may include pain medicine, surgery, blood transfusions, dialysis, IV or tube feedings, and a ventilator (breathing machine)  · Values history: This document has questions about your views, beliefs, and how you feel and think about life  This information can help others choose the care that you would choose  Why are advance directives important? An advance directive helps you control your care  Although spoken wishes may be used, it is better to have your wishes written down  Spoken wishes can be misunderstood, or not followed  Treatments may be given even if you do not want them  An advance directive may make it easier for your family to make difficult choices about your care  Urinary Incontinence   Urinary incontinence (UI)  is when you lose control of your bladder  UI develops because your bladder cannot store or empty urine properly  The 3 most common types of UI are stress incontinence, urge incontinence, or both  Medicines:   · May be given to help strengthen your bladder control  Report any side effects of medication to your healthcare provider  Do pelvic muscle exercises often:  Your pelvic muscles help you stop urinating  Squeeze these muscles tight for 5 seconds, then relax for 5 seconds  Gradually work up to squeezing for 10 seconds  Do 3 sets of 15 repetitions a day, or as directed  This will help strengthen your pelvic muscles and improve bladder control  Train your bladder:  Go to the bathroom at set times, such as every 2 hours, even if you do not feel the urge to go  You can also try to hold your urine when you feel the urge to go  For example, hold your urine for 5 minutes when you feel the urge to go  As that becomes easier, hold your urine for 10 minutes  Self-care:   · Keep a UI record  Write down how often you leak urine and how much you leak  Make a note of what you were doing when you leaked urine  · Drink liquids as directed   You may need to limit the amount of liquid you drink to help control your urine leakage  Do not drink any liquid right before you go to bed  Limit or do not have drinks that contain caffeine or alcohol  · Prevent constipation  Eat a variety of high-fiber foods  Good examples are high-fiber cereals, beans, vegetables, and whole-grain breads  Walking is the best way to trigger your intestines to have a bowel movement  · Exercise regularly and maintain a healthy weight  Weight loss and exercise will decrease pressure on your bladder and help you control your leakage  · Use a catheter as directed  to help empty your bladder  A catheter is a tiny, plastic tube that is put into your bladder to drain your urine  · Go to behavior therapy as directed  Behavior therapy may be used to help you learn to control your urge to urinate  Weight Management   Why it is important to manage your weight:  Being overweight increases your risk of health conditions such as heart disease, high blood pressure, type 2 diabetes, and certain types of cancer  It can also increase your risk for osteoarthritis, sleep apnea, and other respiratory problems  Aim for a slow, steady weight loss  Even a small amount of weight loss can lower your risk of health problems  How to lose weight safely:  A safe and healthy way to lose weight is to eat fewer calories and get regular exercise  You can lose up about 1 pound a week by decreasing the number of calories you eat by 500 calories each day  Healthy meal plan for weight management:  A healthy meal plan includes a variety of foods, contains fewer calories, and helps you stay healthy  A healthy meal plan includes the following:  · Eat whole-grain foods more often  A healthy meal plan should contain fiber  Fiber is the part of grains, fruits, and vegetables that is not broken down by your body  Whole-grain foods are healthy and provide extra fiber in your diet   Some examples of whole-grain foods are whole-wheat breads and pastas, oatmeal, brown rice, and bulgur  · Eat a variety of vegetables every day  Include dark, leafy greens such as spinach, kale, zaki greens, and mustard greens  Eat yellow and orange vegetables such as carrots, sweet potatoes, and winter squash  · Eat a variety of fruits every day  Choose fresh or canned fruit (canned in its own juice or light syrup) instead of juice  Fruit juice has very little or no fiber  · Eat low-fat dairy foods  Drink fat-free (skim) milk or 1% milk  Eat fat-free yogurt and low-fat cottage cheese  Try low-fat cheeses such as mozzarella and other reduced-fat cheeses  · Choose meat and other protein foods that are low in fat  Choose beans or other legumes such as split peas or lentils  Choose fish, skinless poultry (chicken or turkey), or lean cuts of red meat (beef or pork)  Before you cook meat or poultry, cut off any visible fat  · Use less fat and oil  Try baking foods instead of frying them  Add less fat, such as margarine, sour cream, regular salad dressing and mayonnaise to foods  Eat fewer high-fat foods  Some examples of high-fat foods include french fries, doughnuts, ice cream, and cakes  · Eat fewer sweets  Limit foods and drinks that are high in sugar  This includes candy, cookies, regular soda, and sweetened drinks  Exercise:  Exercise at least 30 minutes per day on most days of the week  Some examples of exercise include walking, biking, dancing, and swimming  You can also fit in more physical activity by taking the stairs instead of the elevator or parking farther away from stores  Ask your healthcare provider about the best exercise plan for you  © Copyright [address] 2018 Information is for End User's use only and may not be sold, redistributed or otherwise used for commercial purposes   All illustrations and images included in CareNotes® are the copyrighted property of [employer]  or [address] [place] [address] [facility]

## 2022-09-19 NOTE — ASSESSMENT & PLAN NOTE
LDL elevated - Lipitor just increased by Cardio - med list updated, con't meds and labs as per Cardio, will obtain copy of FLP, healthy diet and keeping active encouraged

## 2022-11-03 DIAGNOSIS — I10 ESSENTIAL HYPERTENSION: ICD-10-CM

## 2022-11-03 DIAGNOSIS — E06.3 HYPOTHYROIDISM DUE TO HASHIMOTO'S THYROIDITIS: ICD-10-CM

## 2022-11-03 DIAGNOSIS — E03.8 HYPOTHYROIDISM DUE TO HASHIMOTO'S THYROIDITIS: ICD-10-CM

## 2022-11-03 RX ORDER — LEVOTHYROXINE SODIUM 88 MCG
TABLET ORAL
Qty: 90 TABLET | Refills: 1 | Status: SHIPPED | OUTPATIENT
Start: 2022-11-03

## 2022-11-03 RX ORDER — LISINOPRIL 10 MG/1
TABLET ORAL
Qty: 90 TABLET | Refills: 1 | Status: SHIPPED | OUTPATIENT
Start: 2022-11-03

## 2022-11-05 DIAGNOSIS — I10 ESSENTIAL HYPERTENSION: ICD-10-CM

## 2022-11-11 DIAGNOSIS — I10 ESSENTIAL HYPERTENSION: ICD-10-CM

## 2022-11-13 RX ORDER — AMLODIPINE BESYLATE 10 MG/1
TABLET ORAL
Qty: 90 TABLET | Refills: 1 | Status: SHIPPED | OUTPATIENT
Start: 2022-11-13

## 2022-11-21 ENCOUNTER — TELEPHONE (OUTPATIENT)
Dept: FAMILY MEDICINE CLINIC | Facility: HOSPITAL | Age: 87
End: 2022-11-21

## 2022-11-21 NOTE — TELEPHONE ENCOUNTER
Pt reports Covid negative   tues wed and thurs   symptoms  for 7 days      appt  11/22    has been using Mucinex

## 2022-11-21 NOTE — TELEPHONE ENCOUNTER
Pt c/o persistent cough and head congestion began last Monday  States fever lingers around 100 5  Took 3 covid tests and all negative  Can only take certain OTC meds and can't seem to find anything in the store that she can take   PCB

## 2022-11-22 ENCOUNTER — OFFICE VISIT (OUTPATIENT)
Dept: FAMILY MEDICINE CLINIC | Facility: HOSPITAL | Age: 87
End: 2022-11-22

## 2022-11-22 VITALS
SYSTOLIC BLOOD PRESSURE: 119 MMHG | DIASTOLIC BLOOD PRESSURE: 59 MMHG | TEMPERATURE: 96.5 F | OXYGEN SATURATION: 100 % | HEART RATE: 72 BPM | WEIGHT: 120 LBS | BODY MASS INDEX: 25.89 KG/M2 | HEIGHT: 57 IN

## 2022-11-22 DIAGNOSIS — J98.8 BACTERIAL RESPIRATORY INFECTION: Primary | ICD-10-CM

## 2022-11-22 DIAGNOSIS — B96.89 BACTERIAL RESPIRATORY INFECTION: Primary | ICD-10-CM

## 2022-11-22 RX ORDER — BENZONATATE 100 MG/1
100 CAPSULE ORAL 3 TIMES DAILY PRN
Qty: 30 CAPSULE | Refills: 0 | Status: SHIPPED | OUTPATIENT
Start: 2022-11-22

## 2022-11-22 RX ORDER — DOXYCYCLINE HYCLATE 100 MG/1
100 CAPSULE ORAL EVERY 12 HOURS SCHEDULED
Qty: 14 CAPSULE | Refills: 0 | Status: SHIPPED | OUTPATIENT
Start: 2022-11-22 | End: 2022-11-29

## 2022-11-22 NOTE — PROGRESS NOTES
Name: Gilmer Tarango      : 10/4/1929      MRN: [id number]  Encounter Provider: Jena Hernandez DO  Encounter Date: 2022   Encounter department: [facility] [address] [facility]  Bacterial respiratory infection  Comments:  D/t persistent productive cough during exam and wheezing on exam will start Doxy 100 mg bid x 7 days, rx for [employer] sent as well,   Rest/fluids/lozenges/hot tea/garles and OTC Claritin were recommended; d/w pt that cough can last 4-6 wks but call with new/worsening symptoms  Orders:  -     doxycycline hyclate (VIBRAMYCIN) 100 mg capsule; Take 1 capsule (100 mg total) by mouth every 12 (twelve) hours for 7 days  -     benzonatate (TESSALON PERLES) 100 mg capsule; Take 1 capsule (100 mg total) by mouth 3 (three) times a day as needed for cough           Subjective      HPI Pt here for an acute visit    Last week on Monday pt started with a cough  She noted a fever last week with Tm 100 5 and having chills but that has resolved and last fever was about 4 days ago  She notes no SOB but has had some wheezing  She notes nasal congestion and runny nose but no ST/ear pain/N/V/D/rashes/urinary symptoms/HA's/sinus pressure  She feels better then last week but still has a persistent cough  Review of Systems   Constitutional: Positive for fatigue  Negative for chills, fever and unexpected weight change  HENT: Positive for congestion and postnasal drip  Negative for ear pain, sinus pressure, sore throat and trouble swallowing  Eyes: Negative for discharge and itching  Respiratory: Positive for cough and wheezing  Negative for shortness of breath  Cardiovascular: Negative for chest pain and palpitations  Gastrointestinal: Negative for abdominal pain, diarrhea, nausea and vomiting  Genitourinary: Negative for difficulty urinating and dysuria  Skin: Negative for rash and wound     Neurological: Negative for dizziness and headaches  Hematological: Negative for adenopathy  Psychiatric/Behavioral: Negative for behavioral problems and confusion  Current Outpatient Medications on File Prior to Visit   Medication Sig   • amLODIPine (NORVASC) 10 mg tablet TAKE 1 TABLET BY MOUTH EVERY DAY (Patient not taking: Reported on 11/22/2022)   • amLODIPine (NORVASC) 5 mg tablet Take 1 tablet (5 mg total) by mouth daily   • aspirin 81 mg chewable tablet Chew 81 mg every other day   • atorvastatin (LIPITOR) 20 mg tablet Take 20 mg by mouth daily   • Biotin 1000 MCG tablet Take 1,000 mcg by mouth 2 (two) times a day  • Calcium 500 MG tablet Take 1 tablet by mouth 3 (three) times a week    • cholecalciferol (VITAMIN D3) 1,000 units tablet Take 1 tablet by mouth daily   • dorzolamide-timolol (COSOPT) 22 3-6 8 MG/ML ophthalmic solution Apply to eye   • latanoprost (XALATAN) 0 005 % ophthalmic solution    • lisinopril (ZESTRIL) 10 mg tablet TAKE 1 TABLET BY MOUTH EVERY DAY   • metoprolol tartrate (LOPRESSOR) 25 mg tablet TAKE 1 TABLET BY MOUTH TWICE A DAY   • Multiple Vitamins-Minerals (CENTRUM SILVER ADULT 50+ PO) Take by mouth  • Omega-3 Fatty Acids (FISH OIL) 1,000 mg Take 1,000 mg by mouth daily  • Synthroid 88 MCG tablet TAKE 1 TABLET BY MOUTH EVERY DAY       Objective     /59 (BP Location: Left arm, Patient Position: Sitting, Cuff Size: Standard)   Pulse 72   Temp (!) 96 5 °F (35 8 °C) (Tympanic)   Ht 4' 9" (1 448 m)   Wt 54 4 kg (120 lb)   SpO2 100%   BMI 25 97 kg/m²     Physical Exam  Vitals and nursing note reviewed  Constitutional:       General: She is not in acute distress  Appearance: She is well-developed and well-nourished  She is not ill-appearing  HENT:      Head: Normocephalic and atraumatic  Right Ear: Tympanic membrane and external ear normal  There is no impacted cerumen  Left Ear: Tympanic membrane and external ear normal  There is no impacted cerumen     Eyes:      General:         Right eye: No discharge  Left eye: No discharge  Conjunctiva/sclera: Conjunctivae normal    Neck:      Trachea: No tracheal deviation  Cardiovascular:      Rate and Rhythm: Normal rate and regular rhythm  Heart sounds: Murmur heard  No friction rub  Pulmonary:      Effort: Pulmonary effort is normal  No respiratory distress  Breath sounds: Wheezing present  No rhonchi or rales  Comments: Rare exp wheeze HARRIS  Musculoskeletal:         General: No edema  Cervical back: Neck supple  Lymphadenopathy:      Cervical: Cervical adenopathy present  Skin:     General: Skin is warm  Coloration: Skin is not pale  Findings: No rash  Neurological:      Mental Status: She is alert  Motor: No abnormal muscle tone  Gait: Gait normal    Psychiatric:         Mood and Affect: Mood and affect normal          Behavior: Behavior normal          Thought Content:  Thought content normal          Judgment: Judgment normal        Rickey Sloan,

## 2023-01-19 DIAGNOSIS — D64.9 ANEMIA, UNSPECIFIED TYPE: Primary | ICD-10-CM

## 2023-01-23 ENCOUNTER — APPOINTMENT (OUTPATIENT)
Dept: LAB | Facility: HOSPITAL | Age: 88
End: 2023-01-23

## 2023-01-23 DIAGNOSIS — D64.9 ANEMIA, UNSPECIFIED TYPE: ICD-10-CM

## 2023-01-23 PROCEDURE — 85025 COMPLETE CBC W/AUTO DIFF WBC: CPT | Performed by: INTERNAL MEDICINE

## 2023-01-24 LAB
BASOPHILS # BLD AUTO: 0.09 THOUSANDS/ÂΜL (ref 0–0.1)
BASOPHILS NFR BLD AUTO: 1 % (ref 0–1)
EOSINOPHIL # BLD AUTO: 0.74 THOUSAND/ÂΜL (ref 0–0.61)
EOSINOPHIL NFR BLD AUTO: 9 % (ref 0–6)
ERYTHROCYTE [DISTWIDTH] IN BLOOD BY AUTOMATED COUNT: 15.9 % (ref 11.6–15.1)
FERRITIN SERPL-MCNC: 16 NG/ML (ref 8–388)
HCT VFR BLD AUTO: 28.5 % (ref 34.8–46.1)
HGB BLD-MCNC: 7.6 G/DL (ref 11.5–15.4)
IMM GRANULOCYTES # BLD AUTO: 0.03 THOUSAND/UL (ref 0–0.2)
IMM GRANULOCYTES NFR BLD AUTO: 0 % (ref 0–2)
IRON SERPL-MCNC: 18 UG/DL (ref 50–170)
LYMPHOCYTES # BLD AUTO: 1.44 THOUSANDS/ÂΜL (ref 0.6–4.47)
LYMPHOCYTES NFR BLD AUTO: 17 % (ref 14–44)
MCH RBC QN AUTO: 22.8 PG (ref 26.8–34.3)
MCHC RBC AUTO-ENTMCNC: 26.7 G/DL (ref 31.4–37.4)
MCV RBC AUTO: 85 FL (ref 82–98)
MONOCYTES # BLD AUTO: 0.79 THOUSAND/ÂΜL (ref 0.17–1.22)
MONOCYTES NFR BLD AUTO: 9 % (ref 4–12)
NEUTROPHILS # BLD AUTO: 5.41 THOUSANDS/ÂΜL (ref 1.85–7.62)
NEUTS SEG NFR BLD AUTO: 64 % (ref 43–75)
NRBC BLD AUTO-RTO: 0 /100 WBCS
PLATELET # BLD AUTO: 429 THOUSANDS/UL (ref 149–390)
PMV BLD AUTO: 11.5 FL (ref 8.9–12.7)
RBC # BLD AUTO: 3.34 MILLION/UL (ref 3.81–5.12)
WBC # BLD AUTO: 8.5 THOUSAND/UL (ref 4.31–10.16)

## 2023-02-02 ENCOUNTER — OFFICE VISIT (OUTPATIENT)
Dept: FAMILY MEDICINE CLINIC | Facility: HOSPITAL | Age: 88
End: 2023-02-02

## 2023-02-02 VITALS
SYSTOLIC BLOOD PRESSURE: 128 MMHG | BODY MASS INDEX: 25.85 KG/M2 | HEIGHT: 57 IN | HEART RATE: 82 BPM | TEMPERATURE: 98 F | WEIGHT: 119.8 LBS | DIASTOLIC BLOOD PRESSURE: 50 MMHG

## 2023-02-02 DIAGNOSIS — E03.8 HYPOTHYROIDISM DUE TO HASHIMOTO'S THYROIDITIS: ICD-10-CM

## 2023-02-02 DIAGNOSIS — E06.3 HYPOTHYROIDISM DUE TO HASHIMOTO'S THYROIDITIS: ICD-10-CM

## 2023-02-02 DIAGNOSIS — D50.9 IRON DEFICIENCY ANEMIA, UNSPECIFIED IRON DEFICIENCY ANEMIA TYPE: Primary | ICD-10-CM

## 2023-02-02 DIAGNOSIS — R19.5 HEME POSITIVE STOOL: ICD-10-CM

## 2023-02-02 DIAGNOSIS — N18.30 STAGE 3 CHRONIC KIDNEY DISEASE, UNSPECIFIED WHETHER STAGE 3A OR 3B CKD (HCC): ICD-10-CM

## 2023-02-02 DIAGNOSIS — I73.9 PERIPHERAL ARTERIAL DISEASE (HCC): ICD-10-CM

## 2023-02-02 DIAGNOSIS — R68.81 EARLY SATIETY: ICD-10-CM

## 2023-02-02 DIAGNOSIS — E21.3 HYPERPARATHYROIDISM (HCC): ICD-10-CM

## 2023-02-02 DIAGNOSIS — I10 ESSENTIAL HYPERTENSION: ICD-10-CM

## 2023-02-02 LAB — SL AMB POCT FECES OCC BLD: POSITIVE

## 2023-02-02 RX ORDER — FERROUS SULFATE TAB EC 324 MG (65 MG FE EQUIVALENT) 324 (65 FE) MG
324 TABLET DELAYED RESPONSE ORAL
Qty: 30 TABLET | Refills: 2 | Status: SHIPPED | OUTPATIENT
Start: 2023-02-02

## 2023-02-02 NOTE — ASSESSMENT & PLAN NOTE
Pt noting no claudication and last SHIMON nml, no further eval needed, urged to con't ASA and statin and regular exercise/walking

## 2023-02-02 NOTE — ASSESSMENT & PLAN NOTE
Lab Results   Component Value Date    EGFR 51 08/03/2021    CREATININE 0 98 08/03/2021   Stable and only mildly decreased, will watch closely with contrast to be given with CT A/P, urged to keep hydrated

## 2023-02-02 NOTE — PROGRESS NOTES
Name: Letitia Jefferson      : 10/4/1929      MRN: [id number]  Encounter Provider: Marilin Joseph DO  Encounter Date: 2023   Encounter department: [facility] [place] [facility]      1  Iron deficiency anemia, unspecified iron deficiency anemia type  Comments:  New onset iron def with early satiety and unintentional wgt loss w/mild symptoms (SOB/fatigue/lightheadedness), will start iron supplement and investigate under  Orders:  -     CT abdomen pelvis w contrast; Future; Expected date: 2023  -     ferrous sulfate 324 (65 Fe) mg; Take 1 tablet (324 mg total) by mouth daily before breakfast  -     Ambulatory Referral to Gastroenterology; Future  -     CBC and differential  -     POCT hemoccult screening    2  Stage 3 chronic kidney disease, unspecified whether stage 3a or 3b [facility] ([facility]  Assessment & Plan:  Lab Results   Component Value Date    EGFR 51 2021    CREATININE 0 98 2021   Stable and only mildly decreased, will watch closely with contrast to be given with CT A/P, urged to keep hydrated      3  Hyperparathyroidism ([facility] )  Assessment & Plan:  Following with endo and having Ca/PTH monitored, cont labs and f/u as per specialists      4  Hypothyroidism due to Hashimoto's thyroiditis  Assessment & Plan:  Recent TSH wnl, con't meds/labs and f/u as per Endo      5  Peripheral arterial disease (HCC)  Assessment & Plan:  Pt noting no claudication and last SHIMON nml, no further eval needed, urged to con't ASA and statin and regular exercise/walking      6  Essential hypertension  Comments:  BP at goal, con't current meds, med list updated  Orders:  -     metoprolol tartrate (LOPRESSOR) 25 mg tablet; Take 1 tablet (25 mg total) by mouth in the morning    7   Heme positive stool  Comments:  check CT A/P and needs referral to GI to eval for poss maligancy  Orders:  -     CT abdomen pelvis w contrast; Future; Expected date: 2023  -     Ambulatory Referral to Gastroenterology; Future    8  Early satiety  Comments:  will need CT A/P and GI eval d/t mult red flag symptoms, will follow closely  Orders:  -     CT abdomen pelvis w contrast; Future; Expected date: 02/02/2023  -     Ambulatory Referral to Gastroenterology; Future  -     POCT hemoccult screening         Subjective      HPI Pt here for follow up appt and BW results    BW results were d/w pt in detail: Had labs for Cardio - upon review H/H low at 8 7/30 8 (MCV 83) with RDW 15 4  BUN/Cr stable but with GFR 57, LDL a bit low at 36  We called pt and ordered repeat CBC and iron studies  H/H on repeat was 7 6/28 5 with iron 18 and ferritin 16  She notes a fall just before her first labs and only had a resulting bruise at her R hip  She notes an episode of lightheadedness last Tue but none since then  She notes notes some fatigue and has had an increase in SOB  She notes no CP/palp  She notes no blood in the stool/black stool/blood in urine/vaginal bleeding/abd or pelvic pain  She admits her diet is not as good as it could be  Wgt is down 5 lbs from Sept 22  She states she gets full easily but still has an appetite  She notes no issues with swallowing  Pt saw Endo (Dr Emma Severino) in Sept shortly after our last routine visit for f/u hypothyroid and hyperparathyroidism - OV note reviewed  She was told to con't her current Levothyroxine at 88 mcg daily  She was told to con't here current Vit D and to repeat BW in 1 yr before next f/u appt  Pt last had SHMION Nov 2018 and were normal  She is on ASA and statin  Review of Systems   Constitutional: Positive for appetite change, fatigue and unexpected weight change  Negative for chills and fever  HENT: Negative for trouble swallowing  Eyes: Negative for pain and visual disturbance  Respiratory: Positive for shortness of breath  Negative for cough and wheezing  Cardiovascular: Negative for chest pain, palpitations and leg swelling  Gastrointestinal: Negative for abdominal pain, blood in stool, constipation, diarrhea, nausea and vomiting  Genitourinary: Negative for difficulty urinating, dysuria, vaginal bleeding and vaginal pain  Musculoskeletal: Negative for back pain and neck pain  Skin: Negative for rash and wound  Neurological: Positive for light-headedness  Negative for dizziness, syncope and headaches  Hematological: Negative for adenopathy  Psychiatric/Behavioral: Negative for confusion  Current Outpatient Medications on File Prior to Visit   Medication Sig   • amLODIPine (NORVASC) 5 mg tablet Take 1 tablet (5 mg total) by mouth daily   • aspirin 81 mg chewable tablet Chew 81 mg every other day   • atorvastatin (LIPITOR) 20 mg tablet Take 20 mg by mouth daily   • Biotin 1000 MCG tablet Take 1,000 mcg by mouth 2 (two) times a day  • Calcium 500 MG tablet Take 1 tablet by mouth 3 (three) times a week    • cholecalciferol (VITAMIN D3) 1,000 units tablet Take 1 tablet by mouth daily   • dorzolamide-timolol (COSOPT) 22 3-6 8 MG/ML ophthalmic solution Apply to eye   • latanoprost (XALATAN) 0 005 % ophthalmic solution    • lisinopril (ZESTRIL) 10 mg tablet TAKE 1 TABLET BY MOUTH EVERY DAY   • Multiple Vitamins-Minerals (CENTRUM SILVER ADULT 50+ PO) Take by mouth  • Omega-3 Fatty Acids (FISH OIL) 1,000 mg Take 1,000 mg by mouth daily     • Synthroid 88 MCG tablet TAKE 1 TABLET BY MOUTH EVERY DAY   • [DISCONTINUED] amLODIPine (NORVASC) 10 mg tablet TAKE 1 TABLET BY MOUTH EVERY DAY (Patient not taking: Reported on 11/22/2022)   • [DISCONTINUED] benzonatate (TESSALON PERLES) 100 mg capsule Take 1 capsule (100 mg total) by mouth 3 (three) times a day as needed for cough   • [DISCONTINUED] metoprolol tartrate (LOPRESSOR) 25 mg tablet TAKE 1 TABLET BY MOUTH TWICE A DAY       Objective     /50   Pulse 82   Temp 98 °F (36 7 °C) (Tympanic)   Ht 4' 9" (1 448 m)   Wt 54 3 kg (119 lb 12 8 oz)   BMI 25 92 kg/m²     Physical Exam  Vitals and nursing note reviewed  Constitutional:       General: She is not in acute distress  Appearance: She is well-developed  She is not ill-appearing  HENT:      Head: Normocephalic and atraumatic  Eyes:      General:         Right eye: No discharge  Left eye: No discharge  Conjunctiva/sclera: Conjunctivae normal    Neck:      Trachea: No tracheal deviation  Cardiovascular:      Rate and Rhythm: Normal rate and regular rhythm  Heart sounds: Normal heart sounds  No murmur heard  No friction rub  Pulmonary:      Effort: Pulmonary effort is normal  No respiratory distress  Breath sounds: Normal breath sounds  No wheezing, rhonchi or rales  Abdominal:      General: There is no distension  Palpations: Abdomen is soft  Tenderness: There is no abdominal tenderness  There is no guarding or rebound  Genitourinary:     Rectum: Guaiac result positive  Musculoskeletal:      Cervical back: Neck supple  Skin:     General: Skin is warm  Coloration: Skin is not pale  Findings: No rash  Neurological:      General: No focal deficit present  Mental Status: She is alert  Motor: No abnormal muscle tone  Gait: Gait normal    Psychiatric:         Mood and Affect: Mood normal          Behavior: Behavior normal          Thought Content:  Thought content normal          Judgment: Judgment normal        Gutierrez Celaya DO

## 2023-02-08 ENCOUNTER — HOSPITAL ENCOUNTER (OUTPATIENT)
Dept: CT IMAGING | Facility: HOSPITAL | Age: 88
Discharge: HOME | End: 2023-02-08

## 2023-02-08 DIAGNOSIS — R19.5 HEME POSITIVE STOOL: ICD-10-CM

## 2023-02-08 DIAGNOSIS — D50.9 IRON DEFICIENCY ANEMIA, UNSPECIFIED IRON DEFICIENCY ANEMIA TYPE: ICD-10-CM

## 2023-02-08 DIAGNOSIS — R68.81 EARLY SATIETY: ICD-10-CM

## 2023-02-08 RX ADMIN — IOHEXOL 100 ML: 350 INJECTION, SOLUTION INTRAVENOUS at 10:45

## 2023-02-10 ENCOUNTER — OFFICE VISIT (OUTPATIENT)
Dept: GASTROENTEROLOGY | Facility: CLINIC | Age: 88
End: 2023-02-10

## 2023-02-10 VITALS
WEIGHT: 121.2 LBS | DIASTOLIC BLOOD PRESSURE: 60 MMHG | BODY MASS INDEX: 26.15 KG/M2 | SYSTOLIC BLOOD PRESSURE: 128 MMHG | HEIGHT: 57 IN

## 2023-02-10 DIAGNOSIS — R19.5 HEME POSITIVE STOOL: ICD-10-CM

## 2023-02-10 DIAGNOSIS — R63.4 ABNORMAL WEIGHT LOSS: ICD-10-CM

## 2023-02-10 DIAGNOSIS — D50.9 IRON DEFICIENCY ANEMIA, UNSPECIFIED IRON DEFICIENCY ANEMIA TYPE: ICD-10-CM

## 2023-02-10 DIAGNOSIS — R93.3 ABNORMAL CT SCAN, COLON: Primary | ICD-10-CM

## 2023-02-10 NOTE — PROGRESS NOTES
[facility] - Outpatient Consultation  Pau Glasgow 80 y o  female MRN: [id number]  Encounter: [id number]          ASSESSMENT AND PLAN:      1  Iron deficiency anemia, unspecified iron deficiency anemia type  2  Heme positive stool  3  Abnormal weight loss  4  Abnormal CT scan, colon    - Ambulatory Referral to Gastroenterology  - EGD; Future  - Colonoscopy; Future  - polyethylene glycol (GOLYTELY) 4000 mL solution; Take as directed by the office for colonoscopy  Dispense: 4000 mL; Refill: 0      This is a pleasant 77-year-old female referred for iron deficiency anemia (hemoglobin 7 6, down from 11 6 two years ago) with markedly thickened wall of the mid ascending colon on CT scan concerning for malignancy  No significant mesenteric lymphadenopathy  She lives independently and takes care of herself  She has lost about 5 pounds but otherwise denies any blood in the school, changes in bowel habits, abdominal pain, upper GI symptoms  She would like to pursue endoscopic evaluation  I explained assuming this is a colon cancer, treatment options may include surgery, chemotherapy, radiation  She would be open to these treatments depending on the findings and what is offered  We will also schedule EGD to rule out sources of upper GI bleeding  I discussed risks and benefits of procedure  Risks include but are not limited to infection, bleeding, perforation, missed lesions  Gave instructions for GoLytely bowel prep  She will continue her iron supplement daily  ______________________________________________________________________    HPI: 77-year-old female with history of stage 3 CKD, peripheral arterial disease, left ventricular hypertrophy, hypertension, dyslipidemia, hypothyroidism, hyperparathyroidism, thyroid nodule, impaired fasting glucose, reflux referred by PCP Dr Hafsa Pacheco for iron deficiency anemia and abnormal CT scan of the colon      She was recently found to have iron deficiency anemia  She denies overt GI bleeding  Her bowel movements are regular  She denies any diarrhea or constipation  She denies any abdominal pain  No nausea or vomiting  She has decreased appetite but still eats 3 meals a day  She has lost about 5 pounds  She denies reflux and difficulty swallowing  Her last colonoscopy was in 2005  She denies family history of colon cancer  Her mother lived to age 80  She has never smoked  REVIEW OF SYSTEMS:    CONSTITUTIONAL: Denies any fever, chills, rigors  + mild weight loss  HEENT: No earache or tinnitus  Denies hearing loss or visual disturbances  CARDIOVASCULAR: No chest pain or palpitations  RESPIRATORY: Denies any cough, hemoptysis, shortness of breath or dyspnea on exertion  GASTROINTESTINAL: As noted in the History of Present Illness  GENITOURINARY: No problems with urination  Denies any hematuria or dysuria  NEUROLOGIC: No dizziness or vertigo, denies headaches  MUSCULOSKELETAL: Denies any muscle or joint pain  SKIN: Denies skin rashes or itching  ENDOCRINE: Denies excessive thirst  Denies intolerance to heat or cold  PSYCHOSOCIAL: Denies depression or anxiety  Denies any recent memory loss         Historical Information   Past Medical History:   Diagnosis Date   • BCC (basal cell carcinoma)    • Diastolic dysfunction    • Dyslipidemia    • Dysphagia    • Glaucoma    • Hashimoto's thyroiditis    • Hyperparathyroidism ([facility] )    • Hypertension    • Impaired fasting glucose    • Insomnia    • Osteopenia    • SCCA (squamous cell carcinoma) of skin      Past Surgical History:   Procedure Laterality Date   • CATARACT EXTRACTION, BILATERAL     • COLONOSCOPY      Complete, Onset - 10/25/05 - 10 years    • HYSTERECTOMY     • PARATHYROIDECTOMY      1st 1985 and 2nd 2002   • PA NDSC WRST SURG W/RLS TRANSVRS CARPL LIGM Right 05/02/2016    Procedure: RELEASE CARPAL TUNNEL ENDOSCOPIC;  Surgeon: Callie Dye MD;  Location:  MAIN OR;  Service: Orthopedics   • TONSILLECTOMY       Social History   Social History     Substance and Sexual Activity   Alcohol Use Yes    Comment: rarely, social      Social History     Substance and Sexual Activity   Drug Use No     Social History     Tobacco Use   Smoking Status Never   Smokeless Tobacco Never     Family History   Problem Relation Age of Onset   • Stroke Father         CVA   • Ovarian cancer Mother        Meds/Allergies       Current Outpatient Medications:   •  amLODIPine (NORVASC) 5 mg tablet  •  aspirin 81 mg chewable tablet  •  atorvastatin (LIPITOR) 20 mg tablet  •  Biotin 1000 MCG tablet  •  Calcium 500 MG tablet  •  cholecalciferol (VITAMIN D3) 1,000 units tablet  •  dorzolamide-timolol (COSOPT) 22 3-6 8 MG/ML ophthalmic solution  •  ferrous sulfate 324 (65 Fe) mg  •  latanoprost (XALATAN) 0 005 % ophthalmic solution  •  lisinopril (ZESTRIL) 10 mg tablet  •  metoprolol tartrate (LOPRESSOR) 25 mg tablet  •  Multiple Vitamins-Minerals (CENTRUM SILVER ADULT 50+ PO)  •  Omega-3 Fatty Acids (FISH OIL) 1,000 mg  •  polyethylene glycol (GOLYTELY) 4000 mL solution  •  Synthroid 88 MCG tablet    Allergies   Allergen Reactions   • Cyclogyl [Cyclopentolate Hcl]    • Phenylephrine    • Pred Forte [Prednisolone Acetate]            Objective     Blood pressure 128/60, height 4' 9" (1 448 m), weight 55 kg (121 lb 3 2 oz)  Body mass index is 26 23 kg/m²          PHYSICAL EXAM:      General Appearance:   Alert, pleasant elderly female, cooperative, no distress   HEENT:   Normocephalic, atraumatic, anicteric      Neck:  Supple, symmetrical, trachea midline   Lungs:   Clear to auscultation bilaterally   Heart[de-identified]   Regular rate and rhythm   Abdomen:   Soft, non-tender, non-distended; normal bowel sounds   Genitalia:   Deferred    Rectal:   Deferred    Extremities:  No cyanosis, clubbing or edema    Pulses:  2+ and symmetric    Skin:  No jaundice, rashes, or lesions    Lymph nodes:  No palpable cervical lymphadenopathy  Lab Results:   No visits with results within 1 Day(s) from this visit  Latest known visit with results is:   Office Visit on 02/02/2023   Component Date Value   • FECES OCC BLD 02/02/2023 positive          Radiology Results:   CT abdomen pelvis w contrast    Result Date: 2/8/2023  Narrative: CT ABDOMEN AND PELVIS WITH IV CONTRAST INDICATION:   D50 9: Iron deficiency anemia, unspecified R19 5: Other fecal abnormalities R68 81: Early satiety  COMPARISON:  February 22, 2011 TECHNIQUE:  CT examination of the abdomen and pelvis was performed  Axial, sagittal, and coronal 2D reformatted images were created from the source data and submitted for interpretation  Radiation dose length product (DLP) for this visit:  444 87 mGy-cm   This examination, like all CT scans performed in the [facility], was performed utilizing techniques to minimize radiation dose exposure, including the use of iterative  reconstruction and automated exposure control  IV Contrast:  100 mL of iohexol (OMNIPAQUE) Enteric Contrast:  Enteric contrast was not administered  FINDINGS: ABDOMEN LOWER CHEST:  Large hiatal hernia seen  LIVER/BILIARY TREE:  Hepatic steatosis seen Subcentimeter hypodensity seen in the inferior aspect of the right hepatic lobe, segment 6, nonspecific GALLBLADDER: Hourglass shaped gallbladder seen with calculi in the fundal portion compatible with adenomyosis  SPLEEN:  Unremarkable  PANCREAS:  No pancreatic ductal dilation seen ADRENAL GLANDS:  Unremarkable   KIDNEYS/URETERS:  There is a septated the cyst in the anterior aspect of the right kidney, measuring 1 3 cm, stable Multiple septae Dilation of the both renal pelvises seen without any obstructing calculus STOMACH AND BOWEL:  Diverticulosis seen There is thickening of the sigmoid colonic wall related to chronic diverticular disease There is masslike thickening in the ascending colon in about 7 cm long segment: Normal images 55 series 601 Hepatic flexure appear unremarkable There is no significant mesenteric lymphadenopathy Small mesenteric lymph nodes are seen measuring about 5 to 6 mm on the right  APPENDIX:  No findings to suggest appendicitis  ABDOMINOPELVIC CAVITY:  Small para-aortic lymph node is seen with short axis measurement less than 1 cm  A rounded the density with the fluid attenuation measuring 1 1 cm, indeterminate probably benign VESSELS:  Celiac trunk, SMA, SELENA are patent PELVIS REPRODUCTIVE ORGANS:  Uterus surgically absent URINARY BLADDER:  Unremarkable   ABDOMINAL WALL/INGUINAL REGIONS:  Umbilical hernia containing fat seen OSSEOUS STRUCTURES:  No acute compression collapse vertebra No gross lytic     Impression: Markedly thickened wall of the mid ascending colon, suggests colonic mass/neoplasm Correlation with the colonoscopy suggested No bowel obstruction A complex multiseptated cyst, measuring 1 3 cm in the anterior aspect of the right kidney, indolent lesion unchanged from the previous study of  February 22, 2011, [place]ad[place] 2F Subcentimeter hypodensity right hepatic lobe, nonspecific may be assessed with nonemergent MRI or on follow-up surveillance scan Large hiatal hernia A 1 2 cm rounded fluid attenuation cystic structures at the level of the pelvic brim in relation to the left common iliac artery, nonspecific attention at follow-up/surveillance, likely benign  I personally discussed this study with France Lala on 2/8/2023 at 12:05 PM  Workstation performed: [id number]

## 2023-02-10 NOTE — H&P (VIEW-ONLY)
[facility] - Outpatient Consultation  Hanny Davila 80 y o  female MRN: [id number]  Encounter: [id number]          ASSESSMENT AND PLAN:      1  Iron deficiency anemia, unspecified iron deficiency anemia type  2  Heme positive stool  3  Abnormal weight loss  4  Abnormal CT scan, colon    - Ambulatory Referral to Gastroenterology  - EGD; Future  - Colonoscopy; Future  - polyethylene glycol (GOLYTELY) 4000 mL solution; Take as directed by the office for colonoscopy  Dispense: 4000 mL; Refill: 0      This is a pleasant 29-year-old female referred for iron deficiency anemia (hemoglobin 7 6, down from 11 6 two years ago) with markedly thickened wall of the mid ascending colon on CT scan concerning for malignancy  No significant mesenteric lymphadenopathy  She lives independently and takes care of herself  She has lost about 5 pounds but otherwise denies any blood in the school, changes in bowel habits, abdominal pain, upper GI symptoms  She would like to pursue endoscopic evaluation  I explained assuming this is a colon cancer, treatment options may include surgery, chemotherapy, radiation  She would be open to these treatments depending on the findings and what is offered  We will also schedule EGD to rule out sources of upper GI bleeding  I discussed risks and benefits of procedure  Risks include but are not limited to infection, bleeding, perforation, missed lesions  Gave instructions for GoLytely bowel prep  She will continue her iron supplement daily  ______________________________________________________________________    HPI: 29-year-old female with history of stage 3 CKD, peripheral arterial disease, left ventricular hypertrophy, hypertension, dyslipidemia, hypothyroidism, hyperparathyroidism, thyroid nodule, impaired fasting glucose, reflux referred by PCP Dr Lexii Figueredo for iron deficiency anemia and abnormal CT scan of the colon      She was recently found to have iron deficiency anemia  She denies overt GI bleeding  Her bowel movements are regular  She denies any diarrhea or constipation  She denies any abdominal pain  No nausea or vomiting  She has decreased appetite but still eats 3 meals a day  She has lost about 5 pounds  She denies reflux and difficulty swallowing  Her last colonoscopy was in 2005  She denies family history of colon cancer  Her mother lived to age 80  She has never smoked  REVIEW OF SYSTEMS:    CONSTITUTIONAL: Denies any fever, chills, rigors  + mild weight loss  HEENT: No earache or tinnitus  Denies hearing loss or visual disturbances  CARDIOVASCULAR: No chest pain or palpitations  RESPIRATORY: Denies any cough, hemoptysis, shortness of breath or dyspnea on exertion  GASTROINTESTINAL: As noted in the History of Present Illness  GENITOURINARY: No problems with urination  Denies any hematuria or dysuria  NEUROLOGIC: No dizziness or vertigo, denies headaches  MUSCULOSKELETAL: Denies any muscle or joint pain  SKIN: Denies skin rashes or itching  ENDOCRINE: Denies excessive thirst  Denies intolerance to heat or cold  PSYCHOSOCIAL: Denies depression or anxiety  Denies any recent memory loss         Historical Information   Past Medical History:   Diagnosis Date   • BCC (basal cell carcinoma)    • Diastolic dysfunction    • Dyslipidemia    • Dysphagia    • Glaucoma    • Hashimoto's thyroiditis    • Hyperparathyroidism ([facility] [address] )    • Hypertension    • Impaired fasting glucose    • Insomnia    • Osteopenia    • SCCA (squamous cell carcinoma) of skin      Past Surgical History:   Procedure Laterality Date   • CATARACT EXTRACTION, BILATERAL     • COLONOSCOPY      Complete, Onset - 10/25/05 - 10 years    • HYSTERECTOMY     • PARATHYROIDECTOMY      1st 1985 and 2nd 2002   • [place] NDSC WRST SURG W/RLS TRANSVRS CARPL LIGM Right 05/02/2016    Procedure: RELEASE CARPAL TUNNEL ENDOSCOPIC;  Surgeon: Claudy Craig MD;  Location:  MAIN OR;  Service: Orthopedics   • TONSILLECTOMY       Social History   Social History     Substance and Sexual Activity   Alcohol Use Yes    Comment: rarely, social      Social History     Substance and Sexual Activity   Drug Use No     Social History     Tobacco Use   Smoking Status Never   Smokeless Tobacco Never     Family History   Problem Relation Age of Onset   • Stroke Father         CVA   • Ovarian cancer Mother        Meds/Allergies       Current Outpatient Medications:   •  amLODIPine (NORVASC) 5 mg tablet  •  aspirin 81 mg chewable tablet  •  atorvastatin (LIPITOR) 20 mg tablet  •  Biotin 1000 MCG tablet  •  Calcium 500 MG tablet  •  cholecalciferol (VITAMIN D3) 1,000 units tablet  •  dorzolamide-timolol (COSOPT) 22 3-6 8 MG/ML ophthalmic solution  •  ferrous sulfate 324 (65 Fe) mg  •  latanoprost (XALATAN) 0 005 % ophthalmic solution  •  lisinopril (ZESTRIL) 10 mg tablet  •  metoprolol tartrate (LOPRESSOR) 25 mg tablet  •  Multiple Vitamins-Minerals (CENTRUM SILVER ADULT 50+ PO)  •  Omega-3 Fatty Acids (FISH OIL) 1,000 mg  •  polyethylene glycol (GOLYTELY) 4000 mL solution  •  Synthroid 88 MCG tablet    Allergies   Allergen Reactions   • Cyclogyl [Cyclopentolate Hcl]    • Phenylephrine    • Pred Forte [Prednisolone Acetate]            Objective     Blood pressure 128/60, height 4' 9" (1 448 m), weight 55 kg (121 lb 3 2 oz)  Body mass index is 26 23 kg/m²          PHYSICAL EXAM:      General Appearance:   Alert, pleasant elderly female, cooperative, no distress   HEENT:   Normocephalic, atraumatic, anicteric      Neck:  Supple, symmetrical, trachea midline   Lungs:   Clear to auscultation bilaterally   Heart[de-identified]   Regular rate and rhythm   Abdomen:   Soft, non-tender, non-distended; normal bowel sounds   Genitalia:   Deferred    Rectal:   Deferred    Extremities:  No cyanosis, clubbing or edema    Pulses:  2+ and symmetric    Skin:  No jaundice, rashes, or lesions    Lymph nodes:  No palpable cervical lymphadenopathy  Lab Results:   No visits with results within 1 Day(s) from this visit  Latest known visit with results is:   Office Visit on 02/02/2023   Component Date Value   • FECES OCC BLD 02/02/2023 positive          Radiology Results:   CT abdomen pelvis w contrast    Result Date: 2/8/2023  Narrative: CT ABDOMEN AND PELVIS WITH IV CONTRAST INDICATION:   D50 9: Iron deficiency anemia, unspecified R19 5: Other fecal abnormalities R68 81: Early satiety  COMPARISON:  February 22, 2011 TECHNIQUE:  CT examination of the abdomen and pelvis was performed  Axial, sagittal, and coronal 2D reformatted images were created from the source data and submitted for interpretation  Radiation dose length product (DLP) for this visit:  444 87 mGy-cm   This examination, like all CT scans performed in the [facility], was performed utilizing techniques to minimize radiation dose exposure, including the use of iterative  reconstruction and automated exposure control  IV Contrast:  100 mL of iohexol (OMNIPAQUE) Enteric Contrast:  Enteric contrast was not administered  FINDINGS: ABDOMEN LOWER CHEST:  Large hiatal hernia seen  LIVER/BILIARY TREE:  Hepatic steatosis seen Subcentimeter hypodensity seen in the inferior aspect of the right hepatic lobe, segment 6, nonspecific GALLBLADDER: Hourglass shaped gallbladder seen with calculi in the fundal portion compatible with adenomyosis  SPLEEN:  Unremarkable  PANCREAS:  No pancreatic ductal dilation seen ADRENAL GLANDS:  Unremarkable   KIDNEYS/URETERS:  There is a septated the cyst in the anterior aspect of the right kidney, measuring 1 3 cm, stable Multiple septae Dilation of the both renal pelvises seen without any obstructing calculus STOMACH AND BOWEL:  Diverticulosis seen There is thickening of the sigmoid colonic wall related to chronic diverticular disease There is masslike thickening in the ascending colon in about 7 cm long segment: Normal images 55 series 601 Hepatic flexure appear unremarkable There is no significant mesenteric lymphadenopathy Small mesenteric lymph nodes are seen measuring about 5 to 6 mm on the right  APPENDIX:  No findings to suggest appendicitis  ABDOMINOPELVIC CAVITY:  Small para-aortic lymph node is seen with short axis measurement less than 1 cm  A rounded the density with the fluid attenuation measuring 1 1 cm, indeterminate probably benign VESSELS:  Celiac trunk, SMA, SELENA are patent PELVIS REPRODUCTIVE ORGANS:  Uterus surgically absent URINARY BLADDER:  Unremarkable   ABDOMINAL WALL/INGUINAL REGIONS:  Umbilical hernia containing fat seen OSSEOUS STRUCTURES:  No acute compression collapse vertebra No gross lytic     Impression: Markedly thickened wall of the mid ascending colon, suggests colonic mass/neoplasm Correlation with the colonoscopy suggested No bowel obstruction A complex multiseptated cyst, measuring 1 3 cm in the anterior aspect of the right kidney, indolent lesion unchanged from the previous study of  February 22, 2011, [place] 2F Subcentimeter hypodensity right hepatic lobe, nonspecific may be assessed with nonemergent MRI or on follow-up surveillance scan Large hiatal hernia A 1 2 cm rounded fluid attenuation cystic structures at the level of the pelvic brim in relation to the left common iliac artery, nonspecific attention at follow-up/surveillance, likely benign  I personally discussed this study with Rene Easley on 2/8/2023 at 12:05 PM  Workstation performed: [id number]

## 2023-02-10 NOTE — PATIENT INSTRUCTIONS
Scheduled date of colonoscopy (as of today):02 17 23  Physician performing colonoscopy:DR KLINE  Location of colonoscopy:[facility]  Bowel prep reviewed with patient:MARQUIS  Instructions reviewed with patient by:IVAN  Clearances:  N/A posterior cervical L/posterior cervical R/anterior cervical L/anterior cervical R/supraclavicular L/supraclavicular R

## 2023-02-14 ENCOUNTER — TELEPHONE (OUTPATIENT)
Dept: OTHER | Facility: OTHER | Age: 88
End: 2023-02-14

## 2023-02-14 NOTE — TELEPHONE ENCOUNTER
Pt would like a call back tomorrow regarding her iron tablets  She was suppose to stop them one week in advance  She was unaware and did stop taking them  Will stop tomorrow  Please call tomorrow to discuss  Colonoscopy 2/17/2023

## 2023-02-15 NOTE — TELEPHONE ENCOUNTER
Spoke with patient and informed her she may continue with colonoscopy as planned  Advised patient to call with any further questions or concerns

## 2023-02-17 ENCOUNTER — ANESTHESIA (OUTPATIENT)
Dept: GASTROENTEROLOGY | Facility: HOSPITAL | Age: 88
End: 2023-02-17

## 2023-02-17 ENCOUNTER — ANESTHESIA EVENT (OUTPATIENT)
Dept: GASTROENTEROLOGY | Facility: HOSPITAL | Age: 88
End: 2023-02-17

## 2023-02-17 ENCOUNTER — HOSPITAL ENCOUNTER (OUTPATIENT)
Dept: GASTROENTEROLOGY | Facility: HOSPITAL | Age: 88
Setting detail: OUTPATIENT SURGERY
Discharge: HOME | End: 2023-02-17

## 2023-02-17 VITALS
HEIGHT: 57 IN | HEART RATE: 71 BPM | BODY MASS INDEX: 26.16 KG/M2 | WEIGHT: 121.25 LBS | DIASTOLIC BLOOD PRESSURE: 61 MMHG | OXYGEN SATURATION: 95 % | TEMPERATURE: 98.5 F | SYSTOLIC BLOOD PRESSURE: 124 MMHG | RESPIRATION RATE: 19 BRPM

## 2023-02-17 DIAGNOSIS — R19.5 HEME POSITIVE STOOL: ICD-10-CM

## 2023-02-17 DIAGNOSIS — D50.9 IRON DEFICIENCY ANEMIA, UNSPECIFIED IRON DEFICIENCY ANEMIA TYPE: ICD-10-CM

## 2023-02-17 DIAGNOSIS — R93.3 ABNORMAL CT SCAN, COLON: ICD-10-CM

## 2023-02-17 DIAGNOSIS — R63.4 ABNORMAL WEIGHT LOSS: ICD-10-CM

## 2023-02-17 DIAGNOSIS — K63.89 MASS OF HEPATIC FLEXURE OF COLON: Primary | ICD-10-CM

## 2023-02-17 RX ORDER — PROPOFOL 10 MG/ML
INJECTION, EMULSION INTRAVENOUS AS NEEDED
Status: DISCONTINUED | OUTPATIENT
Start: 2023-02-17 | End: 2023-02-17

## 2023-02-17 RX ORDER — EPHEDRINE SULFATE 50 MG/ML
INJECTION INTRAVENOUS AS NEEDED
Status: DISCONTINUED | OUTPATIENT
Start: 2023-02-17 | End: 2023-02-17

## 2023-02-17 RX ORDER — SODIUM CHLORIDE, SODIUM LACTATE, POTASSIUM CHLORIDE, CALCIUM CHLORIDE 600; 310; 30; 20 MG/100ML; MG/100ML; MG/100ML; MG/100ML
INJECTION, SOLUTION INTRAVENOUS CONTINUOUS PRN
Status: DISCONTINUED | OUTPATIENT
Start: 2023-02-17 | End: 2023-02-17

## 2023-02-17 RX ORDER — LIDOCAINE HYDROCHLORIDE 20 MG/ML
INJECTION, SOLUTION EPIDURAL; INFILTRATION; INTRACAUDAL; PERINEURAL AS NEEDED
Status: DISCONTINUED | OUTPATIENT
Start: 2023-02-17 | End: 2023-02-17

## 2023-02-17 RX ADMIN — PROPOFOL 50 MG: 10 INJECTION, EMULSION INTRAVENOUS at 12:24

## 2023-02-17 RX ADMIN — PROPOFOL 50 MG: 10 INJECTION, EMULSION INTRAVENOUS at 12:18

## 2023-02-17 RX ADMIN — PROPOFOL 50 MG: 10 INJECTION, EMULSION INTRAVENOUS at 12:32

## 2023-02-17 RX ADMIN — SODIUM CHLORIDE, SODIUM LACTATE, POTASSIUM CHLORIDE, AND CALCIUM CHLORIDE: .6; .31; .03; .02 INJECTION, SOLUTION INTRAVENOUS at 12:16

## 2023-02-17 RX ADMIN — LIDOCAINE HYDROCHLORIDE 80 MG: 20 INJECTION, SOLUTION EPIDURAL; INFILTRATION; INTRACAUDAL; PERINEURAL at 12:18

## 2023-02-17 RX ADMIN — EPHEDRINE SULFATE 10 MG: 50 INJECTION INTRAVENOUS at 12:33

## 2023-02-17 NOTE — ANESTHESIA POSTPROCEDURE EVALUATION
Post-Op Assessment Note    CV Status:  Stable  Pain Score: 0    Pain management: adequate     Mental Status:  Alert and awake   Hydration Status:  Euvolemic   PONV Controlled:  Controlled   Airway Patency:  Patent      Post Op Vitals Reviewed: Yes      Staff: Anesthesiologist, CRNA         There were no known notable events for this encounter      /51 (02/17/23 1244)    Temp      Pulse 74 (02/17/23 1244)   Resp 16 (02/17/23 1244)    SpO2 96 % (02/17/23 1244)

## 2023-02-17 NOTE — INTERVAL H&P NOTE
H&P reviewed  After examining the patient I find no changes in the patients condition since the H&P had been written      Vitals:    02/17/23 1128   BP: 129/59   Pulse: 64   Resp: 16   Temp: 98 5 °F (36 9 °C)   SpO2: 98%

## 2023-02-17 NOTE — ANESTHESIA PREPROCEDURE EVALUATION
Procedure:  COLONOSCOPY  EGD    Relevant Problems   CARDIO   (+) Hypertension   (+) Peripheral arterial disease (HCC)      ENDO   (+) Hyperparathyroidism (HCC)   (+) Hypothyroidism      GI/HEPATIC   (+) Esophageal reflux      /RENAL   (+) Stage 3 chronic kidney disease, unspecified whether stage 3a or 3b CKD (HCC)      Other   (+) Dyslipidemia        Physical Exam    Airway    Mallampati score: III  TM Distance: <3 FB  Neck ROM: limited     Dental   Comment: partials, upper dentures and lower dentures,     Cardiovascular      Pulmonary      Other Findings        Anesthesia Plan  ASA Score- 3     Anesthesia Type- IV sedation with anesthesia with ASA Monitors  Additional Monitors:   Airway Plan:           Plan Factors-    Chart reviewed  Patient summary reviewed  Patient is not a current smoker  Induction- intravenous  Postoperative Plan-     Informed Consent- Anesthetic plan and risks discussed with patient  I personally reviewed this patient with the CRNA  Discussed and agreed on the Anesthesia Plan with the CRNA  Jer Morrissey

## 2023-02-25 DIAGNOSIS — D50.9 IRON DEFICIENCY ANEMIA, UNSPECIFIED IRON DEFICIENCY ANEMIA TYPE: ICD-10-CM

## 2023-02-26 RX ORDER — FERROUS SULFATE TAB EC 324 MG (65 MG FE EQUIVALENT) 324 (65 FE) MG
TABLET DELAYED RESPONSE ORAL
Qty: 90 TABLET | Refills: 1 | Status: SHIPPED | OUTPATIENT
Start: 2023-02-26

## 2023-03-06 NOTE — PROGRESS NOTES
Colon and Rectal Surgery   Lyanne Hammans 80 y o  female MRN: [id number]   Encounter: [id number]  03/07/23   3:18 PM          ASSESSMENT:  Lyanne Hammans is a 80 y o  female who presents with new diagnosis ascending tumor, colon cancer/adenocarcinoma  Mismatch repair loss  Discussed on my personal review of the CT imaging as well as showing them the tumor on coronal images today that there is no obvious metastatic disease, there are some small liver and kidney findings for follow-up  She is in good performance status despite her age and we did discuss curative resection today  We discussed benefits of resection including possible cure prevention of obstruction, alternatives including observation, which would lead to likely obstruction and mortality and is not recommended  We discussed Robotic, possible laparoscopic versus open right hemicolectomy, in a face-to-face, personal, informed consent process, the benefits, alternatives,   risks including not limited to bleeding, infection, risks of anesthesia, open surgery, DVT/PE, heart attack, stroke, death, damage to local structures(e g  ureter, duodenum),anastomotic leak requiring reoperation, temporary versus permanent stoma  They understood these risks, signed informed consent, and wish to proceed  PLAN:  Attention to activity, protein/fat intake, Ensure or boost 2-3 times per day  Robotic, possible laparoscopic versus open right hemicolectomy  CBC/CMP/type and screen/A1c/CEA ordered today  Surgical optimization visit given prior history, age and iron deficiency anemia  CC:  Dr Jessica [facility]      [facility]  Lyanne Hammans is a 80 y o  female referred for evaluation today by Dr Aurelia Roche, for mass of hepatic flexure  She is feeling well today  Patient denies any abdominal pain, change in bowel habits or rectal bleeding  She states that she has had some weight loss without trying  Patient had recent colonoscopy/EGD with Dr Amaya Newby on 2/17/2023 which showed single friable, fungating and malignant-appearing mass in the ascending colon; performed cold forceps biopsy  Large circumferential mass in ascending colon, total size unable to be determined as I was not able to pass scope through past it due to significant looping  4 biopsies obtained  Mass was friable and had self limited bleeding with biopsies  3 cc tattoo placed 1 fold (2 cm) distal to mass  Colonoscopy Pathology reported:  A  Large Intestine, Right/Ascending Colon, mass:  - 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  See note  - Mismatch repair study will be performed    Addendum  Block A1     RESULTS OF IMMUNOHISTOCHEMICAL ANALYSIS FOR MISMATCH REPAIR PROTEIN LOSS     INTERPRETATION: 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, see comment      RESULTS:  Antibody            Clone                 Description                                  Results  MLH1                 M1                    Mismatch repair protein   Loss of nuclear expression  MSH2                [id number]        Mismatch repair protein  Intact nuclear expression  MSH6                40                      Mismatch repair protein  Intact nuclear expression  PMS2                 [id number]        Mismatch repair protein    Loss of nuclear expression     Comment:      Testing of MLH1 promoter methylation study will be ordered  ADDENDUM TO FOLLOW       Loss of nuclear expression of MLH1 and PMS2 present: testing for methylation of the MLH1 promoter is indicated A positive control for each antibody have been reviewed and accepted       CT abdomen and pelvis on 2/8/2023 reported:  Markedly thickened wall of the mid ascending colon, suggests colonic mass/neoplasm   Correlation with the colonoscopy suggested  No bowel obstruction       A complex multiseptated cyst, measuring 1 3 cm in the anterior aspect of the right kidney, indolent lesion unchanged from the previous study of  February 22, 2011, [place] 2F      Subcentimeter hypodensity right hepatic lobe, nonspecific may be assessed with nonemergent MRI or on follow-up surveillance scan      Large hiatal hernia     A 1 2 cm rounded fluid attenuation cystic structures at the level of the pelvic brim in relation to the left common iliac artery, nonspecific attention at follow-up/surveillance, likely benign        Historical Information   Past Medical History:   Diagnosis Date   • BCC (basal cell carcinoma)    • Diastolic dysfunction    • Dyslipidemia    • Dysphagia    • Glaucoma    • Hashimoto's thyroiditis    • Hyperparathyroidism ([address] )    • Hypertension    • Impaired fasting glucose    • Insomnia    • Osteopenia    • SCCA (squamous cell carcinoma) of skin      Past Surgical History:   Procedure Laterality Date   • CATARACT EXTRACTION, BILATERAL     • COLONOSCOPY      Complete, Onset - 10/25/05 - 10 years    • HYSTERECTOMY     • PARATHYROIDECTOMY      1st 1985 and 2nd 2002   • [place] NDSC WRST SURG W/RLS TRANSVRS CARPL LIGM Right 05/02/2016    Procedure: RELEASE CARPAL TUNNEL ENDOSCOPIC;  Surgeon: Torsten Becerril MD;  Location: [facility] OR;  Service: Orthopedics   • TONSILLECTOMY         Meds/Allergies       Current Outpatient Medications:   •  [START ON 3/20/2023] metroNIDAZOLE (FLAGYL) 500 mg tablet, 1 PM AND 10PM Do not start before March 20, 2023 , Disp: 2 tablet, Rfl: 0  •  [START ON 3/20/2023] neomycin (MYCIFRADIN) 500 mg tablet, 1pm and 10pm Do not start before March 20, 2023 , Disp: 2 tablet, Rfl: 0  •  amLODIPine (NORVASC) 5 mg tablet, Take 1 tablet (5 mg total) by mouth daily, Disp: , Rfl:   •  aspirin 81 mg chewable tablet, Chew 81 mg every other day, Disp: , Rfl:   •  atorvastatin (LIPITOR) 20 mg tablet, Take 20 mg by mouth daily, Disp: , Rfl:   •  Biotin 1000 MCG tablet, Take 1,000 mcg by mouth 2 (two) times a day , Disp: , Rfl:   •  Calcium 500 MG tablet, Take 1 tablet by mouth 3 (three) times a week , Disp: , Rfl:   •  cholecalciferol (VITAMIN D3) 1,000 units tablet, Take 1 tablet by mouth daily, Disp: , Rfl:   •  dorzolamide-timolol (COSOPT) 22 3-6 8 MG/ML ophthalmic solution, Apply to eye, Disp: , Rfl:   •  ferrous sulfate 324 (65 Fe) mg, TAKE 1 TABLET BY MOUTH EVERY MORNING BEFORE BREAKFAST, Disp: 90 tablet, Rfl: 1  •  latanoprost (XALATAN) 0 005 % ophthalmic solution, , Disp: , Rfl:   •  lisinopril (ZESTRIL) 10 mg tablet, TAKE 1 TABLET BY MOUTH EVERY DAY, Disp: 90 tablet, Rfl: 1  •  metoprolol tartrate (LOPRESSOR) 25 mg tablet, Take 1 tablet (25 mg total) by mouth in the morning, Disp: 180 tablet, Rfl: 1  •  Multiple Vitamins-Minerals (CENTRUM SILVER ADULT 50+ PO), Take by mouth , Disp: , Rfl:   •  Omega-3 Fatty Acids (FISH OIL) 1,000 mg, Take 1,000 mg by mouth daily  , Disp: , Rfl:   •  polyethylene glycol (GOLYTELY) 4000 mL solution, Take as directed by the office for colonoscopy , Disp: 4000 mL, Rfl: 0  •  Synthroid 88 MCG tablet, TAKE 1 TABLET BY MOUTH EVERY DAY, Disp: 90 tablet, Rfl: 1      Allergies   Allergen Reactions   • Cyclogyl [Cyclopentolate Hcl]    • Phenylephrine    • Pred Forte [Prednisolone Acetate]          Social History   Social History     Substance and Sexual Activity   Alcohol Use Yes    Comment: rarely, social      Social History     Substance and Sexual Activity   Drug Use No     Social History     Tobacco Use   Smoking Status Never   Smokeless Tobacco Never         Family History:   Family History   Problem Relation Age of Onset   • Stroke Father         CVA   • Ovarian cancer Mother        Review of Systems   Constitutional: Negative  HENT: Negative  Eyes: Negative  Respiratory: Negative  Cardiovascular: Negative  Gastrointestinal: Negative  Endocrine: Negative  Genitourinary: Negative  Musculoskeletal: Negative  Skin: Negative  Allergic/Immunologic: Negative  Neurological: Negative      Hematological: Negative  Psychiatric/Behavioral: Negative        Objective   Current Vitals:   Vitals:    03/07/23 1400   Weight: 53 5 kg (118 lb)   Height: 4' 9" (1 448 m)     Physical Exam:  General:no distress  Eyes:perrla/eomi  HEENT:moist mucus membranes  Neck supple  Pulm:no increased work of breathing, clear bilateral  CV:sinus  Abdomen:soft,nontender, mild right sided fullness  Extremities:no edema  Lymphatics:no neck/groin lymphadenopathy

## 2023-03-07 ENCOUNTER — OFFICE VISIT (OUTPATIENT)
Age: 88
End: 2023-03-07

## 2023-03-07 VITALS — WEIGHT: 118 LBS | BODY MASS INDEX: 25.46 KG/M2 | HEIGHT: 57 IN

## 2023-03-07 DIAGNOSIS — Z01.89 ENCOUNTER FOR GERIATRIC ASSESSMENT: Primary | ICD-10-CM

## 2023-03-07 DIAGNOSIS — E64.0 SEQUELAE OF PROTEIN-CALORIE MALNUTRITION (HCC): ICD-10-CM

## 2023-03-07 DIAGNOSIS — C18.0 MALIGNANT NEOPLASM OF CECUM (HCC): ICD-10-CM

## 2023-03-07 DIAGNOSIS — K63.89 MASS OF HEPATIC FLEXURE OF COLON: ICD-10-CM

## 2023-03-07 DIAGNOSIS — E83.10 DISORDER OF IRON METABOLISM, UNSPECIFIED: ICD-10-CM

## 2023-03-07 DIAGNOSIS — E86.0 DEHYDRATION: ICD-10-CM

## 2023-03-07 DIAGNOSIS — C18.2 CANCER OF ASCENDING COLON (HCC): Primary | ICD-10-CM

## 2023-03-07 RX ORDER — METRONIDAZOLE 500 MG/1
TABLET ORAL
Qty: 2 TABLET | Refills: 0 | Status: SHIPPED | OUTPATIENT
Start: 2023-03-20 | End: 2023-03-21

## 2023-03-07 RX ORDER — ACETAMINOPHEN 325 MG/1
975 TABLET ORAL ONCE
OUTPATIENT
Start: 2023-03-07 | End: 2023-03-07

## 2023-03-07 RX ORDER — NEOMYCIN SULFATE 500 MG/1
TABLET ORAL
Qty: 2 TABLET | Refills: 0 | Status: SHIPPED | OUTPATIENT
Start: 2023-03-20 | End: 2023-03-21

## 2023-03-07 RX ORDER — HEPARIN SODIUM 5000 [USP'U]/ML
5000 INJECTION, SOLUTION INTRAVENOUS; SUBCUTANEOUS ONCE
OUTPATIENT
Start: 2023-03-07 | End: 2023-03-07

## 2023-03-07 RX ORDER — POLYETHYLENE GLYCOL 3350 17 G/17G
255 POWDER, FOR SOLUTION ORAL ONCE
OUTPATIENT
Start: 2023-03-07 | End: 2023-03-07

## 2023-03-07 NOTE — PATIENT INSTRUCTIONS
ASSESSMENT:  Cristino Landin is a 80 y o  female who presents with new diagnosis ascending tumor, colon cancer/adenocarcinoma  Mismatch repair loss  Discussed on my personal review of the CT imaging as well as showing them the tumor on coronal images today that there is no obvious metastatic disease, there are some small liver and kidney findings for follow-up  She is in good performance status despite her age and we did discuss curative resection today  We discussed benefits of resection including possible cure prevention of obstruction, alternatives including observation, which would lead to likely obstruction and mortality and is not recommended  We discussed Robotic, possible laparoscopic versus open right hemicolectomy, in a face-to-face, personal, informed consent process, the benefits, alternatives,   risks including not limited to bleeding, infection, risks of anesthesia, open surgery, DVT/PE, heart attack, stroke, death, damage to local structures(e g  ureter, duodenum),anastomotic leak requiring reoperation, temporary versus permanent stoma  They understood these risks, signed informed consent, and wish to proceed      PLAN:  Attention to activity, protein/fat intake, Ensure or boost 2-3 times per day  Robotic, possible laparoscopic versus open right hemicolectomy  CBC/CMP/type and screen/A1c/CEA ordered today  Surgical optimization visit given prior history, age and iron deficiency anemia  CC:  Dr Neha Sr [place] [facility]

## 2023-03-08 ENCOUNTER — TELEPHONE (OUTPATIENT)
Dept: ANESTHESIOLOGY | Facility: CLINIC | Age: 88
End: 2023-03-08

## 2023-03-08 DIAGNOSIS — D64.9 ANEMIA, UNSPECIFIED TYPE: Primary | ICD-10-CM

## 2023-03-09 ENCOUNTER — APPOINTMENT (OUTPATIENT)
Dept: LAB | Facility: HOSPITAL | Age: 88
End: 2023-03-09

## 2023-03-09 ENCOUNTER — DOCUMENTATION (OUTPATIENT)
Dept: HEMATOLOGY ONCOLOGY | Facility: CLINIC | Age: 88
End: 2023-03-09

## 2023-03-09 ENCOUNTER — LAB REQUISITION (OUTPATIENT)
Dept: LAB | Facility: HOSPITAL | Age: 88
End: 2023-03-09

## 2023-03-09 DIAGNOSIS — C18.0 ADENOCARCINOMA OF ILEOCECAL VALVE (HCC): ICD-10-CM

## 2023-03-09 DIAGNOSIS — C18.0 MALIGNANT NEOPLASM OF CECUM (HCC): ICD-10-CM

## 2023-03-09 DIAGNOSIS — E83.10 DISORDER OF IRON METABOLISM, UNSPECIFIED: ICD-10-CM

## 2023-03-09 DIAGNOSIS — D64.9 ANEMIA, UNSPECIFIED TYPE: ICD-10-CM

## 2023-03-09 DIAGNOSIS — K63.89 MASS OF HEPATIC FLEXURE OF COLON: ICD-10-CM

## 2023-03-09 LAB
ABO GROUP BLD: NORMAL
BASOPHILS # BLD AUTO: 0.09 THOUSANDS/ÂΜL (ref 0–0.1)
BASOPHILS NFR BLD AUTO: 1 % (ref 0–1)
BLD GP AB SCN SERPL QL: NEGATIVE
CEA SERPL-MCNC: 2.9 NG/ML (ref 0–3)
EOSINOPHIL # BLD AUTO: 0.73 THOUSAND/ÂΜL (ref 0–0.61)
EOSINOPHIL NFR BLD AUTO: 10 % (ref 0–6)
ERYTHROCYTE [DISTWIDTH] IN BLOOD BY AUTOMATED COUNT: 18.5 % (ref 11.6–15.1)
EST. AVERAGE GLUCOSE BLD GHB EST-MCNC: 100 MG/DL
HBA1C MFR BLD: 5.1 %
HCT VFR BLD AUTO: 31.2 % (ref 34.8–46.1)
HGB BLD-MCNC: 8.8 G/DL (ref 11.5–15.4)
IMM GRANULOCYTES # BLD AUTO: 0.03 THOUSAND/UL (ref 0–0.2)
IMM GRANULOCYTES NFR BLD AUTO: 0 % (ref 0–2)
LYMPHOCYTES # BLD AUTO: 1.1 THOUSANDS/ÂΜL (ref 0.6–4.47)
LYMPHOCYTES NFR BLD AUTO: 15 % (ref 14–44)
MCH RBC QN AUTO: 22.9 PG (ref 26.8–34.3)
MCHC RBC AUTO-ENTMCNC: 28.2 G/DL (ref 31.4–37.4)
MCV RBC AUTO: 81 FL (ref 82–98)
MONOCYTES # BLD AUTO: 0.65 THOUSAND/ÂΜL (ref 0.17–1.22)
MONOCYTES NFR BLD AUTO: 9 % (ref 4–12)
NEUTROPHILS # BLD AUTO: 4.85 THOUSANDS/ÂΜL (ref 1.85–7.62)
NEUTS SEG NFR BLD AUTO: 65 % (ref 43–75)
NRBC BLD AUTO-RTO: 0 /100 WBCS
PLATELET # BLD AUTO: 378 THOUSANDS/UL (ref 149–390)
PMV BLD AUTO: 11.4 FL (ref 8.9–12.7)
RBC # BLD AUTO: 3.85 MILLION/UL (ref 3.81–5.12)
RH BLD: POSITIVE
SPECIMEN EXPIRATION DATE: NORMAL
WBC # BLD AUTO: 7.45 THOUSAND/UL (ref 4.31–10.16)

## 2023-03-09 NOTE — PROGRESS NOTES
In-basket message received from Dr Olena Martins to add patient to GI tumor board on 3/30/2023  Chart reviewed and tumor board prep completed

## 2023-03-10 LAB
ALBUMIN SERPL BCP-MCNC: 2.6 G/DL (ref 3.5–5)
ALP SERPL-CCNC: 59 U/L (ref 46–116)
ALT SERPL W P-5'-P-CCNC: 14 U/L (ref 12–78)
ANION GAP SERPL CALCULATED.3IONS-SCNC: 6 MMOL/L (ref 4–13)
AST SERPL W P-5'-P-CCNC: 10 U/L (ref 5–45)
BILIRUB SERPL-MCNC: 0.23 MG/DL (ref 0.2–1)
BUN SERPL-MCNC: 26 MG/DL (ref 5–25)
CALCIUM ALBUM COR SERPL-MCNC: 10.7 MG/DL (ref 8.3–10.1)
CALCIUM SERPL-MCNC: 9.6 MG/DL (ref 8.3–10.1)
CHLORIDE SERPL-SCNC: 110 MMOL/L (ref 96–108)
CO2 SERPL-SCNC: 25 MMOL/L (ref 21–32)
CREAT SERPL-MCNC: 0.84 MG/DL (ref 0.6–1.3)
FERRITIN SERPL-MCNC: 11 NG/ML (ref 8–388)
GFR SERPL CREATININE-BSD FRML MDRD: 60 ML/MIN/1.73SQ M
GLUCOSE SERPL-MCNC: 84 MG/DL (ref 65–140)
IRON SATN MFR SERPL: 9 % (ref 15–50)
IRON SERPL-MCNC: 25 UG/DL (ref 50–170)
POTASSIUM SERPL-SCNC: 4.6 MMOL/L (ref 3.5–5.3)
PROT SERPL-MCNC: 5.8 G/DL (ref 6.4–8.4)
SODIUM SERPL-SCNC: 141 MMOL/L (ref 135–147)
TIBC SERPL-MCNC: 274 UG/DL (ref 250–450)

## 2023-03-20 ENCOUNTER — TELEMEDICINE (OUTPATIENT)
Dept: ANESTHESIOLOGY | Facility: CLINIC | Age: 88
End: 2023-03-20

## 2023-03-20 ENCOUNTER — OFFICE VISIT (OUTPATIENT)
Dept: FAMILY MEDICINE CLINIC | Facility: HOSPITAL | Age: 88
End: 2023-03-20

## 2023-03-20 VITALS
SYSTOLIC BLOOD PRESSURE: 122 MMHG | HEART RATE: 60 BPM | TEMPERATURE: 96.5 F | DIASTOLIC BLOOD PRESSURE: 60 MMHG | BODY MASS INDEX: 25.2 KG/M2 | WEIGHT: 116.8 LBS | HEIGHT: 57 IN

## 2023-03-20 DIAGNOSIS — I10 PRIMARY HYPERTENSION: ICD-10-CM

## 2023-03-20 DIAGNOSIS — Z01.89 ENCOUNTER FOR GERIATRIC ASSESSMENT: Primary | ICD-10-CM

## 2023-03-20 DIAGNOSIS — N18.30 STAGE 3 CHRONIC KIDNEY DISEASE, UNSPECIFIED WHETHER STAGE 3A OR 3B CKD (HCC): ICD-10-CM

## 2023-03-20 DIAGNOSIS — E78.5 DYSLIPIDEMIA: ICD-10-CM

## 2023-03-20 DIAGNOSIS — D64.9 ANEMIA, UNSPECIFIED TYPE: ICD-10-CM

## 2023-03-20 DIAGNOSIS — I51.7 LEFT VENTRICULAR HYPERTROPHY: ICD-10-CM

## 2023-03-20 DIAGNOSIS — E88.09 HYPOALBUMINEMIA: ICD-10-CM

## 2023-03-20 DIAGNOSIS — C18.2 PRIMARY ADENOCARCINOMA OF ASCENDING COLON (HCC): ICD-10-CM

## 2023-03-20 DIAGNOSIS — I73.9 PERIPHERAL ARTERIAL DISEASE (HCC): ICD-10-CM

## 2023-03-20 DIAGNOSIS — C18.2 PRIMARY ADENOCARCINOMA OF ASCENDING COLON (HCC): Primary | ICD-10-CM

## 2023-03-20 DIAGNOSIS — D50.9 IRON DEFICIENCY ANEMIA, UNSPECIFIED IRON DEFICIENCY ANEMIA TYPE: ICD-10-CM

## 2023-03-20 RX ORDER — SODIUM CHLORIDE 9 MG/ML
20 INJECTION, SOLUTION INTRAVENOUS ONCE
Status: CANCELLED | OUTPATIENT
Start: 2023-03-23

## 2023-03-20 NOTE — H&P (VIEW-ONLY)
[facility]   Consult: Geriatric Surgery   Telemedicine  Brief Visit    Patient is located in the following state in which I hold an active license [place]      Assessment/Plan:  · 77-year-old female referred to [facility] for presurgery geriatric screening secondary to advanced age  · Consult concerns include: advanced age, malnutrition, anemia, and frailty  · She is tentative robotic possible laparoscopic versus open right hemicolectomy in the near future    Problem List Items Addressed This Visit        Digestive    Primary adenocarcinoma of ascending colon ([facility] [address] )   · SEEN FOR OPTIMIZATION   · Seen for  geriatric assessment   · Has received cardiac clearance  · Has received medical clearance  · Will have TWO iron infusions Thursday the 23rd 2023 at 8:30 in the morning  · Infusion #2 to be scheduled at the end of first visit   · Recommended 60 g of protein a day  · SOC follow-up on 3/29/2023 for best pathway  · Recommend inpatient geriatric consult after surgery due to advanced age and 3  · METS 6 73   · Started on BEST protocol   At risk for post-op KEVIN - NO   At risk for post-op SSI- NO   BEST   Breathing- instructed to exercise lungs prior to surgery via deep breathing and cough  Eat- discussed increasing protein intake prior to surgery -extra 30 g a day  Sleep/stress- encouraged 8-10 sleep @ night, stress reduction, avoid sick contacts and handwashing   · Train- encouraged to remain active   ·        Cardiovascular and Mediastinum    Hypertension    Left ventricular hypertrophy  · Stable-no concerns per patient  · METS 6 7  · Received cardiac clearance  · Received medical clearance    Echo   Will have CC   Cards office and [facility]        Genitourinary    Stage 3 chronic kidney disease, unspecified whether stage 3a or 3b CKD ([facility] [address] )  · Stable egfr 60       Other        Encounter for geriatric assessment  High risk geriatric  Recommend inpatient geriatric consult after surgery    Geriatric Assessment   · Falls (last 6 months): 1 fall   · Richard Total Score: 19   · PHQ- 9 Depression Scale: 0   · Nutrition Assessment Score: 10   · METS: 6 73   2 ensure everyday   Does chair yoga exercises sometimes   Breakfast: cereal/yogurt   Lunch: soup and cottage/fruit   Dinner: chicken, beef, pork, fish   Carrots, beans, broccoli    Snacks: peanut butter, cheese crackers, pretzels   Sleep: 5-7 hours       Health goals:  -What are your overall health goals? N/A   -What brings you strength? Family   -What activities are important to you? Read, puzzles, crafting   High risk for post-op delirium RT age, inpatient surgery  · Inpatient geriatric consult ordered for post-op   Delirium precautions on admit  High risk for post- op pneumonia RT age, inpatient surgery  · deep breathing and cough exercises taught to the patient and given  · Instructed to start lung exercises tonight  · Non smoker   High fall risk RT age, inpatient surgery  · Fall precautions on admit  · Standard PT eval after surgery for safe discharge  · Today lower extremity exercises were taught to patient for muscle strengthening and balance  · Exercise routine developed, patient instructed to start tonight, all to be done sitting down only  Pre- frail RT age, inpatient surgery  · Inpatient geriatric consult ordered for postop   · Nutritional supplements-increase protein prior to surgery  · On admission aspiration precautions, skin precautions, delirium precautions, fall precautions    Be your BEST pathway   Breath, eat, sleep/stress relief, and tracking exercise         Hypoalbuminemia  · Albumin low 2 6  · Good appetite-per patient  · Already drinking 2 shakes daily - recommended to continue this practice   · Patient should be attempting 60 g of protein a day  I encouraged her to continue drinking her ensures daily  I encouraged her to eat breakfast lunch and dinner    · Education on high-protein diet provided  · Recommend inpatient nutrition consult after surgery        Anemia  · HGB 8 8  · Prefers [facility] for infusion  · Infusion #1 Thursday 3/23 @ 0830  · Infusion #2 TBS  Assume  iron deficiency anemia and chronic blood loss  Given planned surgery, plan to optimize further with parenteral iron  I have discussed the risks and benefits of parenteral iron with the patient and they wish to proceed  Discussion of risks included but was not limited to change in taste, diarrhea, muscle cramps, nausea or vomiting, pain in the arms or legs, pain or burning sensation in the injection site, allergic reaction  The patient verbalized understanding and wishes to proceed  Latest Reference Range & Units Most Recent   Iron 50 - 170 ug/dL 25 (L)  3/9/23 09:48   Ferritin 8 - 388 ng/mL 11  3/9/23 09:48   Iron Saturation 15 - 50 % 9 (L)  3/9/23 09:48   TIBC 250 - 450 ug/dL 274  3/9/23 09:48   (L): Data is abnormally low          Latest Reference Range & Units Most Recent   WBC 4 31 - 10 16 Thousand/uL 7 45  3/9/23 09:48   Red Blood Cell Count 3 81 - 5 12 Million/uL 3 85  3/9/23 09:48   Hemoglobin 11 5 - 15 4 g/dL 8 8 (L)  3/9/23 09:48   HCT 34 8 - 46 1 % 31 2 (L)  3/9/23 09:48   MCV 82 - 98 fL 81 (L)  3/9/23 09:48   MCH 26 8 - 34 3 pg 22 9 (L)  3/9/23 09:48   MCHC 31 4 - 37 4 g/dL 28 2 (L)  3/9/23 09:48   RDW 11 6 - 15 1 % 18 5 (H)  3/9/23 09:48   Platelet Count 667 - 390 Thousands/uL 378  3/9/23 09:48   MPV 8 9 - 12 7 fL 11 4  3/9/23 09:48   nRBC /100 WBCs 0  3/9/23 09:48   (L): Data is abnormally low  (H): Data is abnormally high    Recent Visits  No visits were found meeting these conditions    Showing recent visits within past 7 days and meeting all other requirements  Today's Visits  Date Type Provider Dept   03/20/23 201 [facility], [address] [place] [address]   03/20/23 Office Visit Radha Skinner DO [facility] [address]   Showing today's visits and meeting all other requirements  Future Appointments  No visits were found meeting these conditions  Showing future appointments within next 150 days and meeting all other requirements       · ROS   · Denies fevers and chills  · Denies congestion and sore throat  · Denies headache  · Denies chest pain   · Denies shortness of breath  · Denies any open areas on her body  · Denies rashes  · Denies difficulty urinating  · Denies dizziness and headaches  · Denies confusion and hallucinations        PHYSICAL   Patient was unable to connect via video  She was alert and orient x3 on exam      SUBJECT   Peter Green is a 51-year-old female who was referred to [facility] for presurgery geriatric screening secondary to advanced age of 80  Patient has a diagnosis of a sending tumor, colon cancer adenocarcinoma  She is electing to undergo a colon resection  She is seen today for surgical optimization  I met patient over the telephone  We were unable to connect via video  Patient requested completing her appointment over the phone  Patient was pleasant and a pleasure to care for  There were no cognitive concerns identified today  Geriatric concerns include frailty and advanced age  I recommended an inpatient geriatric consult after surgery due to her advanced age  She is   She lives at home alone  She still drives  She denies falls  She is independent with all ADLs  Currently right now she would say that her energy level is a 4/5  She does believe it is a realistic to get 5/5  She is feels stronger and improved from previous  I discussed SOC prehab  Patient agrees  We put emphasis on breathing exercises and physical therapy  Patient agrees to start a yoga routine  Follow-up with [facility] on March 29, 2023  Patient is anemic with a hemoglobin of 8 8  Assume iron deficiency anemia  Patient will be getting 2 presurgical iron infusions  The first 1 is for 3/23/2023 at 8:30 in the morning    Patient will schedule her second infusion upon leaving that 1  Patient has blood work showing hypoalbuminemia  She notes her protein is low  We discussed a high-protein diet  We discussed that she continue her practice of drinking 2 ensures daily  I recommend inpatient nutrition consult after surgery  She has received medical clearance and she has received cardiac clearance    As always we discussed having your BEST surgery, and BEST recovery  Surgery goals reviewed today  Breathing exercises   Patient was encouraged to begin lung exercises today  This could be accomplished through deep breathing and cough exercises  Eating/nutrition   Encouraged patient to increase oral protein intake prior to surgery  This can be accomplished by consuming chicken, fish, tuna fish, cottage cheese, cheese, eggs, [place] yogurt, and protein shakes as needed  I encouraged use of protein shakes such ENLIVE  I also recommended making your own protein shakes with protein powder  Sleep/Stress management  Patient was encouraged to rest their body prior to surgery  Encouraged attempting to get 8 hours of sleep at night  Avoid stress  Avoid sick contacts  Encouraged to find a relaxing hobby such as reading, meditation, listening to music  Training exercises  Patient was encouraged to remain active as possible  Today bilateral lower extremity generic exercises were taught for muscle strengthening and balance  All exercises to be done sitting down  Visit Time    Visit Start Time: 1:27  Visit Stop Time: 1:57  Total Visit Duration: 30 minutes           Patient is here at  [address] on:   Date of Surgery:       Patient & I talked about B E S T  Surgery            Breathing exercises    Patient was encouraged to begin lung exercises today  Eating/nutrition   Encouraged patient to increase oral protein intake prior to surgery   This can be accomplished by consuming chicken, fish, tuna fish, cottage cheese, cheese, eggs, Greek yogurt, and protein shakes as needed  Sleep/Stress management  Patient was encouraged to rest their body prior to surgery  Encouraged attempting to get 8 hours of sleep at night  Training exercises  Patient was encouraged to remain active as possible

## 2023-03-20 NOTE — ASSESSMENT & PLAN NOTE
Has seen GI and colorectal surgery, planning for laparoscopic or robotic assisted R hemicolectomy, has seen Cardio and had cardiac clearance granted and medically is acceptable risk as well, no further work up needed, con't tx and f/u as per specialist

## 2023-03-20 NOTE — ASSESSMENT & PLAN NOTE
Saw Cardio (Dr Zhang Isabel) and was taken off ASA d/t iron def anemia and colon CA, on a statin, does not smoke, call with s/sx of claudication

## 2023-03-20 NOTE — ASSESSMENT & PLAN NOTE
Recent Echo reviewed, LVH mild, con't current beta blocker and ACE and statin, good BP control noted today as well

## 2023-03-20 NOTE — PROGRESS NOTES
Name: Guillermo Brewer      : 10/4/1929      MRN: [id number]  Encounter Provider: Prosper Camara DO  Encounter Date: 3/20/2023   Encounter department: [facility]     1  Primary adenocarcinoma of ascending colon [facility])  Assessment & Plan:  Has seen GI and colorectal surgery, planning for laparoscopic or robotic assisted R hemicolectomy, has seen Cardio and had cardiac clearance granted and medically is acceptable risk as well, no further work up needed, con't tx and f/u as per specialist      2  Peripheral arterial disease (HCC)  Assessment & Plan:  Saw Cardio (Dr Corie Baires) and was taken off ASA d/t iron def anemia and colon CA, on a statin, does not smoke, call with s/sx of claudication      3  Left ventricular hypertrophy  Assessment & Plan:  Recent Echo reviewed, LVH mild, con't current beta blocker and ACE and statin, good BP control noted today as well      4  Primary hypertension  Assessment & Plan:  BP at goal, con't current meds, recheck in 6 mos        Colonoscopy     BW  and 3/23    Subjective      HPI Pt here for follow up appt    Pt was seen Feb for iron def anemia and GI symptoms  We ordered CT A/P and pt was subsequently noted to have a colon mass  She was referred to GI and had colonoscopy and EGD 23 and a friable fungating malignant appearing mass was noted in the ascending colon  Pathology was c/w invasive adenocarcinoma arising in a TA with high grade dysplasia  She has seen GI (2/10/23) and colorectal surgery (3/7/23) - OV notes reviewed  Dr Ra Ny is planning on a robotic, poss laparoscopic versus open R hemicolectomy  CEA 3/23 was 2 9  She is on oral iron once daily and is doing well on that w/o constipation  She is doing Boost twice daily  Wgt seems to be stabilizing as per pts home scale  She is down 7 lbs from last year  She notes no abd pain and states her appetite is better   She notes no issues swallowing but she gets full easily  Pt did see Cardio (Dr Glory Patel) in Feb for f/u LVOT anomaly and PAD and HTN - OV note reviewed  Her ASA was stopped  An Echo was ordered and done in Feb  EF was 55-60% and mild LVH and mod MS was noted  BP great today  Med list reviewed  Pt denies SE with the meds and denies missing doses of meds  BW 3/23 reviewed and FBS/A1C 84/5 2, Ca 10 7  Review of Systems   Constitutional: Positive for unexpected weight change  Negative for chills, fatigue and fever  HENT: Negative for congestion and trouble swallowing  Eyes: Negative for photophobia and pain  Respiratory: Positive for cough  Negative for shortness of breath and wheezing  Cardiovascular: Positive for leg swelling  Negative for chest pain and palpitations  Gastrointestinal: Positive for diarrhea  Negative for abdominal pain, blood in stool, constipation, nausea and vomiting  Genitourinary: Negative for difficulty urinating, dysuria and hematuria  Musculoskeletal: Negative for back pain and neck pain  Skin: Negative for rash and wound  Neurological: Negative for dizziness, light-headedness and headaches  Hematological: Does not bruise/bleed easily  Psychiatric/Behavioral: Negative for behavioral problems and sleep disturbance  Current Outpatient Medications on File Prior to Visit   Medication Sig   • Multiple Vitamins-Minerals (PRESERVISION AREDS PO) Take 1 tablet by mouth daily in the early morning   • amLODIPine (NORVASC) 5 mg tablet Take 1 tablet (5 mg total) by mouth daily   • atorvastatin (LIPITOR) 20 mg tablet Take 20 mg by mouth daily   • Biotin 1000 MCG tablet Take 1,000 mcg by mouth 2 (two) times a day     • Calcium 500 MG tablet Take 1 tablet by mouth 3 (three) times a week    • cholecalciferol (VITAMIN D3) 1,000 units tablet Take 1 tablet by mouth daily   • dorzolamide-timolol (COSOPT) 22 3-6 8 MG/ML ophthalmic solution Apply to eye   • ferrous sulfate 324 (65 Fe) mg TAKE 1 TABLET BY MOUTH EVERY MORNING BEFORE BREAKFAST   • latanoprost (XALATAN) 0 005 % ophthalmic solution    • lisinopril (ZESTRIL) 10 mg tablet TAKE 1 TABLET BY MOUTH EVERY DAY   • metoprolol tartrate (LOPRESSOR) 25 mg tablet Take 1 tablet (25 mg total) by mouth in the morning   • metroNIDAZOLE (FLAGYL) 500 mg tablet 1 PM AND 10PM Do not start before March 20, 2023  • Multiple Vitamins-Minerals (CENTRUM SILVER ADULT 50+ PO) Take by mouth  • neomycin (MYCIFRADIN) 500 mg tablet 1pm and 10pm Do not start before March 20, 2023  • Omega-3 Fatty Acids (FISH OIL) 1,000 mg Take 1,000 mg by mouth daily  • Synthroid 88 MCG tablet TAKE 1 TABLET BY MOUTH EVERY DAY   • [DISCONTINUED] aspirin 81 mg chewable tablet Chew 81 mg every other day   • [DISCONTINUED] polyethylene glycol (GOLYTELY) 4000 mL solution Take as directed by the office for colonoscopy  Objective     /60   Pulse 60   Temp (!) 96 5 °F (35 8 °C) (Tympanic)   Ht 4' 9" (1 448 m)   Wt 53 kg (116 lb 12 8 oz)   BMI 25 28 kg/m²     Physical Exam  Vitals and nursing note reviewed  Constitutional:       General: She is not in acute distress  Appearance: She is well-developed  She is not ill-appearing  HENT:      Head: Normocephalic and atraumatic  Eyes:      General:         Right eye: No discharge  Left eye: No discharge  Conjunctiva/sclera: Conjunctivae normal    Neck:      Trachea: No tracheal deviation  Cardiovascular:      Rate and Rhythm: Normal rate and regular rhythm  Heart sounds: Murmur heard  No friction rub  Comments: +1 L>R LE edema  Pulmonary:      Effort: Pulmonary effort is normal  No respiratory distress  Breath sounds: Normal breath sounds  No wheezing, rhonchi or rales  Abdominal:      General: There is no distension  Palpations: Abdomen is soft  Tenderness: There is no abdominal tenderness  There is no guarding or rebound     Musculoskeletal:         General: No deformity or signs of injury  Cervical back: Neck supple  Lymphadenopathy:      Cervical: Cervical adenopathy present  Skin:     General: Skin is warm  Coloration: Skin is not pale  Findings: No bruising or rash  Neurological:      General: No focal deficit present  Mental Status: She is alert  Mental status is at baseline  Motor: No abnormal muscle tone  Gait: Gait normal    Psychiatric:         Mood and Affect: Mood normal          Behavior: Behavior normal          Thought Content:  Thought content normal          Judgment: Judgment normal        Hannah Orlando DO

## 2023-03-20 NOTE — PROGRESS NOTES
[facility]   Consult: Geriatric Surgery   Telemedicine  Brief Visit    Patient is located in the following state in which I hold an active license [place]      Assessment/Plan:  · 70-year-old female referred to [facility] for presurgery geriatric screening secondary to advanced age  · Consult concerns include: advanced age, malnutrition, anemia, and frailty  · She is tentative robotic possible laparoscopic versus open right hemicolectomy in the near future    Problem List Items Addressed This Visit        Digestive    Primary adenocarcinoma of ascending colon ([facility] [address] )   · SEEN FOR OPTIMIZATION   · Seen for  geriatric assessment   · Has received cardiac clearance  · Has received medical clearance  · Will have TWO iron infusions Thursday the 23rd 2023 at 8:30 in the morning  · Infusion #2 to be scheduled at the end of first visit   · Recommended 60 g of protein a day  · SOC follow-up on 3/29/2023 for best pathway  · Recommend inpatient geriatric consult after surgery due to advanced age and 3  · METS 6 73   · Started on BEST protocol   At risk for post-op KEVIN - NO   At risk for post-op SSI- NO   BEST   Breathing- instructed to exercise lungs prior to surgery via deep breathing and cough  Eat- discussed increasing protein intake prior to surgery -extra 30 g a day  Sleep/stress- encouraged 8-10 sleep @ night, stress reduction, avoid sick contacts and handwashing   · Train- encouraged to remain active   ·        Cardiovascular and Mediastinum    Hypertension    Left ventricular hypertrophy  · Stable-no concerns per patient  · METS 6 7  · Received cardiac clearance  · Received medical clearance    Echo   Will have CC   Cards office and [facility]        Genitourinary    Stage 3 chronic kidney disease, unspecified whether stage 3a or 3b CKD ([facility] [address] )  · Stable egfr 60       Other        Encounter for geriatric assessment  High risk geriatric  Recommend inpatient geriatric consult after surgery    Geriatric Assessment   · Falls (last 6 months): 1 fall   · Richard Total Score: 19   · PHQ- 9 Depression Scale: 0   · Nutrition Assessment Score: 10   · METS: 6 73   2 ensure everyday   Does chair yoga exercises sometimes   Breakfast: cereal/yogurt   Lunch: soup and cottage/fruit   Dinner: chicken, beef, pork, fish   Carrots, beans, broccoli    Snacks: peanut butter, cheese crackers, pretzels   Sleep: 5-7 hours       Health goals:  -What are your overall health goals? N/A   -What brings you strength? Family   -What activities are important to you? Read, puzzles, crafting   High risk for post-op delirium RT age, inpatient surgery  · Inpatient geriatric consult ordered for post-op   Delirium precautions on admit  High risk for post- op pneumonia RT age, inpatient surgery  · deep breathing and cough exercises taught to the patient and given  · Instructed to start lung exercises tonight  · Non smoker   High fall risk RT age, inpatient surgery  · Fall precautions on admit  · Standard PT eval after surgery for safe discharge  · Today lower extremity exercises were taught to patient for muscle strengthening and balance  · Exercise routine developed, patient instructed to start tonight, all to be done sitting down only  Pre- frail RT age, inpatient surgery  · Inpatient geriatric consult ordered for postop   · Nutritional supplements-increase protein prior to surgery  · On admission aspiration precautions, skin precautions, delirium precautions, fall precautions    Be your BEST pathway   Breath, eat, sleep/stress relief, and tracking exercise         Hypoalbuminemia  · Albumin low 2 6  · Good appetite-per patient  · Already drinking 2 shakes daily - recommended to continue this practice   · Patient should be attempting 60 g of protein a day  I encouraged her to continue drinking her ensures daily  I encouraged her to eat breakfast lunch and dinner    · Education on high-protein diet provided  · Recommend inpatient nutrition consult after surgery        Anemia  · HGB 8 8  · Prefers [facility] for infusion  · Infusion #1 Thursday 3/23 @ 0830  · Infusion #2 TBS  Assume  iron deficiency anemia and chronic blood loss  Given planned surgery, plan to optimize further with parenteral iron  I have discussed the risks and benefits of parenteral iron with the patient and they wish to proceed  Discussion of risks included but was not limited to change in taste, diarrhea, muscle cramps, nausea or vomiting, pain in the arms or legs, pain or burning sensation in the injection site, allergic reaction  The patient verbalized understanding and wishes to proceed  Latest Reference Range & Units Most Recent   Iron 50 - 170 ug/dL 25 (L)  3/9/23 09:48   Ferritin 8 - 388 ng/mL 11  3/9/23 09:48   Iron Saturation 15 - 50 % 9 (L)  3/9/23 09:48   TIBC 250 - 450 ug/dL 274  3/9/23 09:48   (L): Data is abnormally low          Latest Reference Range & Units Most Recent   WBC 4 31 - 10 16 Thousand/uL 7 45  3/9/23 09:48   Red Blood Cell Count 3 81 - 5 12 Million/uL 3 85  3/9/23 09:48   Hemoglobin 11 5 - 15 4 g/dL 8 8 (L)  3/9/23 09:48   HCT 34 8 - 46 1 % 31 2 (L)  3/9/23 09:48   MCV 82 - 98 fL 81 (L)  3/9/23 09:48   MCH 26 8 - 34 3 pg 22 9 (L)  3/9/23 09:48   MCHC 31 4 - 37 4 g/dL 28 2 (L)  3/9/23 09:48   RDW 11 6 - 15 1 % 18 5 (H)  3/9/23 09:48   Platelet Count 885 - 390 Thousands/uL 378  3/9/23 09:48   MPV 8 9 - 12 7 fL 11 4  3/9/23 09:48   nRBC /100 WBCs 0  3/9/23 09:48   (L): Data is abnormally low  (H): Data is abnormally high    Recent Visits  No visits were found meeting these conditions    Showing recent visits within past 7 days and meeting all other requirements  Today's Visits  Date Type Provider Dept   03/20/23 [facility], [address] [place] [address]   03/20/23 Office Visit DO [facility]   Showing today's visits and meeting all other requirements  Future Appointments  No visits were found meeting these conditions  Showing future appointments within next 150 days and meeting all other requirements       · ROS   · Denies fevers and chills  · Denies congestion and sore throat  · Denies headache  · Denies chest pain   · Denies shortness of breath  · Denies any open areas on her body  · Denies rashes  · Denies difficulty urinating  · Denies dizziness and headaches  · Denies confusion and hallucinations        PHYSICAL   Patient was unable to connect via video  She was alert and orient x3 on exam      SUBJECT   Gay Warren is a 59-year-old female who was referred to [facility] for presurgery geriatric screening secondary to advanced age of 80  Patient has a diagnosis of a sending tumor, colon cancer adenocarcinoma  She is electing to undergo a colon resection  She is seen today for surgical optimization  I met patient over the telephone  We were unable to connect via video  Patient requested completing her appointment over the phone  Patient was pleasant and a pleasure to care for  There were no cognitive concerns identified today  Geriatric concerns include frailty and advanced age  I recommended an inpatient geriatric consult after surgery due to her advanced age  She is   She lives at home alone  She still drives  She denies falls  She is independent with all ADLs  Currently right now she would say that her energy level is a 4/5  She does believe it is a realistic to get 5/5  She is feels stronger and improved from previous  I discussed SOC prehab  Patient agrees  We put emphasis on breathing exercises and physical therapy  Patient agrees to start a yoga routine  Follow-up with [facility] on March 29, 2023  Patient is anemic with a hemoglobin of 8 8  Assume iron deficiency anemia  Patient will be getting 2 presurgical iron infusions  The first 1 is for 3/23/2023 at 8:30 in the morning    Patient will schedule her second infusion upon leaving that 1  Patient has blood work showing hypoalbuminemia  She notes her protein is low  We discussed a high-protein diet  We discussed that she continue her practice of drinking 2 ensures daily  I recommend inpatient nutrition consult after surgery  She has received medical clearance and she has received cardiac clearance    As always we discussed having your BEST surgery, and BEST recovery  Surgery goals reviewed today  Breathing exercises   Patient was encouraged to begin lung exercises today  This could be accomplished through deep breathing and cough exercises  Eating/nutrition   Encouraged patient to increase oral protein intake prior to surgery  This can be accomplished by consuming chicken, fish, tuna fish, cottage cheese, cheese, eggs, [place] yogurt, and protein shakes as needed  I encouraged use of protein shakes such ENLIVE  I also recommended making your own protein shakes with protein powder  Sleep/Stress management  Patient was encouraged to rest their body prior to surgery  Encouraged attempting to get 8 hours of sleep at night  Avoid stress  Avoid sick contacts  Encouraged to find a relaxing hobby such as reading, meditation, listening to music  Training exercises  Patient was encouraged to remain active as possible  Today bilateral lower extremity generic exercises were taught for muscle strengthening and balance  All exercises to be done sitting down  Visit Time    Visit Start Time: 1:27  Visit Stop Time: 1:57  Total Visit Duration: 30 minutes           Patient is here at  [address] on:   Date of Surgery:       Patient & I talked about B E S T  Surgery            Breathing exercises    Patient was encouraged to begin lung exercises today  Eating/nutrition   Encouraged patient to increase oral protein intake prior to surgery   This can be accomplished by consuming chicken, fish, tuna fish, cottage cheese, cheese, eggs, Greek yogurt, and protein shakes as needed  Sleep/Stress management  Patient was encouraged to rest their body prior to surgery  Encouraged attempting to get 8 hours of sleep at night  Training exercises  Patient was encouraged to remain active as possible

## 2023-03-23 ENCOUNTER — HOSPITAL ENCOUNTER (OUTPATIENT)
Dept: INFUSION CENTER | Facility: HOSPITAL | Age: 88
Discharge: HOME | End: 2023-03-23
Attending: ANESTHESIOLOGY

## 2023-03-23 VITALS
TEMPERATURE: 96.2 F | SYSTOLIC BLOOD PRESSURE: 113 MMHG | DIASTOLIC BLOOD PRESSURE: 59 MMHG | RESPIRATION RATE: 14 BRPM | HEART RATE: 62 BPM | OXYGEN SATURATION: 3 %

## 2023-03-23 DIAGNOSIS — C18.2 PRIMARY ADENOCARCINOMA OF ASCENDING COLON (HCC): ICD-10-CM

## 2023-03-23 DIAGNOSIS — D64.9 ANEMIA, UNSPECIFIED TYPE: ICD-10-CM

## 2023-03-23 DIAGNOSIS — D50.9 IRON DEFICIENCY ANEMIA, UNSPECIFIED IRON DEFICIENCY ANEMIA TYPE: Primary | ICD-10-CM

## 2023-03-23 RX ORDER — SODIUM CHLORIDE 9 MG/ML
20 INJECTION, SOLUTION INTRAVENOUS ONCE
Status: CANCELLED | OUTPATIENT
Start: 2023-03-30

## 2023-03-23 RX ORDER — SODIUM CHLORIDE 9 MG/ML
20 INJECTION, SOLUTION INTRAVENOUS ONCE
Status: COMPLETED | OUTPATIENT
Start: 2023-03-23 | End: 2023-03-23

## 2023-03-23 RX ADMIN — FERUMOXYTOL 510 MG: 510 INJECTION INTRAVENOUS at 09:03

## 2023-03-23 RX ADMIN — SODIUM CHLORIDE 20 ML/HR: 9 INJECTION, SOLUTION INTRAVENOUS at 09:03

## 2023-03-23 NOTE — PROGRESS NOTES
Pt here for Feraheme; pt in reclined position; treatment given with no adv reactions; BP and HR stable; AVS given; pt encouraged to call physician with any s/s or questions/concerns; pt verb understanding; left unit amb with steady gait

## 2023-03-29 ENCOUNTER — HOSPITAL ENCOUNTER (OUTPATIENT)
Dept: INFUSION CENTER | Facility: HOSPITAL | Age: 88
Discharge: HOME | End: 2023-03-29
Attending: ANESTHESIOLOGY

## 2023-03-29 ENCOUNTER — TELEPHONE (OUTPATIENT)
Dept: ANESTHESIOLOGY | Facility: CLINIC | Age: 88
End: 2023-03-29

## 2023-03-29 VITALS
HEART RATE: 68 BPM | TEMPERATURE: 97.6 F | OXYGEN SATURATION: 99 % | DIASTOLIC BLOOD PRESSURE: 57 MMHG | SYSTOLIC BLOOD PRESSURE: 112 MMHG | RESPIRATION RATE: 14 BRPM

## 2023-03-29 DIAGNOSIS — D64.9 ANEMIA, UNSPECIFIED TYPE: Primary | ICD-10-CM

## 2023-03-29 DIAGNOSIS — C18.2 PRIMARY ADENOCARCINOMA OF ASCENDING COLON (HCC): ICD-10-CM

## 2023-03-29 DIAGNOSIS — D50.9 IRON DEFICIENCY ANEMIA, UNSPECIFIED IRON DEFICIENCY ANEMIA TYPE: ICD-10-CM

## 2023-03-29 RX ORDER — SODIUM CHLORIDE 9 MG/ML
20 INJECTION, SOLUTION INTRAVENOUS ONCE
Status: COMPLETED | OUTPATIENT
Start: 2023-03-29 | End: 2023-03-29

## 2023-03-29 RX ORDER — SODIUM CHLORIDE 9 MG/ML
20 INJECTION, SOLUTION INTRAVENOUS ONCE
Status: CANCELLED | OUTPATIENT
Start: 2023-03-29

## 2023-03-29 RX ADMIN — FERUMOXYTOL 510 MG: 510 INJECTION INTRAVENOUS at 10:31

## 2023-03-29 RX ADMIN — SODIUM CHLORIDE 20 ML/HR: 9 INJECTION, SOLUTION INTRAVENOUS at 10:31

## 2023-03-29 NOTE — PROGRESS NOTES
Pt tolerated Feraheme infusion with no adverse reaction  Pt left unit ambulatory with steady gait  AVS printed and given to pt

## 2023-03-29 NOTE — TELEPHONE ENCOUNTER
Follow up     [facility]   Saw patient in [facility] on 3/20, no surgery date yet  Patient was educated on increasing protein intake  And needed 2 iron transfusions before surgery  Patient had first Iron Infusion 3/23 & second one on 3/29  Patient is practicing BEST  Feeling good

## 2023-03-30 ENCOUNTER — TELEPHONE (OUTPATIENT)
Age: 88
End: 2023-03-30

## 2023-03-30 ENCOUNTER — DOCUMENTATION (OUTPATIENT)
Dept: HEMATOLOGY ONCOLOGY | Facility: CLINIC | Age: 88
End: 2023-03-30

## 2023-03-30 NOTE — PROGRESS NOTES
RECTAL/GI MULTIDISCIPLINARY CASE REVIEW    DATE: 3/30/2023      PRESENTING DOCTOR: Dr Mar Sterling      DIAGNOSIS: Ascending colon adenocarcinoma      Escobar Ortega was presented at the Rectal/GI Multidisciplinary Conference today  PHYSICIAN RECOMMENDED PLAN:    -Plan is for patient to undergo right colon resection on 4/13/2023  Team agreed to plan  The final treatment plan will be left to the discretion of the patient and the treating physician  DISCLAIMERS:  TO THE TREATING PHYSICIAN:  This conference is a meeting of clinicians from various specialty areas who evaluate and discuss patients for whom a multidisciplinary treatment approach is being considered  Please note that the above opinion was a consensus of the conference attendees and is intended only to assist in quality care of the discussed patient  The responsibility for follow up on the input given during the conference, along with any final decisions regarding plan of care, is that of the patient and the patient's provider  Accordingly, appointments have only been recommended based on this information and have NOT been scheduled unless otherwise noted  TO THE PATIENT:  This summary is a brief record of major aspects of your cancer treatment  You may choose to share a copy with any of your doctors or nurses  However, this is not a detailed or comprehensive record of your care        NCCN guidelines were readily available for review at this discussion

## 2023-03-30 NOTE — TELEPHONE ENCOUNTER
Patient scheduled for surgery  4/13/23  at [facility]  Instructions and PAT's gone over with and mailed to the patient  Patient had 2 iron infusions, has upped her protein intake, blood work is complete  She will be calling central scheduling to schedule EKG

## 2023-03-30 NOTE — LETTER
Alyx Romero  [address] [facility] [address] [facility] [id number]        3/30/2023    Dear Alyx Romero,    Your surgery is scheduled for: April 13, 2023    The hospital will call the evening prior to your surgery with your expected arrival time  Location:St  [address] [address] , [facility] [address] [facility] [address]    CHECK LIST PRIOR TO INPATIENT SURGERY  It is your responsibility to obtain any/all referrals needed for your surgery if required by your insurance  Our office will contact you to discuss your insurance coverage for this procedure  Special instructions required if you are taking any blood thinners  Please verify with the prescribing physician  Examples include Coumadin, Plavix, Xarelto, Eliquis, Pradaxa, etc     Please check with your family physician if you are taking the following medications: Aspirin or any Aspirin containing medication, Gingko biloba, Ginseng, Feverfew, and/or St  Ga’s Wort  We suggest stopping these for 3 days  The night before and the day of your surgery, wash from your neck to groin with chlorhexidine soap  This soap is available at most retail pharmacies under such brand names as Hibiclens, Endure or Aplicare  Pre-admission testing Required: YES X     If Yes, what type: EKG X       BOWEL PREPARATION REQUIRED: YES X    If yes, start date: 4/12/23  Please see attached bowel preparation instructions  NOTHING TO EAT OR DRINK AFTER MIDNIGHT PRIOR TO SURGERY  Please do not hesitate to call our office with any questions regarding your surgery  MIRALAX/GATORADE SURGERY PREPARATION INSTRUCTIONS    Purchase:   (1) 238g bottle of MiraLax or Glycolax - no prescription needed   4 Dulcolax Laxative Tablets - no prescription needed   64 oz  bottle of Gatorade (NOT RED), Crystal Light, or another clear liquid of  choice  **REMEMBER** A prescription for antibiotics has been called into your pharmacy for  prior to your surgery      One Day Prior to Surgery  • Have a normal breakfast, light lunch, and clear liquids thereafter  It is important that you drink plenty of clear liquids throughout the day to prevent dehydration  CLEAR LIQUIDS INCLUDE:  Water, Clear Fruit Juice (Apple, Cranberry, Grape), Black Coffee, Tea, Ginger Ale, Gatorade, Adiel-Aid, Hi-C, plain Jello, Ice Pops, Clear Broth or Bouillon, Crystal Light, Lemonade, Soda (regular or diet)  No Milk Products! • At 1:00 pm, take (4) Dulcolax Tablets with 8 oz  of water  Swallow the tablets whole with a full glass of water  The package may direct you not to exceed (2) tablets at any time but for the purpose of this examination, you should take (4)  • At 1:00 pm, take your first dose of antibiotic as prescribed  • At 1:00 pm, Mix the 238g bottle of MiraLax in 64 oz  of Gatorade and shake the solution until the MiraLax is dissolved  Drink 8 oz  glassfuls at your own pace  It may take 3-4 hours to drink all the solution  • At 10:00 pm, take your last dose of antibiotic as prescribed  • REMEMBER TO STAY CLOSE TO TOILET FACILITIES  The Day of Surgery  • Take nothing by mouth until after surgery

## 2023-03-31 ENCOUNTER — APPOINTMENT (OUTPATIENT)
Dept: LAB | Facility: HOSPITAL | Age: 88
End: 2023-03-31

## 2023-03-31 ENCOUNTER — LAB REQUISITION (OUTPATIENT)
Dept: LAB | Facility: HOSPITAL | Age: 88
End: 2023-03-31

## 2023-03-31 DIAGNOSIS — C18.2 CANCER OF ASCENDING COLON (HCC): ICD-10-CM

## 2023-03-31 DIAGNOSIS — K63.89 MALAKOPLAKIA OF ILEUM: ICD-10-CM

## 2023-03-31 DIAGNOSIS — K63.89 OTHER SPECIFIED DISEASES OF INTESTINE: ICD-10-CM

## 2023-03-31 LAB
ABO GROUP BLD: NORMAL
BLD GP AB SCN SERPL QL: NEGATIVE
RH BLD: POSITIVE
SPECIMEN EXPIRATION DATE: NORMAL

## 2023-04-03 NOTE — PRE-PROCEDURE INSTRUCTIONS
Pre-Surgery Instructions:   Medication Instructions   • amLODIPine (NORVASC) 5 mg tablet Instructed to take per normal schedule including DOS with sips water   • ASPIRIN 81 PO Instructions provided by MD   • atorvastatin (LIPITOR) 20 mg tablet Take per normal schedule    • Biotin 1000 MCG tablet Instructed to avoid all ASA and OTC Vit/Supp 1 week prior to surgery and to avoid NSAIDs 3 days prior to surgery per anesthesia instructions  Tylenol ok to take prn  • Calcium 500 MG tablet Instructed to avoid all ASA and OTC Vit/Supp 1 week prior to surgery and to avoid NSAIDs 3 days prior to surgery per anesthesia instructions  Tylenol ok to take prn  • cholecalciferol (VITAMIN D3) 1,000 units tablet Instructed to avoid all ASA and OTC Vit/Supp 1 week prior to surgery and to avoid NSAIDs 3 days prior to surgery per anesthesia instructions  Tylenol ok to take prn  • dorzolamide-timolol (COSOPT) 22 3-6 8 MG/ML ophthalmic solution Take per normal schedule    • ferrous sulfate 324 (65 Fe) mg Instructions provided by MD- hold DOS   • latanoprost (XALATAN) 0 005 % ophthalmic solution Take per normal schedule    • lisinopril (ZESTRIL) 10 mg tablet Instructed to take per normal schedule except day before and  DOS   • metoprolol tartrate (LOPRESSOR) 25 mg tablet Take per normal schedule    • Multiple Vitamins-Minerals (CENTRUM SILVER ADULT 50+ PO) Instructed to avoid all ASA and OTC Vit/Supp 1 week prior to surgery and to avoid NSAIDs 3 days prior to surgery per anesthesia instructions  Tylenol ok to take prn  • Multiple Vitamins-Minerals (PRESERVISION AREDS PO) Instructed to avoid all ASA and OTC Vit/Supp 1 week prior to surgery and to avoid NSAIDs 3 days prior to surgery per anesthesia instructions  Tylenol ok to take prn  • Omega-3 Fatty Acids (FISH OIL) 1,000 mg Instructed to avoid all ASA and OTC Vit/Supp 1 week prior to surgery and to avoid NSAIDs 3 days prior to surgery per anesthesia instructions   Tylenol ok to take prn  • Synthroid 88 MCG tablet Instructed to take per normal  including DOS with sips water    Medication instructions for day surgery reviewed  Please use only a sip of water to take your instructed medications  Avoid all over the counter vitamins, supplements and NSAIDS for one week prior to surgery per anesthesia guidelines  Tylenol is ok to take as needed  You will receive a call one business day prior to surgery with an arrival time and hospital directions  If your surgery is scheduled on a Monday, the hospital will be calling you on the Friday prior to your surgery  If you have not heard from anyone by 8pm, please call the hospital supervisor through the hospital  at [phone]  Mejia Hare [phone])  Do not eat or drink anything after midnight the night before your surgery, including candy, mints, lifesavers, or chewing gum  Do not drink alcohol 24hrs before your surgery  Try not to smoke at least 24hrs before your surgery  Does not have bowel prep or Carb drink instructions will call office and inquire  Follow the pre surgery showering instructions as listed in the [facility] Surgical Experience Booklet” or otherwise provided by your surgeon's office  Do not shave the surgical area 24 hours before surgery  Do not apply any lotions, creams, including makeup, cologne, deodorant, or perfumes after showering on the day of your surgery  No contact lenses, eye make-up, or artificial eyelashes  Remove nail polish, including gel polish, and any artificial, gel, or acrylic nails if possible  Remove all jewelry including rings and body piercing jewelry  Wear causal clothing that is easy to take on and off  Consider your type of surgery  Keep any valuables, jewelry, piercings at home  Please bring any specially ordered equipment (sling, braces) if indicated  Arrange for a responsible person to drive you to and from the hospital on the day of your surgery   Visitor Guidelines discussed  Call the surgeon's office with any new illnesses, exposures, or additional questions prior to surgery  Please reference your [facility] Surgical Experience Booklet” for additional information to prepare for your upcoming surgery

## 2023-04-04 DIAGNOSIS — E06.3 HYPOTHYROIDISM DUE TO HASHIMOTO'S THYROIDITIS: ICD-10-CM

## 2023-04-04 DIAGNOSIS — E03.8 HYPOTHYROIDISM DUE TO HASHIMOTO'S THYROIDITIS: ICD-10-CM

## 2023-04-04 RX ORDER — LEVOTHYROXINE SODIUM 88 MCG
TABLET ORAL
Qty: 90 TABLET | Refills: 1 | Status: SHIPPED | OUTPATIENT
Start: 2023-04-04

## 2023-04-06 LAB
ATRIAL RATE: 64 BPM
P AXIS: -5 DEGREES
PR INTERVAL: 196 MS
QRS AXIS: -48 DEGREES
QRSD INTERVAL: 82 MS
QT INTERVAL: 374 MS
QTC INTERVAL: 385 MS
T WAVE AXIS: 18 DEGREES
VENTRICULAR RATE: 64 BPM

## 2023-04-13 ENCOUNTER — HOSPITAL ENCOUNTER (INPATIENT)
Facility: HOSPITAL | Age: 88
LOS: 4 days | Discharge: HOME HEALTH | End: 2023-04-17
Attending: COLON & RECTAL SURGERY | Admitting: ANESTHESIOLOGY

## 2023-04-13 DIAGNOSIS — Z01.89 ENCOUNTER FOR GERIATRIC ASSESSMENT: ICD-10-CM

## 2023-04-13 DIAGNOSIS — C18.2 CANCER OF ASCENDING COLON (HCC): ICD-10-CM

## 2023-04-13 DIAGNOSIS — E88.09 HYPOALBUMINEMIA: ICD-10-CM

## 2023-04-13 DIAGNOSIS — D50.9 IRON DEFICIENCY ANEMIA, UNSPECIFIED IRON DEFICIENCY ANEMIA TYPE: ICD-10-CM

## 2023-04-13 DIAGNOSIS — D64.9 ANEMIA, UNSPECIFIED TYPE: ICD-10-CM

## 2023-04-13 DIAGNOSIS — N18.30 STAGE 3 CHRONIC KIDNEY DISEASE, UNSPECIFIED WHETHER STAGE 3A OR 3B CKD (HCC): Primary | ICD-10-CM

## 2023-04-13 LAB
GLUCOSE SERPL-MCNC: 130 MG/DL (ref 65–140)
PLATELET # BLD AUTO: 201 THOUSANDS/UL (ref 149–390)
PMV BLD AUTO: 10.8 FL (ref 8.9–12.7)

## 2023-04-13 PROCEDURE — 0DTF4ZZ RESECTION OF RIGHT LARGE INTESTINE, PERCUTANEOUS ENDOSCOPIC APPROACH: ICD-10-PCS | Performed by: COLON & RECTAL SURGERY

## 2023-04-13 RX ORDER — ACETAMINOPHEN 325 MG/1
975 TABLET ORAL ONCE
Status: COMPLETED | OUTPATIENT
Start: 2023-04-13 | End: 2023-04-13

## 2023-04-13 RX ORDER — LISINOPRIL 10 MG/1
10 TABLET ORAL DAILY
Status: DISCONTINUED | OUTPATIENT
Start: 2023-04-13 | End: 2023-04-13

## 2023-04-13 RX ORDER — LEVOTHYROXINE SODIUM 88 UG/1
88 TABLET ORAL DAILY
Status: DISCONTINUED | OUTPATIENT
Start: 2023-04-13 | End: 2023-04-17 | Stop reason: HOSPADM

## 2023-04-13 RX ORDER — HYDROMORPHONE HCL/PF 1 MG/ML
0.5 SYRINGE (ML) INJECTION
Status: DISCONTINUED | OUTPATIENT
Start: 2023-04-13 | End: 2023-04-13 | Stop reason: HOSPADM

## 2023-04-13 RX ORDER — CEFAZOLIN SODIUM 2 G/50ML
2000 SOLUTION INTRAVENOUS ONCE
Status: COMPLETED | OUTPATIENT
Start: 2023-04-13 | End: 2023-04-13

## 2023-04-13 RX ORDER — MAGNESIUM HYDROXIDE 1200 MG/15ML
LIQUID ORAL AS NEEDED
Status: DISCONTINUED | OUTPATIENT
Start: 2023-04-13 | End: 2023-04-13 | Stop reason: HOSPADM

## 2023-04-13 RX ORDER — OXYCODONE HYDROCHLORIDE 5 MG/1
5 TABLET ORAL EVERY 4 HOURS PRN
Status: DISCONTINUED | OUTPATIENT
Start: 2023-04-13 | End: 2023-04-17 | Stop reason: HOSPADM

## 2023-04-13 RX ORDER — ONDANSETRON 2 MG/ML
4 INJECTION INTRAMUSCULAR; INTRAVENOUS EVERY 4 HOURS PRN
Status: DISCONTINUED | OUTPATIENT
Start: 2023-04-13 | End: 2023-04-17 | Stop reason: HOSPADM

## 2023-04-13 RX ORDER — BISACODYL 5 MG/1
10 TABLET, DELAYED RELEASE ORAL 2 TIMES DAILY
Status: DISCONTINUED | OUTPATIENT
Start: 2023-04-13 | End: 2023-04-13

## 2023-04-13 RX ORDER — FENTANYL CITRATE/PF 50 MCG/ML
50 SYRINGE (ML) INJECTION
Status: DISCONTINUED | OUTPATIENT
Start: 2023-04-13 | End: 2023-04-13 | Stop reason: HOSPADM

## 2023-04-13 RX ORDER — POLYETHYLENE GLYCOL 3350 17 G/17G
255 POWDER, FOR SOLUTION ORAL ONCE
Status: DISCONTINUED | OUTPATIENT
Start: 2023-04-13 | End: 2023-04-13

## 2023-04-13 RX ORDER — HYDROMORPHONE HCL/PF 1 MG/ML
1 SYRINGE (ML) INJECTION
Status: DISCONTINUED | OUTPATIENT
Start: 2023-04-13 | End: 2023-04-13

## 2023-04-13 RX ORDER — HEPARIN SODIUM 5000 [USP'U]/ML
5000 INJECTION, SOLUTION INTRAVENOUS; SUBCUTANEOUS EVERY 12 HOURS SCHEDULED
Status: DISCONTINUED | OUTPATIENT
Start: 2023-04-14 | End: 2023-04-17 | Stop reason: HOSPADM

## 2023-04-13 RX ORDER — METOCLOPRAMIDE HYDROCHLORIDE 5 MG/ML
10 INJECTION INTRAMUSCULAR; INTRAVENOUS ONCE AS NEEDED
Status: DISCONTINUED | OUTPATIENT
Start: 2023-04-13 | End: 2023-04-13 | Stop reason: HOSPADM

## 2023-04-13 RX ORDER — SODIUM CHLORIDE, SODIUM LACTATE, POTASSIUM CHLORIDE, CALCIUM CHLORIDE 600; 310; 30; 20 MG/100ML; MG/100ML; MG/100ML; MG/100ML
100 INJECTION, SOLUTION INTRAVENOUS CONTINUOUS
Status: DISCONTINUED | OUTPATIENT
Start: 2023-04-13 | End: 2023-04-13

## 2023-04-13 RX ORDER — METRONIDAZOLE 500 MG/100ML
500 INJECTION, SOLUTION INTRAVENOUS ONCE
Status: COMPLETED | OUTPATIENT
Start: 2023-04-13 | End: 2023-04-13

## 2023-04-13 RX ORDER — HYDROMORPHONE HCL/PF 1 MG/ML
0.2 SYRINGE (ML) INJECTION
Status: DISCONTINUED | OUTPATIENT
Start: 2023-04-13 | End: 2023-04-17 | Stop reason: HOSPADM

## 2023-04-13 RX ORDER — ONDANSETRON 2 MG/ML
4 INJECTION INTRAMUSCULAR; INTRAVENOUS EVERY 6 HOURS PRN
Status: DISCONTINUED | OUTPATIENT
Start: 2023-04-13 | End: 2023-04-13

## 2023-04-13 RX ORDER — AMLODIPINE BESYLATE 5 MG/1
5 TABLET ORAL DAILY
Status: DISCONTINUED | OUTPATIENT
Start: 2023-04-13 | End: 2023-04-14

## 2023-04-13 RX ORDER — ACETAMINOPHEN 325 MG/1
975 TABLET ORAL EVERY 8 HOURS SCHEDULED
Status: DISCONTINUED | OUTPATIENT
Start: 2023-04-13 | End: 2023-04-17 | Stop reason: HOSPADM

## 2023-04-13 RX ORDER — OXYCODONE HYDROCHLORIDE 5 MG/1
2.5 TABLET ORAL EVERY 4 HOURS PRN
Status: DISCONTINUED | OUTPATIENT
Start: 2023-04-13 | End: 2023-04-17 | Stop reason: HOSPADM

## 2023-04-13 RX ORDER — HEPARIN SODIUM 5000 [USP'U]/ML
5000 INJECTION, SOLUTION INTRAVENOUS; SUBCUTANEOUS ONCE
Status: COMPLETED | OUTPATIENT
Start: 2023-04-13 | End: 2023-04-13

## 2023-04-13 RX ORDER — SODIUM CHLORIDE, SODIUM LACTATE, POTASSIUM CHLORIDE, CALCIUM CHLORIDE 600; 310; 30; 20 MG/100ML; MG/100ML; MG/100ML; MG/100ML
125 INJECTION, SOLUTION INTRAVENOUS CONTINUOUS
Status: DISCONTINUED | OUTPATIENT
Start: 2023-04-13 | End: 2023-04-14

## 2023-04-13 RX ADMIN — HEPARIN SODIUM 5000 UNITS: 5000 INJECTION INTRAVENOUS; SUBCUTANEOUS at 10:51

## 2023-04-13 RX ADMIN — ACETAMINOPHEN 975 MG: 325 TABLET ORAL at 10:51

## 2023-04-13 RX ADMIN — ACETAMINOPHEN 975 MG: 325 TABLET ORAL at 21:16

## 2023-04-13 RX ADMIN — SODIUM CHLORIDE, SODIUM LACTATE, POTASSIUM CHLORIDE, AND CALCIUM CHLORIDE 125 ML/HR: .6; .31; .03; .02 INJECTION, SOLUTION INTRAVENOUS at 17:16

## 2023-04-13 RX ADMIN — SODIUM CHLORIDE, SODIUM LACTATE, POTASSIUM CHLORIDE, AND CALCIUM CHLORIDE 100 ML/HR: .6; .31; .03; .02 INJECTION, SOLUTION INTRAVENOUS at 12:00

## 2023-04-13 NOTE — INTERVAL H&P NOTE
H&P reviewed  After examining the patient I find no changes in the patients condition since the H&P had been written  ASSESSMENT:  Goldie Ayala is a 80 y o  female who presents with new diagnosis ascending tumor, colon cancer/adenocarcinoma  We discussed laparoscopic versus open right hemicolectomy, in a face-to-face, personal, informed consent process, the benefits, alternatives,   risks including not limited to bleeding, infection, risks of anesthesia, open surgery, DVT/PE, heart attack, stroke, death, damage to local structures(e g  ureter, duodenum),anastomotic leak requiring reoperation, temporary versus permanent stoma  They understood these risks, signed informed consent, and wish to proceed       PLAN:  laparoscopic versus open right hemicolectomy    Gen:no distress  HEENT:Perrla/eomi  CV:sinus  Lung:clear bilateral  Abd:soft,nontender  Ext: no edema    Vitals:    04/13/23 1048   BP: 139/65   Pulse: 70   Resp: 18   Temp: 97 6 °F (36 4 °C)   SpO2: 100%

## 2023-04-13 NOTE — PROGRESS NOTES
"Post-Op Check-colorectal surgery   Willma Rank 80 y o  female MRN: [id number]  Unit/Bed#: [facility] 827-01 Encounter: [id number]    Assessment:  77-year-old female with a past medical history of HLD, HTN, glaucoma, hyperparathyroidism, Hashimoto's, and adenocarcinoma of the ascending colon postop day 0 status post laparoscopic right colon resection by Dr Niki Moody  UOP: 100 cc clear yellow urine    Plan:  -Afebrile, mild pain with palpation, mildly distended, incisions clean dry and intact  -N p o /sips with meds  -LR at 125  -Bobby in place draining clear yellow urine  -Strict I's and O's  -Subcu heparin for DVT prophylaxis  -Encourage out of bed and ambulation and incentive spirometer use   -PT/OT consults  -Gerontology consult, appreciate recommendations  -Nephrology consult, appreciate recommendations  -Nutrition consult, appreciate recommendations    Subjective/Objective   Chief Complaint: Patient offers no complaints lying flat at this time  She denies any fevers, chills, chest pain, shortness of breath, nausea, or vomiting  Subjective:   Pain: Controlled on current as needed regimen  N/V: Denies  Tolerating Diet: N p o , sips with medications  -Flatus and -BM  OOB and ambulating starting on 4/14    Objective:     Blood pressure 151/59, pulse 63, temperature (!) 97 2 °F (36 2 °C), resp  rate 16, height 4' 9\" (1 448 m), weight 52 6 kg (116 lb), SpO2 95 %  ,Body mass index is 25 1 kg/m²  Intake/Output Summary (Last 24 hours) at 4/13/2023 1807  Last data filed at 4/13/2023 1523  Gross per 24 hour   Intake 2000 ml   Output 100 ml   Net 1900 ml       Invasive Devices     Peripheral Intravenous Line  Duration           Peripheral IV 04/13/23 Left Antecubital <1 day    Peripheral IV 04/13/23 Left Hand <1 day          Drain  Duration           Urethral Catheter Latex;Straight-tip 16 Fr  <1 day                Physical Exam:    General: Pt is AAOx3, lying down in bed in NAD, very pleasant     HEENT: " normocephalic, no scleral icterus, dry mucous membranes, hearing aids in place  Neck: Supple, non-tender, ROM intact   CV: RRR, no murmurs, gallops, rubs  S1 and S2  Resp: Lung sounds clear to auscultation B/L, normal respiratory effort no  wheezes, rhonchi, rhales   Abd: Soft, with minimal tenderness to all 4 quadrants, mildly-distended, non-tympanitic  Hypoactive bowelsounds all 4 quadrants  No rebound or guarding  No CVA tenderness  Abdominal incisions clean, dry, intact, with glue in place  Bobby catheter in place draining clear yellow urine  Ext: Warm with no cyanosis, no edema, no deformities  ROM intact   Skin: No rashes, bruises, ulcers  Neuro: Sensation intact all 4 extremities  5+ strength all 4 extremities  Lab, Imaging and other studies:I have personally reviewed pertinent lab results      VTE Pharmacologic Prophylaxis: Heparin  VTE Mechanical Prophylaxis: sequential compression device    Troy Carls

## 2023-04-13 NOTE — RESPIRATORY THERAPY NOTE
"RT Protocol Note  Jacquelyn Linn 80 y o  female MRN: [id number]  Unit/Bed#: [facility] 827-01 Encounter: [id number]    Assessment    Active Problems:    * No active hospital problems  *      Home Pulmonary Medications:  NOne       Past Medical History:   Diagnosis Date    BCC (basal cell carcinoma)     Diastolic dysfunction     Dyslipidemia     Dysphagia     Glaucoma     Hashimoto's thyroiditis     Hyperparathyroidism (HCC)     Hypertension     Impaired fasting glucose     Insomnia     Osteopenia     SCCA (squamous cell carcinoma) of skin      Social History     Socioeconomic History    Marital status: Single     Spouse name: None    Number of children: None    Years of education: None    Highest education level: None   Occupational History    Occupation: Retired   Tobacco Use    Smoking status: Never    Smokeless tobacco: Never   Vaping Use    Vaping Use: Never used   Substance and Sexual Activity    Alcohol use: Yes     Comment: rarely, social     Drug use: No    Sexual activity: None   Other Topics Concern    None   Social History Narrative     per [employer]      Social Determinants of Health     Financial Resource Strain: Low Risk     Difficulty of Paying Living Expenses: Not hard at all   Food Insecurity: Not on file   Transportation Needs: No Transportation Needs    Lack of Transportation (Medical): No    Lack of Transportation (Non-Medical): No   Physical Activity: Not on file   Stress: Not on file   Social Connections: Not on file   Intimate Partner Violence: Not on file   Housing Stability: Not on file       Subjective         Objective    Physical Exam:   Assessment Type: Pre-treatment  General Appearance: Sedated, Sleeping  Respiratory Pattern: Normal  Chest Assessment: Chest expansion symmetrical  Bilateral Breath Sounds: Clear, Diminished  Location Specific: No  Cough: None  O2 Device: 4 lpm NC    Vitals:  Blood pressure 151/59, pulse 63, temperature (!) 97 2 °F (36 2 °C), resp   rate 16, height 4' 9\" " (1 448 m), weight 52 6 kg (116 lb), SpO2 95 %  Imaging and other studies: I have personally reviewed pertinent reports  No recent films  O2 Device: 4 lpm NC     Plan    Respiratory Plan: No distress/Pulmonary history        Resp Comments: 79 y/o s/p RESECTION COLON RIGHT LAPAROSCOPIC  Pt on 4 lpm NC with SpO2's > 94%; sound asleep  Will return for IS instruction  Device left with family members  No hx of COPD and no pulm meds taken at home  CXR: none recent  Will D/C protocol at this time

## 2023-04-13 NOTE — OP NOTE
OPERATIVE REPORT  PATIENT NAME: Antony Brock    :  10/4/1929  MRN: [id number]  Pt Location: BE OR ROOM 05    SURGERY DATE: 2023    Surgeon(s) and Role:     * Flako Bernstein MD - Primary     * Nahed Cross PA-C - Assisting-no qualified resident available, she was required for the entirety of the case, incision, laparoscopic entry assistance, traction/countertraction, assistance with performing anastomosis and closure    Preop Diagnosis:  Cancer of ascending colon ([address] ) [C18 2]    Post-Op Diagnosis Codes:     * Cancer of ascending colon ([address] ) [C18 2]    Procedure(s):  DIagnostic laparoscopy                                                                    06280  -Intraoperative ICG fluorescence angiography                            85904  -RESECTION COLON RIGHT LAPAROSCOPIC, ileum to mid transverse, ileocolic side-to-side, functional end-to-end stapled anastomosis           43161    Specimen(s):  ID Type Source Tests Collected by Time Destination   1 : RIGHT HEMICOLECTOMY Tissue Large Intestine, Right/Ascending Colon TISSUE EXAM lFako Bernstein MD 2023 1442      Estimated Blood Loss:   100 mL    Drains:  NG/OG/Enteral Tube Nasogastric 16 Fr Right nare (Active)   Number of days: 0       Urethral Catheter Latex;Straight-tip 16 Fr  (Active)   Number of days: 0     Anesthesia Type:   General    Operative Indications:  Cancer of ascending colon ([address] ) [C18 2]    Operative Findings:  -Large proximal ascending colon cancer, ileum to mid transverse, side-to-side, functional end-to-end, stapled anastomosis    Complications:   None    Procedure and Technique:  Tessie Osei a 80 y  o  female who presents with new diagnosis ascending tumor, colon cancer/adenocarcinoma        We discussed laparoscopic versus open right hemicolectomy, in a face-to-face, personal, informed consent process, the benefits, alternatives, risks including not limited to bleeding, infection, risks of anesthesia, open surgery, DVT/PE, heart attack, stroke, death, damage to local structures(e g  ureter, duodenum),anastomotic leak requiring reoperation, temporary versus permanent stoma  They understood these risks, signed informed consent, and wish to proceed  She was brought to the operating room where general endotracheal anesthesia was induced without event  Bobby was placed under sterile condition, placed in modified lithotomy position with attention to joints and bony extremities on pink pad system  She received 5000 units of subcutaneous heparin prior to operation, Venodynes were on and running throughout the procedure  She received cefazolin and Flagyl prior to skin incision  She was chlorhexidine sterile prepped and after appropriate drying time Ioban and sterile draped  After timeout was taken per protocol procedure began     Incision was made for a 12 mm Dahl trocar supraumbilical  This was carried through with electrocautery and using S retractors the fascia was exposed and incised  Once the peritoneal cavity was entered balloon secured the 12 mm Dahl trocar, 15 mm CO2 pneumoperitoneum was introduced  Diagnostic laparoscopy revealed some omental adhesions to the anterior abdominal wall, normal peritoneal cavity visualized, no lesions or obvious liver parenchyma lesion  2 additional 5 mm trocars were placed in the left upper quadrant and left lower quadrant     The ileocolic fat pad grasped and raised cephalad  The ileocolic pedicle was easily visualized and skeletonized sweeping down the retroperitoneal structures  This was ligated high between triple burns of the Enseal energy device ×2  The medial lateral dissection was then continued in this plane sweeping all retroperitoneal structures down including visualized duodenum, until hepatic flexure was encountered  The mesentery to this area was also taken between double burns of the Enseal energy device   The lateral dissection was undertaken on the white line of Toldt and medializing the entire right colon until it was ready for anastomosis  An 8 cm extraction incision was made extending from the 12 mm trocar  An Philip wound retractor was immediately placed to protect the wound and the previously grasped and marked small bowel was brought into the extraction incision  The then grasped cecum was brought into the incision and inspected  The ileum and transverse colon were lined up on the antimesenteric side of the small bowel and on the antimesenteric taenia of the large bowel  With a 3-0 Vicryl suture  Enterotomies were made and YULIA blue load 100 was placed and after orientation and appropriate compression time fired  This was opened showing good common channel and no hemorrhage  The Allis clamps were then used to close this open end and bring it through a repeat firing after appropriate compression time  This was removed passed off as specimen the corners and crossing staple lines and crotch stitch were also placed in the similar fashion with 3-0 Vicryl suture on this anastomosis  Note that prior to firing the stapler ICG dye was injected under laparoscopic ICG mode,good Blood supply up to the end of the staple line    Irrigation was undertaken and ran clean, twisted the wound protector close to reinsufflate briefly, inspected the right upper quadrant as well as the rest of the abdomen, there was no bleeding and irrigated clean  The colon bundle closing tray was used to change all gowns and gloves and provide a clean field for closure which was undertaken with fascial #1 PDS  Skin was closed after irrigation with 4-0 Monocryl and Histoacryl glue at the incision and the 2 remaining 5 mm port sites  All sponge needle instrument/RF Wand counts were correct I was present and scrubbed for the entirety of the procedure    Patient Disposition:  PACU - hemodynamically stable      Colon Resection  Operation performed with curative intent Yes   Tumor Location (select all that apply) Ascending colon   Extent of colon and vascular resection (select all that apply) Right hemicolectomy - ileocolic, right colic (if present)        SIGNATURE: Yanni Rodriguez MD  DATE: April 13, 2023  TIME: 3:15 PM

## 2023-04-14 ENCOUNTER — HOME HEALTH ADMISSION (OUTPATIENT)
Dept: HOME HEALTH SERVICES | Facility: HOME HEALTHCARE | Age: 88
End: 2023-04-14

## 2023-04-14 LAB
ANION GAP SERPL CALCULATED.3IONS-SCNC: 6 MMOL/L (ref 4–13)
BASOPHILS # BLD AUTO: 0.02 THOUSANDS/ΜL (ref 0–0.1)
BASOPHILS NFR BLD AUTO: 0 % (ref 0–1)
BUN SERPL-MCNC: 18 MG/DL (ref 5–25)
CALCIUM SERPL-MCNC: 8.8 MG/DL (ref 8.3–10.1)
CHLORIDE SERPL-SCNC: 109 MMOL/L (ref 96–108)
CO2 SERPL-SCNC: 22 MMOL/L (ref 21–32)
CREAT SERPL-MCNC: 0.81 MG/DL (ref 0.6–1.3)
EOSINOPHIL # BLD AUTO: 0 THOUSAND/ΜL (ref 0–0.61)
EOSINOPHIL NFR BLD AUTO: 0 % (ref 0–6)
ERYTHROCYTE [DISTWIDTH] IN BLOOD BY AUTOMATED COUNT: 21.7 % (ref 11.6–15.1)
GFR SERPL CREATININE-BSD FRML MDRD: 62 ML/MIN/1.73SQ M
GLUCOSE SERPL-MCNC: 132 MG/DL (ref 65–140)
HCT VFR BLD AUTO: 32.2 % (ref 34.8–46.1)
HGB BLD-MCNC: 9.4 G/DL (ref 11.5–15.4)
IMM GRANULOCYTES # BLD AUTO: 0.04 THOUSAND/UL (ref 0–0.2)
IMM GRANULOCYTES NFR BLD AUTO: 0 % (ref 0–2)
LYMPHOCYTES # BLD AUTO: 0.57 THOUSANDS/ΜL (ref 0.6–4.47)
LYMPHOCYTES NFR BLD AUTO: 6 % (ref 14–44)
MAGNESIUM SERPL-MCNC: 2.1 MG/DL (ref 1.6–2.6)
MCH RBC QN AUTO: 24.9 PG (ref 26.8–34.3)
MCHC RBC AUTO-ENTMCNC: 29.2 G/DL (ref 31.4–37.4)
MCV RBC AUTO: 85 FL (ref 82–98)
MONOCYTES # BLD AUTO: 0.63 THOUSAND/ΜL (ref 0.17–1.22)
MONOCYTES NFR BLD AUTO: 7 % (ref 4–12)
NEUTROPHILS # BLD AUTO: 7.72 THOUSANDS/ΜL (ref 1.85–7.62)
NEUTS SEG NFR BLD AUTO: 87 % (ref 43–75)
NRBC BLD AUTO-RTO: 0 /100 WBCS
PHOSPHATE SERPL-MCNC: 4.3 MG/DL (ref 2.3–4.1)
PLATELET # BLD AUTO: 295 THOUSANDS/UL (ref 149–390)
PMV BLD AUTO: 10.8 FL (ref 8.9–12.7)
POTASSIUM SERPL-SCNC: 4.1 MMOL/L (ref 3.5–5.3)
RBC # BLD AUTO: 3.77 MILLION/UL (ref 3.81–5.12)
SODIUM SERPL-SCNC: 137 MMOL/L (ref 135–147)
WBC # BLD AUTO: 8.98 THOUSAND/UL (ref 4.31–10.16)

## 2023-04-14 RX ORDER — AMLODIPINE BESYLATE 5 MG/1
5 TABLET ORAL DAILY
Status: DISCONTINUED | OUTPATIENT
Start: 2023-04-15 | End: 2023-04-17 | Stop reason: HOSPADM

## 2023-04-14 RX ORDER — DEXTROSE, SODIUM CHLORIDE, AND POTASSIUM CHLORIDE 5; .45; .15 G/100ML; G/100ML; G/100ML
84 INJECTION INTRAVENOUS CONTINUOUS
Status: DISPENSED | OUTPATIENT
Start: 2023-04-14 | End: 2023-04-15

## 2023-04-14 RX ADMIN — LEVOTHYROXINE SODIUM 88 MCG: 88 TABLET ORAL at 09:25

## 2023-04-14 RX ADMIN — SODIUM CHLORIDE, SODIUM LACTATE, POTASSIUM CHLORIDE, AND CALCIUM CHLORIDE 125 ML/HR: .6; .31; .03; .02 INJECTION, SOLUTION INTRAVENOUS at 01:08

## 2023-04-14 RX ADMIN — DEXTROSE, SODIUM CHLORIDE, AND POTASSIUM CHLORIDE 84 ML/HR: 5; .45; .15 INJECTION INTRAVENOUS at 09:25

## 2023-04-14 RX ADMIN — ACETAMINOPHEN 975 MG: 325 TABLET ORAL at 14:24

## 2023-04-14 RX ADMIN — ACETAMINOPHEN 975 MG: 325 TABLET ORAL at 22:23

## 2023-04-14 RX ADMIN — AMLODIPINE BESYLATE 5 MG: 5 TABLET ORAL at 09:24

## 2023-04-14 RX ADMIN — ONDANSETRON 4 MG: 2 INJECTION INTRAMUSCULAR; INTRAVENOUS at 22:29

## 2023-04-14 RX ADMIN — Medication 25 MG: at 09:24

## 2023-04-14 RX ADMIN — DEXTROSE, SODIUM CHLORIDE, AND POTASSIUM CHLORIDE 84 ML/HR: 5; .45; .15 INJECTION INTRAVENOUS at 22:23

## 2023-04-14 RX ADMIN — HEPARIN SODIUM 5000 UNITS: 5000 INJECTION INTRAVENOUS; SUBCUTANEOUS at 09:25

## 2023-04-14 RX ADMIN — HEPARIN SODIUM 5000 UNITS: 5000 INJECTION INTRAVENOUS; SUBCUTANEOUS at 22:23

## 2023-04-14 RX ADMIN — ACETAMINOPHEN 975 MG: 325 TABLET ORAL at 05:01

## 2023-04-14 NOTE — CONSULTS
[address] [place] [address] 80 y o  female MRN: [id number]  Unit/Bed#: [facility] 827-01 Encounter: [id number]    ASSESSMENT and PLAN:       80 y o  female with a past medical history of hypertension, hyperlipidemia, hypothyroidism, thyroid nodule, hyperparathyroidism status post prior surgery, who was admitted to [place] [address] after presenting  for elective colon surgery given adenocarcinoma of the ascending colon  A renal consultation is requested today for assistance in the management of perioperative CKD management  1 - CKD III    -Likely with age-related nephron loss  - Creatinine appears to be stable and at baseline 0 8 - 0 9 mg/dL preop and postoperatively  - Patient did not have significant hypotension issues during operation on 4/13    Creatinine overall remains at baseline  Plan  - Continue holding ACE inhibitor for now  - Can restart ACE inhibitor as long as the renal function is stable and if blood pressure rises above 225 systolic  - I/os; avoid nephrotoxic agents  - Continue amlodipine with hold parameters  - Intravenous fluids per primary team  - Follow bladder scans post Bobby removal  - Patient has renal cyst that should be followed as outpatient- patient prefers to follow with PCP regarding this  I have sent a message to the patient's PCP through [employer]   - Over the weekend, please call if questions or issues arise  - I have messaged primary team with renal recommendations above  2-electrolytes-appropriate    - Phosphorus borderline elevated but will monitor for now    3-acid/base-appropriate    4-adenocarcinoma of colon-status post surgery and further plans per colorectal team    5-hypertension-plan as noted above    6-anemia-per primary team   Hemoglobin appears relatively stable and is improved from prior      HISTORY OF PRESENT ILLNESS:  Requesting Physician: Sindhu Toro MD  Reason for Consult: CKD stage III perioperative management    Devang Shields is a 80 y o  female with a past medical history of hypertension, hyperlipidemia, hypothyroidism, thyroid nodule, hyperparathyroidism status post prior surgery, who was admitted to [facility] after presenting  for elective colon surgery given adenocarcinoma of the ascending colon  A renal consultation is requested today for assistance in the management of perioperative CKD management  Patient was in usual state of health prior to admission  Did not use ACE inhibitor on the day of admission  Does not use NSAIDs  Denies fevers, chills, nausea, vomiting  Is here for elective colon surgery which the patient completed on 4/13      PAST MEDICAL HISTORY:  Past Medical History:   Diagnosis Date   • BCC (basal cell carcinoma)    • Diastolic dysfunction    • Dyslipidemia    • Dysphagia    • Glaucoma    • Hashimoto's thyroiditis    • Hyperparathyroidism ([address] )    • Hypertension    • Impaired fasting glucose    • Insomnia    • Osteopenia    • SCCA (squamous cell carcinoma) of skin        PAST SURGICAL HISTORY:  Past Surgical History:   Procedure Laterality Date   • CATARACT EXTRACTION, BILATERAL     • COLONOSCOPY      Complete, Onset - 10/25/05 - 10 years    • HYSTERECTOMY     • PARATHYROIDECTOMY      1st 1985 and 2nd 2002   • FL LAPAROSCOPY COLECTOMY PARTIAL W/ANASTOMOSIS N/A 4/13/2023    Procedure: RESECTION COLON RIGHT LAPAROSCOPIC;  Surgeon: Moisés Goodwin MD;  Location:  MAIN OR;  Service: Colorectal   • FL [address] [facility] Fl[address] WRST SURG W/RLS TRANSVRS CARPL LIGM Right 05/02/2016    Procedure: RELEASE CARPAL TUNNEL ENDOSCOPIC;  Surgeon: Judy Metz MD;  Location:  MAIN OR;  Service: Orthopedics   • TONSILLECTOMY         ALLERGIES:  Allergies   Allergen Reactions   • Cyclogyl [Cyclopentolate Hcl]    • Phenylephrine Other (See Comments)     Eye gtts red eye infected sore   • Pred Forte [Prednisolone Acetate]        SOCIAL HISTORY:  Social History     Substance and Sexual Activity   Alcohol Use Yes    Comment: rarely, social Social History     Substance and Sexual Activity   Drug Use No     Social History     Tobacco Use   Smoking Status Never   Smokeless Tobacco Never       FAMILY HISTORY:  Family History   Problem Relation Age of Onset   • Stroke Father         CVA   • Ovarian cancer Mother        MEDICATIONS:    Current Facility-Administered Medications:   •  acetaminophen (TYLENOL) tablet 975 mg, 975 mg, Oral, Q8H [facility], Nilam Rutledge PA-C, 975 mg at 04/14/23 0501  •  [START ON 4/15/2023] amLODIPine (NORVASC) tablet 5 mg, 5 mg, Oral, Daily, Izabella Vallecillo MD  •  dextrose 5 % and sodium chloride 0 45 % with KCl 20 mEq/L infusion, 84 mL/hr, Intravenous, Continuous, Nilam Rutledge PA-C, Last Rate: 84 mL/hr at 04/14/23 0925, 84 mL/hr at 04/14/23 0925  •  heparin (porcine) subcutaneous injection 5,000 Units, 5,000 Units, Subcutaneous, Q12H [facility], 5,000 Units at 04/14/23 0925 **AND** [COMPLETED] Platelet count, , , Once, W Romayne Stengel Hohenshilt, PA-C  •  HYDROmorphone (DILAUDID) injection 0 2 mg, 0 2 mg, Intravenous, Q3H PRN, Nilam Rutledge PA-C  •  lactated ringers bolus 1,000 mL, 1,000 mL, Intravenous, Once PRN **AND** lactated ringers bolus 1,000 mL, 1,000 mL, Intravenous, Once PRN, W Romayne Stengel Hohenshilt, PA-C  •  levothyroxine tablet 88 mcg, 88 mcg, Oral, Daily, W Romayne Stengel Hohenshilt, PA-C, 88 mcg at 04/14/23 0925  •  metoprolol tartrate (LOPRESSOR) partial tablet 25 mg, 25 mg, Oral, Daily, W Romayne Stengel Hohenshilt, PA-C, 25 mg at 04/14/23 4254  •  ondansetron (ZOFRAN) injection 4 mg, 4 mg, Intravenous, Q4H PRN, Nilam Rutledge PA-C  •  oxyCODONE (ROXICODONE) IR tablet 2 5 mg, 2 5 mg, Oral, Q4H PRN, Nilam Rutledge PA-C  •  oxyCODONE (ROXICODONE) IR tablet 5 mg, 5 mg, Oral, Q4H PRN, Nilam Rutledge PA-C  •  sodium chloride 0 9 % bolus 1,000 mL, 1,000 mL, Intravenous, Once PRN **AND** sodium chloride 0 9 % bolus 1,000 mL, 1,000 mL, Intravenous, Once PRN, W Romayne Stengel Hohenshilt, PA-C    REVIEW OF SYSTEMS:    All the systems were reviewed and were negative except as documented on the HPI      PHYSICAL EXAM:  Current Weight: Weight - Scale: 52 6 kg (116 lb)  First Weight: Weight - Scale: 52 6 kg (116 lb)  Vitals:    04/13/23 1646 04/13/23 2229 04/14/23 0504 04/14/23 0821   BP: 151/59 152/58  153/55   BP Location:  Left arm     Pulse: 63 69  74   Resp:  18  16   Temp: (!) 97 2 °F (36 2 °C) 97 5 °F (36 4 °C)  (!) 97 2 °F (36 2 °C)   TempSrc:  Oral     SpO2: 95% 100% 98% 97%   Weight:       Height:           Intake/Output Summary (Last 24 hours) at 4/14/2023 1326  Last data filed at 4/14/2023 1051  Gross per 24 hour   Intake 4126 25 ml   Output 1075 ml   Net 3051 25 ml     Physical Exam    General: NAD  Skin: no rash  Eyes: anicteric sclera  ENT: moist mucous membrane  Neck: supple  Chest: CTA b/l, no ronchii, no wheeze, no rubs, no rales  CVS: s1s2, no murmur, no gallop, no rub  Abdomen: soft, nontender, nl sounds, surgical site intact and no drainage  Extremities: trace R> L edema   : no stephens  Neuro: AAOX3  Psych: normal affect      Invasive Devices:      Lab Results:   Results from last 7 days   Lab Units 04/14/23  0605 04/13/23  1850   WBC Thousand/uL 8 98  --    HEMOGLOBIN g/dL 9 4*  --    HEMATOCRIT % 32 2*  --    PLATELETS Thousands/uL 295 201   POTASSIUM mmol/L 4 1  --    CHLORIDE mmol/L 109*  --    CO2 mmol/L 22  --    BUN mg/dL 18  --    CREATININE mg/dL 0 81  --    CALCIUM mg/dL 8 8  --    MAGNESIUM mg/dL 2 1  --    PHOSPHORUS mg/dL 4 3*  --

## 2023-04-14 NOTE — PLAN OF CARE
Problem: MOBILITY - ADULT  Goal: Maintain or return to baseline ADL function  Description: INTERVENTIONS:  -  Assess patient's ability to carry out ADLs; assess patient's baseline for ADL function and identify physical deficits which impact ability to perform ADLs (bathing, care of mouth/teeth, toileting, grooming, dressing, etc )  - Assess/evaluate cause of self-care deficits   - Assess range of motion  - Assess patient's mobility; develop plan if impaired  - Assess patient's need for assistive devices and provide as appropriate  - Encourage maximum independence but intervene and supervise when necessary  - Involve family in performance of ADLs  - Assess for home care needs following discharge   - Consider OT consult to assist with ADL evaluation and planning for discharge  - Provide patient education as appropriate  Outcome: Progressing  Goal: Maintains/Returns to pre admission functional level  Description: INTERVENTIONS:  - Perform BMAT or MOVE assessment daily    - Set and communicate daily mobility goal to care team and patient/family/caregiver     - Collaborate with rehabilitation services on mobility goals if consulted  - Stand patient 3 times a day  - Ambulate patient 3 times a day  - Out of bed to chair 3 times a day   - Out of bed for meals 3 times a day  - Out of bed for toileting  - Record patient progress and toleration of activity level   Outcome: Progressing     Problem: PAIN - ADULT  Goal: Verbalizes/displays adequate comfort level or baseline comfort level  Description: Interventions:  - Encourage patient to monitor pain and request assistance  - Assess pain using appropriate pain scale  - Administer analgesics based on type and severity of pain and evaluate response  - Implement non-pharmacological measures as appropriate and evaluate response  - Consider cultural and social influences on pain and pain management  - Notify physician/advanced practitioner if interventions unsuccessful or patient reports new pain  Outcome: Progressing     Problem: INFECTION - ADULT  Goal: Absence or prevention of progression during hospitalization  Description: INTERVENTIONS:  - Assess and monitor for signs and symptoms of infection  - Monitor lab/diagnostic results  - Monitor all insertion sites, i e  indwelling lines, tubes, and drains  - Monitor endotracheal if appropriate and nasal secretions for changes in amount and color  - [place] appropriate cooling/warming therapies per order  - Administer medications as ordered  - Instruct and encourage patient and family to use good hand hygiene technique  - Identify and instruct in appropriate isolation precautions for identified infection/condition  Outcome: Progressing  Goal: Absence of fever/infection during neutropenic period  Description: INTERVENTIONS:  - Monitor WBC    Outcome: Progressing     Problem: SAFETY ADULT  Goal: Maintain or return to baseline ADL function  Description: INTERVENTIONS:  -  Assess patient's ability to carry out ADLs; assess patient's baseline for ADL function and identify physical deficits which impact ability to perform ADLs (bathing, care of mouth/teeth, toileting, grooming, dressing, etc )  - Assess/evaluate cause of self-care deficits   - Assess range of motion  - Assess patient's mobility; develop plan if impaired  - Assess patient's need for assistive devices and provide as appropriate  - Encourage maximum independence but intervene and supervise when necessary  - Involve family in performance of ADLs  - Assess for home care needs following discharge   - Consider OT consult to assist with ADL evaluation and planning for discharge  - Provide patient education as appropriate  Outcome: Progressing  Goal: Maintains/Returns to pre admission functional level  Description: INTERVENTIONS:  - Perform BMAT or MOVE assessment daily    - Set and communicate daily mobility goal to care team and patient/family/caregiver     - Collaborate with rehabilitation services on mobility goals if consulted  - Stand patient 3 times a day  - Ambulate patient 3 times a day  - Out of bed to chair 3 times a day   - Out of bed for meals 3 times a day  - Out of bed for toileting  - Record patient progress and toleration of activity level   Outcome: Progressing  Goal: Patient will remain free of falls  Description: INTERVENTIONS:  - Educate patient/family on patient safety including physical limitations  - Instruct patient to call for assistance with activity   - Consult OT/PT to assist with strengthening/mobility   - Keep Call bell within reach  - Keep bed low and locked with side rails adjusted as appropriate  - Keep care items and personal belongings within reach  - Initiate and maintain comfort rounds  - Make Fall Risk Sign visible to staff  - Offer Toileting every 2 Hours, in advance of need  - Initiate/Maintain bed/chair alarm  - Apply yellow socks and bracelet for high fall risk patients  - Consider moving patient to room near nurses station  Outcome: Progressing     Problem: DISCHARGE PLANNING  Goal: Discharge to home or other facility with appropriate resources  Description: INTERVENTIONS:  - Identify barriers to discharge w/patient and caregiver  - Arrange for needed discharge resources and transportation as appropriate  - Identify discharge learning needs (meds, wound care, etc )  - Arrange for interpretive services to assist at discharge as needed  - Refer to Case Management Department for coordinating discharge planning if the patient needs post-hospital services based on physician/advanced practitioner order or complex needs related to functional status, cognitive ability, or social support system  Outcome: Progressing     Problem: Knowledge Deficit  Goal: Patient/family/caregiver demonstrates understanding of disease process, treatment plan, medications, and discharge instructions  Description: Complete learning assessment and assess knowledge base   Interventions:  - Provide teaching at level of understanding  - Provide teaching via preferred learning methods  Outcome: Progressing

## 2023-04-14 NOTE — CASE MANAGEMENT
Case Management Assessment & Discharge Planning Note    Patient name Alyx Romero  Location [address] [facility] MRN [id number]  : 10/4/1929 Date 2023       Current Admission Date: 2023  Current Admission Diagnosis:Cancer of ascending colon [facility])   Patient Active Problem List    Diagnosis Date Noted   • Encounter for geriatric assessment 2023   • Hypoalbuminemia 2023   • Anemia 2023   • Iron deficiency anemia 2023   • Primary adenocarcinoma of ascending colon ([facility] ) 2023   • Stage 3 chronic kidney disease, unspecified whether stage 3a or 3b CKD ([place] [address] ) 2022   • Hypothyroidism 2021   • Hyperparathyroidism ([facility] ) 2018   • Malignant melanoma of torso excluding breast ([place] [address] ) 2018   • Mixed urge and stress incontinence 2016   • De Quervain's tenosynovitis, right 2016   • Peripheral arterial disease ([place] [address] )    • Diastolic dysfunction    • Dyslipidemia 2012   • Esophageal reflux 2012   • Glaucoma 2012   • Hypertension 2012   • Impaired fasting glucose 2012   • Insomnia 2012   • Left ventricular hypertrophy 2012   • 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 tuberculosis 2012   • Thyroid nodule 2012      LOS (days): 1  Geometric Mean LOS (GMLOS) (days): 3 50  Days to GMLOS:2 7     OBJECTIVE:    Risk of Unplanned Readmission Score: 14         Current admission status: Inpatient  Referral Reason: DME    Preferred Pharmacy:   [address] [facility] [place] [address]  [address] [facility] [id number]  Phone: [phone] Fax: [address], [facility] [place] [address]  Phone: [phone] Fax: [phone]    Primary Care Provider: Lidya Sandoval DO    Primary Insurance: MEDICARE  Secondary Insurance: AETNA    ASSESSMENT:  Sandra Guerrier Proxies    There are no active Health Care Proxies on file  Readmission Root Cause  30 Day Readmission: No    Patient Information  Admitted from[de-identified] Home  Mental Status: Alert  During Assessment patient was accompanied by: Daughter  Assessment information provided by[de-identified] Patient  Primary Caregiver: Self  Support Systems: Self, Children  Home entry access options  Select all that apply : Stairs  Number of steps to enter home : 4  Do the steps have railings?: Yes  Type of Current Residence: [place]  In the last 12 months, was there a time when you were not able to pay the mortgage or rent on time?: No  In the last 12 months, was there a time when you did not have a steady place to sleep or slept in a shelter (including now)?: No  Homeless/housing insecurity resource given?: No  Living Arrangements: Lives Alone  Is patient a ?: No    Activities of Daily Living Prior to Admission  Functional Status: Independent  Completes ADLs independently?: Yes  Ambulates independently?: Yes  Does patient use assisted devices?: No  Does patient currently own DME?: Yes  What DME does the patient currently own?: Stra[employer]  Does patient have a history of Outpatient Therapy (PT/OT)?: No  Does the patient have a history of Short-Term Rehab?: No  Does patient have a history of HHC?: No  Does patient currently have [address]?: No         Patient Information Continued  Income Source:  Other (Comment) (Retired)  Does patient have prescription coverage?: Yes  Within the past 12 months, you worried that your food would run out before you got the money to buy more : Never true  Within the past 12 months, the food you bought just didn't last and you didn't have money to get more : Never true  Food insecurity resource given?: No  Does patient receive dialysis treatments?: No  Does patient have a history of substance abuse?: No  Does patient have a history of Mental Health Diagnosis?: No         Means of Transportation  Means of Transport to [facility][de-identified] Drives Self  In the past 12 months, has lack of transportation kept you from medical appointments or from getting medications?: No  In the past 12 months, has lack of transportation kept you from meetings, work, or from getting things needed for daily living?: No  Was application for public transport provided?: No        DISCHARGE DETAILS:    Discharge planning discussed with[de-identified] Patient and daughter  Freedom of Choice: Yes  Comments - Freedom of Choice: discussed PT recs of home PT with pt and daughter - no preference provided  CM contacted family/caregiver?: Yes  Were Treatment Team discharge recommendations reviewed with patient/caregiver?: Yes  Did patient/caregiver verbalize understanding of patient care needs?: Yes  Were patient/caregiver advised of the risks associated with not following Treatment Team discharge recommendations?: Yes    Contacts  Patient Contacts: Daughter  Relationship to Patient[de-identified] Family  Contact Method:  In Person  Reason/Outcome: Continuity of Care, Discharge [address]         Is the patient interested in [address] at discharge?: Yes  [address] requested[de-identified] Physical [address] Name[de-identified] [facility] Provider[de-identified] PCP  Home Health Services Needed[de-identified] Strengthening/Theraputic Exercises to Improve Function  Homebound Criteria Met[de-identified] Requires the Assistance of Another Person for Safe Ambulation or to Leave the Home  Supporting Clincal Findings[de-identified] Limited Endurance    DME Referral Provided  Referral made for DME?: Yes  DME referral completed for the following items[de-identified] Mahesh Poonam  DME Supplier Name[de-identified] [employer]    Other Referral/Resources/Interventions Provided:  Interventions: HHC, DME  Referral Comments: referral made to Phil Valdez    Would you like to participate in our [address] service program?  : No - Declined    [facility]  Discharge Destination Plan[de-identified] Home with [address]

## 2023-04-14 NOTE — PLAN OF CARE
Problem: PHYSICAL THERAPY ADULT  Goal: Performs mobility at highest level of function for planned discharge setting  See evaluation for individualized goals  Description: Treatment/Interventions: Elevations, Gait training, Bed mobility, OT, Spoke to nursing  Equipment Recommended: Jeremy Og       See flowsheet documentation for full assessment, interventions and recommendations  Note: Prognosis: Good  Problem List: Decreased endurance, Decreased mobility, Pain  Assessment: Pt is a 80 y o  female seen for PT evaluation s/p admit to [facility] on 4/13/2023  Pt was admitted with a primary dx of:colon cancer s/p Lap R colon resection, ileum to mid-transverse, ileocolic side-to-side, functional end-to-end stapled anastomosis 4/14   PT now consulted for assessment of mobility and d/c needs  Pt with Ambulate orders  Pts current comorbidities effecting treatment include: Glaucoma, HTN, HTN, Lt ventricular hypertrophy, CKD  Pts current clinical presentation is Unstable/ Unpredictable (high complexity) due to Ongoing medical management for primary dx, Increased reliance on more restrictive AD compared to baseline, Increased assistance needed from caregiver at current time, s/p surgical intervention  Prior to admission, pt was Independent for mobility and ADLs, denies use of any DME  Upon evaluation, pt currently is requiring Min A X 1 for bed mobility; supervision for transfers; supervision for ambulation 150' ft w/ RW  Pt with good mobility at the time of PT eval, encourage continued mobility with staff  At conclusion of PT session all needs in reach, RN notified of session findings/recommendations and pt returned back in recliner chair with phone and call bell within reach  Pt denies any further questions at this time  The patient's AM-PAC Basic Mobility Inpatient Short Form Raw Score is  17  A Raw score of greater than 16 suggests the patient may benefit from discharge to home   Please also refer to the recommendation of the Physical Therapist for safe discharge planning  Recommend HHPT with family assist  upon hospital D/C  Pt states plans to stay with daughter post d/c, FF[employer], pt reports daughter works but will be taking time off  PT Discharge Recommendation: Home with home health rehabilitation (with increased caregiver support)    See flowsheet documentation for full assessment

## 2023-04-14 NOTE — PLAN OF CARE
"  Problem: OCCUPATIONAL THERAPY ADULT  Goal: Performs self-care activities at highest level of function for planned discharge setting  See evaluation for individualized goals  Description: Treatment Interventions: Functional transfer training, ADL retraining, Endurance training, UE strengthening/ROM, Patient/family training, Cognitive reorientation, Equipment evaluation/education, Compensatory technique education, Energy conservation, Activityengagement          See flowsheet documentation for full assessment, interventions and recommendations  4/14/2023 1450 by Susanna Gary OT  Note: Limitation: Decreased ADL status, Decreased UE ROM, Decreased UE strength, Decreased Safe judgement during ADL, Decreased endurance, Decreased cognition, Decreased high-level ADLs, Decreased self-care trans  Prognosis: Good  Assessment: Pt is a 80 y o  Female who presented to [facility] on 4/13/2023 with cancer of ascending colon  Pt s/p \" Lap R colon resection, ileum to mid-transverse, ileocolic side-to-side, functional end-to-end stapled anastomosis 4/14  \" Pt  has a past medical history of BCC (basal cell carcinoma), Diastolic dysfunction, Dyslipidemia, Dysphagia, Glaucoma, Hashimoto's thyroiditis, Hyperparathyroidism ([address] ), Hypertension, Impaired fasting glucose, Insomnia, Osteopenia, and SCCA (squamous cell carcinoma) of skin  Pt greeted bedside for OT evaluation on 4/14/2023  Pt lives alone in a [facility] with [address]  Pt plans to DC home with daughter with a first floor set up  Pt's daughter does work  PTA, Pt reports being I with ADLs/IADLs/no AD/ +  Pt demonstrating the following occupational deficits: S with UB ADLs, min A with LB ADLs, min A with bed mobility, S with functional transfers, and S with functional mobility with RW   Limitations that impact functional performance include decreased ADL status, decreased UE ROM, decreased UE strength, decreased safe judgement during ADLs, decreased cognition, decreased endurance, " "decreased self care transfers, decreased high level ADLs and pain  Occupational performance areas to address ADL retraining, functional transfer training, UE strengthening/ROM, endurance training, cognitive reorientation, Pt/caregiver education, equipment evaluation/education, compensatory technique education, energy conservation and activity engagement   Pt would benefit from continued skilled OT services while in hospital to maximize independence with ADLs  Will continue to follow Pt's progress  Pt would benefit from returning home with increased assist from family upon DC to maximize safety and independence with ADLs and functional tasks of choice  OT Discharge Recommendation: No rehabilitation needs (home with support of family)       4/14/2023 1450 by Trinh Sutton OT  Note: Limitation: Decreased ADL status, Decreased UE ROM, Decreased UE strength, Decreased Safe judgement during ADL, Decreased endurance, Decreased cognition, Decreased high-level ADLs, Decreased self-care trans  Prognosis: Good  Assessment: Pt is a 80 y o  Female who presented to [facility] on 4/13/2023 with cancer of ascending colon  Pt s/p \" Lap R colon resection, ileum to mid-transverse, ileocolic side-to-side, functional end-to-end stapled anastomosis 4/14  \" Pt  has a past medical history of BCC (basal cell carcinoma), Diastolic dysfunction, Dyslipidemia, Dysphagia, Glaucoma, Hashimoto's thyroiditis, Hyperparathyroidism ([address] ), Hypertension, Impaired fasting glucose, Insomnia, Osteopenia, and SCCA (squamous cell carcinoma) of skin  Pt greeted bedside for OT evaluation on 4/14/2023  Pt lives alone in a [facility] with [address]  Pt plans to DC home with daughter with a first floor set up  Pt's daughter does work  PTA, Pt reports being I with ADLs/IADLs/no AD/ +  Pt demonstrating the following occupational deficits: S with UB ADLs, min A with LB ADLs, min A with bed mobility, S with functional transfers, and S with functional mobility with RW   " Limitations that impact functional performance include decreased ADL status, decreased UE ROM, decreased UE strength, decreased safe judgement during ADLs, decreased cognition, decreased endurance, decreased self care transfers, decreased high level ADLs and pain  Occupational performance areas to address ADL retraining, functional transfer training, UE strengthening/ROM, endurance training, cognitive reorientation, Pt/caregiver education, equipment evaluation/education, compensatory technique education, energy conservation and activity engagement   Pt would benefit from continued skilled OT services while in hospital to maximize independence with ADLs  Will continue to follow Pt's progress  Pt would benefit from returning home with increased assist from family upon DC to maximize safety and independence with ADLs and functional tasks of choice       OT Discharge Recommendation: No rehabilitation needs (home with support of family)

## 2023-04-14 NOTE — CONSULTS
Consultation - Geriatric Medicine   Hugh Forbes 80 y o  female MRN: [id number]  Unit/Bed#: [facility] 827-01 Encounter: [id number]      Assessment/Plan     Adenocarcinoma of ascending colon  -s/p laparoscopic right colon resection 4/13  -acute pain well controlled  -currently NPO with sips and tolerating, advancement of diet per surg  -continue ISS and pulm toilet    Acute pain due to surgery  -currently with pain control per Geriatric pain protocol:  Tylenol 975mg Q8H scheduled  Roxicodone 2 5mg Q4H PRN moderate pain  Roxicodone 5mg Q4H PRN severe pain  Dilaudid 0 2mg Q3H PRN    Cognitive screening  -alert and fully oriented, no memory or cognitive concerns reported  -no cognitive testing on record for review  -no recent CTH or MRI brain on record for review  -increased risk delirium and cognitive decline due to age, cont strict precautions   -encourage patient remain physically, socially and cognitively active and engaged to maintain cognitive acuity   -continue use of sensory assist devices such as hearing aids and glasses at appropriate times to reduce risk sensory impairment from contributing to isolation, confusion, cognitive decline     Hypothyroidism  -continued on home levothyroxine regimen  -follows with Endo as o/p    Glaucoma  -continue home eyedrop regimen, monitor bp and HR with use any beta blocker containing drops   -ongoing o/p f/u with Optho    Impaired Vision  -continue use of corrective lenses at all appropriate times  -encourage adequate lighting and encourage use of assistance with ambulation  -keep personal belongings close to person to avoid reaching  -encourage appropriate footwear at all times  -consider large font for printed materials provided to patient     Impaired Hearing  -Encourage use of hearing aids at all appropriate times  -encourage providers and caregivers to speak slowly and clearly directly to patient  -minimize background noise to encourage patient engagement  -encourage use of teach back method to ensure clear communication    Impaired mastication  -Requires use of dentures - encourage use at all appropriate times   -ensure meal consistency appropriate for abilities  -continue aspiration precautions    High risk of falls  -due to age, deconditioning and debility, unfamiliar hospital environment and history of mechanical fall in past six months  -does not use assist device for ambulation at baseline   -encourage good body mechanics and assist with transfers  -maintain env free of fall hazards  -PT/OT pending     Deconditioning/debility/frailty  -clinical frailty scale stage IV, vulnerable   -multifactorial including age, adenocarcinoma of colon now s/p resection and multiple additional chronic medical co-morbidities   -continue optimization of chronic conditions and address acute derangements as arise  -continue psychosocial supports     Delirium precautions  -Patient is high risk of delirium due to hospitalization, surgical procedure, acute pain, age   -Initiate delirium precautions  -maintain normal sleep/wake cycle  -minimize overnight interruptions, group overnight vitals/labs/nursing checks as possible  -dim lights, close blinds and turn off tv to minimize stimulation and encourage sleep environment in evenings  -ensure that pain is well controlled   -monitor for fecal and urinary retention which may precipitate delirium  -encourage early mobilization and ambulation with assist as cleared to safely do so  -provide frequent reorientation and redirection as indicated and appropriate     Home medication review  [facility] [phone]  Per most recent PCP documentation:    Levothyrioxine 88mcg daily  Ferrous sulfate 324mg daily  Metoprolol Tartrate 25mg daily  Lisinopril 10mg daily   Atorvastatin 20mg daily  Amlodipine 5mg daily     Care coordination: rounded with Vicki (RN)    History of Present Illness   Physician Requesting Consult: Jo Ann Alas MD  Reason for Consult / Brie Muro Problem: Geriatric surgical optimization program  Hx and PE limited by: N/A  Additional history obtained from: Chart review and patient evaluation    HPI: Zoila Iniguez is a 80y o  year old female with HLD, GERD, glaucoma, HTN, LVH, mixed urge and stress incontinence, PAD, hypothyroidism, CKD-III, malignant melanoma of torso, insomnia and adenocarcinoma of ascending colon who is admitted to the colorectal surgical service, she underwent laparoscopic right colon resection yesterday and is being seen in consultation by Geriatrics as part of geriatric surgical optimization program  Deshawn Coles is seen and examined at bedside where she is sitting resting comfortably, she has been feeling well since her surgery and pain has been well controlled  Prior to admission she was residing home alone, she is independent with ADLs and iADLs including driving, she does not endorse any memory or cognitive concerns  She does not use any assist device for ambulation at baseline, she endorses one fall in past six months which was mechanical in nature, she denies any significant injuries aside from soft tissue contusion as result of that fall  She requires use of hearing aid, glasses and dentures  Inpatient consult to Gerontology  Consult performed by: Jessie Mei DO  Consult ordered by: Andrea Murillo MD        Review of Systems   Constitutional: Negative  Negative for chills and fever  HENT: Positive for dental problem (upper and lower denture)  Eyes: Positive for visual disturbance (glasses)  Respiratory: Negative  Negative for cough and shortness of breath  Cardiovascular: Positive for leg swelling (LLE, chronic since remote hx baker cyst rupture)  Gastrointestinal:        Abd sore with movement, pain otherwise controlled    Genitourinary: Negative  Musculoskeletal: Negative  Skin: Negative  Neurological: Negative  Negative for dizziness, weakness, light-headedness, numbness and headaches  Hematological: Negative  Psychiatric/Behavioral: Negative  Negative for sleep disturbance  All other systems reviewed and are negative      Historical Information   Past Medical History:   Diagnosis Date   • BCC (basal cell carcinoma)    • Diastolic dysfunction    • Dyslipidemia    • Dysphagia    • Glaucoma    • Hashimoto's thyroiditis    • Hyperparathyroidism ([address] )    • Hypertension    • Impaired fasting glucose    • Insomnia    • Osteopenia    • SCCA (squamous cell carcinoma) of skin      Past Surgical History:   Procedure Laterality Date   • CATARACT EXTRACTION, BILATERAL     • COLONOSCOPY      Complete, Onset - 10/25/05 - 10 years    • HYSTERECTOMY     • PARATHYROIDECTOMY      1st 1985 and 2nd 2002   • MA NDSC WRST SURG W/RLS TRANSVRS CARPL LIGM Right 05/02/2016    Procedure: RELEASE CARPAL TUNNEL ENDOSCOPIC;  Surgeon: Roscoe Jain MD;  Location:  MAIN OR;  Service: Orthopedics   • TONSILLECTOMY       Social History   Social History     Substance and Sexual Activity   Alcohol Use Yes    Comment: rarely, social      Social History     Substance and Sexual Activity   Drug Use No     Social History     Tobacco Use   Smoking Status Never   Smokeless Tobacco Never     Family History:   Family History   Problem Relation Age of Onset   • Stroke Father         CVA   • Ovarian cancer Mother      Meds/Allergies   all current active meds have been reviewed    Allergies   Allergen Reactions   • Cyclogyl [Cyclopentolate Hcl]    • Phenylephrine Other (See Comments)     Eye gtts red eye infected sore   • Pred Forte [Prednisolone Acetate]      Objective     Intake/Output Summary (Last 24 hours) at 4/14/2023 0722  Last data filed at 4/14/2023 0500  Gross per 24 hour   Intake 2979 17 ml   Output 850 ml   Net 2129 17 ml     Invasive Devices     Peripheral Intravenous Line  Duration           Peripheral IV 04/13/23 Left Antecubital <1 day    Peripheral IV 04/13/23 Left Hand <1 day          Drain  Duration Urethral Catheter Latex;Straight-tip 16 Fr  <1 day              Physical Exam  Vitals and nursing note reviewed  Constitutional:       General: She is not in acute distress  Appearance: Normal appearance  She is not ill-appearing or toxic-appearing  Comments: Elderly female appears younger than stated age   HENT:      Head: Normocephalic and atraumatic  Nose: Nose normal       Mouth/Throat:      Mouth: Mucous membranes are moist    Eyes:      General: No scleral icterus  Right eye: No discharge  Left eye: No discharge  Conjunctiva/sclera: Conjunctivae normal       Comments: Wearing glasses   Neck:      Comments: Phonation normal   Cardiovascular:      Rate and Rhythm: Normal rate  Pulses: Normal pulses  Pulmonary:      Effort: Pulmonary effort is normal  No respiratory distress  Breath sounds: No wheezing  Comments: Currently saturating well on room air (O2 via NC listed in vitals flowsheet but is currently on room air) with no increased work of breathing or accessory muscle use noted  Abdominal:      Comments: Soft, sore with palpation    Musculoskeletal:      Cervical back: Neck supple  Right lower leg: No edema  Comments: Mild L ankle edema (chronic per pt)    Mildly reduced overall muscle mass   Skin:     General: Skin is warm and dry  Neurological:      Mental Status: She is alert  Mental status is at baseline  Comments: Awake and alert, oriented, answers questions appropriately   Psychiatric:         Mood and Affect: Mood normal          Behavior: Behavior normal       Comments: Pleasant and cooperative        Lab Results:     I have personally reviewed pertinent lab results  I have personally reviewed the pertinent imaging study reports in PACS      Therapies:   PT: pending  OT: pending    VTE Prophylaxis: Heparin    Code Status: Level 1 - Full Code  Advance Directive and Living Will: Yes    Power of :    POLST:      Family and Social Support:   Living Arrangements: Lives Alone  Support Systems: Self; Spouse/significant other; Son; Daughter  Assistance Needed: n/a  Type of Current Residence: Private residence  [address]: No    Goals of Care: pain control

## 2023-04-14 NOTE — PHYSICAL THERAPY NOTE
Physical Therapy Evaluation     Patient's Name: Fredis Elliott    Admitting Diagnosis  Cancer of ascending colon ([facility] ) [C18 2]    Problem List  Patient Active Problem List   Diagnosis    De Quervain's tenosynovitis, right    Diastolic dysfunction    Dyslipidemia    Esophageal reflux    Glaucoma    Hypertension    Impaired fasting glucose    Insomnia    Left ventricular hypertrophy    Mixed urge and stress incontinence    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 tuberculosis    Thyroid nodule    Peripheral arterial disease ([place] [address] )    Malignant melanoma of torso excluding breast ([place] [address] )    Hyperparathyroidism ([place] [address] )    Hypothyroidism    Stage 3 chronic kidney disease, unspecified whether stage 3a or 3b CKD ([place] [address] )    Primary adenocarcinoma of ascending colon ([place] [address] )    Encounter for geriatric assessment    Hypoalbuminemia    Anemia    Iron deficiency anemia       Past Medical History  Past Medical History:   Diagnosis Date    BCC (basal cell carcinoma)     Diastolic dysfunction     Dyslipidemia     Dysphagia     Glaucoma     Hashimoto's thyroiditis     Hyperparathyroidism ([place] [address] )     Hypertension     Impaired fasting glucose     Insomnia     Osteopenia     SCCA (squamous cell carcinoma) of skin        Past Surgical History  Past Surgical History:   Procedure Laterality Date    CATARACT EXTRACTION, BILATERAL      COLONOSCOPY      Complete, Onset - 10/25/05 - 10 years     HYSTERECTOMY      PARATHYROIDECTOMY      1st 1985 and 2nd 2002    [place] LAPAROSCOPY COLECTOMY PARTIAL W/ANASTOMOSIS N/A 4/13/2023    Procedure: RESECTION COLON RIGHT LAPAROSCOPIC;  Surgeon: Claudean Snowman, MD;  [facility];  Service: Colorectal    [place] [facility],2 And 3 S Floors WRST SURG W/RLS TRANSVRS CARPL LIGM Right 05/02/2016    Procedure: RELEASE CARPAL TUNNEL ENDOSCOPIC;  Surgeon: Saw Armas MD;  [facility];  Service: Orthopedics    TONSILLECTOMY          04/14/23 0912   PT Last Visit   PT Visit Date 04/14/23   Note Type   Note type Evaluation   Pain Assessment   Pain Assessment Tool 0-10   Pain Score 2   Pain Location/Orientation Location: Abdomen   Restrictions/Precautions   Weight Bearing Precautions Per Order No   Other Precautions Multiple lines; Fall Risk;Pain   Home Living   Type of [address] One level   Bathroom Shower/Tub Tub/shower unit   Bathroom Toilet Standard   Bathroom Equipment Grab bars in shower; Shower chair   Home Equipment Walker;Cane   Additional Comments Pt lives alone in [facility], denies using any DME  Pt plans to stay with daughter post dc, ranch home  Prior Function   Level of [place] Independent with ADLs; Independent with functional mobility; Independent with IADLS   Lives With Alone   Receives Help From Family   IADLs Independent with driving; Independent with meal prep; Independent with medication management   Falls in the last 6 months 1 to 4   Vocational Retired   Comments Pt states was Ind for [facility] ADLs   Cognition   Overall Cognitive Status WFL   Arousal/Participation Alert   Attention Within functional limits   Orientation Level Oriented X4   Memory Within functional limits   Following Commands Follows all commands and directions without difficulty   Comments Pt cooperative during session   RLE Assessment   RLE Assessment WFL   LLE Assessment   LLE Assessment WFL   Bed Mobility   Supine to Sit 4  Minimal assistance   Additional items Assist x 1;HOB elevated; Increased time required;Verbal cues;LE management   Sit to Supine Unable to assess   Additional Comments Pt noted to be in bed upon arrival    Transfers   Sit to Stand 5  Supervision   Additional items Increased time required   Stand to Sit 5  Supervision   Additional items Increased time required;Verbal cues   Additional Comments with RW   Ambulation/Elevation   Gait pattern WNL   Gait Assistance 5  Supervision   Assistive Device Rolling walker   Distance 150'   Balance   Static Sitting Fair +   Dynamic Sitting Fair   Static Standing Fair   Dynamic Standing [address]4, & 5 -   Activity Tolerance   Activity Tolerance Patient tolerated treatment well   Medical Staff Made Aware Co-eval with OT 2* medical complexity   Nurse Made Aware RN cleared pt for therapy   Assessment   Prognosis Good   Problem List Decreased endurance;Decreased mobility;Pain   Assessment Pt is a 80 y o  female seen for PT evaluation s/p admit to [facility] on 4/13/2023  Pt was admitted with a primary dx of:colon cancer s/p Lap R colon resection, ileum to mid-transverse, ileocolic side-to-side, functional end-to-end stapled anastomosis 4/14   PT now consulted for assessment of mobility and d/c needs  Pt with Ambulate orders  Pts current comorbidities effecting treatment include: Glaucoma, HTN, HTN, Lt ventricular hypertrophy, CKD  Pts current clinical presentation is Unstable/ Unpredictable (high complexity) due to Ongoing medical management for primary dx, Increased reliance on more restrictive AD compared to baseline, Increased assistance needed from caregiver at current time, s/p surgical intervention  Prior to admission, pt was Independent for mobility and ADLs, denies use of any DME  Upon evaluation, pt currently is requiring Min A X 1 for bed mobility; supervision for transfers; supervision for ambulation 150' ft w/ RW  Pt with good mobility at the time of PT eval, encourage continued mobility with staff  At conclusion of PT session all needs in reach, RN notified of session findings/recommendations and pt returned back in recliner chair with phone and call bell within reach  Pt denies any further questions at this time  The patient's AM-PAC Basic Mobility Inpatient Short Form Raw Score is  17  A Raw score of greater than 16 suggests the patient may benefit from discharge to home  Please also refer to the recommendation of the Physical Therapist for safe discharge planning  Recommend HHPT with family assist  upon hospital D/C   Pt states plans to stay with daughter post d/c, [employer], pt reports daughter works but will be taking time off  Goals   Patient Goals to walk   STG Expiration Date 04/28/23   Short Term Goal #1 STG 1  Pt will be able to perform bed mobility tasks with Mod Ind in order to improve overall functional mobility and assist in safe d/c  STG 2  Pt with sit EOB for at least 25 minutes at Ind level in order to strengthen abdominal musculature and assist in future transfers/ ambulation  STG 3  Pt will be able to perform functional transfer with Mod Ind in order to improve overall functional mobility and assist in safe d/c  STG 4  Pt will be able to ambulate at least 300 feet with least restrictive device with Mod Ind A in order to improve overall functional mobility and assist in safe d/c  STG 5  Pt will improve sitting/standing static/dynamic balance 1/2 grade in order to improve functional mobility and assist in safe d/c  STG 6  Pt will improve LE strength by 1/2 grade in order to improve functional mobility and assist in safe d/c  STG 7   Pt will be able to negotiate at least 2 stairs with least restrictive device with supervision A in order to improve overall functional mobility and assist in safe d/c    Plan   Treatment/Interventions Elevations;Gait training;Bed mobility;OT;Spoke to nursing   PT Frequency 2-3x/wk   Recommendation   PT Discharge Recommendation Home with home health rehabilitation  (with increased caregiver support)   Equipment Recommended Walker   AM-PAC Basic Mobility Inpatient   Turning in Flat Bed Without Bedrails 3   Lying on Back to Sitting on Edge of Flat Bed Without Bedrails 3   Moving Bed to Chair 3   Standing Up From Chair Using Arms 3   Walk in Room 3   Climb 3-5 Stairs With Railing 2   Basic Mobility Inpatient Raw Score 17   Basic Mobility Standardized Score 39 67   Highest Level Of Mobility   -HLM Goal 5: Stand one or more mins   -HLM Achieved 7: Walk 25 feet or more   Modified [place] Scale   Modified [place] Scale 3   End of Consult   Patient Position at End of Consult Bedside chair; All needs within reach           Bobby Vides, PT DPT

## 2023-04-14 NOTE — PLAN OF CARE
Problem: MOBILITY - ADULT  Goal: Maintain or return to baseline ADL function  Description: INTERVENTIONS:  -  Assess patient's ability to carry out ADLs; assess patient's baseline for ADL function and identify physical deficits which impact ability to perform ADLs (bathing, care of mouth/teeth, toileting, grooming, dressing, etc )  - Assess/evaluate cause of self-care deficits   - Assess range of motion  - Assess patient's mobility; develop plan if impaired  - Assess patient's need for assistive devices and provide as appropriate  - Encourage maximum independence but intervene and supervise when necessary  - Involve family in performance of ADLs  - Assess for home care needs following discharge   - Consider OT consult to assist with ADL evaluation and planning for discharge  - Provide patient education as appropriate  Outcome: Progressing  Goal: Maintains/Returns to pre admission functional level  Description: INTERVENTIONS:  - Perform BMAT or MOVE assessment daily    - Set and communicate daily mobility goal to care team and patient/family/caregiver     - Collaborate with rehabilitation services on mobility goals if consulted  - Stand patient 3 times a day  - Ambulate patient 3 times a day  - Out of bed to chair 3 times a day   - Out of bed for meals 3 times a day  - Out of bed for toileting  - Record patient progress and toleration of activity level   Outcome: Progressing     Problem: PAIN - ADULT  Goal: Verbalizes/displays adequate comfort level or baseline comfort level  Description: Interventions:  - Encourage patient to monitor pain and request assistance  - Assess pain using appropriate pain scale  - Administer analgesics based on type and severity of pain and evaluate response  - Implement non-pharmacological measures as appropriate and evaluate response  - Consider cultural and social influences on pain and pain management  - Notify physician/advanced practitioner if interventions unsuccessful or patient reports new pain  Outcome: Progressing     Problem: INFECTION - ADULT  Goal: Absence or prevention of progression during hospitalization  Description: INTERVENTIONS:  - Assess and monitor for signs and symptoms of infection  - Monitor lab/diagnostic results  - Monitor all insertion sites, i e  indwelling lines, tubes, and drains  - Monitor endotracheal if appropriate and nasal secretions for changes in amount and color  - [place] appropriate cooling/warming therapies per order  - Administer medications as ordered  - Instruct and encourage patient and family to use good hand hygiene technique  - Identify and instruct in appropriate isolation precautions for identified infection/condition  Outcome: Progressing  Goal: Absence of fever/infection during neutropenic period  Description: INTERVENTIONS:  - Monitor WBC    Outcome: Progressing     Problem: SAFETY ADULT  Goal: Maintain or return to baseline ADL function  Description: INTERVENTIONS:  -  Assess patient's ability to carry out ADLs; assess patient's baseline for ADL function and identify physical deficits which impact ability to perform ADLs (bathing, care of mouth/teeth, toileting, grooming, dressing, etc )  - Assess/evaluate cause of self-care deficits   - Assess range of motion  - Assess patient's mobility; develop plan if impaired  - Assess patient's need for assistive devices and provide as appropriate  - Encourage maximum independence but intervene and supervise when necessary  - Involve family in performance of ADLs  - Assess for home care needs following discharge   - Consider OT consult to assist with ADL evaluation and planning for discharge  - Provide patient education as appropriate  Outcome: Progressing  Goal: Maintains/Returns to pre admission functional level  Description: INTERVENTIONS:  - Perform BMAT or MOVE assessment daily    - Set and communicate daily mobility goal to care team and patient/family/caregiver     - Collaborate with rehabilitation services on mobility goals if consulted  - Stand patient 3 times a day  - Ambulate patient 3 times a day  - Out of bed to chair 3 times a day   - Out of bed for meals 3 times a day  - Out of bed for toileting  - Record patient progress and toleration of activity level   Outcome: Progressing  Goal: Patient will remain free of falls  Description: INTERVENTIONS:  - Educate patient/family on patient safety including physical limitations  - Instruct patient to call for assistance with activity   - Consult OT/PT to assist with strengthening/mobility   - Keep Call bell within reach  - Keep bed low and locked with side rails adjusted as appropriate  - Keep care items and personal belongings within reach  - Initiate and maintain comfort rounds  - Make Fall Risk Sign visible to staff  - Offer Toileting every 2 Hours, in advance of need  - Initiate/Maintain bed/chair alarm  - Apply yellow socks and bracelet for high fall risk patients  - Consider moving patient to room near nurses station  Outcome: Progressing     Problem: DISCHARGE PLANNING  Goal: Discharge to home or other facility with appropriate resources  Description: INTERVENTIONS:  - Identify barriers to discharge w/patient and caregiver  - Arrange for needed discharge resources and transportation as appropriate  - Identify discharge learning needs (meds, wound care, etc )  - Arrange for interpretive services to assist at discharge as needed  - Refer to Case Management Department for coordinating discharge planning if the patient needs post-hospital services based on physician/advanced practitioner order or complex needs related to functional status, cognitive ability, or social support system  Outcome: Progressing     Problem: Knowledge Deficit  Goal: Patient/family/caregiver demonstrates understanding of disease process, treatment plan, medications, and discharge instructions  Description: Complete learning assessment and assess knowledge base   Interventions:  - Provide teaching at level of understanding  - Provide teaching via preferred learning methods  Outcome: Progressing

## 2023-04-14 NOTE — OCCUPATIONAL THERAPY NOTE
Occupational Therapy Evaluation     Patient Name: Vi WALKER Date: 4/14/2023  Problem List  Active Problems:    * No active hospital problems  *    Past Medical History  Past Medical History:   Diagnosis Date    BCC (basal cell carcinoma)     Diastolic dysfunction     Dyslipidemia     Dysphagia     Glaucoma     Hashimoto's thyroiditis     Hyperparathyroidism ([address] )     Hypertension     Impaired fasting glucose     Insomnia     Osteopenia     SCCA (squamous cell carcinoma) of skin      Past Surgical History  Past Surgical History:   Procedure Laterality Date    CATARACT EXTRACTION, BILATERAL      COLONOSCOPY      Complete, Onset - 10/25/05 - 10 years     HYSTERECTOMY      PARATHYROIDECTOMY      1st 1985 and 2nd 2002    [place] LAPAROSCOPY COLECTOMY PARTIAL W/ANASTOMOSIS N/A 4/13/2023    Procedure: RESECTION COLON RIGHT LAPAROSCOPIC;  Surgeon: Nuzhat Beatty MD;  Location: [facility];  Service: Colorectal    [place] [facility],2 And 3 S Floors WRST SURG W/RLS TRANSVRS CARPL LIGM Right 05/02/2016    Procedure: RELEASE CARPAL TUNNEL ENDOSCOPIC;  Surgeon: Brittney Dietrich MD;  [facility];  Service: Orthopedics    TONSILLECTOMY           04/14/23 0909   OT Last Visit   OT Visit Date 04/14/23   Note Type   Note type Evaluation   Pain Assessment   Pain Assessment Tool 0-10   Pain Score 2   Pain Location/Orientation Orientation: Bilateral;Location: Abdomen   Hospital Pain Intervention(s) Repositioned; Ambulation/increased activity   Restrictions/Precautions   Weight Bearing Precautions Per Order No   Other Precautions Fall Risk;Pain;Multiple lines  (stephens, IV)   Home Living   Type of [address] [place] [address] One level   Bathroom Shower/Tub Tub/shower unit   Bathroom Toilet Standard   Bathroom Equipment Grab bars in shower; Shower chair   Home Equipment Walker;Cane   Additional Comments Pt lives alone in a [facility] with 4 FARAZ  Pt plans to DC home with daughter with a first floor set up  Pt's daughter does work     Prior Function Level of [place] Independent with ADLs; Independent with functional mobility; Independent with IADLS   Lives With Alone   Receives Help From Family  (local son and local daughter)   IADLs Independent with driving; Independent with meal prep; Independent with medication management   Falls in the last 6 months 1 to 4   Vocational Retired   Comments PTA, Pt reports being I with ADLs/IADLs/no AD/ +  Lifestyle   Autonomy I with ADLs/IADLs/no AD/ +   Reciprocal Relationships Supportive children   Service to Others Retired   [address] Enjoys play cards, reading, and doing puzzles   ADL   Where Assessed Edge of bed   Eating Assistance 7  Independent   Grooming Assistance 5  [address] [facility] 5  Supervision/Setup   LB Bathing Assistance 4  Minimal Assistance   [address] [facility] 5  Supervision/Setup   LB Dressing Assistance 4  [address] [facility] 4  Minimal Assistance   Functional Deficit Setup;Supervision/safety; Increased time to complete   Bed Mobility   Supine to Sit 4  Minimal assistance   Additional items Assist x 1; Increased time required;Verbal cues;LE management   Sit to Supine Unable to assess   Additional Comments Pt greeted supine in bed     Transfers   Sit to Stand 5  Supervision   Additional items Increased time required;Verbal cues   Stand to Sit 5  Supervision   Additional items Increased time required;Verbal cues   Additional Comments with RW   Functional Mobility   Functional Mobility 5  Supervision   Additional Comments S with functional mobility with RW- household distances   Additional items Rolling walker   Balance   Static Sitting Fair +   Dynamic Sitting Fair   Static Standing Fair   Dynamic Standing Fair -   Ambulatory Fair -   Activity Tolerance   Activity Tolerance Patient tolerated treatment well   Medical Staff Made Aware Co-eval with "PT 2* to Pt's medical complexity and decreased endurance  Nurse Made Aware RN cleared/updated  RUE Assessment   RUE Assessment WFL   LUE Assessment   LUE Assessment WFL   Hand Function   Gross Motor Coordination Functional   Fine Motor Coordination Functional   Sensation   Light Touch No apparent deficits   Psychosocial   Psychosocial (WDL) WDL   Cognition   Overall Cognitive Status WFL   Arousal/Participation Alert; Cooperative   Attention Within functional limits   Orientation Level Oriented X4   Memory Within functional limits   Following Commands Follows all commands and directions without difficulty   Comments Pt very pleasant and cooperative during OT session  Assessment   Limitation Decreased ADL status; Decreased UE ROM; Decreased UE strength;Decreased Safe judgement during ADL;Decreased endurance;Decreased cognition;Decreased high-level ADLs; Decreased self-care trans   Prognosis Good   Assessment Pt is a 80 y o  Female who [facility] [place] [facility] with cancer of ascending colon  Pt s/p \" Lap R colon resection, ileum to mid-transverse, ileocolic side-to-side, functional end-to-end stapled anastomosis 4/14  \" Pt  has a past medical history of BCC (basal cell carcinoma), Diastolic dysfunction, Dyslipidemia, Dysphagia, Glaucoma, Hashimoto's thyroiditis, Hyperparathyroidism ([address] ), Hypertension, Impaired fasting glucose, Insomnia, Osteopenia, and SCCA (squamous cell carcinoma) of skin  Pt greeted bedside for OT evaluation on 4/14/2023  Pt lives alone in a [facility] with 4 [address]  Pt plans to DC home with daughter with a first floor set up  Pt's daughter does work  PTA, Pt reports being I with ADLs/IADLs/no AD/ +  Pt demonstrating the following occupational deficits: S with UB ADLs, min A with LB ADLs, min A with bed mobility, S with functional transfers, and S with functional mobility with RW   Limitations that impact functional performance include decreased ADL status, decreased UE ROM, decreased UE strength, " decreased safe judgement during ADLs, decreased cognition, decreased endurance, decreased self care transfers, decreased high level ADLs and pain  Occupational performance areas to address ADL retraining, functional transfer training, UE strengthening/ROM, endurance training, cognitive reorientation, Pt/caregiver education, equipment evaluation/education, compensatory technique education, energy conservation and activity engagement   Pt would benefit from continued skilled OT services while in hospital to maximize independence with ADLs  Will continue to follow Pt's progress  Pt would benefit from returning home with increased assist from family upon DC to maximize safety and independence with ADLs and functional tasks of choice  Goals   Patient Goals To walk  LTG Time Frame 10-14   Long Term Goal #1 See goals listed below  Plan   Treatment Interventions Functional transfer training;ADL retraining; Endurance training;UE strengthening/ROM; Patient/family training;Cognitive reorientation;Equipment evaluation/education; Compensatory technique education; Energy conservation; Activityengagement   Goal Expiration Date 04/28/23   OT Frequency 2-3x/wk   Recommendation   OT Discharge Recommendation No rehabilitation needs  (home with support of family)   Additional Comments  The patient's raw score on the AM-PAC Daily Activity Inpatient Short Form is 19  A raw score of greater than or equal to 19 suggests the patient may benefit from discharge to home  Please refer to the recommendation of the Occupational Therapist for safe discharge planning     AM-PAC Daily Activity Inpatient   Lower Body Dressing 3   Bathing 3   Toileting 3   Upper Body Dressing 3   Grooming 3   Eating 4   Daily Activity Raw Score 19   Daily Activity Standardized Score (Calc for Raw Score >=11) 40 22   AM-[facility] Applied Cognition Inpatient   Following a Speech/Presentation 4   Understanding Ordinary Conversation 4   Taking Medications 4   Remembering Where Things Are Placed or Put Away 4   Remembering List of 4-5 Errands 4   Taking Care of Complicated Tasks 4   Applied Cognition Raw Score 24   Applied Cognition Standardized Score 62 21   End of Consult   Education Provided Yes   Patient Position at End of Consult Bedside chair; All needs within reach   Nurse Communication Nurse aware of consult       Goals:  Pt will complete UB ADLs with I in order to maximize participation with ADLs  Pt will complete LB ADLs with I in order to maximize safety with ADLs  Pt will complete toileting routine (transfer, hygiene, and clothing management) with I in order to return to prior level of function  Pt will complete bed mobility with I in order to maximize participation with ADLs  Pt will complete functional transfers at I level in order to increase participation with ADLs  Pt will increase dynamic standing balance to F+ in order to increase safety with ADLs  Pt will increase standing tolerance x10 min in order to increase participation with ADLs  Pt will complete functional mobility with AD PRN for item retrieval task at [place] level in order to increase participation with ADLs  Pt will complete IADL tasks/simulation of IADLs tasks with I in order to return to PLOF  Pt will demonstrate G energy conservation techniques with ADLs/IADLs in order to reduce the risk of falls  Pt will be attentive 100% of the time for ongoing functional/formal cognitive assessment to assist with safe dc planning prn        Zakiya Persaud MS, OTR/L

## 2023-04-14 NOTE — PROGRESS NOTES
"Progress Note -Colorectal surgery   Carlyn Boothe 80 y o  female MRN: [id number]  [facility] Encounter: [id number]    Assessment:  25-year-old female with colon cancer s/p Lap R colon resection, ileum to mid-transverse, ileocolic side-to-side, functional end-to-end stapled anastomosis 4/14    UOP: 750 cc clear yellow urine    Plan:  - NPO with sips, advance as tolerated  - LR at 125  - stephens in place, plan to remove today  - strict I/Os  - Encourage out of bed and ambulation and incentive spirometer use   - PT/OT eval   - appreciate input from gerontology, nephrology, nutrition  - Please TigerText the surgery resident or AP role with any questions  Subjective/Objective   Subjective  No acute events overnight  Tolerating sips of water without n/v  Not passing flatus or having BMs  Objective:     Blood pressure 152/58, pulse 69, temperature 97 5 °F (36 4 °C), temperature source Oral, resp  rate 18, height 4' 9\" (1 448 m), weight 52 6 kg (116 lb), SpO2 100 %  ,Body mass index is 25 1 kg/m²  Intake/Output Summary (Last 24 hours) at 4/14/2023 0019  Last data filed at 4/13/2023 2112  Gross per 24 hour   Intake 2000 ml   Output 350 ml   Net 1650 ml       Invasive Devices     Peripheral Intravenous Line  Duration           Peripheral IV 04/13/23 Left Antecubital <1 day    Peripheral IV 04/13/23 Left Hand <1 day          Drain  Duration           Urethral Catheter Latex;Straight-tip 16 Fr  <1 day                Physical Exam:  General: No acute distress  Neuro: alert and oriented  HEENT: moist mucous membranes  CV: Well perfused, regular rate and rhythm  Lungs: Normal work of breathing, no increased respiratory effort  Abdomen: Soft, appropriately tender, non-distended  Incisions clean, dry and intact  Extremities: No edema, clubbing or cyanosis  Skin: Warm, dry    Lab, Imaging and other studies:I have personally reviewed pertinent lab results      VTE Pharmacologic Prophylaxis: Heparin  VTE Mechanical " Prophylaxis: sequential compression device    Loredo Lips

## 2023-04-14 NOTE — APP STUDENT NOTE
IGOR STUDENT  Inpatient Progress Note for TRAINING ONLY  Not Part of Legal Medical Record       H&P Exam - Joy Hightower 80 y o  female MRN: [id number]    Unit/Bed#: [facility] 827-01 Encounter: [id number]    Summary: This is a 80year old female patient with PMH of dyslipidemia, HTN, left ventricular hypertrophy, hyperparathyroidism, Hashimoto's, glaucoma, hypoalbuminemia, anemia, CKD stage III, and primary adenocarcinoma of ascending colon postop day 1 status post laparoscopic right colon resection by Dr Valorie Rodarte  Nephrology consulted for recommendations  Assessment:    1- Chronic kidney disease stage III, likely due to age-related nephron loss  - Stable eGFR at 60 prior to admission  - 3/9/2023: Creatinine 0 84, BUN 26  - 4/14: Creatinine 0 81, BUN 18, eGFR 62  - Plan is to just monitor kidney function for now    2- Primary adenocarcinoma of ascending colon  - Underwent laparoscopic resection of right colon on 4/13, removing ileum to mid-transverse, ileocolic side-to-side, and functional end-to-end stapled anastomosis  - On 3/7, Dr Valorie Rodarte noted CT showed no obvious metastatic disease but there were some small liver and kidney findings for follow-up   Laparoscopy on 4/13 revealed some omental adhesions to anterior abdominal wall  - Patient currently on NPO with sips, advance as tolerated  - Bobby in place, plan to remove today  - Strict I/Os  - Pain from surgery being managed under Geriatric pain protocol:   Tylenol 975mg Q8H scheduled  Roxicodone 2 5mg Q4H PRN moderate pain  Roxicodone 5mg Q4H PRN severe pain   Dilaudid 0 2mg Q3H PRN    3- Volume  - Did not see any signs of volume overload    4- Electrolytes  - Monitoring for right now  - 4/14: Phosphorous 4 3    5- Acid/base  - Monitoring for right now    6- Anemia  - 3/20: Hgb 8 8  - 4/14: Hgb 9 4    7- Hypoalbuminemia  - 3/9: Albumin low at 2 6    8- Essential hypertension  - Patient reports BP usually well-controlled with meds (/50 on 2/2/2023)  - At 3:26 PM 4/13, BP zen to 186/60, but is currently 153/55 and has been consistent  Plan:    History of Present Illness     Patient underwent laparoscopic resection of the right colon yesterday (April 13) with no acute complications  Had some abdominal tenderness post procedure in area of surgery, which is currently being managed with pain medications  Patient denies any fever, chills, nausea, vomiting, constipation, or diarrhea, and urine appeared yellow  No new complaints        ROS:     Historical Information   Past Medical History:   Diagnosis Date   • BCC (basal cell carcinoma)    • Diastolic dysfunction    • Dyslipidemia    • Dysphagia    • Glaucoma    • Hashimoto's thyroiditis    • Hyperparathyroidism ([address] )    • Hypertension    • Impaired fasting glucose    • Insomnia    • Osteopenia    • SCCA (squamous cell carcinoma) of skin      Past Surgical History:   Procedure Laterality Date   • CATARACT EXTRACTION, BILATERAL     • COLONOSCOPY      Complete, Onset - 10/25/05 - 10 years    • HYSTERECTOMY     • PARATHYROIDECTOMY      1st 1985 and 2nd 2002   • VT LAPAROSCOPY COLECTOMY PARTIAL W/ANASTOMOSIS N/A 4/13/2023    Procedure: RESECTION COLON RIGHT LAPAROSCOPIC;  Surgeon: Candy Morales MD;  [facility];  Service: Colorectal   • VT NDSC WRST SURG W/RLS TRANSVRS CARPL LIGM Right 05/02/2016    Procedure: RELEASE CARPAL TUNNEL ENDOSCOPIC;  Surgeon: Kamilah Cartagena MD;  Location: [facility];  Service: Orthopedics   • TONSILLECTOMY       Social History   Social History     Substance and Sexual Activity   Alcohol Use Yes    Comment: rarely, social      Social History     Substance and Sexual Activity   Drug Use No     Social History     Tobacco Use   Smoking Status Never   Smokeless Tobacco Never     Family History:     Meds/Allergies     Per most recent PCP documentation:    Levothyrioxine 88mcg daily  Ferrous sulfate 324mg daily  Metoprolol Tartrate 25mg daily  Lisinopril 10mg daily "  Atorvastatin 20mg daily  Amlodipine 5mg daily     Allergies   Allergen Reactions   • Cyclogyl [Cyclopentolate Hcl]    • Phenylephrine Other (See Comments)     Eye gtts red eye infected sore   • Pred Forte [Prednisolone Acetate]        Objective   First Vitals:   Blood Pressure: 139/65 (04/13/23 1048)  Pulse: 70 (04/13/23 1048)  Temperature: 97 6 °F (36 4 °C) (04/13/23 1048)  Temp Source: Temporal (04/13/23 1048)  Respirations: 18 (04/13/23 1048)  Height: 4' 9\" (144 8 cm) (04/03/23 0951)  Weight - Scale: 52 6 kg (116 lb) (04/03/23 0951)  SpO2: 100 % (04/13/23 1048)    Current Vitals:   Blood Pressure: 153/55 (04/14/23 0821)  Pulse: 74 (04/14/23 0821)  Temperature: (!) 97 2 °F (36 2 °C) (04/14/23 0821)  Temp Source: Oral (04/13/23 2229)  Respirations: 16 (04/14/23 0821)  Height: 4' 9\" (144 8 cm) (04/13/23 1048)  Weight - Scale: 52 6 kg (116 lb) (04/13/23 1048)  SpO2: 97 % (04/14/23 0821)      Intake/Output Summary (Last 24 hours) at 4/14/2023 1037  Last data filed at 4/14/2023 4544  Gross per 24 hour   Intake 4006 25 ml   Output 850 ml   Net 3156 25 ml       Invasive Devices     Peripheral Intravenous Line  Duration           Peripheral IV 04/13/23 Left Antecubital <1 day    Peripheral IV 04/13/23 Left Hand <1 day          Drain  Duration           Urethral Catheter Latex;Straight-tip 16 Fr  <1 day                Physical Exam:     General: In no acute distress  Skin: no rash  Eyes: anicteric sclera  ENT: moist mucous membranes  Pulm: no ronchii, wheezes, or rales  Cardio: normal S1, S2, no murmurs, gallops, or rubs  Abdomen: soft, nontender, normal bowel sounds  Neuro: alert and oriented to person, place, and time  Psych: normal affect  "

## 2023-04-15 LAB
ABO GROUP BLD BPU: NORMAL
ABO GROUP BLD BPU: NORMAL
ANION GAP SERPL CALCULATED.3IONS-SCNC: 1 MMOL/L (ref 4–13)
BPU ID: NORMAL
BPU ID: NORMAL
BUN SERPL-MCNC: 14 MG/DL (ref 5–25)
CALCIUM SERPL-MCNC: 9.5 MG/DL (ref 8.3–10.1)
CHLORIDE SERPL-SCNC: 112 MMOL/L (ref 96–108)
CO2 SERPL-SCNC: 26 MMOL/L (ref 21–32)
CREAT SERPL-MCNC: 0.92 MG/DL (ref 0.6–1.3)
CROSSMATCH: NORMAL
CROSSMATCH: NORMAL
GFR SERPL CREATININE-BSD FRML MDRD: 53 ML/MIN/1.73SQ M
GLUCOSE SERPL-MCNC: 134 MG/DL (ref 65–140)
PHOSPHATE SERPL-MCNC: 1.8 MG/DL (ref 2.3–4.1)
POTASSIUM SERPL-SCNC: 4.4 MMOL/L (ref 3.5–5.3)
SODIUM SERPL-SCNC: 139 MMOL/L (ref 135–147)
UNIT DISPENSE STATUS: NORMAL
UNIT DISPENSE STATUS: NORMAL
UNIT PRODUCT CODE: NORMAL
UNIT PRODUCT CODE: NORMAL
UNIT PRODUCT VOLUME: 350 ML
UNIT PRODUCT VOLUME: 350 ML
UNIT RH: NORMAL
UNIT RH: NORMAL

## 2023-04-15 RX ADMIN — ACETAMINOPHEN 975 MG: 325 TABLET ORAL at 22:59

## 2023-04-15 RX ADMIN — ACETAMINOPHEN 975 MG: 325 TABLET ORAL at 05:23

## 2023-04-15 RX ADMIN — LEVOTHYROXINE SODIUM 88 MCG: 88 TABLET ORAL at 05:23

## 2023-04-15 RX ADMIN — Medication 25 MG: at 08:31

## 2023-04-15 RX ADMIN — HEPARIN SODIUM 5000 UNITS: 5000 INJECTION INTRAVENOUS; SUBCUTANEOUS at 08:31

## 2023-04-15 RX ADMIN — AMLODIPINE BESYLATE 5 MG: 5 TABLET ORAL at 08:31

## 2023-04-15 RX ADMIN — HEPARIN SODIUM 5000 UNITS: 5000 INJECTION INTRAVENOUS; SUBCUTANEOUS at 23:00

## 2023-04-15 RX ADMIN — SODIUM PHOSPHATE, MONOBASIC, MONOHYDRATE AND SODIUM PHOSPHATE, DIBASIC, ANHYDROUS 30 MMOL: 142; 276 INJECTION, SOLUTION INTRAVENOUS at 10:13

## 2023-04-15 RX ADMIN — DEXTROSE, SODIUM CHLORIDE, AND POTASSIUM CHLORIDE 84 ML/HR: 5; .45; .15 INJECTION INTRAVENOUS at 10:13

## 2023-04-15 NOTE — PROGRESS NOTES
"Progress Note -Colorectal surgery   Hugh Forbes 80 y o  female MRN: [id number]  [facility] Encounter: [id number]    Assessment:  55-year-old female with colon cancer s/p Lap R colon resection, ileum to mid-transverse, ileocolic side-to-side, functional end-to-end stapled anastomosis 4/14    UOP: 825cc (0 7mL/kg/hr)  Am labs pending  Yesterday cre 0 81, WBC 8 9, hgb 9 4    Plan:  - CLD tst crackers  - Ensures  - mIVF  - strict I/Os  - Encourage out of bed and ambulation and incentive spirometer use   - PT/OT eval   - appreciate input from gerontology, nephrology, nutrition  - Please TigerText the surgery resident or AP role with any questions  Subjective/Objective   Subjective  Patient seen and examined bedside  Endorses nausea overnight  No emesis  +burping  No flatus or BM  Objective:     Blood pressure 162/64, pulse 76, temperature (!) 97 °F (36 1 °C), resp  rate 16, height 4' 9\" (1 448 m), weight 52 6 kg (116 lb), SpO2 97 %  ,Body mass index is 25 1 kg/m²  Intake/Output Summary (Last 24 hours) at 4/15/2023 1020  Last data filed at 4/15/2023 0401  Gross per 24 hour   Intake 2467 08 ml   Output 825 ml   Net 1642 08 ml       Invasive Devices     Peripheral Intravenous Line  Duration           Peripheral IV 04/15/23 Right Antecubital <1 day                Physical Exam:  General - no acute distress, responsive and cooperative  CV - warm, regular rate  Pulm - normal work of breathing, no respiratory distress  Abd - soft, distended, incisions c/d/i  Neuro - m/s grossly intact, cn grossly intact  Ext - moving all extremities      Lab, Imaging and other studies:I have personally reviewed pertinent lab results      VTE Pharmacologic Prophylaxis: Heparin  VTE Mechanical Prophylaxis: sequential compression device      "

## 2023-04-15 NOTE — PLAN OF CARE
Problem: MOBILITY - ADULT  Goal: Maintain or return to baseline ADL function  Description: INTERVENTIONS:  -  Assess patient's ability to carry out ADLs; assess patient's baseline for ADL function and identify physical deficits which impact ability to perform ADLs (bathing, care of mouth/teeth, toileting, grooming, dressing, etc )  - Assess/evaluate cause of self-care deficits   - Assess range of motion  - Assess patient's mobility; develop plan if impaired  - Assess patient's need for assistive devices and provide as appropriate  - Encourage maximum independence but intervene and supervise when necessary  - Involve family in performance of ADLs  - Assess for home care needs following discharge   - Consider OT consult to assist with ADL evaluation and planning for discharge  - Provide patient education as appropriate  Outcome: Progressing  Goal: Maintains/Returns to pre admission functional level  Description: INTERVENTIONS:  - Perform BMAT or MOVE assessment daily    - Set and communicate daily mobility goal to care team and patient/family/caregiver     - Collaborate with rehabilitation services on mobility goals if consulted  - Stand patient 3 times a day  - Ambulate patient 3 times a day  - Out of bed to chair 3 times a day   - Out of bed for meals 3 times a day  - Out of bed for toileting  - Record patient progress and toleration of activity level   Outcome: Progressing     Problem: SAFETY ADULT  Goal: Maintain or return to baseline ADL function  Description: INTERVENTIONS:  -  Assess patient's ability to carry out ADLs; assess patient's baseline for ADL function and identify physical deficits which impact ability to perform ADLs (bathing, care of mouth/teeth, toileting, grooming, dressing, etc )  - Assess/evaluate cause of self-care deficits   - Assess range of motion  - Assess patient's mobility; develop plan if impaired  - Assess patient's need for assistive devices and provide as appropriate  - Encourage maximum independence but intervene and supervise when necessary  - Involve family in performance of ADLs  - Assess for home care needs following discharge   - Consider OT consult to assist with ADL evaluation and planning for discharge  - Provide patient education as appropriate  Outcome: Progressing  Goal: Maintains/Returns to pre admission functional level  Description: INTERVENTIONS:  - Perform BMAT or MOVE assessment daily    - Set and communicate daily mobility goal to care team and patient/family/caregiver     - Collaborate with rehabilitation services on mobility goals if consulted  - Stand patient 3 times a day  - Ambulate patient 3 times a day  - Out of bed to chair 3 times a day   - Out of bed for meals 3 times a day  - Out of bed for toileting  - Record patient progress and toleration of activity level   Outcome: Progressing  Goal: Patient will remain free of falls  Description: INTERVENTIONS:  - Educate patient/family on patient safety including physical limitations  - Instruct patient to call for assistance with activity   - Consult OT/PT to assist with strengthening/mobility   - Keep Call bell within reach  - Keep bed low and locked with side rails adjusted as appropriate  - Keep care items and personal belongings within reach  - Initiate and maintain comfort rounds  - Make Fall Risk Sign visible to staff  - Offer Toileting every 2 Hours, in advance of need  - Initiate/Maintain bed/chair alarm  - Apply yellow socks and bracelet for high fall risk patients  - Consider moving patient to room near nurses station  Outcome: Progressing     Problem: DISCHARGE PLANNING  Goal: Discharge to home or other facility with appropriate resources  Description: INTERVENTIONS:  - Identify barriers to discharge w/patient and caregiver  - Arrange for needed discharge resources and transportation as appropriate  - Identify discharge learning needs (meds, wound care, etc )  - Arrange for interpretive services to assist at discharge as needed  - Refer to Case Management Department for coordinating discharge planning if the patient needs post-hospital services based on physician/advanced practitioner order or complex needs related to functional status, cognitive ability, or social support system  Outcome: Progressing     Problem: Knowledge Deficit  Goal: Patient/family/caregiver demonstrates understanding of disease process, treatment plan, medications, and discharge instructions  Description: Complete learning assessment and assess knowledge base    Interventions:  - Provide teaching at level of understanding  - Provide teaching via preferred learning methods  Outcome: Progressing

## 2023-04-15 NOTE — PLAN OF CARE
Problem: MOBILITY - ADULT  Goal: Maintain or return to baseline ADL function  Description: INTERVENTIONS:  -  Assess patient's ability to carry out ADLs; assess patient's baseline for ADL function and identify physical deficits which impact ability to perform ADLs (bathing, care of mouth/teeth, toileting, grooming, dressing, etc )  - Assess/evaluate cause of self-care deficits   - Assess range of motion  - Assess patient's mobility; develop plan if impaired  - Assess patient's need for assistive devices and provide as appropriate  - Encourage maximum independence but intervene and supervise when necessary  - Involve family in performance of ADLs  - Assess for home care needs following discharge   - Consider OT consult to assist with ADL evaluation and planning for discharge  - Provide patient education as appropriate  Outcome: Progressing  Goal: Maintains/Returns to pre admission functional level  Description: INTERVENTIONS:  - Perform BMAT or MOVE assessment daily    - Set and communicate daily mobility goal to care team and patient/family/caregiver     - Collaborate with rehabilitation services on mobility goals if consulted  - Stand patient 3 times a day  - Ambulate patient 3 times a day  - Out of bed to chair 3 times a day   - Out of bed for meals 3 times a day  - Out of bed for toileting  - Record patient progress and toleration of activity level   Outcome: Progressing     Problem: PAIN - ADULT  Goal: Verbalizes/displays adequate comfort level or baseline comfort level  Description: Interventions:  - Encourage patient to monitor pain and request assistance  - Assess pain using appropriate pain scale  - Administer analgesics based on type and severity of pain and evaluate response  - Implement non-pharmacological measures as appropriate and evaluate response  - Consider cultural and social influences on pain and pain management  - Notify physician/advanced practitioner if interventions unsuccessful or patient reports new pain  Outcome: Progressing     Problem: INFECTION - ADULT  Goal: Absence or prevention of progression during hospitalization  Description: INTERVENTIONS:  - Assess and monitor for signs and symptoms of infection  - Monitor lab/diagnostic results  - Monitor all insertion sites, i e  indwelling lines, tubes, and drains  - Monitor endotracheal if appropriate and nasal secretions for changes in amount and color  - [place] appropriate cooling/warming therapies per order  - Administer medications as ordered  - Instruct and encourage patient and family to use good hand hygiene technique  - Identify and instruct in appropriate isolation precautions for identified infection/condition  Outcome: Progressing  Goal: Absence of fever/infection during neutropenic period  Description: INTERVENTIONS:  - Monitor WBC    Outcome: Progressing     Problem: SAFETY ADULT  Goal: Maintain or return to baseline ADL function  Description: INTERVENTIONS:  -  Assess patient's ability to carry out ADLs; assess patient's baseline for ADL function and identify physical deficits which impact ability to perform ADLs (bathing, care of mouth/teeth, toileting, grooming, dressing, etc )  - Assess/evaluate cause of self-care deficits   - Assess range of motion  - Assess patient's mobility; develop plan if impaired  - Assess patient's need for assistive devices and provide as appropriate  - Encourage maximum independence but intervene and supervise when necessary  - Involve family in performance of ADLs  - Assess for home care needs following discharge   - Consider OT consult to assist with ADL evaluation and planning for discharge  - Provide patient education as appropriate  Outcome: Progressing  Goal: Maintains/Returns to pre admission functional level  Description: INTERVENTIONS:  - Perform BMAT or MOVE assessment daily    - Set and communicate daily mobility goal to care team and patient/family/caregiver     - Collaborate with rehabilitation services on mobility goals if consulted  - Stand patient 3 times a day  - Ambulate patient 3 times a day  - Out of bed to chair 3 times a day   - Out of bed for meals 3 times a day  - Out of bed for toileting  - Record patient progress and toleration of activity level   Outcome: Progressing  Goal: Patient will remain free of falls  Description: INTERVENTIONS:  - Educate patient/family on patient safety including physical limitations  - Instruct patient to call for assistance with activity   - Consult OT/PT to assist with strengthening/mobility   - Keep Call bell within reach  - Keep bed low and locked with side rails adjusted as appropriate  - Keep care items and personal belongings within reach  - Initiate and maintain comfort rounds  - Make Fall Risk Sign visible to staff  - Offer Toileting every 2 Hours, in advance of need  - Initiate/Maintain bed/chair alarm  - Apply yellow socks and bracelet for high fall risk patients  - Consider moving patient to room near nurses station  Outcome: Progressing     Problem: DISCHARGE PLANNING  Goal: Discharge to home or other facility with appropriate resources  Description: INTERVENTIONS:  - Identify barriers to discharge w/patient and caregiver  - Arrange for needed discharge resources and transportation as appropriate  - Identify discharge learning needs (meds, wound care, etc )  - Arrange for interpretive services to assist at discharge as needed  - Refer to Case Management Department for coordinating discharge planning if the patient needs post-hospital services based on physician/advanced practitioner order or complex needs related to functional status, cognitive ability, or social support system  Outcome: Progressing     Problem: Knowledge Deficit  Goal: Patient/family/caregiver demonstrates understanding of disease process, treatment plan, medications, and discharge instructions  Description: Complete learning assessment and assess knowledge base   Interventions:  - Provide teaching at level of understanding  - Provide teaching via preferred learning methods  Outcome: Progressing     Problem: Nutrition/Hydration-ADULT  Goal: Nutrient/Hydration intake appropriate for improving, restoring or maintaining nutritional needs  Description: Monitor and assess patient's nutrition/hydration status for malnutrition  Collaborate with interdisciplinary team and initiate plan and interventions as ordered  Monitor patient's weight and dietary intake as ordered or per policy  Utilize nutrition screening tool and intervene as necessary  Determine patient's food preferences and provide high-protein, high-caloric foods as appropriate       INTERVENTIONS:  - Monitor oral intake, urinary output, labs, and treatment plans  - Assess nutrition and hydration status and recommend course of action  - Evaluate amount of meals eaten  - Assist patient with eating if necessary   - Allow adequate time for meals  - Recommend/ encourage appropriate diets, oral nutritional supplements, and vitamin/mineral supplements  - Order, calculate, and assess calorie counts as needed  - Recommend, monitor, and adjust tube feedings and TPN/PPN based on assessed needs  - Assess need for intravenous fluids  - Provide specific nutrition/hydration education as appropriate  - Include patient/family/caregiver in decisions related to nutrition  Outcome: Progressing     Problem: Prexisting or High Potential for Compromised Skin Integrity  Goal: Skin integrity is maintained or improved  Description: INTERVENTIONS:  - Identify patients at risk for skin breakdown  - Assess and monitor skin integrity  - Assess and monitor nutrition and hydration status  - Monitor labs   - Assess for incontinence   - Turn and reposition patient  - Assist with mobility/ambulation  - Relieve pressure over bony prominences  - Avoid friction and shearing  - Provide appropriate hygiene as needed including keeping skin clean and dry  - Evaluate need for skin moisturizer/barrier cream  - Collaborate with interdisciplinary team   - Patient/family teaching  - Consider wound care consult   Outcome: Progressing

## 2023-04-16 RX ORDER — LISINOPRIL 10 MG/1
10 TABLET ORAL DAILY
Status: DISCONTINUED | OUTPATIENT
Start: 2023-04-16 | End: 2023-04-17 | Stop reason: HOSPADM

## 2023-04-16 RX ADMIN — HEPARIN SODIUM 5000 UNITS: 5000 INJECTION INTRAVENOUS; SUBCUTANEOUS at 21:43

## 2023-04-16 RX ADMIN — ACETAMINOPHEN 975 MG: 325 TABLET ORAL at 05:19

## 2023-04-16 RX ADMIN — Medication 25 MG: at 08:32

## 2023-04-16 RX ADMIN — LISINOPRIL 10 MG: 10 TABLET ORAL at 09:29

## 2023-04-16 RX ADMIN — ACETAMINOPHEN 975 MG: 325 TABLET ORAL at 21:43

## 2023-04-16 RX ADMIN — AMLODIPINE BESYLATE 5 MG: 5 TABLET ORAL at 08:32

## 2023-04-16 RX ADMIN — HEPARIN SODIUM 5000 UNITS: 5000 INJECTION INTRAVENOUS; SUBCUTANEOUS at 08:32

## 2023-04-16 RX ADMIN — LEVOTHYROXINE SODIUM 88 MCG: 88 TABLET ORAL at 05:19

## 2023-04-16 NOTE — PROGRESS NOTES
"Progress Note -Colorectal surgery   Jonh Heart 80 y o  female MRN: [id number]  [facility] Encounter: [id number]    Assessment:  70-year-old female with colon cancer s/p Lap R colon resection, ileum to mid-transverse, ileocolic side-to-side, functional end-to-end stapled anastomosis 4/14    UOP: 1 5L    Plan:  - advance diet today from CLD/tst/crx  - cont ensures  - strict I/Os  - Encourage out of bed and ambulation and incentive spirometer use   - PT/OT eval   - appreciate input from gerontology, nephrology, nutrition  - consider starting lisinopril if SBP >155  - Please TigerText the surgery resident or AP role with any questions  Subjective/Objective   Subjective  No acute events overnight  Tolerating diet without nausea or vomiting, no pain  Had a BM yesterday    Objective:     Blood pressure 151/70, pulse 85, temperature (!) 97 2 °F (36 2 °C), resp  rate 18, height 4' 9\" (1 448 m), weight 52 6 kg (116 lb), SpO2 95 %  ,Body mass index is 25 1 kg/m²  Intake/Output Summary (Last 24 hours) at 4/16/2023 0700  Last data filed at 4/15/2023 2324  Gross per 24 hour   Intake 835 ml   Output 1475 ml   Net -640 ml       Invasive Devices     Peripheral Intravenous Line  Duration           Peripheral IV 04/15/23 Right Antecubital 1 day                Physical Exam:  General: No acute distress  Neuro: alert and oriented  HEENT: moist mucous membranes  CV: Well perfused, regular rate and rhythm  Lungs: Normal work of breathing, no increased respiratory effort  Abdomen: Soft, minimal incisional tenderness, non-distended  Incisions clean, dry and intact  Extremities: No edema, clubbing or cyanosis  Skin: Warm, dry      Lab, Imaging and other studies:I have personally reviewed pertinent lab results      VTE Pharmacologic Prophylaxis: Heparin  VTE Mechanical Prophylaxis: sequential compression device      "

## 2023-04-16 NOTE — PLAN OF CARE
Problem: MOBILITY - ADULT  Goal: Maintain or return to baseline ADL function  Description: INTERVENTIONS:  -  Assess patient's ability to carry out ADLs; assess patient's baseline for ADL function and identify physical deficits which impact ability to perform ADLs (bathing, care of mouth/teeth, toileting, grooming, dressing, etc )  - Assess/evaluate cause of self-care deficits   - Assess range of motion  - Assess patient's mobility; develop plan if impaired  - Assess patient's need for assistive devices and provide as appropriate  - Encourage maximum independence but intervene and supervise when necessary  - Involve family in performance of ADLs  - Assess for home care needs following discharge   - Consider OT consult to assist with ADL evaluation and planning for discharge  - Provide patient education as appropriate  Outcome: Progressing  Goal: Maintains/Returns to pre admission functional level  Description: INTERVENTIONS:  - Perform BMAT or MOVE assessment daily    - Set and communicate daily mobility goal to care team and patient/family/caregiver     - Collaborate with rehabilitation services on mobility goals if consulted  - Stand patient 3 times a day  - Ambulate patient 3 times a day  - Out of bed to chair 3 times a day   - Out of bed for meals 3 times a day  - Out of bed for toileting  - Record patient progress and toleration of activity level   Outcome: Progressing     Problem: PAIN - ADULT  Goal: Verbalizes/displays adequate comfort level or baseline comfort level  Description: Interventions:  - Encourage patient to monitor pain and request assistance  - Assess pain using appropriate pain scale  - Administer analgesics based on type and severity of pain and evaluate response  - Implement non-pharmacological measures as appropriate and evaluate response  - Consider cultural and social influences on pain and pain management  - Notify physician/advanced practitioner if interventions unsuccessful or patient reports new pain  Outcome: Progressing     Problem: INFECTION - ADULT  Goal: Absence or prevention of progression during hospitalization  Description: INTERVENTIONS:  - Assess and monitor for signs and symptoms of infection  - Monitor lab/diagnostic results  - Monitor all insertion sites, i e  indwelling lines, tubes, and drains  - Monitor endotracheal if appropriate and nasal secretions for changes in amount and color  - [place] appropriate cooling/warming therapies per order  - Administer medications as ordered  - Instruct and encourage patient and family to use good hand hygiene technique  - Identify and instruct in appropriate isolation precautions for identified infection/condition  Outcome: Progressing  Goal: Absence of fever/infection during neutropenic period  Description: INTERVENTIONS:  - Monitor WBC    Outcome: Progressing     Problem: SAFETY ADULT  Goal: Maintain or return to baseline ADL function  Description: INTERVENTIONS:  -  Assess patient's ability to carry out ADLs; assess patient's baseline for ADL function and identify physical deficits which impact ability to perform ADLs (bathing, care of mouth/teeth, toileting, grooming, dressing, etc )  - Assess/evaluate cause of self-care deficits   - Assess range of motion  - Assess patient's mobility; develop plan if impaired  - Assess patient's need for assistive devices and provide as appropriate  - Encourage maximum independence but intervene and supervise when necessary  - Involve family in performance of ADLs  - Assess for home care needs following discharge   - Consider OT consult to assist with ADL evaluation and planning for discharge  - Provide patient education as appropriate  Outcome: Progressing  Goal: Maintains/Returns to pre admission functional level  Description: INTERVENTIONS:  - Perform BMAT or MOVE assessment daily    - Set and communicate daily mobility goal to care team and patient/family/caregiver     - Collaborate with rehabilitation services on mobility goals if consulted  - Stand patient 3 times a day  - Ambulate patient 3 times a day  - Out of bed to chair 3 times a day   - Out of bed for meals 3 times a day  - Out of bed for toileting  - Record patient progress and toleration of activity level   Outcome: Progressing  Goal: Patient will remain free of falls  Description: INTERVENTIONS:  - Educate patient/family on patient safety including physical limitations  - Instruct patient to call for assistance with activity   - Consult OT/PT to assist with strengthening/mobility   - Keep Call bell within reach  - Keep bed low and locked with side rails adjusted as appropriate  - Keep care items and personal belongings within reach  - Initiate and maintain comfort rounds  - Make Fall Risk Sign visible to staff  - Offer Toileting every 2 Hours, in advance of need  - Initiate/Maintain bed/chair alarm  - Apply yellow socks and bracelet for high fall risk patients  - Consider moving patient to room near nurses station  Outcome: Progressing     Problem: DISCHARGE PLANNING  Goal: Discharge to home or other facility with appropriate resources  Description: INTERVENTIONS:  - Identify barriers to discharge w/patient and caregiver  - Arrange for needed discharge resources and transportation as appropriate  - Identify discharge learning needs (meds, wound care, etc )  - Arrange for interpretive services to assist at discharge as needed  - Refer to Case Management Department for coordinating discharge planning if the patient needs post-hospital services based on physician/advanced practitioner order or complex needs related to functional status, cognitive ability, or social support system  Outcome: Progressing     Problem: Knowledge Deficit  Goal: Patient/family/caregiver demonstrates understanding of disease process, treatment plan, medications, and discharge instructions  Description: Complete learning assessment and assess knowledge base   Interventions:  - Provide teaching at level of understanding  - Provide teaching via preferred learning methods  Outcome: Progressing     Problem: Nutrition/Hydration-ADULT  Goal: Nutrient/Hydration intake appropriate for improving, restoring or maintaining nutritional needs  Description: Monitor and assess patient's nutrition/hydration status for malnutrition  Collaborate with interdisciplinary team and initiate plan and interventions as ordered  Monitor patient's weight and dietary intake as ordered or per policy  Utilize nutrition screening tool and intervene as necessary  Determine patient's food preferences and provide high-protein, high-caloric foods as appropriate       INTERVENTIONS:  - Monitor oral intake, urinary output, labs, and treatment plans  - Assess nutrition and hydration status and recommend course of action  - Evaluate amount of meals eaten  - Assist patient with eating if necessary   - Allow adequate time for meals  - Recommend/ encourage appropriate diets, oral nutritional supplements, and vitamin/mineral supplements  - Order, calculate, and assess calorie counts as needed  - Recommend, monitor, and adjust tube feedings and TPN/PPN based on assessed needs  - Assess need for intravenous fluids  - Provide specific nutrition/hydration education as appropriate  - Include patient/family/caregiver in decisions related to nutrition  Outcome: Progressing     Problem: Prexisting or High Potential for Compromised Skin Integrity  Goal: Skin integrity is maintained or improved  Description: INTERVENTIONS:  - Identify patients at risk for skin breakdown  - Assess and monitor skin integrity  - Assess and monitor nutrition and hydration status  - Monitor labs   - Assess for incontinence   - Turn and reposition patient  - Assist with mobility/ambulation  - Relieve pressure over bony prominences  - Avoid friction and shearing  - Provide appropriate hygiene as needed including keeping skin clean and dry  - Evaluate need for skin moisturizer/barrier cream  - Collaborate with interdisciplinary team   - Patient/family teaching  - Consider wound care consult   Outcome: Progressing

## 2023-04-16 NOTE — CASE MANAGEMENT
Case Management Discharge Planning Note    Patient name Devang Shields  Location [address] [place] [address] [facility] [id number]  : 10/4/1929 Date 2023       Current Admission Date: 2023  Current Admission Diagnosis:Cancer of ascending colon [facility])   Patient Active Problem List    Diagnosis Date Noted   • Encounter for geriatric assessment 2023   • Hypoalbuminemia 2023   • Anemia 2023   • Iron deficiency anemia 2023   • Primary adenocarcinoma of ascending colon ([facility] ) 2023   • Stage 3 chronic kidney disease, unspecified whether stage 3a or 3b CKD ([place] [address] ) 2022   • Hypothyroidism 2021   • Hyperparathyroidism ([facility] ) 2018   • Malignant melanoma of torso excluding breast ([place] [address] ) 2018   • Mixed urge and stress incontinence 2016   • De Quervain's tenosynovitis, right 2016   • Peripheral arterial disease ([place] [address] )    • Diastolic dysfunction    • Dyslipidemia 2012   • Esophageal reflux 2012   • Glaucoma 2012   • Hypertension 2012   • Impaired fasting glucose 2012   • Insomnia 2012   • Left ventricular hypertrophy 2012   • 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 tuberculosis 2012   • Thyroid nodule 2012      LOS (days): 3  Geometric Mean LOS (GMLOS) (days): 3 50  Days to GMLOS:0 5     OBJECTIVE:  Risk of Unplanned Readmission Score: 13 85         Current admission status: Inpatient   [facility]:   [address] [facility] [address]  [address] [place] [address]  Phone: [phone] Fax: [address], [place] [address] [place] [address]  Phone: [phone] Fax: [phone]    Primary Care Provider: Barbra Cruz DO    Primary Insurance: MEDICARE  Secondary Insurance: AET[employer]    DISCHARGE DETAILS:                  Additional Comments: Delivered rolling walker to patient's bedside, receipt signed and uploaded into paracte  Discharge anticipated tomorrow

## 2023-04-16 NOTE — PLAN OF CARE
Problem: MOBILITY - ADULT  Goal: Maintain or return to baseline ADL function  Description: INTERVENTIONS:  -  Assess patient's ability to carry out ADLs; assess patient's baseline for ADL function and identify physical deficits which impact ability to perform ADLs (bathing, care of mouth/teeth, toileting, grooming, dressing, etc )  - Assess/evaluate cause of self-care deficits   - Assess range of motion  - Assess patient's mobility; develop plan if impaired  - Assess patient's need for assistive devices and provide as appropriate  - Encourage maximum independence but intervene and supervise when necessary  - Involve family in performance of ADLs  - Assess for home care needs following discharge   - Consider OT consult to assist with ADL evaluation and planning for discharge  - Provide patient education as appropriate  Outcome: Progressing  Goal: Maintains/Returns to pre admission functional level  Description: INTERVENTIONS:  - Perform BMAT or MOVE assessment daily    - Set and communicate daily mobility goal to care team and patient/family/caregiver     - Collaborate with rehabilitation services on mobility goals if consulted  - Stand patient 3 times a day  - Ambulate patient 3 times a day  - Out of bed to chair 3 times a day   - Out of bed for meals 3 times a day  - Out of bed for toileting  - Record patient progress and toleration of activity level   Outcome: Progressing     Problem: PAIN - ADULT  Goal: Verbalizes/displays adequate comfort level or baseline comfort level  Description: Interventions:  - Encourage patient to monitor pain and request assistance  - Assess pain using appropriate pain scale  - Administer analgesics based on type and severity of pain and evaluate response  - Implement non-pharmacological measures as appropriate and evaluate response  - Consider cultural and social influences on pain and pain management  - Notify physician/advanced practitioner if interventions unsuccessful or patient reports new pain  Outcome: Progressing     Problem: INFECTION - ADULT  Goal: Absence or prevention of progression during hospitalization  Description: INTERVENTIONS:  - Assess and monitor for signs and symptoms of infection  - Monitor lab/diagnostic results  - Monitor all insertion sites, i e  indwelling lines, tubes, and drains  - Monitor endotracheal if appropriate and nasal secretions for changes in amount and color  - [place] appropriate cooling/warming therapies per order  - Administer medications as ordered  - Instruct and encourage patient and family to use good hand hygiene technique  - Identify and instruct in appropriate isolation precautions for identified infection/condition  Outcome: Progressing  Goal: Absence of fever/infection during neutropenic period  Description: INTERVENTIONS:  - Monitor WBC    Outcome: Progressing     Problem: SAFETY ADULT  Goal: Maintain or return to baseline ADL function  Description: INTERVENTIONS:  -  Assess patient's ability to carry out ADLs; assess patient's baseline for ADL function and identify physical deficits which impact ability to perform ADLs (bathing, care of mouth/teeth, toileting, grooming, dressing, etc )  - Assess/evaluate cause of self-care deficits   - Assess range of motion  - Assess patient's mobility; develop plan if impaired  - Assess patient's need for assistive devices and provide as appropriate  - Encourage maximum independence but intervene and supervise when necessary  - Involve family in performance of ADLs  - Assess for home care needs following discharge   - Consider OT consult to assist with ADL evaluation and planning for discharge  - Provide patient education as appropriate  Outcome: Progressing  Goal: Maintains/Returns to pre admission functional level  Description: INTERVENTIONS:  - Perform BMAT or MOVE assessment daily    - Set and communicate daily mobility goal to care team and patient/family/caregiver     - Collaborate with rehabilitation services on mobility goals if consulted  - Stand patient 3 times a day  - Ambulate patient 3 times a day  - Out of bed to chair 3 times a day   - Out of bed for meals 3 times a day  - Out of bed for toileting  - Record patient progress and toleration of activity level   Outcome: Progressing  Goal: Patient will remain free of falls  Description: INTERVENTIONS:  - Educate patient/family on patient safety including physical limitations  - Instruct patient to call for assistance with activity   - Consult OT/PT to assist with strengthening/mobility   - Keep Call bell within reach  - Keep bed low and locked with side rails adjusted as appropriate  - Keep care items and personal belongings within reach  - Initiate and maintain comfort rounds  - Make Fall Risk Sign visible to staff  - Offer Toileting every 2 Hours, in advance of need  - Initiate/Maintain bed/chair alarm  - Apply yellow socks and bracelet for high fall risk patients  - Consider moving patient to room near nurses station  Outcome: Progressing     Problem: DISCHARGE PLANNING  Goal: Discharge to home or other facility with appropriate resources  Description: INTERVENTIONS:  - Identify barriers to discharge w/patient and caregiver  - Arrange for needed discharge resources and transportation as appropriate  - Identify discharge learning needs (meds, wound care, etc )  - Arrange for interpretive services to assist at discharge as needed  - Refer to Case Management Department for coordinating discharge planning if the patient needs post-hospital services based on physician/advanced practitioner order or complex needs related to functional status, cognitive ability, or social support system  Outcome: Progressing     Problem: Knowledge Deficit  Goal: Patient/family/caregiver demonstrates understanding of disease process, treatment plan, medications, and discharge instructions  Description: Complete learning assessment and assess knowledge base   Interventions:  - Provide teaching at level of understanding  - Provide teaching via preferred learning methods  Outcome: Progressing     Problem: Prexisting or High Potential for Compromised Skin Integrity  Goal: Skin integrity is maintained or improved  Description: INTERVENTIONS:  - Identify patients at risk for skin breakdown  - Assess and monitor skin integrity  - Assess and monitor nutrition and hydration status  - Monitor labs   - Assess for incontinence   - Turn and reposition patient  - Assist with mobility/ambulation  - Relieve pressure over bony prominences  - Avoid friction and shearing  - Provide appropriate hygiene as needed including keeping skin clean and dry  - Evaluate need for skin moisturizer/barrier cream  - Collaborate with interdisciplinary team   - Patient/family teaching  - Consider wound care consult   Outcome: Progressing

## 2023-04-17 VITALS
HEART RATE: 64 BPM | WEIGHT: 116 LBS | TEMPERATURE: 97 F | BODY MASS INDEX: 25.03 KG/M2 | HEIGHT: 57 IN | SYSTOLIC BLOOD PRESSURE: 154 MMHG | DIASTOLIC BLOOD PRESSURE: 59 MMHG | OXYGEN SATURATION: 96 % | RESPIRATION RATE: 16 BRPM

## 2023-04-17 LAB
BASE EXCESS BLDA CALC-SCNC: -6 MMOL/L (ref -2–3)
CA-I BLD-SCNC: 1.3 MMOL/L (ref 1.12–1.32)
DME PARACHUTE DELIVERY DATE ACTUAL: NORMAL
DME PARACHUTE DELIVERY DATE REQUESTED: NORMAL
DME PARACHUTE ITEM DESCRIPTION: NORMAL
DME PARACHUTE ORDER STATUS: NORMAL
DME PARACHUTE SUPPLIER NAME: NORMAL
DME PARACHUTE SUPPLIER PHONE: NORMAL
GLUCOSE SERPL-MCNC: 139 MG/DL (ref 65–140)
HCO3 BLDA-SCNC: 19.7 MMOL/L (ref 22–28)
HCT VFR BLD CALC: 25 % (ref 34.8–46.1)
HGB BLDA-MCNC: 8.5 G/DL (ref 11.5–15.4)
PCO2 BLD: 21 MMOL/L (ref 21–32)
PCO2 BLD: 36.6 MM HG (ref 36–44)
PH BLD: 7.34 [PH] (ref 7.35–7.45)
PO2 BLD: 164 MM HG (ref 75–129)
POTASSIUM BLD-SCNC: 4.1 MMOL/L (ref 3.5–5.3)
SAO2 % BLD FROM PO2: 99 % (ref 60–85)
SODIUM BLD-SCNC: 138 MMOL/L (ref 136–145)
SPECIMEN SOURCE: ABNORMAL

## 2023-04-17 RX ORDER — ACETAMINOPHEN 325 MG/1
975 TABLET ORAL EVERY 6 HOURS PRN
Qty: 30 TABLET | Refills: 0 | Status: SHIPPED | OUTPATIENT
Start: 2023-04-17 | End: 2023-04-24

## 2023-04-17 RX ORDER — OXYCODONE HYDROCHLORIDE 5 MG/1
2.5 TABLET ORAL EVERY 6 HOURS PRN
Qty: 8 TABLET | Refills: 0 | Status: SHIPPED | OUTPATIENT
Start: 2023-04-17 | End: 2023-04-21

## 2023-04-17 RX ORDER — ENOXAPARIN SODIUM 100 MG/ML
40 INJECTION SUBCUTANEOUS DAILY
Qty: 9.2 ML | Refills: 0 | Status: SHIPPED | OUTPATIENT
Start: 2023-04-18 | End: 2023-05-11

## 2023-04-17 RX ADMIN — OXYCODONE HYDROCHLORIDE 5 MG: 5 TABLET ORAL at 12:00

## 2023-04-17 RX ADMIN — Medication 25 MG: at 09:29

## 2023-04-17 RX ADMIN — HEPARIN SODIUM 5000 UNITS: 5000 INJECTION INTRAVENOUS; SUBCUTANEOUS at 09:28

## 2023-04-17 RX ADMIN — LEVOTHYROXINE SODIUM 88 MCG: 88 TABLET ORAL at 05:40

## 2023-04-17 RX ADMIN — ACETAMINOPHEN 975 MG: 325 TABLET ORAL at 05:40

## 2023-04-17 RX ADMIN — LISINOPRIL 10 MG: 10 TABLET ORAL at 09:28

## 2023-04-17 RX ADMIN — AMLODIPINE BESYLATE 5 MG: 5 TABLET ORAL at 09:28

## 2023-04-17 NOTE — DISCHARGE SUMMARY
Discharge Summary -colorectal surgery   Devang Shields 80 y o  female MRN: [id number]  Unit/Bed#: [facility] 827-01 Encounter: [id number]    Admission Date: 4/13/2023     Discharge Date: 4/17/2023    Admitting Diagnosis: Cancer of ascending colon [facility]) [C18 2]    Discharge Diagnosis: Ascending colon cancer status post laparoscopic right colon resection by Dr Matthew Avila    Attending and Service: Dr Matthew Avila, colorectal surgical Services  Consulting Physician(s): Geriatrics, nephrology, nutrition    Imaging and Procedures Performed:   4/13/2023 laparoscopic right colon resection, ileum to mid transverse, ileocolic side-to-side functional end-to-end stapled anastomosis by Dr Matthew Avila    Pathology: Pending    Hospital Course: Devang Shields is a 78-year-old female with a past medical history of HLD, HTN, hyperparathyroidism, Hashimoto's, and BCC presented for elective surgical intervention as mentioned above by Dr Matthew Avila  She underwent surgical intervention without any complications  Postoperatively she was transferred to the medical surgical floor where she was started on sips of clear liquids, IV fluids, Bobby remained in place, subcu heparin for DVT prophylaxis, and PT/OT consults, gerontology consult, nephrology consult, and nutrition consult for assistance with geriatric protocol  Patient's Bobby was removed on postoperative day 1  Throughout her hospital stay her diet was slowly advanced as tolerated without nausea or vomiting  PT OT worked with patient and cleared for home with home PT  Nephrology and gerontology made recommendations and signed off prior to discharge  By postoperative day 4 patient was out of bed and ambulating without assistance, tolerating diet without any nausea or vomiting, urinating without any difficulties, pain remained controlled on p o  regimen  Patient was cleared for discharge on postoperative day 4 from a colorectal, gerontology, and nephrology perspective    All discharge instructions as well as postoperative follow-up appointments were discussed with the patient at bedside and his son over the phone  Lovenox injection teaching was discussed with the patient and her daughter-in-law, which agreed to give daily, 40 mg until May 11, 2023  On discharge, the patient is instructed to follow-up with the patient's primary care provider within the next 2 weeks to review the events of the recent hospitalization  The patient is instructed to follow-up with Dr Anthony Hansen in 2 to 3 weeks  The patient is instructed to follow the provided discharge instructions  Condition at Discharge: good     Discharge instructions/Information to patient and family:   See after visit summary for information provided to patient and family  Provisions for Follow-Up Care:  See after visit summary for information related to follow-up care and any pertinent home health orders  Disposition: Home w [facility]    Planned Readmission: No    Discharge Statement   I spent 30 minutes discharging the patient  This time was spent on the day of discharge  I had direct contact with the patient on the day of discharge  Additional documentation is required if more than 30 minutes were spent on discharge  Discharge Medications:  See after visit summary for reconciled discharge medications provided to patient and family      Nina Caba PA-C  4/17/2023  12:56 PM

## 2023-04-17 NOTE — PROGRESS NOTES
"Progress Note - Colorectal Surgery   Fab Ocampo 80 y o  female MRN: [id number]  [facility]: [id number]    Assessment:  80-year-old female with colon cancer s/p Lap R colon resection, ileum to mid-transverse, ileocolic side-to-side, functional end-to-end stapled anastomosis 4/14    Plan:  • Lo Res, ensures  • Appreciate Geriatrics, Nephro,  • OOB/ambulate  • PT/OT- no rehab needs  • D/c today  • Please Tiger[employer] on call [facility] Resident if any further questions    Subjective/Objective     Subjective:   No acute events overnight  Tolerating diet  BM+   Flatus+  Patient has been OOB  Pertinent review of systems as above  All other review of systems negative  Objective:    Blood pressure 131/56, pulse 76, temperature (!) 97 3 °F (36 3 °C), resp  rate 20, height 4' 9\" (1 448 m), weight 52 6 kg (116 lb), SpO2 94 %  ,Body mass index is 25 1 kg/m²  Intake/Output Summary (Last 24 hours) at 4/17/2023 0610  Last data filed at 4/16/2023 1800  Gross per 24 hour   Intake 180 ml   Output --   Net 180 ml       Invasive Devices     Peripheral Intravenous Line  Duration           Peripheral IV 04/15/23 Right Antecubital 2 days                Physical Exam:   Gen:  NAD  HEENT: NCAT  MMM  CV: well perfused  Lungs: Normal respiratory effort  Abd: soft, nt/nd, incisions cdi  Skin: warm/ dry  Extremities: no peripheral edema, no clubbing or cyanosis  Neuro: AxO x3      Results from last 7 days   Lab Units 04/14/23  0605 04/13/23  1850   WBC Thousand/uL 8 98  --    HEMOGLOBIN g/dL 9 4*  --    HEMATOCRIT % 32 2*  --    PLATELETS Thousands/uL 295 201     Results from last 7 days   Lab Units 04/15/23  0541 04/14/23  0605   POTASSIUM mmol/L 4 4 4 1   CHLORIDE mmol/L 112* 109*   CO2 mmol/L 26 22   BUN mg/dL 14 18   CREATININE mg/dL 0 92 0 81   CALCIUM mg/dL 9 5 8 8            I have personally reviewed pertinent films in PACS      Medications:   Scheduled Meds:  Current Facility-Administered " Medications   Medication Dose Route Frequency Provider Last Rate   • acetaminophen  975 mg Oral [facility] Rosendase Nasir PA-C     • amLODIPine  5 mg Oral Daily Zaki Ny MD     • heparin (porcine)  5,000 Units Subcutaneous Q12H [address] Rom[address] Stengel Hohenshilt, PA-C     • HYDROmorphone  0 2 mg Intravenous Q3H PRN Rosenda Best PA-C     • levothyroxine  88 mcg Oral Daily [address] Stengel Hohenshilt, PA-C     • lisinopril  10 mg Oral Daily Elyce Riedel, MD     • metoprolol tartrate  25 mg Oral Daily [address] Stengel Hohenshilt, PA-C     • ondansetron  4 mg Intravenous Q4H PRN Rosenda Best PA-C     • oxyCODONE  2 5 mg Oral Q4H PRN Rosenda Best PA-C     • oxyCODONE  5 mg Oral Q4H PRN Fadi Camacho PA-C       Continuous Infusions:   PRN Meds:  HYDROmorphone, 0 2 mg, Q3H PRN  ondansetron, 4 mg, Q4H PRN  oxyCODONE, 2 5 mg, Q4H PRN  oxyCODONE, 5 mg, Q4H PRN      VTE Pharmacologic Prophylaxis: Heparin  VTE Mechanical Prophylaxis: sequential compression device

## 2023-04-17 NOTE — CASE MANAGEMENT
Case Management Discharge Planning Note    Patient name Jeannette Rank  Location [address] 827/PPHP 898-16 MRN [id number]  : 10/4/1929 Date 2023       Current Admission Date: 2023  Current Admission Diagnosis:Cancer of ascending colon [facility])   Patient Active Problem List    Diagnosis Date Noted   • Encounter for geriatric assessment 2023   • Hypoalbuminemia 2023   • Anemia 2023   • Iron deficiency anemia 2023   • Primary adenocarcinoma of ascending colon ([facility] ) 2023   • Stage 3 chronic kidney disease, unspecified whether stage 3a or 3b CKD ([place] [address] ) 2022   • Hypothyroidism 2021   • Hyperparathyroidism ([place] [address] ) 2018   • Malignant melanoma of torso excluding breast ([place] [address] ) 2018   • Mixed urge and stress incontinence 2016   • De Quervain's tenosynovitis, right 2016   • Peripheral arterial disease ([place] [address] )    • Diastolic dysfunction    • Dyslipidemia 2012   • Esophageal reflux 2012   • Glaucoma 2012   • Hypertension 2012   • Impaired fasting glucose 2012   • Insomnia 2012   • Left ventricular hypertrophy 2012   • 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 tuberculosis 2012   • Thyroid nodule 2012      LOS (days): 4  Geometric Mean LOS (GMLOS) (days): 3 50  Days to GMLOS:-0 3     OBJECTIVE:  Risk of Unplanned Readmission Score: 12 77         Current admission status: Inpatient   [facility]erred Pharmacy:   [address] [facility] [address]  [address] [place] [address]  Phone: [phone] Fax: [address] [facility] 94 [facility] 38 210 [facility]  Phone: [phone] Fax: [phone]    Primary Care Provider: Tamiko Lopez DO    Primary Insurance: MEDICARE  Secondary Insurance: Luis Vasquez    DISCHARGE DETAILS:    Discharge planning discussed with[de-identified] patient Discharge Destination Plan[de-identified] Home with [facility]       IMM Given (Date):: 04/17/23  IMM Given to[de-identified] Patient     Additional Comments: ADRIANA start of care is scheduled for tomorrow 4/18/23 - need to price check SQ lovenox, daughter will transport home        Patients out of pocket costs for lovenox is $133 00 - spoke at bedside with patient to discuss expense, states that she is able to afford medication and will  today

## 2023-04-17 NOTE — PLAN OF CARE
Problem: MOBILITY - ADULT  Goal: Maintain or return to baseline ADL function  Description: INTERVENTIONS:  -  Assess patient's ability to carry out ADLs; assess patient's baseline for ADL function and identify physical deficits which impact ability to perform ADLs (bathing, care of mouth/teeth, toileting, grooming, dressing, etc )  - Assess/evaluate cause of self-care deficits   - Assess range of motion  - Assess patient's mobility; develop plan if impaired  - Assess patient's need for assistive devices and provide as appropriate  - Encourage maximum independence but intervene and supervise when necessary  - Involve family in performance of ADLs  - Assess for home care needs following discharge   - Consider OT consult to assist with ADL evaluation and planning for discharge  - Provide patient education as appropriate  Outcome: Progressing  Goal: Maintains/Returns to pre admission functional level  Description: INTERVENTIONS:  - Perform BMAT or MOVE assessment daily    - Set and communicate daily mobility goal to care team and patient/family/caregiver     - Collaborate with rehabilitation services on mobility goals if consulted  - Stand patient 3 times a day  - Ambulate patient 3 times a day  - Out of bed to chair 3 times a day   - Out of bed for meals 3 times a day  - Out of bed for toileting  - Record patient progress and toleration of activity level   Outcome: Progressing     Problem: PAIN - ADULT  Goal: Verbalizes/displays adequate comfort level or baseline comfort level  Description: Interventions:  - Encourage patient to monitor pain and request assistance  - Assess pain using appropriate pain scale  - Administer analgesics based on type and severity of pain and evaluate response  - Implement non-pharmacological measures as appropriate and evaluate response  - Consider cultural and social influences on pain and pain management  - Notify physician/advanced practitioner if interventions unsuccessful or patient reports new pain  Outcome: Progressing     Problem: Prexisting or High Potential for Compromised Skin Integrity  Goal: Skin integrity is maintained or improved  Description: INTERVENTIONS:  - Identify patients at risk for skin breakdown  - Assess and monitor skin integrity  - Assess and monitor nutrition and hydration status  - Monitor labs   - Assess for incontinence   - Turn and reposition patient  - Assist with mobility/ambulation  - Relieve pressure over bony prominences  - Avoid friction and shearing  - Provide appropriate hygiene as needed including keeping skin clean and dry  - Evaluate need for skin moisturizer/barrier cream  - Collaborate with interdisciplinary team   - Patient/family teaching  - Consider wound care consult   Outcome: Progressing     Problem: Nutrition/Hydration-ADULT  Goal: Nutrient/Hydration intake appropriate for improving, restoring or maintaining nutritional needs  Description: Monitor and assess patient's nutrition/hydration status for malnutrition  Collaborate with interdisciplinary team and initiate plan and interventions as ordered  Monitor patient's weight and dietary intake as ordered or per policy  Utilize nutrition screening tool and intervene as necessary  Determine patient's food preferences and provide high-protein, high-caloric foods as appropriate       INTERVENTIONS:  - Monitor oral intake, urinary output, labs, and treatment plans  - Assess nutrition and hydration status and recommend course of action  - Evaluate amount of meals eaten  - Assist patient with eating if necessary   - Allow adequate time for meals  - Recommend/ encourage appropriate diets, oral nutritional supplements, and vitamin/mineral supplements  - Order, calculate, and assess calorie counts as needed  - Recommend, monitor, and adjust tube feedings and TPN/PPN based on assessed needs  - Assess need for intravenous fluids  - Provide specific nutrition/hydration education as appropriate  - Include patient/family/caregiver in decisions related to nutrition  Outcome: Progressing

## 2023-04-17 NOTE — DISCHARGE INSTR - AVS FIRST PAGE
DISCHARGE INSTRUCTIONS    Follow Up: Follow up with Dr Oksana Singer on 5/15 at 1PM  -Lovenox 40mg injection daily until 5/11    Diet: You may resume a regular diet    Pain: Tylenol 975mg as needed for mild pain control every 8 hours  Oxycodone 2 5mg as needed for moderate/severe pain every 6 hours  Shower: You may shower over the wound  Do not bathe or use a pool or hot tub until cleared by the physician  Activity: As tolerated  You may go up and down stairs, walk as much as you are comfortable, but walk at least 3 times each day  Do not lift anything heavier than 15 pounds for at least 2-4 weeks, unless cleared by the doctor  Driving: Do not drive or make any important decisions while on narcotic pain medication  Generally, you may drive when your off all narcotic pain medications  Medications: Resume all of your previous medications, unless told otherwise by the doctor  Tylenol is always fine  You do not need to take the narcotic pain medications unless you are having significant pain and discomfort  Call the office: If you are experiencing any of the following, fevers above 101 5° or chills, significant nausea or vomiting, increase in abdominal pain, if the wound develops drainage and/or is excessive redness around the wound, or if you have significant diarrhea or other worsening symptoms

## 2023-04-24 ENCOUNTER — OFFICE VISIT (OUTPATIENT)
Dept: FAMILY MEDICINE CLINIC | Facility: HOSPITAL | Age: 88
End: 2023-04-24

## 2023-04-24 VITALS
HEIGHT: 57 IN | TEMPERATURE: 97.4 F | BODY MASS INDEX: 24.59 KG/M2 | HEART RATE: 62 BPM | DIASTOLIC BLOOD PRESSURE: 54 MMHG | SYSTOLIC BLOOD PRESSURE: 116 MMHG | WEIGHT: 114 LBS

## 2023-04-24 DIAGNOSIS — C18.2 PRIMARY ADENOCARCINOMA OF ASCENDING COLON (HCC): ICD-10-CM

## 2023-04-24 DIAGNOSIS — Z09 HOSPITAL DISCHARGE FOLLOW-UP: Primary | ICD-10-CM

## 2023-04-24 DIAGNOSIS — N18.30 STAGE 3 CHRONIC KIDNEY DISEASE, UNSPECIFIED WHETHER STAGE 3A OR 3B CKD (HCC): ICD-10-CM

## 2023-04-24 DIAGNOSIS — I10 PRIMARY HYPERTENSION: ICD-10-CM

## 2023-04-24 DIAGNOSIS — N28.1 COMPLEX RENAL CYST: ICD-10-CM

## 2023-04-24 DIAGNOSIS — D50.9 IRON DEFICIENCY ANEMIA, UNSPECIFIED IRON DEFICIENCY ANEMIA TYPE: ICD-10-CM

## 2023-04-24 NOTE — ASSESSMENT & PLAN NOTE
Lab Results   Component Value Date    EGFR 53 04/15/2023    EGFR 62 04/14/2023    EGFR 60 03/09/2023    CREATININE 0 92 04/15/2023    CREATININE 0 81 04/14/2023    CREATININE 0 84 03/09/2023   GFR stable, had IP eval with Nephro, con't to keep hydrated, on an ACE, will follow

## 2023-04-24 NOTE — ASSESSMENT & PLAN NOTE
S/p R lap hemicolectomy, path reviewed with pt briefly, case to be discussed at tumor board this week, has f/u with Dr Mildred Radford, will follow

## 2023-04-24 NOTE — PROGRESS NOTES
Assessment & Plan     1  Hospital discharge follow-up    2  Primary adenocarcinoma of ascending colon [facility])  Assessment & Plan:  S/p R lap hemicolectomy, path reviewed with pt briefly, case to be discussed at tumor board this week, has f/u with Dr Janny Cisneros, will follow      3  Stage 3 chronic kidney disease, unspecified whether stage 3a or 3b CKD [facility])  Assessment & Plan:  Lab Results   Component Value Date    EGFR 53 04/15/2023    EGFR 62 04/14/2023    EGFR 60 03/09/2023    CREATININE 0 92 04/15/2023    CREATININE 0 81 04/14/2023    CREATININE 0 84 03/09/2023   GFR stable, had IP eval with Nephro, con't to keep hydrated, on an ACE, will follow      4  Primary hypertension  Assessment & Plan:  BP at goal, con't current meds      5  Iron deficiency anemia, unspecified iron deficiency anemia type  Assessment & Plan:  Likely d/t adenocarcinoma of colon, on PO iron, will follow      6  Complex renal cyst  Assessment & Plan:  Repeat renal US in 6 mos - WILL ORDER AT NEXT APPT        Colonoscopy 2/23    BW 3/23  FLP 1/23       Subjective     Transitional Care Management Review:   Carlyn Boothe is a 80 y o  female here for TCM follow up  She was admitted to [facility] from 4/13/23 to 4/17/23  Records were reviewed by myself in detail and events are summarized below  During the TCM phone call patient stated:  TCM Call     Date and time call was made  4/17/2023  2:40 PM    Hospital care reviewed  Records reviewed    Patient was hospitial[facility] at  [facility]    Date of Admission  04/13/23    Date of discharge  04/17/23    Diagnosis  Primary adenocarcinoma of ascending colon    Disposition  Home health services    Were the patients medications reviewed and updated  No    Current Symptoms  None      TCM Call     Post hospital issues  Reduced activity    Should patient be enrolled in anticoag monitoring? No    Scheduled for follow up?   Yes    Did you obtain your prescribed medications  Yes    Do you need help managing your prescriptions or medications  No    Is transportation to your appointment needed  No    I have advised the patient to call PCP with any new or worsening symptoms  Kem TEMI Tam        HPI Pt was admitted 4/13/23 for scheduled R lap hemicolectomy for recently dx adenocarcinoma of ascending colon  She underwent the procedure with Dr Valorie Rodarte as planned on 0/20/93 w/o complications  Post op period she was NPO at first with LR  Diet was slowly advanced to Lo Res diet  She was given SQ Heparin for DVT prophylaxis and was discharged with SQ Lovenox to be completed 5/11/23  She was seen by PT/OT, Geriatrics and Nephrology during hospital stay  Geriatrics recommended geriatric pain protocal and delirium precautions and pt did well  PT/OT evaluated pt and home with home health rehab was recommended by PT  Nephro recommended con't holding ACE but could restart as long as GFR remained stable and if BP trended up  It was recommended her renal cyst be f/u with as an OP  Pts GFR remained stable in upper 50-low 60's/CKD stage III  Her H/H was stable and upon discharge was 9 4/32 2  She was told to f/u with PCP and with surgeon in 2-3 wks  Pt was discharged home on 4/17/23  Med list reviewed  Pt has been doing well since discharge  She states her appetite is slowly improving  She is down 6 lbs from Nov 22  She notes no pain and denies N/V/F/C/cough/increase in SOB  Her D-I-L is giving her her Lovenox shots  She notes no bleeding but has some bruising  She has f/u with surgeon 5/15/23  She is aware of need to f/u on R renal cyst         Review of Systems   Constitutional: Negative for chills and fever  HENT: Negative for congestion and trouble swallowing  Eyes: Negative for pain and visual disturbance  Respiratory: Positive for shortness of breath  Negative for cough and wheezing  SOB at baseline   Cardiovascular: Positive for leg swelling   Negative for chest pain and "palpitations  L>R LE edema at baseline   Gastrointestinal: Negative for abdominal pain, blood in stool, constipation, diarrhea, nausea and vomiting  Genitourinary: Negative for difficulty urinating and dysuria  Musculoskeletal: Positive for gait problem  Negative for back pain and neck pain  Skin: Positive for wound  Negative for rash  Surgical wound approximated and healing well   Neurological: Negative for dizziness, light-headedness and headaches  Hematological: Bruises/bleeds easily  Psychiatric/Behavioral: Negative for confusion  Objective     /54   Pulse 62   Temp (!) 97 4 °F (36 3 °C) (Tympanic)   Ht 4' 9\" (1 448 m)   Wt 51 7 kg (114 lb)   BMI 24 67 kg/m²      Physical Exam  Vitals and nursing note reviewed  Constitutional:       General: She is not in acute distress  Appearance: She is well-developed  She is not ill-appearing  HENT:      Head: Normocephalic and atraumatic  Right Ear: External ear normal       Left Ear: External ear normal    Eyes:      General:         Right eye: No discharge  Left eye: No discharge  Conjunctiva/sclera: Conjunctivae normal    Neck:      Thyroid: No thyromegaly  Trachea: No tracheal deviation  Cardiovascular:      Rate and Rhythm: Normal rate and regular rhythm  Heart sounds: Normal heart sounds  No murmur heard  Comments: L>R trace LE edema  Pulmonary:      Effort: Pulmonary effort is normal  No respiratory distress  Breath sounds: Normal breath sounds  No wheezing, rhonchi or rales  Abdominal:      General: There is no distension  Palpations: Abdomen is soft  Tenderness: There is no abdominal tenderness  There is no guarding or rebound  Musculoskeletal:         General: No deformity or signs of injury  Cervical back: Neck supple  Lymphadenopathy:      Cervical: No cervical adenopathy  Skin:     General: Skin is warm and dry  Coloration: Skin is not pale        " Findings: No rash  Comments: abd incision well approximated and w/o discharge/erythema   Neurological:      General: No focal deficit present  Mental Status: She is alert  Motor: No abnormal muscle tone  Gait: Gait abnormal       Comments: Ambulates with cane   Psychiatric:         Mood and Affect: Mood normal          Behavior: Behavior normal          Thought Content:  Thought content normal          Judgment: Judgment normal        Medications have been reviewed by provider in current encounter    Aminta Ortega DO

## 2023-04-26 ENCOUNTER — HOME CARE VISIT (OUTPATIENT)
Dept: HOME HEALTH SERVICES | Facility: HOME HEALTHCARE | Age: 88
End: 2023-04-26

## 2023-04-26 VITALS
DIASTOLIC BLOOD PRESSURE: 54 MMHG | HEART RATE: 68 BPM | RESPIRATION RATE: 18 BRPM | SYSTOLIC BLOOD PRESSURE: 114 MMHG | TEMPERATURE: 97.3 F | OXYGEN SATURATION: 99 %

## 2023-04-26 VITALS — DIASTOLIC BLOOD PRESSURE: 78 MMHG | SYSTOLIC BLOOD PRESSURE: 132 MMHG

## 2023-04-27 ENCOUNTER — DOCUMENTATION (OUTPATIENT)
Dept: HEMATOLOGY ONCOLOGY | Facility: CLINIC | Age: 88
End: 2023-04-27

## 2023-04-27 NOTE — PROGRESS NOTES
RECTAL/GI MULTIDISCIPLINARY CASE REVIEW    DATE: 4/27/2023      PRESENTING DOCTOR: Dr Loki Bowman      DIAGNOSIS: Adenocarcinoma of the Ascending Colon      Prosper Kallie was presented at the Rectal/GI Multidisciplinary Conference today  PHYSICIAN RECOMMENDED PLAN:    -Possible surveillance with Ultrasound versus observation for renal lesion  Discuss future radiographic and endoscopic surveillance    -Patient is scheduled with Dr Loki Bowman on 5/15/2023  Team agreed to plan  The final treatment plan will be left to the discretion of the patient and the treating physician  DISCLAIMERS:  TO THE TREATING PHYSICIAN:  This conference is a meeting of clinicians from various specialty areas who evaluate and discuss patients for whom a multidisciplinary treatment approach is being considered  Please note that the above opinion was a consensus of the conference attendees and is intended only to assist in quality care of the discussed patient  The responsibility for follow up on the input given during the conference, along with any final decisions regarding plan of care, is that of the patient and the patient's provider  Accordingly, appointments have only been recommended based on this information and have NOT been scheduled unless otherwise noted  TO THE PATIENT:  This summary is a brief record of major aspects of your cancer treatment  You may choose to share a copy with any of your doctors or nurses  However, this is not a detailed or comprehensive record of your care        NCCN guidelines were readily available for review at this discussion

## 2023-04-28 ENCOUNTER — HOME CARE VISIT (OUTPATIENT)
Dept: HOME HEALTH SERVICES | Facility: HOME HEALTHCARE | Age: 88
End: 2023-04-28

## 2023-05-03 ENCOUNTER — HOME CARE VISIT (OUTPATIENT)
Dept: HOME HEALTH SERVICES | Facility: HOME HEALTHCARE | Age: 88
End: 2023-05-03

## 2023-05-04 VITALS
RESPIRATION RATE: 18 BRPM | OXYGEN SATURATION: 99 % | SYSTOLIC BLOOD PRESSURE: 124 MMHG | DIASTOLIC BLOOD PRESSURE: 56 MMHG | HEART RATE: 60 BPM | TEMPERATURE: 97.6 F

## 2023-05-05 ENCOUNTER — HOME CARE VISIT (OUTPATIENT)
Dept: HOME HEALTH SERVICES | Facility: HOME HEALTHCARE | Age: 88
End: 2023-05-05

## 2023-05-11 NOTE — PROGRESS NOTES
Colon and Rectal Surgery   Richie Hirsch 80 y o  female MRN: [id number]   Encounter: [id number]  05/15/23   2:56 PM        ASSESSMENT:    Arlen Meredith returns today, she is doing well 1 month status post laparoscopic right hemicolectomy, we discussed pathology today T3N0(0/16) lymph nodes, good result, Stage IIA ascending colon adenocarcinoma/cancer, negative margins, she will not require adjuvant chemotherapy  PLAN:  -Will be repeat discussed at tumor conference for pathology review  -6 months Labs given, CBC/CMP/CEA  -6 months office visit following these labs  -We will discuss ongoing surveillance colonoscopy/CT scan at that time  CC:  Dr Zeinab VIVEROS  Richie Hirsch is a 80 y o  female is here today for a post op evaluation  She is eating what she wishes, having 1-2 formed bowel movements  She is getting around well  She is status post right hemicolectomy for ascending colon cancer on 4/13/23     OP note:   -Large proximal ascending colon cancer, ileum to mid transverse, side-to-side, functional end-to-end, stapled anastomosis     Pathology:   A  Terminal ileum, ascending colon, right hemicolectomy:  - Adenocarcinoma (10 6 cm), moderately to poorly differentiated, arising in a tubular adenoma with high grade dysplasia  - Tumor invades through the muscularis propria into pericolorectal tissue (pT3)   - Lymphovascular invasion is present  - Terminal ileum with no pathologic abnormality   - All margins are negative for tumor for carcinoma   - Sixteen lymph nodes, negative for malignancy (0/16)         Historical Information   Past Medical History:   Diagnosis Date   • BCC (basal cell carcinoma)    • Diastolic dysfunction    • Dyslipidemia    • Dysphagia    • Glaucoma    • Hashimoto's thyroiditis    • Hyperparathyroidism ([facility] [address] )    • Hypertension    • Impaired fasting glucose    • Insomnia    • Osteopenia    • SCCA (squamous cell carcinoma) of skin      Past Surgical History:   Procedure Laterality Date   • CATARACT EXTRACTION, BILATERAL     • COLONOSCOPY      Complete, Onset - 10/25/05 - 10 years    • HYSTERECTOMY     • PARATHYROIDECTOMY      1st 1985 and 2nd 2002   • FL LAPAROSCOPY COLECTOMY PARTIAL W/ANASTOMOSIS N/A 4/13/2023    Procedure: RESECTION COLON RIGHT LAPAROSCOPIC;  Surgeon: Dyan Mendez MD;  Location: [facility];  Service: Colorectal   • FL NDSC WRST SURG W/RLS TRANSVRS CARPL LIGM Right 05/02/2016    Procedure: RELEASE CARPAL TUNNEL ENDOSCOPIC;  Surgeon: Ranjith Johnson MD;  Location: [facility];  Service: Orthopedics   • TONSILLECTOMY         Meds/Allergies       Current Outpatient Medications:   •  amLODIPine (NORVASC) 5 mg tablet, Take 1 tablet (5 mg total) by mouth daily, Disp: , Rfl:   •  ASPIRIN 81 PO, Take by mouth, Disp: , Rfl:   •  atorvastatin (LIPITOR) 20 mg tablet, Take 20 mg by mouth daily, Disp: , Rfl:   •  Biotin 1000 MCG tablet, Take 5,000 mcg by mouth daily, Disp: , Rfl:   •  Calcium 500 MG tablet, Take 1 tablet by mouth 3 (three) times a week , Disp: , Rfl:   •  cholecalciferol (VITAMIN D3) 1,000 units tablet, Take 1 tablet by mouth daily, Disp: , Rfl:   •  dorzolamide-timolol (COSOPT) 22 3-6 8 MG/ML ophthalmic solution, Administer 1 drop to both eyes 2 (two) times a day, Disp: , Rfl:   •  enoxaparin (Lovenox) 40 mg/0 4 mL, Inject 0 4 mL (40 mg total) under the skin in the morning for 23 days Do not start before April 18, 2023 , Disp: 9 2 mL, Rfl: 0  •  ferrous sulfate 324 (65 Fe) mg, TAKE 1 TABLET BY MOUTH EVERY MORNING BEFORE BREAKFAST, Disp: 90 tablet, Rfl: 1  •  latanoprost (XALATAN) 0 005 % ophthalmic solution, Administer 1 drop into the left eye daily at bedtime, Disp: , Rfl:   •  lisinopril (ZESTRIL) 10 mg tablet, TAKE 1 TABLET BY MOUTH EVERY DAY, Disp: 90 tablet, Rfl: 1  •  metoprolol tartrate (LOPRESSOR) 25 mg tablet, Take 1 tablet (25 mg total) by mouth in the morning, Disp: 180 tablet, Rfl: 1  • "Multiple Vitamins-Minerals (CENTRUM SILVER ADULT 50+ PO), Take by mouth , Disp: , Rfl:   •  Multiple Vitamins-Minerals (PRESERVISION AREDS PO), Take 1 tablet by mouth 2 (two) times a day, Disp: , Rfl:   •  Omega-3 Fatty Acids (FISH OIL) 1,000 mg, Take 1,000 mg by mouth daily  , Disp: , Rfl:   •  Synthroid 88 MCG tablet, TAKE 1 TABLET BY MOUTH EVERY DAY, Disp: 90 tablet, Rfl: 1      Allergies   Allergen Reactions   • Cyclogyl [Cyclopentolate Hcl]    • Phenylephrine Other (See Comments)     Eye gtts red eye infected sore   • Pred Forte [Prednisolone Acetate]          Social History   Social History     Substance and Sexual Activity   Alcohol Use Yes    Comment: rarely, social      Social History     Substance and Sexual Activity   Drug Use No     Social History     Tobacco Use   Smoking Status Never   Smokeless Tobacco Never         Family History:   Family History   Problem Relation Age of Onset   • Stroke Father         CVA   • Ovarian cancer Mother        Review of Systems    Objective   Current Vitals:   Vitals:    05/15/23 1255   Weight: 51 3 kg (113 lb)   Height: 4' 9\" (1 448 m)     Physical Exam:  General:no distress  ENT:moist mucus membranes  Pulm:no increased work of breathing  Abdomen:soft,nontender, well healed incisions  Extremities:stable LLE edema      "

## 2023-05-15 ENCOUNTER — OFFICE VISIT (OUTPATIENT)
Age: 88
End: 2023-05-15

## 2023-05-15 VITALS — BODY MASS INDEX: 24.38 KG/M2 | HEIGHT: 57 IN | WEIGHT: 113 LBS

## 2023-05-15 DIAGNOSIS — Z85.038 HISTORY OF COLON CANCER, STAGE II: Primary | ICD-10-CM

## 2023-05-15 DIAGNOSIS — C18.2 PRIMARY ADENOCARCINOMA OF ASCENDING COLON (HCC): ICD-10-CM

## 2023-05-15 NOTE — PATIENT INSTRUCTIONS
ASSESSMENT:    Peter Marsh returns today, she is doing well 1 month status post laparoscopic right hemicolectomy, we discussed pathology today T3N0(0/16) lymph nodes, good result, Stage IIA ascending colon adenocarcinoma/cancer, negative margins, she will not require adjuvant chemotherapy      PLAN:  -Will be repeat discussed at tumor conference for pathology review  -6 months Labs given, CBC/CMP/CEA  -6 months office visit following these labs  -We will discuss ongoing surveillance colonoscopy/CT scan at that time  CC:  Dr Lynn Vasquez Tumor Conference

## 2023-05-16 ENCOUNTER — DOCUMENTATION (OUTPATIENT)
Dept: HEMATOLOGY ONCOLOGY | Facility: CLINIC | Age: 88
End: 2023-05-16

## 2023-05-16 DIAGNOSIS — I10 ESSENTIAL HYPERTENSION: ICD-10-CM

## 2023-05-16 NOTE — PROGRESS NOTES
In-basket message received from Dr Latrell Anna to add patient to [facility] [address] on 6/8/2023  Chart reviewed and prep completed

## 2023-05-17 RX ORDER — LISINOPRIL 10 MG/1
TABLET ORAL
Qty: 90 TABLET | Refills: 1 | Status: SHIPPED | OUTPATIENT
Start: 2023-05-17

## 2023-05-18 DIAGNOSIS — I10 ESSENTIAL HYPERTENSION: ICD-10-CM

## 2023-05-18 RX ORDER — AMLODIPINE BESYLATE 5 MG/1
5 TABLET ORAL DAILY
Qty: 90 TABLET | Refills: 1 | Status: SHIPPED | OUTPATIENT
Start: 2023-05-18

## 2023-07-26 DIAGNOSIS — I10 ESSENTIAL HYPERTENSION: ICD-10-CM

## 2023-08-26 DIAGNOSIS — D50.9 IRON DEFICIENCY ANEMIA, UNSPECIFIED IRON DEFICIENCY ANEMIA TYPE: ICD-10-CM

## 2023-08-27 RX ORDER — FERROUS SULFATE TAB EC 324 MG (65 MG FE EQUIVALENT) 324 (65 FE) MG
324 TABLET DELAYED RESPONSE ORAL
Qty: 90 TABLET | Refills: 1 | Status: SHIPPED | OUTPATIENT
Start: 2023-08-27

## 2023-09-08 ENCOUNTER — APPOINTMENT (OUTPATIENT)
Dept: LAB | Facility: HOSPITAL | Age: 88
End: 2023-09-08
Payer: MEDICARE

## 2023-09-08 DIAGNOSIS — E04.1 THYROID NODULE: ICD-10-CM

## 2023-09-08 DIAGNOSIS — E21.3 HYPERPARATHYROIDISM (HCC): ICD-10-CM

## 2023-09-08 DIAGNOSIS — E06.3 HYPOTHYROIDISM DUE TO HASHIMOTO'S THYROIDITIS: ICD-10-CM

## 2023-09-08 DIAGNOSIS — E03.8 HYPOTHYROIDISM DUE TO HASHIMOTO'S THYROIDITIS: ICD-10-CM

## 2023-09-08 LAB
25(OH)D3 SERPL-MCNC: 53.4 NG/ML (ref 30–100)
ALBUMIN SERPL BCP-MCNC: 3.7 G/DL (ref 3.5–5)
ALP SERPL-CCNC: 64 U/L (ref 34–104)
ALT SERPL W P-5'-P-CCNC: 12 U/L (ref 7–52)
ANION GAP SERPL CALCULATED.3IONS-SCNC: 4 MMOL/L
AST SERPL W P-5'-P-CCNC: 14 U/L (ref 13–39)
BASOPHILS # BLD AUTO: 0.05 THOUSANDS/ÂΜL (ref 0–0.1)
BASOPHILS NFR BLD AUTO: 1 % (ref 0–1)
BILIRUB SERPL-MCNC: 0.64 MG/DL (ref 0.2–1)
BUN SERPL-MCNC: 21 MG/DL (ref 5–25)
CALCIUM SERPL-MCNC: 9.8 MG/DL (ref 8.4–10.2)
CHLORIDE SERPL-SCNC: 109 MMOL/L (ref 96–108)
CO2 SERPL-SCNC: 29 MMOL/L (ref 21–32)
CREAT SERPL-MCNC: 0.94 MG/DL (ref 0.6–1.3)
EOSINOPHIL # BLD AUTO: 0.33 THOUSAND/ÂΜL (ref 0–0.61)
EOSINOPHIL NFR BLD AUTO: 6 % (ref 0–6)
ERYTHROCYTE [DISTWIDTH] IN BLOOD BY AUTOMATED COUNT: 16.1 % (ref 11.6–15.1)
GFR SERPL CREATININE-BSD FRML MDRD: 52 ML/MIN/1.73SQ M
GLUCOSE P FAST SERPL-MCNC: 118 MG/DL (ref 65–99)
HCT VFR BLD AUTO: 41 % (ref 34.8–46.1)
HGB BLD-MCNC: 12.8 G/DL (ref 11.5–15.4)
IMM GRANULOCYTES # BLD AUTO: 0.02 THOUSAND/UL (ref 0–0.2)
IMM GRANULOCYTES NFR BLD AUTO: 0 % (ref 0–2)
LYMPHOCYTES # BLD AUTO: 1.11 THOUSANDS/ÂΜL (ref 0.6–4.47)
LYMPHOCYTES NFR BLD AUTO: 19 % (ref 14–44)
MAGNESIUM SERPL-MCNC: 2.3 MG/DL (ref 1.9–2.7)
MCH RBC QN AUTO: 27.2 PG (ref 26.8–34.3)
MCHC RBC AUTO-ENTMCNC: 31.2 G/DL (ref 31.4–37.4)
MCV RBC AUTO: 87 FL (ref 82–98)
MONOCYTES # BLD AUTO: 0.55 THOUSAND/ÂΜL (ref 0.17–1.22)
MONOCYTES NFR BLD AUTO: 10 % (ref 4–12)
NEUTROPHILS # BLD AUTO: 3.76 THOUSANDS/ÂΜL (ref 1.85–7.62)
NEUTS SEG NFR BLD AUTO: 64 % (ref 43–75)
NRBC BLD AUTO-RTO: 0 /100 WBCS
PHOSPHATE SERPL-MCNC: 3.5 MG/DL (ref 2.3–4.1)
PLATELET # BLD AUTO: 244 THOUSANDS/UL (ref 149–390)
PMV BLD AUTO: 11.9 FL (ref 8.9–12.7)
POTASSIUM SERPL-SCNC: 4.7 MMOL/L (ref 3.5–5.3)
PROT SERPL-MCNC: 6.4 G/DL (ref 6.4–8.4)
PTH-INTACT SERPL-MCNC: 42.4 PG/ML (ref 12–88)
RBC # BLD AUTO: 4.71 MILLION/UL (ref 3.81–5.12)
SODIUM SERPL-SCNC: 142 MMOL/L (ref 135–147)
T4 FREE SERPL-MCNC: 1.52 NG/DL (ref 0.61–1.12)
TSH SERPL DL<=0.05 MIU/L-ACNC: 1.08 UIU/ML (ref 0.45–4.5)
WBC # BLD AUTO: 5.82 THOUSAND/UL (ref 4.31–10.16)

## 2023-09-08 PROCEDURE — 36415 COLL VENOUS BLD VENIPUNCTURE: CPT

## 2023-09-08 PROCEDURE — 83970 ASSAY OF PARATHORMONE: CPT

## 2023-09-08 PROCEDURE — 82306 VITAMIN D 25 HYDROXY: CPT

## 2023-09-08 PROCEDURE — 84443 ASSAY THYROID STIM HORMONE: CPT

## 2023-09-08 PROCEDURE — 80053 COMPREHEN METABOLIC PANEL: CPT

## 2023-09-08 PROCEDURE — 83735 ASSAY OF MAGNESIUM: CPT

## 2023-09-08 PROCEDURE — 84100 ASSAY OF PHOSPHORUS: CPT

## 2023-09-08 PROCEDURE — 84439 ASSAY OF FREE THYROXINE: CPT

## 2023-09-22 ENCOUNTER — OFFICE VISIT (OUTPATIENT)
Dept: FAMILY MEDICINE CLINIC | Facility: HOSPITAL | Age: 88
End: 2023-09-22
Payer: MEDICARE

## 2023-09-22 VITALS
DIASTOLIC BLOOD PRESSURE: 60 MMHG | WEIGHT: 118.8 LBS | HEIGHT: 57 IN | TEMPERATURE: 97.9 F | HEART RATE: 60 BPM | BODY MASS INDEX: 25.63 KG/M2 | SYSTOLIC BLOOD PRESSURE: 122 MMHG

## 2023-09-22 DIAGNOSIS — E06.3 HYPOTHYROIDISM DUE TO HASHIMOTO'S THYROIDITIS: ICD-10-CM

## 2023-09-22 DIAGNOSIS — E03.8 HYPOTHYROIDISM DUE TO HASHIMOTO'S THYROIDITIS: ICD-10-CM

## 2023-09-22 DIAGNOSIS — R73.01 IMPAIRED FASTING GLUCOSE: ICD-10-CM

## 2023-09-22 DIAGNOSIS — I10 PRIMARY HYPERTENSION: ICD-10-CM

## 2023-09-22 DIAGNOSIS — Z23 ENCOUNTER FOR IMMUNIZATION: ICD-10-CM

## 2023-09-22 DIAGNOSIS — E21.3 HYPERPARATHYROIDISM (HCC): ICD-10-CM

## 2023-09-22 DIAGNOSIS — E78.5 DYSLIPIDEMIA: ICD-10-CM

## 2023-09-22 DIAGNOSIS — I73.9 PERIPHERAL ARTERIAL DISEASE (HCC): ICD-10-CM

## 2023-09-22 DIAGNOSIS — D50.9 IRON DEFICIENCY ANEMIA, UNSPECIFIED IRON DEFICIENCY ANEMIA TYPE: ICD-10-CM

## 2023-09-22 DIAGNOSIS — Z00.00 MEDICARE ANNUAL WELLNESS VISIT, SUBSEQUENT: ICD-10-CM

## 2023-09-22 DIAGNOSIS — C18.2 PRIMARY ADENOCARCINOMA OF ASCENDING COLON (HCC): Primary | ICD-10-CM

## 2023-09-22 PROCEDURE — 99214 OFFICE O/P EST MOD 30 MIN: CPT | Performed by: INTERNAL MEDICINE

## 2023-09-22 PROCEDURE — G0439 PPPS, SUBSEQ VISIT: HCPCS | Performed by: INTERNAL MEDICINE

## 2023-09-22 PROCEDURE — 90662 IIV NO PRSV INCREASED AG IM: CPT

## 2023-09-22 PROCEDURE — G0008 ADMIN INFLUENZA VIRUS VAC: HCPCS

## 2023-09-22 NOTE — PROGRESS NOTES
Assessment and Plan:     Problem List Items Addressed This Visit        Digestive    Primary adenocarcinoma of ascending colon ([address]) - Primary     Doing well without evidence of reoccurrence, just saw Colorectal surgeon, has BW and f/u 6 mos, call with GI symptoms         Relevant Orders    CBC and differential    Comprehensive metabolic panel    Hemoglobin A1C    Lipid panel    Iron    Ferritin       Endocrine    Impaired fasting glucose     FBS up again on recent labs, wgt up 4 lbs, urged healthy diet and keep active, recheck BW in 6 mos - BW order given         Relevant Orders    CBC and differential    Comprehensive metabolic panel    Hemoglobin A1C    Lipid panel    Iron    Ferritin    Hyperparathyroidism (HCC)    Relevant Orders    CBC and differential    Comprehensive metabolic panel    Hemoglobin A1C    Lipid panel    Iron    Ferritin    Hypothyroidism    Relevant Orders    CBC and differential    Comprehensive metabolic panel    Hemoglobin A1C    Lipid panel    Iron    Ferritin       Cardiovascular and Mediastinum    Hypertension     BP at goal, con't current meds, recheck in 6 mos         Relevant Orders    CBC and differential    Comprehensive metabolic panel    Hemoglobin A1C    Lipid panel    Iron    Ferritin    Peripheral arterial disease (HCC)     No current s/sx, on ASA and statin, FLP annually - order given for March 24         Relevant Orders    CBC and differential    Comprehensive metabolic panel    Hemoglobin A1C    Lipid panel    Iron    Ferritin       Other    Dyslipidemia     FLP annually - BW order given, con't current statin for now, healthy diet and regular exercise encouraged         Relevant Orders    CBC and differential    Comprehensive metabolic panel    Hemoglobin A1C    Lipid panel    Iron    Ferritin    Iron deficiency anemia     Still taking iron supplements, check iron levels now and stop iron supplement if back to normal, anemia has resolved, will follow and recheck in 6 mos as well - both BW orders given (now and 6 mos)         Relevant Orders    Iron    Ferritin    CBC and differential    Comprehensive metabolic panel    Hemoglobin A1C    Lipid panel    Iron    Ferritin   Other Visit Diagnoses     Medicare annual wellness visit, subsequent            BMI Counseling: Body mass index is 25.71 kg/m². The BMI is above normal. Nutrition recommendations include decreasing portion sizes, encouraging healthy choices of fruits and vegetables, consuming healthier snacks, moderation in carbohydrate intake, increasing intake of lean protein, reducing intake of saturated and trans fat and reducing intake of cholesterol. Exercise recommendations include exercising 3-5 times per week. No pharmacotherapy was ordered. Rationale for BMI follow-up plan is due to patient being overweight or obese. Depression Screening and Follow-up Plan: Patient was screened for depression during today's encounter. They screened negative with a PHQ-2 score of 0. Urinary Incontinence Plan of Care: counseling topics discussed: practice Kegel (pelvic floor strengthening) exercises, use restroom every 2 hours and limiting fluid intake 3-4 hours before bed. Preventive health issues were discussed with patient, and age appropriate screening tests were ordered as noted in patient's After Visit Summary. Flu vaccine given today      Personalized health advice and appropriate referrals for health education or preventive services given if needed, as noted in patient's After Visit Summary. History of Present Illness:     Patient presents for a Medicare Wellness Visit    HPI Pt here for follow up appt and AWV    Pt saw Colorectal surgeon (Dr. Saarhi Yanes) in May for f/u colon CA s/p R hemicolectomy - OV note reviewed. She was givne BW to be done in 6 mos and told to f/u at that time. She denies N/V/D/blood in stool/wgt loss. She is up 4 lbs. She admits diet has not been as good and she is eating more sweets/carbs. She goes to chair yoga once a week and walks. Pt saw Cardio (Dr. Andrew Johnson) in June for f/u HTN and PAD - OV note reviewed. She has decided she is going to follow with [facility] from here out. BP at goal today and meds were reviewed and no changes have occurred. She denies missing doses of meds or SE with the meds. She does not check her BP outside the office. She notes no frequent Ha's/dizziness/double vision/CP. She had her labs done for Endo last week for her thyroid and hyperparathyroidism. Labs look good except sugar was 118. She has f/u with Endo next week. Patient Care Team:  Manuel Singleton DO as PCP - General  Ginna Kline DO as PCP - Endocrinology (Endocrinology)  Alexander Gowers, MD Kristopher Sos, DO as Cardiologist (Cardiology)  Felicita Metcalf MD as Consulting Physician (Ophthalmology)  Kari Meneses MD (Plastic Surgery)     Review of Systems:     Review of Systems   Constitutional: Negative for chills, fever and unexpected weight change. HENT: Negative for congestion and trouble swallowing. Eyes: Positive for visual disturbance. Negative for pain. Respiratory: Negative for cough, shortness of breath and wheezing. Cardiovascular: Positive for leg swelling. Negative for chest pain and palpitations. Gastrointestinal: Negative for abdominal pain, blood in stool, constipation, diarrhea, nausea and vomiting. Endocrine: Negative for polydipsia and polyuria. Genitourinary: Negative for difficulty urinating, dysuria, hematuria, vaginal bleeding and vaginal pain. Musculoskeletal: Negative for back pain and neck pain. Skin: Negative for rash and wound. Neurological: Negative for dizziness, light-headedness and headaches. Hematological: Bruises/bleeds easily. Psychiatric/Behavioral: Negative for confusion and dysphoric mood.         Problem List:     Patient Active Problem List   Diagnosis   • De Quervain's tenosynovitis, right   • Diastolic dysfunction • Dyslipidemia   • Esophageal reflux   • Glaucoma   • Hypertension   • Impaired fasting glucose   • Insomnia   • Left ventricular hypertrophy   • Mixed urge and stress incontinence   • 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 tuberculosis   • Thyroid nodule   • Peripheral arterial disease (HCC)   • 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)   • Hyperparathyroidism ([address])   • Hypothyroidism   • Stage 3 chronic kidney disease, unspecified whether stage 3a or 3b CKD ([address])   • Primary adenocarcinoma of ascending colon ([address])   • Hypoalbuminemia   • Anemia   • Iron deficiency anemia   • Complex renal cyst      Past Medical and Surgical History:     Past Medical History:   Diagnosis Date   • BCC (basal cell carcinoma)    • Diastolic dysfunction    • Dyslipidemia    • Dysphagia    • Glaucoma    • Hashimoto's thyroiditis    • Hyperparathyroidism ([address])    • Hypertension    • Impaired fasting glucose    • Insomnia    • Osteopenia    • SCCA (squamous cell carcinoma) of skin      Past Surgical History:   Procedure Laterality Date   • CATARACT EXTRACTION, BILATERAL     • COLONOSCOPY      Complete, Onset - 10/25/05 - 10 years    • HYSTERECTOMY     • PARATHYROIDECTOMY      1st 1985 and 2nd 2002   • CO LAPAROSCOPY COLECTOMY PARTIAL W/ANASTOMOSIS N/A 4/13/2023    Procedure: RESECTION COLON RIGHT LAPAROSCOPIC;  Surgeon: Ghulam Robbins MD;  Location: [facility];  Service: Colorectal   • CO NDSC WRST SURG W/RLS TRANSVRS CARPL LIGM Right 05/02/2016    Procedure: RELEASE CARPAL TUNNEL ENDOSCOPIC;  Surgeon: Belen Willingham MD;  Location: [facility];  Service: Orthopedics   • TONSILLECTOMY        Family History:     Family History   Problem Relation Age of Onset   • Stroke Father         CVA   • Ovarian cancer Mother       Social History:     Social History     Socioeconomic History   • Marital status: Single     Spouse name: None   • Number of children: None   • Years of education: None   • Highest education level: None   Occupational History   • Occupation: Retired   Tobacco Use   • Smoking status: Never   • Smokeless tobacco: Never   Vaping Use   • Vaping Use: Never used   Substance and Sexual Activity   • Alcohol use: Yes     Comment: rarely, social    • Drug use: No   • Sexual activity: None   Other Topics Concern   • None   Social History Narrative     per [employer]      Social Determinants of Health     Financial Resource Strain: Low Risk  (9/22/2023)    Overall Financial Resource Strain (CARDIA)    • Difficulty of Paying Living Expenses: Not hard at all   Food Insecurity: No Food Insecurity (4/14/2023)    Hunger Vital Sign    • Worried About Running Out of Food in the Last Year: Never true    • Ran Out of Food in the Last Year: Never true   Transportation Needs: No Transportation Needs (9/22/2023)    PRAPARE - Transportation    • Lack of Transportation (Medical): No    • Lack of Transportation (Non-Medical):  No   Physical Activity: Not on file   Stress: Not on file   Social Connections: Not on file   Intimate Partner Violence: Not on file   Housing Stability: Unknown (4/14/2023)    Housing Stability Vital Sign    • Unable to Pay for Housing in the Last Year: No    • Number of Places Lived in the Last Year: Not on file    • Unstable Housing in the Last Year: No      Medications and Allergies:     Current Outpatient Medications   Medication Sig Dispense Refill   • amLODIPine (NORVASC) 5 mg tablet Take 1 tablet (5 mg total) by mouth daily 90 tablet 1   • ASPIRIN 81 PO Take by mouth     • atorvastatin (LIPITOR) 20 mg tablet Take 20 mg by mouth daily     • Biotin 1000 MCG tablet Take 5,000 mcg by mouth daily     • Calcium 500 MG tablet Take 1 tablet by mouth 3 (three) times a week      • cholecalciferol (VITAMIN D3) 1,000 units tablet Take 1 tablet by mouth daily     • dorzolamide-timolol (COSOPT) 22.3-6.8 MG/ML ophthalmic solution Administer 1 drop to both eyes 2 (two) times a day     • ferrous sulfate 324 (65 Fe) mg TAKE 1 TABLET BY MOUTH EVERY DAY BEFORE BREAKFAST 90 tablet 1   • latanoprost (XALATAN) 0.005 % ophthalmic solution Administer 1 drop into the left eye daily at bedtime     • lisinopril (ZESTRIL) 10 mg tablet TAKE 1 TABLET BY MOUTH EVERY DAY 90 tablet 1   • metoprolol tartrate (LOPRESSOR) 25 mg tablet TAKE 1 TABLET ORALLY TWICE DAILY 180 tablet 2   • Multiple Vitamins-Minerals (CENTRUM SILVER ADULT 50+ PO) Take by mouth. • Multiple Vitamins-Minerals (PRESERVISION AREDS PO) Take 1 tablet by mouth 2 (two) times a day     • Omega-3 Fatty Acids (FISH OIL) 1,000 mg Take 1,000 mg by mouth daily. • Synthroid 88 MCG tablet TAKE 1 TABLET BY MOUTH EVERY DAY 90 tablet 1     No current facility-administered medications for this visit. Allergies   Allergen Reactions   • Cyclogyl [Cyclopentolate Hcl]    • Phenylephrine Other (See Comments)     Eye gtts red eye infected sore   • Pred Forte [Prednisolone Acetate]       Immunizations:     Immunization History   Administered Date(s) Administered   • COVID-19 MODERNA VACC 0.5 ML IM 01/23/2021, 02/20/2021   • INFLUENZA 12/09/2004, 09/29/2014, 10/16/2015, 09/20/2016, 10/06/2017, 09/03/2021   • Influenza Split High Dose Preservative Free IM 09/27/2012, 09/29/2014, 10/16/2015, 09/20/2016, 10/12/2018   • Influenza, high dose seasonal 0.7 mL 09/19/2022   • Pneumococcal Conjugate 13-Valent 10/16/2015   • Pneumococcal Polysaccharide PPV23 06/14/2000, 04/16/2007   • Zoster 05/01/2013   • Zoster Vaccine Recombinant 07/24/2020, 01/09/2021, 01/09/2021   • influenza, trivalent, adjuvanted 10/11/2019      Health Maintenance: There are no preventive care reminders to display for this patient. Topic Date Due   • COVID-19 Vaccine (3 - Moderna series) 04/17/2021   • Influenza Vaccine (1) 09/01/2023      Medicare Screening Tests and Risk Assessments:     Hill Vargas is here for her Subsequent Wellness visit.  Last Medicare Wellness visit information reviewed, patient interviewed and updates made to the record today. Health Risk Assessment:   Patient rates overall health as good. Patient feels that their physical health rating is same. Patient is satisfied with their life. Eyesight was rated as slightly worse. Hearing was rated as same. Patient feels that their emotional and mental health rating is same. Patients states they are never, rarely angry. Patient states they are sometimes unusually tired/fatigued. Pain experienced in the last 7 days has been none. Patient states that she has experienced no weight loss or gain in last 6 months. Eyesight worse - wears glasses, was told she is she getting macular degeneration, goes to eye doc every 3 mos    Depression Screening:   PHQ-2 Score: 0      Fall Risk Screening: In the past year, patient has experienced: history of falling in past year    Number of falls: 1  Injured during fall?: No    Feels unsteady when standing or walking?: No    Worried about falling?: Yes      Urinary Incontinence Screening:   Patient has leaked urine accidently in the last six months. Has some urge incontinence    Home Safety:  Patient does not have trouble with stairs inside or outside of their home. Patient has working smoke alarms and has working carbon monoxide detector. Home safety hazards include: loose rugs on the floor. Nutrition:   Current diet is Regular. Medications:   Patient is currently taking over-the-counter supplements. OTC medications include: see medication list. Patient is able to manage medications. Activities of Daily Living (ADLs)/Instrumental Activities of Daily Living (IADLs):   Walk and transfer into and out of bed and chair?: Yes  Dress and groom yourself?: Yes    Bathe or shower yourself?: Yes    Feed yourself?  Yes  Do your laundry/housekeeping?: Yes  Manage your money, pay your bills and track your expenses?: Yes  Make your own meals?: Yes    Do your own shopping?: Yes    Previous Hospitalizations: Any hospitalizations or ED visits within the last 12 months?: Yes    How many hospitalizations have you had in the last year?: 1-2    Hospitalization Comments: 1 hospitalization    Advance Care Planning:   Living will: Yes    Durable POA for healthcare: Yes    Advanced directive: Yes      Cognitive Screening:   Provider or family/friend/caregiver concerned regarding cognition?: No    PREVENTIVE SCREENINGS      Cardiovascular Screening:    General: Screening Current, Risks and Benefits Discussed and History Lipid Disorder      Diabetes Screening:     General: Screening Current and Risks and Benefits Discussed      Colorectal Cancer Screening:     General: Risks and Benefits Discussed, Screening Current and History Colorectal Cancer      Breast Cancer Screening:     General: Risks and Benefits Discussed and Screening Not Indicated      Cervical Cancer Screening:    General: Screening Not Indicated and Risks and Benefits Discussed      Osteoporosis Screening:    General: Risks and Benefits Discussed and Screening Not Indicated      Abdominal Aortic Aneurysm (AAA) Screening:        General: Risks and Benefits Discussed and Screening Not Indicated      Lung Cancer Screening:     General: Screening Not Indicated and Risks and Benefits Discussed      Hepatitis C Screening:    General: Risks and Benefits Discussed and Screening Not Indicated    Screening, Brief Intervention, and Referral to Treatment (SBIRT)    Screening  Typical number of drinks in a day: 0  Typical number of drinks in a week: 0  Interpretation: Low risk drinking behavior. Single Item Drug Screening:  How often have you used an illegal drug (including marijuana) or a prescription medication for non-medical reasons in the past year? never    Single Item Drug Screen Score: 0  Interpretation: Negative screen for possible drug use disorder    Other Counseling Topics:   Car/seat belt/driving safety and regular weightbearing exercise.      Vision Screening Right eye Left eye Both eyes   Without correction      With correction 20/40 20/40 20/40        Physical Exam:     /60   Pulse 60   Temp 97.9 °F (36.6 °C) (Tympanic)   Ht 4' 9" (1.448 m)   Wt 53.9 kg (118 lb 12.8 oz)   BMI 25.71 kg/m²     Physical Exam  Vitals and nursing note reviewed. Constitutional:       General: She is not in acute distress. Appearance: She is well-developed. She is not ill-appearing. HENT:      Head: Normocephalic and atraumatic. Right Ear: External ear normal.      Left Ear: External ear normal.      Ears:      Comments: B/L hearing aids in place  Eyes:      General:         Right eye: No discharge. Left eye: No discharge. Conjunctiva/sclera: Conjunctivae normal.   Neck:      Thyroid: No thyromegaly. Vascular: No carotid bruit. Trachea: No tracheal deviation. Cardiovascular:      Rate and Rhythm: Normal rate and regular rhythm. Heart sounds: Murmur heard. Comments: Trace LLE nonpitting edema  Pulmonary:      Effort: Pulmonary effort is normal. No respiratory distress. Breath sounds: Normal breath sounds. No wheezing, rhonchi or rales. Abdominal:      General: There is no distension. Palpations: Abdomen is soft. Tenderness: There is no abdominal tenderness. There is no guarding or rebound. Musculoskeletal:         General: No deformity or signs of injury. Cervical back: Neck supple. Lymphadenopathy:      Cervical: No cervical adenopathy. Skin:     General: Skin is warm and dry. Coloration: Skin is not pale. Findings: No bruising or rash. Neurological:      General: No focal deficit present. Mental Status: She is alert. Mental status is at baseline. Motor: No abnormal muscle tone. Gait: Gait normal.   Psychiatric:         Mood and Affect: Mood normal.         Behavior: Behavior normal.         Thought Content:  Thought content normal.         Judgment: Judgment normal. Almaz Irwin, DO

## 2023-09-22 NOTE — ASSESSMENT & PLAN NOTE
Still taking iron supplements, check iron levels now and stop iron supplement if back to normal, anemia has resolved, will follow and recheck in 6 mos as well - both BW orders given (now and 6 mos)

## 2023-09-22 NOTE — ASSESSMENT & PLAN NOTE
FBS up again on recent labs, wgt up 4 lbs, urged healthy diet and keep active, recheck BW in 6 mos - BW order given

## 2023-09-22 NOTE — ASSESSMENT & PLAN NOTE
Doing well without evidence of reoccurrence, just saw Colorectal surgeon, has BW and f/u 6 mos, call with GI symptoms

## 2023-09-22 NOTE — ASSESSMENT & PLAN NOTE
FLP annually - BW order given, con't current statin for now, healthy diet and regular exercise encouraged

## 2023-09-22 NOTE — PATIENT INSTRUCTIONS
Medicare Preventive Visit Patient Instructions  Thank you for completing your Welcome to Medicare Visit or Medicare Annual Wellness Visit today. Your next wellness visit will be due in one year (9/22/2024). The screening/preventive services that you may require over the next 5-10 years are detailed below. Some tests may not apply to you based off risk factors and/or age. Screening tests ordered at today's visit but not completed yet may show as past due. Also, please note that scanned in results may not display below. Preventive Screenings:  Service Recommendations Previous Testing/Comments   Colorectal Cancer Screening  * Colonoscopy    * Fecal Occult Blood Test (FOBT)/Fecal Immunochemical Test (FIT)  * Fecal DNA/Cologuard Test  * Flexible Sigmoidoscopy Age: 43-73 years old   Colonoscopy: every 10 years (may be performed more frequently if at higher risk)  OR  FOBT/FIT: every 1 year  OR  Cologuard: every 3 years  OR  Sigmoidoscopy: every 5 years  Screening may be recommended earlier than age 39 if at higher risk for colorectal cancer. Also, an individualized decision between you and your healthcare provider will decide whether screening between the ages of 77-80 would be appropriate. Colonoscopy: 02/17/2023  FOBT/FIT: Not on file  Cologuard: Not on file  Sigmoidoscopy: Not on file    Screening Not Indicated     Breast Cancer Screening Age: 36 years old  Frequency: every 1-2 years  Not required if history of left and right mastectomy Mammogram: Not on file        Cervical Cancer Screening Between the ages of 21-29, pap smear recommended once every 3 years. Between the ages of 32-69, can perform pap smear with HPV co-testing every 5 years.    Recommendations may differ for women with a history of total hysterectomy, cervical cancer, or abnormal pap smears in past. Pap Smear: Not on file    Screening Not Indicated   Hepatitis C Screening Once for adults born between 1945 and 1965  More frequently in patients at high risk for Hepatitis C Hep C Antibody: Not on file        Diabetes Screening 1-2 times per year if you're at risk for diabetes or have pre-diabetes Fasting glucose: 118 mg/dL (9/8/2023)  A1C: 5.1 % (3/9/2023)  Screening Current   Cholesterol Screening Once every 5 years if you don't have a lipid disorder. May order more often based on risk factors. Lipid panel: 08/03/2021    Screening Current     Other Preventive Screenings Covered by Medicare:  1. Abdominal Aortic Aneurysm (AAA) Screening: covered once if your at risk. You're considered to be at risk if you have a family history of AAA. 2. Lung Cancer Screening: covers low dose CT scan once per year if you meet all of the following conditions: (1) Age 48-67; (2) No signs or symptoms of lung cancer; (3) Current smoker or have quit smoking within the last 15 years; (4) You have a tobacco smoking history of at least 20 pack years (packs per day multiplied by number of years you smoked); (5) You get a written order from a healthcare provider. 3. Glaucoma Screening: covered annually if you're considered high risk: (1) You have diabetes OR (2) Family history of glaucoma OR (3)  aged 48 and older OR (3)  American aged 72 and older  3. Osteoporosis Screening: covered every 2 years if you meet one of the following conditions: (1) You're estrogen deficient and at risk for osteoporosis based off medical history and other findings; (2) Have a vertebral abnormality; (3) On glucocorticoid therapy for more than 3 months; (4) Have primary hyperparathyroidism; (5) On osteoporosis medications and need to assess response to drug therapy. · Last bone density test (DXA Scan): Not on file. 5. HIV Screening: covered annually if you're between the age of 14-79. Also covered annually if you are younger than 13 and older than 72 with risk factors for HIV infection.  For pregnant patients, it is covered up to 3 times per pregnancy. Immunizations:  Immunization Recommendations   Influenza Vaccine Annual influenza vaccination during flu season is recommended for all persons aged >= 6 months who do not have contraindications   Pneumococcal Vaccine   * Pneumococcal conjugate vaccine = PCV13 (Prevnar 13), PCV15 (Vaxneuvance), PCV20 (Prevnar 20)  * Pneumococcal polysaccharide vaccine = PPSV23 (Pneumovax) Adults 20-63 years old: 1-3 doses may be recommended based on certain risk factors  Adults 72 years old: 1-2 doses may be recommended based off what pneumonia vaccine you previously received   Hepatitis B Vaccine 3 dose series if at intermediate or high risk (ex: diabetes, end stage renal disease, liver disease)   Tetanus (Td) Vaccine - COST NOT COVERED BY MEDICARE PART B Following completion of primary series, a booster dose should be given every 10 years to maintain immunity against tetanus. Td may also be given as tetanus wound prophylaxis. Tdap Vaccine - COST NOT COVERED BY MEDICARE PART B Recommended at least once for all adults. For pregnant patients, recommended with each pregnancy. Shingles Vaccine (Shingrix) - COST NOT COVERED BY MEDICARE PART B  2 shot series recommended in those aged 48 and above     Health Maintenance Due:  There are no preventive care reminders to display for this patient. Immunizations Due:      Topic Date Due   • COVID-19 Vaccine (3 - Moderna series) 04/17/2021   • Influenza Vaccine (1) 09/01/2023     Advance Directives   What are advance directives? Advance directives are legal documents that state your wishes and plans for medical care. These plans are made ahead of time in case you lose your ability to make decisions for yourself. Advance directives can apply to any medical decision, such as the treatments you want, and if you want to donate organs. What are the types of advance directives? There are many types of advance directives, and each state has rules about how to use them.  You may choose a combination of any of the following:  · Living will: This is a written record of the treatment you want. You can also choose which treatments you do not want, which to limit, and which to stop at a certain time. This includes surgery, medicine, IV fluid, and tube feedings. · Durable power of  for healthcare [facility]): This is a written record that states who you want to make healthcare choices for you when you are unable to make them for yourself. This person, called a proxy, is usually a family member or a friend. You may choose more than 1 proxy. · Do not resuscitate (DNR) order:  A DNR order is used in case your heart stops beating or you stop breathing. It is a request not to have certain forms of treatment, such as CPR. A DNR order may be included in other types of advance directives. · Medical directive: This covers the care that you want if you are in a coma, near death, or unable to make decisions for yourself. You can list the treatments you want for each condition. Treatment may include pain medicine, surgery, blood transfusions, dialysis, IV or tube feedings, and a ventilator (breathing machine). · Values history: This document has questions about your views, beliefs, and how you feel and think about life. This information can help others choose the care that you would choose. Why are advance directives important? An advance directive helps you control your care. Although spoken wishes may be used, it is better to have your wishes written down. Spoken wishes can be misunderstood, or not followed. Treatments may be given even if you do not want them. An advance directive may make it easier for your family to make difficult choices about your care. Fall Prevention    Fall prevention  includes ways to make your home and other areas safer. It also includes ways you can move more carefully to prevent a fall.  Health conditions that cause changes in your blood pressure, vision, or muscle strength and coordination may increase your risk for falls. Medicines may also increase your risk for falls if they make you dizzy, weak, or sleepy. Fall prevention tips:   · Stand or sit up slowly. · Use assistive devices as directed. · Wear shoes that fit well and have soles that . · Wear a personal alarm. · Stay active. · Manage your medical conditions. Home Safety Tips:  · Add items to prevent falls in the bathroom. · Keep paths clear. · Install bright lights in your home. · Keep items you use often on shelves within reach. · Paint or place reflective tape on the edges of your stairs. Urinary Incontinence   Urinary incontinence (UI)  is when you lose control of your bladder. UI develops because your bladder cannot store or empty urine properly. The 3 most common types of UI are stress incontinence, urge incontinence, or both. Medicines:   · May be given to help strengthen your bladder control. Report any side effects of medication to your healthcare provider. Do pelvic muscle exercises often:  Your pelvic muscles help you stop urinating. Squeeze these muscles tight for 5 seconds, then relax for 5 seconds. Gradually work up to squeezing for 10 seconds. Do 3 sets of 15 repetitions a day, or as directed. This will help strengthen your pelvic muscles and improve bladder control. Train your bladder:  Go to the bathroom at set times, such as every 2 hours, even if you do not feel the urge to go. You can also try to hold your urine when you feel the urge to go. For example, hold your urine for 5 minutes when you feel the urge to go. As that becomes easier, hold your urine for 10 minutes. Self-care:   · Keep a UI record. Write down how often you leak urine and how much you leak. Make a note of what you were doing when you leaked urine. · Drink liquids as directed. You may need to limit the amount of liquid you drink to help control your urine leakage.  Do not drink any liquid right before you go to bed. Limit or do not have drinks that contain caffeine or alcohol. · Prevent constipation. Eat a variety of high-fiber foods. Good examples are high-fiber cereals, beans, vegetables, and whole-grain breads. Walking is the best way to trigger your intestines to have a bowel movement. · Exercise regularly and maintain a healthy weight. Weight loss and exercise will decrease pressure on your bladder and help you control your leakage. · Use a catheter as directed  to help empty your bladder. A catheter is a tiny, plastic tube that is put into your bladder to drain your urine. · Go to behavior therapy as directed. Behavior therapy may be used to help you learn to control your urge to urinate. © Copyright [address] 2018 Information is for End User's use only and may not be sold, redistributed or otherwise used for commercial purposes. All illustrations and images included in CareNotes® are the copyrighted property of [employer]. or [facility] Preventive Visit Patient Instructions  Thank you for completing your Welcome to Medicare Visit or Medicare Annual Wellness Visit today. Your next wellness visit will be due in one year (9/22/2024). The screening/preventive services that you may require over the next 5-10 years are detailed below. Some tests may not apply to you based off risk factors and/or age. Screening tests ordered at today's visit but not completed yet may show as past due. Also, please note that scanned in results may not display below.   Preventive Screenings:  Service Recommendations Previous Testing/Comments   Colorectal Cancer Screening  * Colonoscopy    * Fecal Occult Blood Test (FOBT)/Fecal Immunochemical Test (FIT)  * Fecal DNA/Cologuard Test  * Flexible Sigmoidoscopy Age: 43-73 years old   Colonoscopy: every 10 years (may be performed more frequently if at higher risk)  OR  FOBT/FIT: every 1 year  OR  Cologuard: every 3 years OR  Sigmoidoscopy: every 5 years  Screening may be recommended earlier than age 39 if at higher risk for colorectal cancer. Also, an individualized decision between you and your healthcare provider will decide whether screening between the ages of 77-80 would be appropriate. Colonoscopy: 02/17/2023  FOBT/FIT: Not on file  Cologuard: Not on file  Sigmoidoscopy: Not on file    Screening Not Indicated     Breast Cancer Screening Age: 36 years old  Frequency: every 1-2 years  Not required if history of left and right mastectomy Mammogram: Not on file        Cervical Cancer Screening Between the ages of 21-29, pap smear recommended once every 3 years. Between the ages of 32-69, can perform pap smear with HPV co-testing every 5 years. Recommendations may differ for women with a history of total hysterectomy, cervical cancer, or abnormal pap smears in past. Pap Smear: Not on file    Screening Not Indicated   Hepatitis C Screening Once for adults born between 1945 and 1965  More frequently in patients at high risk for Hepatitis C Hep C Antibody: Not on file        Diabetes Screening 1-2 times per year if you're at risk for diabetes or have pre-diabetes Fasting glucose: 118 mg/dL (9/8/2023)  A1C: 5.1 % (3/9/2023)  Screening Current   Cholesterol Screening Once every 5 years if you don't have a lipid disorder. May order more often based on risk factors. Lipid panel: 08/03/2021    Screening Current     Other Preventive Screenings Covered by Medicare:  6. Abdominal Aortic Aneurysm (AAA) Screening: covered once if your at risk. You're considered to be at risk if you have a family history of AAA.   7. Lung Cancer Screening: covers low dose CT scan once per year if you meet all of the following conditions: (1) Age 48-67; (2) No signs or symptoms of lung cancer; (3) Current smoker or have quit smoking within the last 15 years; (4) You have a tobacco smoking history of at least 20 pack years (packs per day multiplied by number of years you smoked); (5) You get a written order from a healthcare provider. 8. Glaucoma Screening: covered annually if you're considered high risk: (1) You have diabetes OR (2) Family history of glaucoma OR (3)  aged 48 and older OR (3)  American aged 72 and older  5. Osteoporosis Screening: covered every 2 years if you meet one of the following conditions: (1) You're estrogen deficient and at risk for osteoporosis based off medical history and other findings; (2) Have a vertebral abnormality; (3) On glucocorticoid therapy for more than 3 months; (4) Have primary hyperparathyroidism; (5) On osteoporosis medications and need to assess response to drug therapy. · Last bone density test (DXA Scan): Not on file. 10. HIV Screening: covered annually if you're between the age of 14-79. Also covered annually if you are younger than 13 and older than 72 with risk factors for HIV infection. For pregnant patients, it is covered up to 3 times per pregnancy. Immunizations:  Immunization Recommendations   Influenza Vaccine Annual influenza vaccination during flu season is recommended for all persons aged >= 6 months who do not have contraindications   Pneumococcal Vaccine   * Pneumococcal conjugate vaccine = PCV13 (Prevnar 13), PCV15 (Vaxneuvance), PCV20 (Prevnar 20)  * Pneumococcal polysaccharide vaccine = PPSV23 (Pneumovax) Adults 20-63 years old: 1-3 doses may be recommended based on certain risk factors  Adults 72 years old: 1-2 doses may be recommended based off what pneumonia vaccine you previously received   Hepatitis B Vaccine 3 dose series if at intermediate or high risk (ex: diabetes, end stage renal disease, liver disease)   Tetanus (Td) Vaccine - COST NOT COVERED BY MEDICARE PART B Following completion of primary series, a booster dose should be given every 10 years to maintain immunity against tetanus. Td may also be given as tetanus wound prophylaxis.    Tdap Vaccine - COST NOT COVERED BY MEDICARE PART B Recommended at least once for all adults. For pregnant patients, recommended with each pregnancy. Shingles Vaccine (Shingrix) - COST NOT COVERED BY MEDICARE PART B  2 shot series recommended in those aged 48 and above     Health Maintenance Due:  There are no preventive care reminders to display for this patient. Immunizations Due:      Topic Date Due   • COVID-19 Vaccine (3 - Moderna series) 04/17/2021   • Influenza Vaccine (1) 09/01/2023     Advance Directives   What are advance directives? Advance directives are legal documents that state your wishes and plans for medical care. These plans are made ahead of time in case you lose your ability to make decisions for yourself. Advance directives can apply to any medical decision, such as the treatments you want, and if you want to donate organs. What are the types of advance directives? There are many types of advance directives, and each state has rules about how to use them. You may choose a combination of any of the following:  · Living will: This is a written record of the treatment you want. You can also choose which treatments you do not want, which to limit, and which to stop at a certain time. This includes surgery, medicine, IV fluid, and tube feedings. · Durable power of  for healthcare [facility]): This is a written record that states who you want to make healthcare choices for you when you are unable to make them for yourself. This person, called a proxy, is usually a family member or a friend. You may choose more than 1 proxy. · Do not resuscitate (DNR) order:  A DNR order is used in case your heart stops beating or you stop breathing. It is a request not to have certain forms of treatment, such as CPR. A DNR order may be included in other types of advance directives. · Medical directive: This covers the care that you want if you are in a coma, near death, or unable to make decisions for yourself.  You can list the treatments you want for each condition. Treatment may include pain medicine, surgery, blood transfusions, dialysis, IV or tube feedings, and a ventilator (breathing machine). · Values history: This document has questions about your views, beliefs, and how you feel and think about life. This information can help others choose the care that you would choose. Why are advance directives important? An advance directive helps you control your care. Although spoken wishes may be used, it is better to have your wishes written down. Spoken wishes can be misunderstood, or not followed. Treatments may be given even if you do not want them. An advance directive may make it easier for your family to make difficult choices about your care. Fall Prevention    Fall prevention  includes ways to make your home and other areas safer. It also includes ways you can move more carefully to prevent a fall. Health conditions that cause changes in your blood pressure, vision, or muscle strength and coordination may increase your risk for falls. Medicines may also increase your risk for falls if they make you dizzy, weak, or sleepy. Fall prevention tips:   · Stand or sit up slowly. · Use assistive devices as directed. · Wear shoes that fit well and have soles that . · Wear a personal alarm. · Stay active. · Manage your medical conditions. Home Safety Tips:  · Add items to prevent falls in the bathroom. · Keep paths clear. · Install bright lights in your home. · Keep items you use often on shelves within reach. · Paint or place reflective tape on the edges of your stairs. Urinary Incontinence   Urinary incontinence (UI)  is when you lose control of your bladder. UI develops because your bladder cannot store or empty urine properly. The 3 most common types of UI are stress incontinence, urge incontinence, or both. Medicines:   · May be given to help strengthen your bladder control.  Report any side effects of medication to your healthcare provider. Do pelvic muscle exercises often:  Your pelvic muscles help you stop urinating. Squeeze these muscles tight for 5 seconds, then relax for 5 seconds. Gradually work up to squeezing for 10 seconds. Do 3 sets of 15 repetitions a day, or as directed. This will help strengthen your pelvic muscles and improve bladder control. Train your bladder:  Go to the bathroom at set times, such as every 2 hours, even if you do not feel the urge to go. You can also try to hold your urine when you feel the urge to go. For example, hold your urine for 5 minutes when you feel the urge to go. As that becomes easier, hold your urine for 10 minutes. Self-care:   · Keep a UI record. Write down how often you leak urine and how much you leak. Make a note of what you were doing when you leaked urine. · Drink liquids as directed. You may need to limit the amount of liquid you drink to help control your urine leakage. Do not drink any liquid right before you go to bed. Limit or do not have drinks that contain caffeine or alcohol. · Prevent constipation. Eat a variety of high-fiber foods. Good examples are high-fiber cereals, beans, vegetables, and whole-grain breads. Walking is the best way to trigger your intestines to have a bowel movement. · Exercise regularly and maintain a healthy weight. Weight loss and exercise will decrease pressure on your bladder and help you control your leakage. · Use a catheter as directed  to help empty your bladder. A catheter is a tiny, plastic tube that is put into your bladder to drain your urine. · Go to behavior therapy as directed. Behavior therapy may be used to help you learn to control your urge to urinate. Weight Management   Why it is important to manage your weight:  Being overweight increases your risk of health conditions such as heart disease, high blood pressure, type 2 diabetes, and certain types of cancer.  It can also increase your risk for osteoarthritis, sleep apnea, and other respiratory problems. Aim for a slow, steady weight loss. Even a small amount of weight loss can lower your risk of health problems. How to lose weight safely:  A safe and healthy way to lose weight is to eat fewer calories and get regular exercise. You can lose up about 1 pound a week by decreasing the number of calories you eat by 500 calories each day. Healthy meal plan for weight management:  A healthy meal plan includes a variety of foods, contains fewer calories, and helps you stay healthy. A healthy meal plan includes the following:  · Eat whole-grain foods more often. A healthy meal plan should contain fiber. Fiber is the part of grains, fruits, and vegetables that is not broken down by your body. Whole-grain foods are healthy and provide extra fiber in your diet. Some examples of whole-grain foods are whole-wheat breads and pastas, oatmeal, brown rice, and bulgur. · Eat a variety of vegetables every day. Include dark, leafy greens such as spinach, kale, zaki greens, and mustard greens. Eat yellow and orange vegetables such as carrots, sweet potatoes, and winter squash. · Eat a variety of fruits every day. Choose fresh or canned fruit (canned in its own juice or light syrup) instead of juice. Fruit juice has very little or no fiber. · Eat low-fat dairy foods. Drink fat-free (skim) milk or 1% milk. Eat fat-free yogurt and low-fat cottage cheese. Try low-fat cheeses such as mozzarella and other reduced-fat cheeses. · Choose meat and other protein foods that are low in fat. Choose beans or other legumes such as split peas or lentils. Choose fish, skinless poultry (chicken or turkey), or lean cuts of red meat (beef or pork). Before you cook meat or poultry, cut off any visible fat. · Use less fat and oil. Try baking foods instead of frying them.  Add less fat, such as margarine, sour cream, regular salad dressing and mayonnaise to foods. Eat fewer high-fat foods. Some examples of high-fat foods include french fries, doughnuts, ice cream, and cakes. · Eat fewer sweets. Limit foods and drinks that are high in sugar. This includes candy, cookies, regular soda, and sweetened drinks. Exercise:  Exercise at least 30 minutes per day on most days of the week. Some examples of exercise include walking, biking, dancing, and swimming. You can also fit in more physical activity by taking the stairs instead of the elevator or parking farther away from stores. Ask your healthcare provider about the best exercise plan for you. © Copyright [employer] 2018 Information is for End User's use only and may not be sold, redistributed or otherwise used for commercial purposes.  All illustrations and images included in CareNotes® are the copyrighted property of [employer] or 34 [place] [address]

## 2023-09-25 ENCOUNTER — OFFICE VISIT (OUTPATIENT)
Dept: ENDOCRINOLOGY | Facility: HOSPITAL | Age: 88
End: 2023-09-25
Payer: MEDICARE

## 2023-09-25 VITALS
BODY MASS INDEX: 26.06 KG/M2 | SYSTOLIC BLOOD PRESSURE: 124 MMHG | DIASTOLIC BLOOD PRESSURE: 78 MMHG | WEIGHT: 120.8 LBS | HEART RATE: 62 BPM | HEIGHT: 57 IN

## 2023-09-25 DIAGNOSIS — E06.3 HYPOTHYROIDISM DUE TO HASHIMOTO'S THYROIDITIS: Primary | ICD-10-CM

## 2023-09-25 DIAGNOSIS — E04.1 THYROID NODULE: ICD-10-CM

## 2023-09-25 DIAGNOSIS — E03.8 HYPOTHYROIDISM DUE TO HASHIMOTO'S THYROIDITIS: Primary | ICD-10-CM

## 2023-09-25 DIAGNOSIS — E89.2 STATUS POST PARATHYROIDECTOMY (HCC): ICD-10-CM

## 2023-09-25 DIAGNOSIS — E21.3 HYPERPARATHYROIDISM (HCC): ICD-10-CM

## 2023-09-25 PROBLEM — Z98.890 STATUS POST PARATHYROIDECTOMY: Status: ACTIVE | Noted: 2023-09-25

## 2023-09-25 PROBLEM — Z90.89 STATUS POST PARATHYROIDECTOMY: Status: ACTIVE | Noted: 2023-09-25

## 2023-09-25 PROCEDURE — 99214 OFFICE O/P EST MOD 30 MIN: CPT | Performed by: INTERNAL MEDICINE

## 2023-09-25 RX ORDER — LEVOTHYROXINE SODIUM 88 MCG
88 TABLET ORAL DAILY
Qty: 90 TABLET | Refills: 3 | Status: SHIPPED | OUTPATIENT
Start: 2023-09-25

## 2023-09-25 NOTE — PATIENT INSTRUCTIONS
The blood work is all normal.    Continue the same calcium and vitamin d. Continue the same synthroid. Follow up in 1 year with blood work and thyroid ultrasound.

## 2023-09-25 NOTE — PROGRESS NOTES
9/25/2023    Assessment/Plan      Diagnoses and all orders for this visit:    Hypothyroidism due to Hashimoto's thyroiditis  -     Synthroid 88 MCG tablet; Take 1 tablet (88 mcg total) by mouth daily  -     Comprehensive metabolic panel Lab Collect; Future  -     CBC and differential Lab Collect; Future  -     Phosphorus Lab Collect; Future  -     PTH, intact Lab Collect Lab Collect; Future  -     T4, free Lab Collect; Future  -     TSH, 3rd generation Lab Collect; Future  -     Vitamin D 25 hydroxy Lab Collect; Future  -     US thyroid; Future    Thyroid nodule  -     Comprehensive metabolic panel Lab Collect; Future  -     CBC and differential Lab Collect; Future  -     Phosphorus Lab Collect; Future  -     PTH, intact Lab Collect Lab Collect; Future  -     T4, free Lab Collect; Future  -     TSH, 3rd generation Lab Collect; Future  -     Vitamin D 25 hydroxy Lab Collect; Future  -     US thyroid; Future    Hyperparathyroidism ([address])  -     Comprehensive metabolic panel Lab Collect; Future  -     CBC and differential Lab Collect; Future  -     Phosphorus Lab Collect; Future  -     PTH, intact Lab Collect Lab Collect; Future  -     T4, free Lab Collect; Future  -     TSH, 3rd generation Lab Collect; Future  -     Vitamin D 25 hydroxy Lab Collect; Future    Status post parathyroidectomy [facility])  -     Comprehensive metabolic panel Lab Collect; Future  -     CBC and differential Lab Collect; Future  -     Phosphorus Lab Collect; Future  -     PTH, intact Lab Collect Lab Collect; Future  -     T4, free Lab Collect; Future  -     TSH, 3rd generation Lab Collect; Future  -     Vitamin D 25 hydroxy Lab Collect; Future        Assessment/Plan:  1. Hypothyroidism due to Hashimoto's thyroiditis. Most recent thyroid function studies are normal signifying biochemical euthyroidism. She will continue the same Synthroid 88 mcg daily. 2.  Thyroid nodule.   Last thyroid ultrasound in August 2022 showed stable thyroid nodule sizes.  These nodules will be followed over time. 3.  History of hyperparathyroidism. She is post parathyroid adenoma removal.  Most recent calcium and vitamin D levels are normal.  She will continue to drink plenty of water and this will be followed over time. I have asked her to follow-up in 1 year with preceding CMP, CBC, phosphorus, intact parathyroid hormone level, 25-hydroxy vitamin D level, TSH, free T4, and thyroid ultrasound. CC: Hypothyroid, thyroid nodule, hyperparathyroid follow-up    History of Present Illness     HPI: Familia Martínez is a 80y.o. year old female with history of hypothyroidism due to Hashimoto's thyroiditis, thyroid nodule, history of hyperparathyroidism for follow-up visit. She has hypothyroidism and takes Synthroid brand 88 mcg daily. This occurred after surgery for her parathyroid. She denies heat or cold intolerance, palpitation, tremors, or fatigue. Weight is 4 pounds less than last year. She denies diarrhea or constipation, anxiety or depression. She does have some insomnia with good nights and bad nights. She denies dry skin but has brittle nails and hair loss. She denies diplopia. She denies compressive thyroid symptoms or difficulties with swallowing. She has had hyperparathyroidism and required 2 surgeries for primary hyperparathyroidism with a 2nd 1 at the [facility].1st surgery 12/1985 and 2nd one was July 2002. Calcium has been maintained in the upper normal range since surgery. He denies polyuria or polydipsia. She has once or twice a night nocturia. She has no fatigue or mental confusion. She has a history of thyroid nodules and had a fine-needle aspiration biopsy in 2004 at [facility] which was benign and consistent with a goiter. Last thyroid ultrasound in August 2021 did show multiple thyroid nodules all stable in size. There were 2 T rad 4 thyroid nodules in the right and the left lobe of the thyroid. These both measured 2 cm. These have been present since at least 2018. She denies compressive thyroid symptoms or difficulties with swallowing. Review of Systems   Constitutional: Negative for fatigue and unexpected weight change. Weight 4 lbs less than last visit. HENT: Negative for trouble swallowing. Eyes:        Wears glasses. Has blurry vision. Respiratory: Negative for chest tightness and shortness of breath. Cardiovascular: Negative for chest pain and palpitations. Gastrointestinal: Negative for abdominal pain, constipation, diarrhea and nausea. Had colon cancer. Underwent partial colectomy. Feeling ok. Endocrine: Negative for cold intolerance, heat intolerance, polydipsia and polyuria. Feels more volume of urination since her surgery. Nocturia 1-2 times a night at times. Musculoskeletal: Negative for back pain. Skin: Negative for rash. No dry skin. Arias brittle nails. Has hair loss. Neurological: Negative for dizziness, tremors, light-headedness, numbness and headaches. Psychiatric/Behavioral: Positive for sleep disturbance. Negative for confusion and dysphoric mood. The patient is not nervous/anxious. Sleeping variable, good nights and bad nights.         Historical Information   Past Medical History:   Diagnosis Date   • BCC (basal cell carcinoma)    • Diastolic dysfunction    • Dyslipidemia    • Dysphagia    • Glaucoma    • Hashimoto's thyroiditis    • Hyperparathyroidism ([address])    • Hypertension    • Impaired fasting glucose    • Insomnia    • Osteopenia    • SCCA (squamous cell carcinoma) of skin      Past Surgical History:   Procedure Laterality Date   • CATARACT EXTRACTION, BILATERAL     • COLONOSCOPY      Complete, Onset - 10/25/05 - 10 years    • HYSTERECTOMY     • PARATHYROIDECTOMY      1st 1985 and 2nd 2002   • CA LAPAROSCOPY COLECTOMY PARTIAL W/ANASTOMOSIS N/A 4/13/2023    Procedure: RESECTION COLON RIGHT LAPAROSCOPIC;  Surgeon: Rios Massey MD; [facility];  Service: Colorectal   • [facility] NDSC WRST SURG W/RLS TRANSVRS CARPL LIGM Right 05/02/2016    Procedure: RELEASE CARPAL TUNNEL ENDOSCOPIC;  Surgeon: Roe Trejo MD;  [facility];  Service: Orthopedics   • TONSILLECTOMY       Social History   Social History     Substance and Sexual Activity   Alcohol Use Yes    Comment: rarely, social      Social History     Substance and Sexual Activity   Drug Use No     Social History     Tobacco Use   Smoking Status Never   Smokeless Tobacco Never     Family History:   Family History   Problem Relation Age of Onset   • Stroke Father         CVA   • Ovarian cancer Mother        Meds/Allergies   Current Outpatient Medications   Medication Sig Dispense Refill   • amLODIPine (NORVASC) 5 mg tablet Take 1 tablet (5 mg total) by mouth daily 90 tablet 1   • ASPIRIN 81 PO Take by mouth     • atorvastatin (LIPITOR) 20 mg tablet Take 20 mg by mouth daily     • Biotin 1000 MCG tablet Take 5,000 mcg by mouth daily     • Calcium 500 MG tablet Take 1 tablet by mouth 3 (three) times a week      • cholecalciferol (VITAMIN D3) 1,000 units tablet Take 1 tablet by mouth daily     • dorzolamide-timolol (COSOPT) 22.3-6.8 MG/ML ophthalmic solution Administer 1 drop to both eyes 2 (two) times a day     • ferrous sulfate 324 (65 Fe) mg TAKE 1 TABLET BY MOUTH EVERY DAY BEFORE BREAKFAST 90 tablet 1   • latanoprost (XALATAN) 0.005 % ophthalmic solution Administer 1 drop into the left eye daily at bedtime     • lisinopril (ZESTRIL) 10 mg tablet TAKE 1 TABLET BY MOUTH EVERY DAY 90 tablet 1   • metoprolol tartrate (LOPRESSOR) 25 mg tablet TAKE 1 TABLET ORALLY TWICE DAILY 180 tablet 2   • Multiple Vitamins-Minerals (CENTRUM SILVER ADULT 50+ PO) Take by mouth. • Multiple Vitamins-Minerals (PRESERVISION AREDS PO) Take 1 tablet by mouth 2 (two) times a day     • Omega-3 Fatty Acids (FISH OIL) 1,000 mg Take 1,000 mg by mouth daily.      • Synthroid 88 MCG tablet Take 1 tablet (88 mcg total) by mouth daily 90 tablet 3     No current facility-administered medications for this visit. Allergies   Allergen Reactions   • Cyclogyl [Cyclopentolate Hcl]    • Phenylephrine Other (See Comments)     Eye gtts red eye infected sore   • Pred Forte [Prednisolone Acetate]        Objective   Vitals: Blood pressure 124/78, pulse 62, height 4' 9" (1.448 m), weight 54.8 kg (120 lb 12.8 oz). Invasive Devices     None                 Physical Exam  Vitals reviewed. Constitutional:       Appearance: Normal appearance. She is well-developed. HENT:      Head: Normocephalic and atraumatic. Eyes:      Extraocular Movements: Extraocular movements intact. Conjunctiva/sclera: Conjunctivae normal.      Comments: No lid lag, stare, proptosis, or periorbital edema. Neck:      Thyroid: No thyromegaly. Vascular: No carotid bruit. Comments: Healed anterior neck scar. Thyroid normal in size without palpable nodules. No masses of the neck. Cardiovascular:      Rate and Rhythm: Normal rate and regular rhythm. Heart sounds: Normal heart sounds. No murmur heard. Pulmonary:      Effort: Pulmonary effort is normal.      Breath sounds: Normal breath sounds. No wheezing. Abdominal:      Palpations: Abdomen is soft. Musculoskeletal:         General: No deformity. Cervical back: Normal range of motion and neck supple. Right lower leg: No edema. Left lower leg: No edema. Comments: No tremor of the outstretched hands. No spinous process tenderness. No CVA tenderness. Lymphadenopathy:      Cervical: No cervical adenopathy. Skin:     General: Skin is warm and dry. Findings: No rash. Neurological:      Mental Status: She is alert and oriented to person, place, and time. Deep Tendon Reflexes: Reflexes are normal and symmetric.       Comments: Patellar deep tendon reflexes normal.         The history was obtained from the review of the chart and from the patient. Lab Results:      Blood work performed on 2023 showed a CMP with a chloride of 109, fasting glucose of 118, calcium of 9.8 but was otherwise normal.  Mag knees EM is 2.3. Phosphorus is 3.5. Intact parathyroid hormone level is 42.4.    25-hydroxy vitamin D level is 53.4. TSH is 1.081 with a free T41.52.     Lab Results   Component Value Date    CREATININE 0.94 2023    CREATININE 0.92 04/15/2023    CREATININE 0.81 2023    BUN 21 2023    K 4.7 2023     (H) 2023    CO2 29 2023     eGFR   Date Value Ref Range Status   2023 52 ml/min/1.73sq m Final         Lab Results   Component Value Date    HDL 43 2021    TRIG 161 (H) 2021       Lab Results   Component Value Date    ALT 12 2023    AST 14 2023    ALKPHOS 64 2023       Lab Results   Component Value Date    FREET4 1.52 (H) 2023             Future Appointments   Date Time Provider [address]   2023  1:00 PM Marquita Bosworth, MD [facility]   3/22/2024  9:20 AM Wilhemenia Hermosa, DO [facility]   2024  1:00 PM Marysol Ruiz MD [facility]

## 2023-09-28 ENCOUNTER — APPOINTMENT (OUTPATIENT)
Dept: LAB | Facility: HOSPITAL | Age: 88
End: 2023-09-28
Payer: MEDICARE

## 2023-09-28 DIAGNOSIS — D50.9 IRON DEFICIENCY ANEMIA, UNSPECIFIED IRON DEFICIENCY ANEMIA TYPE: ICD-10-CM

## 2023-09-28 LAB
FERRITIN SERPL-MCNC: 30 NG/ML (ref 11–307)
IRON SERPL-MCNC: 124 UG/DL (ref 50–212)

## 2023-09-28 PROCEDURE — 83540 ASSAY OF IRON: CPT

## 2023-09-28 PROCEDURE — 36415 COLL VENOUS BLD VENIPUNCTURE: CPT

## 2023-09-28 PROCEDURE — 82728 ASSAY OF FERRITIN: CPT

## 2023-10-02 ENCOUNTER — TELEPHONE (OUTPATIENT)
Dept: FAMILY MEDICINE CLINIC | Facility: HOSPITAL | Age: 88
End: 2023-10-02

## 2023-10-02 NOTE — TELEPHONE ENCOUNTER
Symptoms include runny nose, (bad cold symptoms), fatigued. Is taking mucinex and tylenol. Is having a pain across her back and upper arms. States she is feeling much better complated to on Friday, will continute to take OTC's, doesn't feel a virtual is necessary.

## 2023-10-02 NOTE — TELEPHONE ENCOUNTER
Patient left a voicemail to let Dr Massiel Castrejon know she is covid positive as of  Friday 9/29. She would like to know what she should be doing. Did not leave any details as to what symptoms she is having.

## 2023-11-03 ENCOUNTER — APPOINTMENT (OUTPATIENT)
Dept: LAB | Facility: HOSPITAL | Age: 88
End: 2023-11-03
Payer: MEDICARE

## 2023-11-03 DIAGNOSIS — Z85.038 HISTORY OF COLON CANCER, STAGE II: ICD-10-CM

## 2023-11-03 LAB
ALBUMIN SERPL BCP-MCNC: 3.9 G/DL (ref 3.5–5)
ALP SERPL-CCNC: 72 U/L (ref 34–104)
ALT SERPL W P-5'-P-CCNC: 14 U/L (ref 7–52)
ANION GAP SERPL CALCULATED.3IONS-SCNC: 5 MMOL/L
AST SERPL W P-5'-P-CCNC: 18 U/L (ref 13–39)
BASOPHILS # BLD AUTO: 0.05 THOUSANDS/ÂΜL (ref 0–0.1)
BASOPHILS NFR BLD AUTO: 1 % (ref 0–1)
BILIRUB SERPL-MCNC: 0.78 MG/DL (ref 0.2–1)
BUN SERPL-MCNC: 21 MG/DL (ref 5–25)
CALCIUM SERPL-MCNC: 10.2 MG/DL (ref 8.4–10.2)
CEA SERPL-MCNC: 3.5 NG/ML (ref 0–3)
CHLORIDE SERPL-SCNC: 108 MMOL/L (ref 96–108)
CO2 SERPL-SCNC: 29 MMOL/L (ref 21–32)
CREAT SERPL-MCNC: 0.9 MG/DL (ref 0.6–1.3)
EOSINOPHIL # BLD AUTO: 0.3 THOUSAND/ÂΜL (ref 0–0.61)
EOSINOPHIL NFR BLD AUTO: 5 % (ref 0–6)
ERYTHROCYTE [DISTWIDTH] IN BLOOD BY AUTOMATED COUNT: 14.6 % (ref 11.6–15.1)
GFR SERPL CREATININE-BSD FRML MDRD: 54 ML/MIN/1.73SQ M
GLUCOSE P FAST SERPL-MCNC: 115 MG/DL (ref 65–99)
HCT VFR BLD AUTO: 42.8 % (ref 34.8–46.1)
HGB BLD-MCNC: 13.8 G/DL (ref 11.5–15.4)
IMM GRANULOCYTES # BLD AUTO: 0.02 THOUSAND/UL (ref 0–0.2)
IMM GRANULOCYTES NFR BLD AUTO: 0 % (ref 0–2)
LYMPHOCYTES # BLD AUTO: 1.21 THOUSANDS/ÂΜL (ref 0.6–4.47)
LYMPHOCYTES NFR BLD AUTO: 19 % (ref 14–44)
MCH RBC QN AUTO: 27.9 PG (ref 26.8–34.3)
MCHC RBC AUTO-ENTMCNC: 32.2 G/DL (ref 31.4–37.4)
MCV RBC AUTO: 87 FL (ref 82–98)
MONOCYTES # BLD AUTO: 0.49 THOUSAND/ÂΜL (ref 0.17–1.22)
MONOCYTES NFR BLD AUTO: 8 % (ref 4–12)
NEUTROPHILS # BLD AUTO: 4.2 THOUSANDS/ÂΜL (ref 1.85–7.62)
NEUTS SEG NFR BLD AUTO: 67 % (ref 43–75)
NRBC BLD AUTO-RTO: 0 /100 WBCS
PLATELET # BLD AUTO: 237 THOUSANDS/UL (ref 149–390)
PMV BLD AUTO: 11.2 FL (ref 8.9–12.7)
POTASSIUM SERPL-SCNC: 4.9 MMOL/L (ref 3.5–5.3)
PROT SERPL-MCNC: 6.9 G/DL (ref 6.4–8.4)
RBC # BLD AUTO: 4.94 MILLION/UL (ref 3.81–5.12)
SODIUM SERPL-SCNC: 142 MMOL/L (ref 135–147)
WBC # BLD AUTO: 6.27 THOUSAND/UL (ref 4.31–10.16)

## 2023-11-03 PROCEDURE — 80053 COMPREHEN METABOLIC PANEL: CPT

## 2023-11-03 PROCEDURE — 85025 COMPLETE CBC W/AUTO DIFF WBC: CPT

## 2023-11-03 PROCEDURE — 36415 COLL VENOUS BLD VENIPUNCTURE: CPT

## 2023-11-03 PROCEDURE — 82378 CARCINOEMBRYONIC ANTIGEN: CPT

## 2023-11-10 NOTE — PROGRESS NOTES
Colon and Rectal Surgery   Fine Section 80 y.o. female MRN: [id number]   Encounter: [id number]  11/14/23   1:14 PM        ASSESSMENT:    Edwardo Mccloud returns today, she is doing well 7 months status post laparoscopic right hemicolectomy, we discussed pathology today T3N0(0/16) lymph nodes, good result, Stage IIA ascending colon adenocarcinoma/cancer, 4/13/2023, negative margins, she will not require adjuvant chemotherapy. In the interval she has done nicely, gaining weight, no troubles with diet or bowel movements, no pain or any complaints on today's visit. We reviewed her laboratory surveillance, CEA is elevated at 3.5, discussed follow-up with CT chest/abdomen/pelvis, and a repeat CEA in 3 months, will currently hold on colonoscopy prep or sedation given this was during the calendar year, though they understand that this may be a follow-up plan in the future would try to avoid at this time at 80years old. PLAN:  -CT chest/abdomen/pelvis with p.o./IV contrast ordered  -CEA ordered for 3 months from now, February 2024  -Office visit in 6 months, they understand that plans and timing may change if there are any abnormal findings or concerns on above testing  CC:  Dr. Sebas Szymanski      [facility]  Fine Section is a 80 y.o. female who is here for 6 month month follow up/ surveillance of colon cancer, stage ll. Last seen in the office on 5/15/2023 for a post operative evaluation.       She is status post right hemicolectomy for ascending colon cancer on 4/13/23       Lab Results   Component Value Date    CEA 3.5 (H) 11/03/2023     Lab Results   Component Value Date    WBC 6.27 11/03/2023    HGB 13.8 11/03/2023    HCT 42.8 11/03/2023    MCV 87 11/03/2023     11/03/2023     Lab Results   Component Value Date    SODIUM 142 11/03/2023    K 4.9 11/03/2023     11/03/2023    CO2 29 11/03/2023    AGAP 5 11/03/2023    BUN 21 11/03/2023    CREATININE 0.90 11/03/2023    GLUC 134 04/15/2023    GLUF 115 (H) 11/03/2023    CALCIUM 10.2 11/03/2023    AST 18 11/03/2023    ALT 14 11/03/2023    ALKPHOS 72 11/03/2023    TP 6.9 11/03/2023    TBILI 0.78 11/03/2023    EGFR 54 11/03/2023     Historical Information   Past Medical History:   Diagnosis Date    BCC (basal cell carcinoma)     Diastolic dysfunction     Dyslipidemia     Dysphagia     Glaucoma     Hashimoto's thyroiditis     Hyperparathyroidism ([address])     Hypertension     Impaired fasting glucose     Insomnia     Osteopenia     SCCA (squamous cell carcinoma) of skin      Past Surgical History:   Procedure Laterality Date    CATARACT EXTRACTION, BILATERAL      COLONOSCOPY      Complete, Onset - 10/25/05 - 10 years     HYSTERECTOMY      PARATHYROIDECTOMY      1st 1985 and 2nd 2002    [address] PARTIAL W/ANASTOMOSIS N/A 4/13/2023    Procedure: RESECTION COLON RIGHT LAPAROSCOPIC;  Surgeon: Angela Celeste MD;  [facility];  Service: Colorectal    [place] [id number] [facility] [address] WRST SURG W/RLS TRANSVRS CARPL LIGM Right 05/02/2016    Procedure: RELEASE CARPAL TUNNEL ENDOSCOPIC;  Surgeon: Valorie Litten, MD;  [facility];  Service: Orthopedics    TONSILLECTOMY         Meds/Allergies       Current Outpatient Medications:     amLODIPine (NORVASC) 5 mg tablet, TAKE 1 TABLET (5 MG TOTAL) BY MOUTH DAILY. , Disp: 90 tablet, Rfl: 1    ASPIRIN 81 PO, Take by mouth, Disp: , Rfl:     atorvastatin (LIPITOR) 20 mg tablet, Take 20 mg by mouth daily, Disp: , Rfl:     Biotin 1000 MCG tablet, Take 5,000 mcg by mouth daily, Disp: , Rfl:     Calcium 500 MG tablet, Take 1 tablet by mouth 3 (three) times a week , Disp: , Rfl:     cholecalciferol (VITAMIN D3) 1,000 units tablet, Take 1 tablet by mouth daily, Disp: , Rfl:     lisinopril (ZESTRIL) 10 mg tablet, TAKE 1 TABLET BY MOUTH EVERY DAY, Disp: 90 tablet, Rfl: 1    metoprolol tartrate (LOPRESSOR) 25 mg tablet, TAKE 1 TABLET ORALLY TWICE DAILY, Disp: 180 tablet, Rfl: 2    Multiple Vitamins-Minerals (CENTRUM SILVER ADULT 50+ PO), Take by mouth., Disp: , Rfl:     Multiple Vitamins-Minerals (PRESERVISION AREDS PO), Take 1 tablet by mouth 2 (two) times a day, Disp: , Rfl:     Synthroid 88 MCG tablet, Take 1 tablet (88 mcg total) by mouth daily, Disp: 90 tablet, Rfl: 3    dorzolamide-timolol (COSOPT) 22.3-6.8 MG/ML ophthalmic solution, Administer 1 drop to both eyes 2 (two) times a day, Disp: , Rfl:     ferrous sulfate 324 (65 Fe) mg, TAKE 1 TABLET BY MOUTH EVERY DAY BEFORE BREAKFAST (Patient not taking: Reported on 11/14/2023), Disp: 90 tablet, Rfl: 1    latanoprost (XALATAN) 0.005 % ophthalmic solution, Administer 1 drop into the left eye daily at bedtime, Disp: , Rfl:     Omega-3 Fatty Acids (FISH OIL) 1,000 mg, Take 1,000 mg by mouth daily. , Disp: , Rfl:       Allergies   Allergen Reactions    Cyclogyl [Cyclopentolate Hcl]     Phenylephrine Other (See Comments)     Eye gtts red eye infected sore    Pred Forte [Prednisolone Acetate]          Social History   Social History     Substance and Sexual Activity   Alcohol Use Yes    Comment: rarely, social      Social History     Substance and Sexual Activity   Drug Use No     Social History     Tobacco Use   Smoking Status Never   Smokeless Tobacco Never         Family History:   Family History   Problem Relation Age of Onset    Stroke Father         CVA    Ovarian cancer Mother        Review of Systems   Constitutional: Negative. HENT: Negative. Eyes: Negative. Respiratory: Negative. Cardiovascular: Negative. Gastrointestinal: Negative. Endocrine: Negative. Genitourinary: Negative. Musculoskeletal: Negative. Skin: Negative. Allergic/Immunologic: Negative. Neurological: Negative. Hematological: Negative. Psychiatric/Behavioral: Negative.          Objective   Current Vitals:   Vitals:    11/14/23 1256   BP: 134/68   Weight: 54 kg (119 lb)   Height: 4' 9" (1.448 m)     Physical Exam:  General:no distress  ENT:moist mucus membranes  Neck:supple  Pulm:no increased work of breathing, clear bilateral  CV:sinus  Abdomen:soft,nontender  Extremities:mild LLE edema she states is typical

## 2023-11-14 ENCOUNTER — OFFICE VISIT (OUTPATIENT)
Age: 88
End: 2023-11-14
Payer: MEDICARE

## 2023-11-14 VITALS
DIASTOLIC BLOOD PRESSURE: 68 MMHG | HEIGHT: 57 IN | WEIGHT: 119 LBS | SYSTOLIC BLOOD PRESSURE: 134 MMHG | BODY MASS INDEX: 25.67 KG/M2

## 2023-11-14 DIAGNOSIS — C18.2 PRIMARY ADENOCARCINOMA OF ASCENDING COLON (HCC): Primary | ICD-10-CM

## 2023-11-14 DIAGNOSIS — I10 ESSENTIAL HYPERTENSION: ICD-10-CM

## 2023-11-14 PROCEDURE — 99215 OFFICE O/P EST HI 40 MIN: CPT | Performed by: COLON & RECTAL SURGERY

## 2023-11-14 RX ORDER — AMLODIPINE BESYLATE 5 MG/1
5 TABLET ORAL DAILY
Qty: 90 TABLET | Refills: 1 | Status: SHIPPED | OUTPATIENT
Start: 2023-11-14

## 2023-11-14 NOTE — PATIENT INSTRUCTIONS
ASSESSMENT:    Angela Orellana returns today, she is doing well 7 months status post laparoscopic right hemicolectomy, we discussed pathology today T3N0(0/16) lymph nodes, good result, Stage IIA ascending colon adenocarcinoma/cancer, 4/13/2023, negative margins, she will not require adjuvant chemotherapy. In the interval she has done nicely, gaining weight, no troubles with diet or bowel movements, no pain or any complaints on today's visit. We reviewed her laboratory surveillance, CEA is elevated at 3.5, discussed follow-up with CT chest/abdomen/pelvis, and a repeat CEA in 3 months, will currently hold on colonoscopy prep or sedation given this was during the calendar year, though they understand that this may be a follow-up plan in the future would try to avoid at this time at 80years old.      PLAN:  -CT chest/abdomen/pelvis with p.o./IV contrast ordered  -CEA ordered for 3 months from now, February 2024  -Office visit in 6 months, they understand that plans and timing may change if there are any abnormal findings or concerns on above testing  CC:  Dr. Serjio Cao

## 2023-11-22 ENCOUNTER — HOSPITAL ENCOUNTER (OUTPATIENT)
Dept: CT IMAGING | Facility: HOSPITAL | Age: 88
Discharge: HOME | End: 2023-11-22
Payer: MEDICARE

## 2023-11-22 DIAGNOSIS — C18.2 PRIMARY ADENOCARCINOMA OF ASCENDING COLON (HCC): ICD-10-CM

## 2023-11-22 PROCEDURE — 71260 CT THORAX DX C+: CPT

## 2023-11-22 PROCEDURE — 74177 CT ABD & PELVIS W/CONTRAST: CPT

## 2023-11-22 PROCEDURE — G1004 CDSM NDSC: HCPCS

## 2023-11-22 RX ADMIN — IOHEXOL 100 ML: 350 INJECTION, SOLUTION INTRAVENOUS at 15:53

## 2023-11-30 ENCOUNTER — TELEPHONE (OUTPATIENT)
Age: 88
End: 2023-11-30

## 2023-11-30 NOTE — TELEPHONE ENCOUNTER
Patients GI provider:  DR CHO [facility]    Number to return call: [phone]     Reason for call: Pt daughter Collette Fleischer contacted office requesting results of recent CT. Please review results and return call to pt.

## 2023-12-04 DIAGNOSIS — R93.89 ABNORMAL CT OF THE CHEST: Primary | ICD-10-CM

## 2023-12-06 ENCOUNTER — TELEPHONE (OUTPATIENT)
Dept: HEMATOLOGY ONCOLOGY | Facility: CLINIC | Age: 88
End: 2023-12-06

## 2023-12-06 NOTE — TELEPHONE ENCOUNTER
I called Edwardo Mccloud in response to a referral that was received for patient to establish care with Thoracic Surgery. Outreach was made to schedule a consultation. Patient is scheduled to see Dr. Nicole Mehta 12/11/23 @ 9am at [facility]. The referral has been closed.

## 2023-12-10 NOTE — H&P (VIEW-ONLY)
Thoracic Consult  Assessment/Plan:    89yF with recent history of lap right hemicolectomy for a T3N0 colon adenocarcinoma who was found to have a left upper lobe lung nodule on postop CTCAP.     Given appearance of the lesion, lack of disease elsewhere, that she is only 8 months out from resection and her pathological staging is T3N0 with 16 negative lymph nodes, this is likely a primary lung cancer.     She is interested in getting a pathological diagnosis as she is interested in considering treatment. Given her age, if she wishes to pursue treatment I would recommend non-surgical options. Discussed with Dr. Soriano and her anatomy is not favorable for navigational bronchoscopy. I will refer her to IR for a biopsy. For completeness will order a PET CT. Will talk on the phone after these are resulted with next steps which will likely involve medical and radiation oncology consults.     Diagnoses and all orders for this visit:    Nodule of upper lobe of left lung  -     Ambulatory Referral to Interventional Radiology; Future  -     NM PET CT skull base to mid thigh; Future          Thoracic History   Diagnosis: T3N0 ascending colon adenocarcinoma   Procedures/Surgeries: Lap right hemicolectomy   Pathology: colon adenocarcinoma   Adjuvant Therapy:       Subjective:    Patient ID: Marizol Jiménez is a 89 y.o. female referred to our office for the finding of a lung mass after her colon resection. She is a lifetime non-smoker. Her medical history includes GERD, hypothyroid, HTN, PAD. On 4/13/2023 she underwent a lap right hemicolectomy for a T3N0M0 adenocarcinoma. A surveillance CT chest, abdomen and pelvis was done 11/22/2023 showing the finding of a 2.8cm left upper lobe lung nodule. There was no prior preoperative CT chest for comparison.      She denies cough, weight loss, chest pain, SOB, or weakness. She has recovered very well from her colon surgery. No immediate biological family cancer history. Her late   did have lung cancer.     Data:  CT C (11/22/23): 2.8 x 2.4 cm left upper lobe lung mass this could represent primary bronchogenic carcinoma or metastatic lesion.     The following portions of the patient's history were reviewed and updated as appropriate: allergies, current medications, past family history, past medical history, past social history, past surgical history, and problem list.    Past Medical History:   Diagnosis Date   • BCC (basal cell carcinoma)    • Diastolic dysfunction    • Dyslipidemia    • Dysphagia    • Glaucoma    • Hashimoto's thyroiditis    • Hyperparathyroidism (HCC)    • Hypertension    • Impaired fasting glucose    • Insomnia    • Osteopenia    • SCCA (squamous cell carcinoma) of skin       Past Surgical History:   Procedure Laterality Date   • CATARACT EXTRACTION, BILATERAL     • COLONOSCOPY      Complete, Onset - 10/25/05 - 10 years    • HYSTERECTOMY     • PARATHYROIDECTOMY      1st 1985 and 2nd 2002   • [place] LAPAROSCOPY COLECTOMY PARTIAL W/ANASTOMOSIS N/A 4/13/2023    Procedure: RESECTION COLON RIGHT LAPAROSCOPIC;  Surgeon: Manny Reid MD;  [facility];  Service: Colorectal   • [place] NDSC WRST SURG W/RLS TRANSVRS CARPL LIGM Right 05/02/2016    Procedure: RELEASE CARPAL TUNNEL ENDOSCOPIC;  Surgeon: Alexandre Diaz MD;  Location: [facility];  Service: Orthopedics   • TONSILLECTOMY        Family History   Problem Relation Age of Onset   • Stroke Father         CVA   • Ovarian cancer Mother       Social History     Socioeconomic History   • Marital status: Single     Spouse name: Not on file   • Number of children: Not on file   • Years of education: Not on file   • Highest education level: Not on file   Occupational History   • Occupation: Retired   Tobacco Use   • Smoking status: Never   • Smokeless tobacco: Never   Vaping Use   • Vaping Use: Never used   Substance and Sexual Activity   • Alcohol use: Yes     Comment: rarely, social    • Drug use: No   • Sexual  activity: Not on file   Other Topics Concern   • Not on file   Social History Narrative     per [employer]      Social Determinants of Health     Financial Resource Strain: Low Risk  (9/22/2023)    Overall Financial Resource Strain (CARDIA)    • Difficulty of Paying Living Expenses: Not hard at all   Food Insecurity: No Food Insecurity (4/14/2023)    Hunger Vital Sign    • Worried About Running Out of Food in the Last Year: Never true    • Ran Out of Food in the Last Year: Never true   Transportation Needs: No Transportation Needs (9/22/2023)    PRAPARE - Transportation    • Lack of Transportation (Medical): No    • Lack of Transportation (Non-Medical): No   Physical Activity: Not on file   Stress: Not on file   Social Connections: Not on file   Intimate Partner Violence: Not on file   Housing Stability: Unknown (4/14/2023)    Housing Stability Vital Sign    • Unable to Pay for Housing in the Last Year: No    • Number of Places Lived in the Last Year: Not on file    • Unstable Housing in the Last Year: No      Review of Systems   Constitutional:  Negative for chills and fever.   HENT:  Negative for trouble swallowing and voice change.    Eyes:  Negative for photophobia and visual disturbance.   Respiratory:  Negative for chest tightness and shortness of breath.    Cardiovascular:  Negative for chest pain and palpitations.   Gastrointestinal:  Negative for abdominal pain, diarrhea, nausea and vomiting.   Genitourinary:  Negative for dysuria and flank pain.   Musculoskeletal:  Negative for back pain and gait problem.   Skin:  Negative for rash and wound.   Allergic/Immunologic: Negative for environmental allergies and food allergies.   Neurological:  Negative for dizziness, weakness, light-headedness and numbness.         Objective:   Physical Exam  Vitals reviewed.   Constitutional:       Appearance: Normal appearance.   HENT:      Head: Normocephalic and atraumatic.   Cardiovascular:      Rate and Rhythm:  "Normal rate and regular rhythm.      Heart sounds: Normal heart sounds.   Pulmonary:      Effort: Pulmonary effort is normal. No respiratory distress.      Breath sounds: No stridor. No wheezing.   Abdominal:      General: Abdomen is flat. There is no distension.      Tenderness: There is no abdominal tenderness.   Musculoskeletal:      Cervical back: No tenderness.      Right lower leg: No edema.      Left lower leg: No edema.   Lymphadenopathy:      Cervical: No cervical adenopathy.   Skin:     General: Skin is warm and dry.   Neurological:      General: No focal deficit present.      Mental Status: She is alert and oriented to person, place, and time.   Psychiatric:         Mood and Affect: Mood normal.         Behavior: Behavior normal.         Thought Content: Thought content normal.         Judgment: Judgment normal.     /71 (BP Location: Right arm, Patient Position: Sitting, Cuff Size: Standard)   Pulse 82   Temp 99.2 °F (37.3 °C) (Temporal)   Resp 14   Ht 4' 9\" (1.448 m)   Wt 53.5 kg (117 lb 15.1 oz)   SpO2 98%   BMI 25.52 kg/m²     No Chest XR results available for this patient.   No CT Chest results available for this patient.  CT chest abdomen pelvis w contrast    Result Date: 11/29/2023  Narrative CT CHEST, ABDOMEN AND PELVIS WITH IV CONTRAST INDICATION:   C18.2: Malignant neoplasm of ascending colon. COMPARISON: CT abdomen pelvis 2/8/2023. TECHNIQUE: CT examination of the chest, abdomen and pelvis was performed. Multiplanar 2D reformatted images were created from the source data. This examination, like all CT scans performed in the [facility], was performed utilizing techniques to minimize radiation dose exposure, including the use of iterative reconstruction and automated exposure control. Radiation dose length product (DLP) for this visit:  720.09 mGy-cm IV Contrast:  100 mL of iohexol (OMNIPAQUE) Enteric Contrast: Enteric contrast was administered. FINDINGS: CHEST LUNGS: " 2.8 x 2.4 cm left upper lobe lung mass is identified abutting the fissure. 2 mm right apical pulmonary nodule is noted series 603 image 25.  There is no tracheal or endobronchial lesion. PLEURA:  Unremarkable. HEART/GREAT VESSELS: Heart is unremarkable for patient's age.  No thoracic aortic aneurysm. Large hiatal hernia is present. MEDIASTINUM AND RAISA:  Unremarkable. CHEST WALL AND LOWER NECK:  Unremarkable. ABDOMEN LIVER/BILIARY TREE: One or more subcentimeter sharply circumscribed low-density hepatic lesion(s) are noted, too small to accurately characterize, but statistically most likely to represent subcentimeter hepatic cysts.  No suspicious solid hepatic lesion  is identified.  Hepatic contours are normal.  No biliary dilatation. GALLBLADDER: There are gallstone(s) within the gallbladder, without pericholecystic inflammatory changes. SPLEEN:  Unremarkable. PANCREAS:  Unremarkable. ADRENAL GLANDS:  Unremarkable. KIDNEYS/URETERS: Stable complex 1.3 cm right interpolar renal cyst is noted. STOMACH AND BOWEL: Patient is status post interval partial colectomy. Colonic diverticulosis is present. APPENDIX:  No findings to suggest appendicitis. ABDOMINOPELVIC CAVITY:  No ascites.  No pneumoperitoneum.  No lymphadenopathy. VESSELS:  Unremarkable for patient's age. PELVIS REPRODUCTIVE ORGANS: Surgical changes of prior hysterectomy. URINARY BLADDER:  Unremarkable. ABDOMINAL WALL/INGUINAL REGIONS: Nonobstructing umbilical hernia is present. OSSEOUS STRUCTURES:  No acute fracture or destructive osseous lesion.     Impression Status post interval partial colectomy. 2.8 x 2.4 cm left upper lobe lung mass this could represent primary bronchogenic carcinoma or metastatic lesion. Large hiatal hernia. Cholelithiasis. Workstation performed: [id number]      No NM PET CT results available for this patient.   No Barium Swallow results available for this patient.

## 2023-12-10 NOTE — PROGRESS NOTES
Thoracic Consult  Assessment/Plan:    89yF with recent history of lap right hemicolectomy for a T3N0 colon adenocarcinoma who was found to have a left upper lobe lung nodule on postop CTCAP. Given appearance of the lesion, lack of disease elsewhere, that she is only 8 months out from resection and her pathological staging is T3N0 with 16 negative lymph nodes, this is likely a primary lung cancer. She is interested in getting a pathological diagnosis as she is interested in considering treatment. Given her age, if she wishes to pursue treatment I would recommend non-surgical options. Discussed with Dr. Nicole Pritchard and her anatomy is not favorable for navigational bronchoscopy. I will refer her to IR for a biopsy. For completeness will order a PET CT. Will talk on the phone after these are resulted with next steps which will likely involve medical and radiation oncology consults. Diagnoses and all orders for this visit:    Nodule of upper lobe of left lung  -     Ambulatory Referral to Interventional Radiology; Future  -     NM PET CT skull base to mid thigh; Future          Thoracic History   Diagnosis: T3N0 ascending colon adenocarcinoma   Procedures/Surgeries: Lap right hemicolectomy   Pathology: colon adenocarcinoma   Adjuvant Therapy:       Subjective:    Patient ID: Vicki Quick is a 80 y.o. female referred to our office for the finding of a lung mass after her colon resection. She is a lifetime non-smoker. Her medical history includes GERD, hypothyroid, HTN, PAD. On 4/13/2023 she underwent a lap right hemicolectomy for a T3N0M0 adenocarcinoma. A surveillance CT chest, abdomen and pelvis was done 11/22/2023 showing the finding of a 2.8cm left upper lobe lung nodule. There was no prior preoperative CT chest for comparison. She denies cough, weight loss, chest pain, SOB, or weakness. She has recovered very well from her colon surgery. No immediate biological family cancer history.  Her late  did have lung cancer. Data:  CT C (11/22/23): 2.8 x 2.4 cm left upper lobe lung mass this could represent primary bronchogenic carcinoma or metastatic lesion.      The following portions of the patient's history were reviewed and updated as appropriate: allergies, current medications, past family history, past medical history, past social history, past surgical history, and problem list.    Past Medical History:   Diagnosis Date   • BCC (basal cell carcinoma)    • Diastolic dysfunction    • Dyslipidemia    • Dysphagia    • Glaucoma    • Hashimoto's thyroiditis    • Hyperparathyroidism ([address])    • Hypertension    • Impaired fasting glucose    • Insomnia    • Osteopenia    • SCCA (squamous cell carcinoma) of skin       Past Surgical History:   Procedure Laterality Date   • CATARACT EXTRACTION, BILATERAL     • COLONOSCOPY      Complete, Onset - 10/25/05 - 10 years    • HYSTERECTOMY     • PARATHYROIDECTOMY      1st 1985 and 2nd 2002   • [place] LAPAROSCOPY COLECTOMY PARTIAL W/ANASTOMOSIS N/A 4/13/2023    Procedure: RESECTION COLON RIGHT LAPAROSCOPIC;  Surgeon: Sue Castanon MD;  [facility];  Service: Colorectal   • [place] NDSC WRST SURG W/RLS TRANSVRS CARPL LIGM Right 05/02/2016    Procedure: RELEASE CARPAL TUNNEL ENDOSCOPIC;  Surgeon: Tracie Perea MD;  Location[facility];  Service: Orthopedics   • TONSILLECTOMY        Family History   Problem Relation Age of Onset   • Stroke Father         CVA   • Ovarian cancer Mother       Social History     Socioeconomic History   • Marital status: Single     Spouse name: Not on file   • Number of children: Not on file   • Years of education: Not on file   • Highest education level: Not on file   Occupational History   • Occupation: Retired   Tobacco Use   • Smoking status: Never   • Smokeless tobacco: Never   Vaping Use   • Vaping Use: Never used   Substance and Sexual Activity   • Alcohol use: Yes     Comment: rarely, social    • Drug use: No   • Sexual activity: Not on file   Other Topics Concern   • Not on file   Social History Narrative     per [employer]      Social Determinants of Health     Financial Resource Strain: Low Risk  (9/22/2023)    Overall Financial Resource Strain (CARDIA)    • Difficulty of Paying Living Expenses: Not hard at all   Food Insecurity: No Food Insecurity (4/14/2023)    Hunger Vital Sign    • Worried About Running Out of Food in the Last Year: Never true    • Ran Out of Food in the Last Year: Never true   Transportation Needs: No Transportation Needs (9/22/2023)    PRAPARE - Transportation    • Lack of Transportation (Medical): No    • Lack of Transportation (Non-Medical): No   Physical Activity: Not on file   Stress: Not on file   Social Connections: Not on file   Intimate Partner Violence: Not on file   Housing Stability: Unknown (4/14/2023)    Housing Stability Vital Sign    • Unable to Pay for Housing in the Last Year: No    • Number of Places Lived in the Last Year: Not on file    • Unstable Housing in the Last Year: No      Review of Systems   Constitutional:  Negative for chills and fever. HENT:  Negative for trouble swallowing and voice change. Eyes:  Negative for photophobia and visual disturbance. Respiratory:  Negative for chest tightness and shortness of breath. Cardiovascular:  Negative for chest pain and palpitations. Gastrointestinal:  Negative for abdominal pain, diarrhea, nausea and vomiting. Genitourinary:  Negative for dysuria and flank pain. Musculoskeletal:  Negative for back pain and gait problem. Skin:  Negative for rash and wound. Allergic/Immunologic: Negative for environmental allergies and food allergies. Neurological:  Negative for dizziness, weakness, light-headedness and numbness. Objective:   Physical Exam  Vitals reviewed. Constitutional:       Appearance: Normal appearance. HENT:      Head: Normocephalic and atraumatic.    Cardiovascular:      Rate and Rhythm: Normal rate and regular rhythm. Heart sounds: Normal heart sounds. Pulmonary:      Effort: Pulmonary effort is normal. No respiratory distress. Breath sounds: No stridor. No wheezing. Abdominal:      General: Abdomen is flat. There is no distension. Tenderness: There is no abdominal tenderness. Musculoskeletal:      Cervical back: No tenderness. Right lower leg: No edema. Left lower leg: No edema. Lymphadenopathy:      Cervical: No cervical adenopathy. Skin:     General: Skin is warm and dry. Neurological:      General: No focal deficit present. Mental Status: She is alert and oriented to person, place, and time. Psychiatric:         Mood and Affect: Mood normal.         Behavior: Behavior normal.         Thought Content: Thought content normal.         Judgment: Judgment normal.     /71 (BP Location: Right arm, Patient Position: Sitting, Cuff Size: Standard)   Pulse 82   Temp 99.2 °F (37.3 °C) (Temporal)   Resp 14   Ht 4' 9" (1.448 m)   Wt 53.5 kg (117 lb 15.1 oz)   SpO2 98%   BMI 25.52 kg/m²     No Chest XR results available for this patient. No CT Chest results available for this patient. CT chest abdomen pelvis w contrast    Result Date: 11/29/2023  Narrative CT CHEST, ABDOMEN AND PELVIS WITH IV CONTRAST INDICATION:   C18.2: Malignant neoplasm of ascending colon. COMPARISON: CT abdomen pelvis 2/8/2023. TECHNIQUE: CT examination of the chest, abdomen and pelvis was performed. Multiplanar 2D reformatted images were created from the source data. This examination, like all CT scans performed in the [facility], was performed utilizing techniques to minimize radiation dose exposure, including the use of iterative reconstruction and automated exposure control. Radiation dose length product (DLP) for this visit:  720.09 mGy-cm IV Contrast:  100 mL of iohexol (OMNIPAQUE) Enteric Contrast: Enteric contrast was administered.  FINDINGS: CHEST LUNGS: 2.8 x 2.4 cm left upper lobe lung mass is identified abutting the fissure. 2 mm right apical pulmonary nodule is noted series 603 image 25. There is no tracheal or endobronchial lesion. PLEURA:  Unremarkable. HEART/GREAT VESSELS: Heart is unremarkable for patient's age. No thoracic aortic aneurysm. Large hiatal hernia is present. MEDIASTINUM AND RAISA:  Unremarkable. CHEST WALL AND LOWER NECK:  Unremarkable. ABDOMEN LIVER/BILIARY TREE: One or more subcentimeter sharply circumscribed low-density hepatic lesion(s) are noted, too small to accurately characterize, but statistically most likely to represent subcentimeter hepatic cysts. No suspicious solid hepatic lesion  is identified. Hepatic contours are normal.  No biliary dilatation. GALLBLADDER: There are gallstone(s) within the gallbladder, without pericholecystic inflammatory changes. SPLEEN:  Unremarkable. PANCREAS:  Unremarkable. ADRENAL GLANDS:  Unremarkable. KIDNEYS/URETERS: Stable complex 1.3 cm right interpolar renal cyst is noted. STOMACH AND BOWEL: Patient is status post interval partial colectomy. Colonic diverticulosis is present. APPENDIX:  No findings to suggest appendicitis. ABDOMINOPELVIC CAVITY:  No ascites. No pneumoperitoneum. No lymphadenopathy. VESSELS:  Unremarkable for patient's age. PELVIS REPRODUCTIVE ORGANS: Surgical changes of prior hysterectomy. URINARY BLADDER:  Unremarkable. ABDOMINAL WALL/INGUINAL REGIONS: Nonobstructing umbilical hernia is present. OSSEOUS STRUCTURES:  No acute fracture or destructive osseous lesion. Impression Status post interval partial colectomy. 2.8 x 2.4 cm left upper lobe lung mass this could represent primary bronchogenic carcinoma or metastatic lesion. Large hiatal hernia. Cholelithiasis. Workstation performed: [id number]      No NM PET CT results available for this patient. No Barium Swallow results available for this patient.

## 2023-12-11 ENCOUNTER — OFFICE VISIT (OUTPATIENT)
Dept: CARDIAC SURGERY | Facility: CLINIC | Age: 88
End: 2023-12-11
Payer: MEDICARE

## 2023-12-11 VITALS
HEART RATE: 82 BPM | TEMPERATURE: 99.2 F | BODY MASS INDEX: 25.45 KG/M2 | DIASTOLIC BLOOD PRESSURE: 71 MMHG | SYSTOLIC BLOOD PRESSURE: 120 MMHG | WEIGHT: 117.95 LBS | RESPIRATION RATE: 14 BRPM | OXYGEN SATURATION: 98 % | HEIGHT: 57 IN

## 2023-12-11 DIAGNOSIS — R91.1 NODULE OF UPPER LOBE OF LEFT LUNG: Primary | ICD-10-CM

## 2023-12-11 PROCEDURE — 99205 OFFICE O/P NEW HI 60 MIN: CPT | Performed by: SURGERY

## 2023-12-11 NOTE — LETTER
December 11, 2023     Marquita Bosworth, MD  [address]    Patient: Israel Garcia   YOB: 1929   Date of Visit: 12/11/2023       Dear Dr. Levi Shankar: Thank you for referring Israel Garcia to me for evaluation. Below are my notes for this consultation. If you have questions, please do not hesitate to call me. I look forward to following your patient along with you. Sincerely,        Leland Ly DO        CC: No Recipients    Leland Ly DO  12/11/2023  9:36 AM  Sign when Signing Visit  Thoracic Consult  Assessment/Plan:    89yF with recent history of lap right hemicolectomy for a T3N0 colon adenocarcinoma who was found to have a left upper lobe lung nodule on postop CTCAP. Given appearance of the lesion, lack of disease elsewhere, that she is only 8 months out from resection and her pathological staging is T3N0 with 16 negative lymph nodes, this is likely a primary lung cancer. She is interested in getting a pathological diagnosis as she is interested in considering treatment. Given her age, if she wishes to pursue treatment I would recommend non-surgical options. Discussed with Dr. Julee Santiago and her anatomy is not favorable for navigational bronchoscopy. I will refer her to IR for a biopsy. For completeness will order a PET CT. Will talk on the phone after these are resulted with next steps which will likely involve medical and radiation oncology consults. Diagnoses and all orders for this visit:    Nodule of upper lobe of left lung  -     Ambulatory Referral to Interventional Radiology; Future  -     NM PET CT skull base to mid thigh;  Future         Thoracic History  Diagnosis: T3N0 ascending colon adenocarcinoma   Procedures/Surgeries: Lap right hemicolectomy   Pathology: colon adenocarcinoma   Adjuvant Therapy:      Subjective:   Patient ID: Israel Garcia is a 80 y.o. female referred to our office for the finding of a lung mass after her colon resection. She is a lifetime non-smoker. Her medical history includes GERD, hypothyroid, HTN, PAD. On 4/13/2023 she underwent a lap right hemicolectomy for a T3N0M0 adenocarcinoma. A surveillance CT chest, abdomen and pelvis was done 11/22/2023 showing the finding of a 2.8cm left upper lobe lung nodule. There was no prior preoperative CT chest for comparison. She denies cough, weight loss, chest pain, SOB, or weakness. She has recovered very well from her colon surgery. No immediate biological family cancer history. Her late  did have lung cancer. Data:  CT C (11/22/23): 2.8 x 2.4 cm left upper lobe lung mass this could represent primary bronchogenic carcinoma or metastatic lesion.      The following portions of the patient's history were reviewed and updated as appropriate: allergies, current medications, past family history, past medical history, past social history, past surgical history, and problem list.    Past Medical History:   Diagnosis Date   • BCC (basal cell carcinoma)    • Diastolic dysfunction    • Dyslipidemia    • Dysphagia    • Glaucoma    • Hashimoto's thyroiditis    • Hyperparathyroidism ([address])    • Hypertension    • Impaired fasting glucose    • Insomnia    • Osteopenia    • SCCA (squamous cell carcinoma) of skin       Past Surgical History:   Procedure Laterality Date   • CATARACT EXTRACTION, BILATERAL     • COLONOSCOPY      Complete, Onset - 10/25/05 - 10 years    • HYSTERECTOMY     • PARATHYROIDECTOMY      1st 1985 and 2nd 2002   • GA LAPAROSCOPY COLECTOMY PARTIAL W/ANASTOMOSIS N/A 4/13/2023    Procedure: RESECTION COLON RIGHT LAPAROSCOPIC;  Surgeon: Christiano Lima MD;  Location: [facility];  Service: Colorectal   • [place] [address] [address] WRST SURG W/RLS TRANSVRS CARPL LIGM Right 05/02/2016    Procedure: RELEASE CARPAL TUNNEL ENDOSCOPIC;  Surgeon: Reji Randolph MD;  Location:  MAIN OR;  Service: Orthopedics   • TONSILLECTOMY        Family History   Problem Relation Age of Onset   • Stroke Father         CVA   • Ovarian cancer Mother       Social History     Socioeconomic History   • Marital status: Single     Spouse name: Not on file   • Number of children: Not on file   • Years of education: Not on file   • Highest education level: Not on file   Occupational History   • Occupation: Retired   Tobacco Use   • Smoking status: Never   • Smokeless tobacco: Never   Vaping Use   • Vaping Use: Never used   Substance and Sexual Activity   • Alcohol use: Yes     Comment: rarely, social    • Drug use: No   • Sexual activity: Not on file   Other Topics Concern   • Not on file   Social History Narrative     per [employer]      Social Determinants of Health     Financial Resource Strain: Low Risk  (9/22/2023)    Overall Financial Resource Strain (CARDIA)    • Difficulty of Paying Living Expenses: Not hard at all   Food Insecurity: No Food Insecurity (4/14/2023)    Hunger Vital Sign    • Worried About Running Out of Food in the Last Year: Never true    • Ran Out of Food in the Last Year: Never true   Transportation Needs: No Transportation Needs (9/22/2023)    PRAPARE - Transportation    • Lack of Transportation (Medical): No    • Lack of Transportation (Non-Medical): No   Physical Activity: Not on file   Stress: Not on file   Social Connections: Not on file   Intimate Partner Violence: Not on file   Housing Stability: Unknown (4/14/2023)    Housing Stability Vital Sign    • Unable to Pay for Housing in the Last Year: No    • Number of Places Lived in the Last Year: Not on file    • Unstable Housing in the Last Year: No      Review of Systems   Constitutional:  Negative for chills and fever. HENT:  Negative for trouble swallowing and voice change. Eyes:  Negative for photophobia and visual disturbance. Respiratory:  Negative for chest tightness and shortness of breath. Cardiovascular:  Negative for chest pain and palpitations.    Gastrointestinal:  Negative for abdominal pain, diarrhea, nausea and vomiting. Genitourinary:  Negative for dysuria and flank pain. Musculoskeletal:  Negative for back pain and gait problem. Skin:  Negative for rash and wound. Allergic/Immunologic: Negative for environmental allergies and food allergies. Neurological:  Negative for dizziness, weakness, light-headedness and numbness. Objective:  Physical Exam  Vitals reviewed. Constitutional:       Appearance: Normal appearance. HENT:      Head: Normocephalic and atraumatic. Cardiovascular:      Rate and Rhythm: Normal rate and regular rhythm. Heart sounds: Normal heart sounds. Pulmonary:      Effort: Pulmonary effort is normal. No respiratory distress. Breath sounds: No stridor. No wheezing. Abdominal:      General: Abdomen is flat. There is no distension. Tenderness: There is no abdominal tenderness. Musculoskeletal:      Cervical back: No tenderness. Right lower leg: No edema. Left lower leg: No edema. Lymphadenopathy:      Cervical: No cervical adenopathy. Skin:     General: Skin is warm and dry. Neurological:      General: No focal deficit present. Mental Status: She is alert and oriented to person, place, and time. Psychiatric:         Mood and Affect: Mood normal.         Behavior: Behavior normal.         Thought Content: Thought content normal.         Judgment: Judgment normal.     /71 (BP Location: Right arm, Patient Position: Sitting, Cuff Size: Standard)   Pulse 82   Temp 99.2 °F (37.3 °C) (Temporal)   Resp 14   Ht 4' 9" (1.448 m)   Wt 53.5 kg (117 lb 15.1 oz)   SpO2 98%   BMI 25.52 kg/m²    No Chest XR results available for this patient. No CT Chest results available for this patient. CT chest abdomen pelvis w contrast    Result Date: 11/29/2023  Narrative CT CHEST, ABDOMEN AND PELVIS WITH IV CONTRAST INDICATION:   C18.2: Malignant neoplasm of ascending colon. COMPARISON: CT abdomen pelvis 2/8/2023.  TECHNIQUE: CT examination of the chest, abdomen and pelvis was performed. Multiplanar 2D reformatted images were created from the source data. This examination, like all CT scans performed in the [facility], was performed utilizing techniques to minimize radiation dose exposure, including the use of iterative reconstruction and automated exposure control. Radiation dose length product (DLP) for this visit:  720.09 mGy-cm IV Contrast:  100 mL of iohexol (OMNIPAQUE) Enteric Contrast: Enteric contrast was administered. FINDINGS: CHEST LUNGS: 2.8 x 2.4 cm left upper lobe lung mass is identified abutting the fissure. 2 mm right apical pulmonary nodule is noted series 603 image 25. There is no tracheal or endobronchial lesion. PLEURA:  Unremarkable. HEART/GREAT VESSELS: Heart is unremarkable for patient's age. No thoracic aortic aneurysm. Large hiatal hernia is present. MEDIASTINUM AND RAISA:  Unremarkable. CHEST WALL AND LOWER NECK:  Unremarkable. ABDOMEN LIVER/BILIARY TREE: One or more subcentimeter sharply circumscribed low-density hepatic lesion(s) are noted, too small to accurately characterize, but statistically most likely to represent subcentimeter hepatic cysts. No suspicious solid hepatic lesion  is identified. Hepatic contours are normal.  No biliary dilatation. GALLBLADDER: There are gallstone(s) within the gallbladder, without pericholecystic inflammatory changes. SPLEEN:  Unremarkable. PANCREAS:  Unremarkable. ADRENAL GLANDS:  Unremarkable. KIDNEYS/URETERS: Stable complex 1.3 cm right interpolar renal cyst is noted. STOMACH AND BOWEL: Patient is status post interval partial colectomy. Colonic diverticulosis is present. APPENDIX:  No findings to suggest appendicitis. ABDOMINOPELVIC CAVITY:  No ascites. No pneumoperitoneum. No lymphadenopathy. VESSELS:  Unremarkable for patient's age. PELVIS REPRODUCTIVE ORGANS: Surgical changes of prior hysterectomy. URINARY BLADDER:  Unremarkable.  ABDOMINAL WALL/INGUINAL REGIONS: Nonobstructing umbilical hernia is present. OSSEOUS STRUCTURES:  No acute fracture or destructive osseous lesion. Impression Status post interval partial colectomy. 2.8 x 2.4 cm left upper lobe lung mass this could represent primary bronchogenic carcinoma or metastatic lesion. Large hiatal hernia. Cholelithiasis. Workstation performed: [id number]      No NM PET CT results available for this patient. No Barium Swallow results available for this patient.

## 2023-12-12 DIAGNOSIS — R91.8 LUNG MASS: Primary | ICD-10-CM

## 2023-12-18 DIAGNOSIS — I10 ESSENTIAL HYPERTENSION: ICD-10-CM

## 2023-12-18 RX ORDER — LISINOPRIL 10 MG/1
TABLET ORAL
Qty: 90 TABLET | Refills: 1 | Status: SHIPPED | OUTPATIENT
Start: 2023-12-18

## 2023-12-26 ENCOUNTER — HOSPITAL ENCOUNTER (OUTPATIENT)
Dept: RADIOLOGY | Age: 88
Discharge: HOME | End: 2023-12-26
Payer: MEDICARE

## 2023-12-26 DIAGNOSIS — R91.1 NODULE OF UPPER LOBE OF LEFT LUNG: ICD-10-CM

## 2023-12-26 LAB — GLUCOSE SERPL-MCNC: 88 MG/DL (ref 65–140)

## 2023-12-26 PROCEDURE — G1004 CDSM NDSC: HCPCS

## 2023-12-26 PROCEDURE — A9552 F18 FDG: HCPCS

## 2023-12-26 PROCEDURE — 82948 REAGENT STRIP/BLOOD GLUCOSE: CPT

## 2023-12-26 PROCEDURE — 78815 PET IMAGE W/CT SKULL-THIGH: CPT

## 2023-12-26 NOTE — LETTER
[facility]  [address] [place] [address]      January 4, 2024    MRN: [id number]     Phone: [phone]     Dear Ms. Jiménez,    Rocky recently had a(n) Nuclear Medicine performed on 12/26/2023 at  [facility] that was requested by Miguel Richmond DO. The study was reviewed by a radiologist, which is a physician who specializes in medical imaging. The radiologist issued a report describing his or her findings. In that report there was a finding that the radiologist felt warranted further discussion with your health care provider and that discussion would be beneficial to you.      The results were sent to Miguel Richmond DO on 12/26/2023  9:31 AM. We recommend that you contact Miguel Richmond DO at [phone] or set up an appointment to discuss the results of the imaging test. If you have already heard from Miguel Richmond DO regarding the results of your study, you can disregard this letter.     This letter is not meant to alarm you, but intended to encourage you to follow-up on your results with the provider that sent you for the imaging study. In addition, we have enclosed answers to frequently asked questions by other patients who have also received a letter to review results with their health care provider (see page two).      Thank you for choosing [facility] for your medical imaging needs.                                                                                                                                                        FREQUENTLY ASKED QUESTIONS    Why am I receiving this letter?  [place] State Law requires us to notify patients who have findings on imaging exams that may require more testing or follow-up with a health professional within the next 3 months.        How serious is the finding on the imaging test?  This letter is sent to all patients who may need follow-up or more testing within the next 3  months.  Receiving this letter does not necessarily mean you have a life-threatening imaging finding or disease.  Recommendations in the radiologist’s imaging report are general in nature and it is up to your healthcare provider to say whether those recommendations make sense for your situation.  You are strongly encouraged to talk to your health care provider about the results and ask whether additional steps need to be taken.    Where can I get a copy of the final report for my recent radiology exam?  To get a full copy of the report you can access your records online at [email or web address] or please contact [facility]’s Medical Records Department at [phone] Monday through Friday between 8 am and 6 pm.         What do I need to do now?           Please contact your health care provider who requested the imaging study to discuss what further actions (if any) are needed.  You may have already heard from (your ordering provider) in regard to this test in which case you can disregard this letter.        NOTICE IN ACCORDANCE WITH THE [place] STATE “PATIENT TEST RESULT INFORMATION ACT OF 2018”    You are receiving this notice as a result of a determination by your diagnostic imaging service that further discussions of your test results are warranted and would be beneficial to you.    The complete results of your test or tests have been or will be sent to the health care practitioner that ordered the test or tests. It is recommended that you contact your health care practitioner to discuss your results as soon as possible.

## 2024-01-04 ENCOUNTER — HOSPITAL ENCOUNTER (OUTPATIENT)
Dept: CT IMAGING | Facility: HOSPITAL | Age: 89
Discharge: HOME | End: 2024-01-04
Payer: MEDICARE

## 2024-01-04 VITALS
OXYGEN SATURATION: 100 % | HEART RATE: 62 BPM | TEMPERATURE: 98.4 F | WEIGHT: 117.95 LBS | HEIGHT: 57 IN | DIASTOLIC BLOOD PRESSURE: 59 MMHG | SYSTOLIC BLOOD PRESSURE: 139 MMHG | BODY MASS INDEX: 25.45 KG/M2 | RESPIRATION RATE: 12 BRPM

## 2024-01-04 DIAGNOSIS — R91.8 LUNG MASS: ICD-10-CM

## 2024-01-04 LAB
INR PPP: 0.96 (ref 0.84–1.19)
PROTHROMBIN TIME: 13.2 SECONDS (ref 11.6–14.5)

## 2024-01-04 PROCEDURE — 88342 IMHCHEM/IMCYTCHM 1ST ANTB: CPT | Performed by: PATHOLOGY

## 2024-01-04 PROCEDURE — 88305 TISSUE EXAM BY PATHOLOGIST: CPT | Performed by: PATHOLOGY

## 2024-01-04 PROCEDURE — 88341 IMHCHEM/IMCYTCHM EA ADD ANTB: CPT | Performed by: PATHOLOGY

## 2024-01-04 PROCEDURE — 85610 PROTHROMBIN TIME: CPT | Performed by: RADIOLOGY

## 2024-01-04 RX ORDER — LIDOCAINE HYDROCHLORIDE 10 MG/ML
INJECTION, SOLUTION EPIDURAL; INFILTRATION; INTRACAUDAL; PERINEURAL AS NEEDED
Status: COMPLETED | OUTPATIENT
Start: 2024-01-04 | End: 2024-01-04

## 2024-01-04 RX ORDER — FENTANYL CITRATE 50 UG/ML
INJECTION, SOLUTION INTRAMUSCULAR; INTRAVENOUS AS NEEDED
Status: COMPLETED | OUTPATIENT
Start: 2024-01-04 | End: 2024-01-04

## 2024-01-04 RX ADMIN — LIDOCAINE HYDROCHLORIDE 8 ML: 10 INJECTION, SOLUTION EPIDURAL; INFILTRATION; INTRACAUDAL; PERINEURAL at 09:06

## 2024-01-04 RX ADMIN — FENTANYL CITRATE 100 MCG: 50 INJECTION, SOLUTION INTRAMUSCULAR; INTRAVENOUS at 09:06

## 2024-01-04 NOTE — BRIEF OP NOTE (RAD/CATH)
INTERVENTIONAL RADIOLOGY PROCEDURE NOTE    Date: 1/4/2024    Procedure:   Procedure Summary       Date: 01/04/24 Room / Location: [facility] CAT Scan    Anesthesia Start:  Anesthesia Stop:     Procedure: IR BIOPSY LUNG Diagnosis:       Lung mass      (hx colon ca, new lung mass)    Scheduled Providers:  Responsible Provider:     Anesthesia Type: Not recorded ASA Status: Not recorded            Preoperative diagnosis:   1. Lung mass         Postoperative diagnosis: Same.    Surgeon: Soren Brown MD     Assistant: None. No qualified resident was available.    Blood loss: None    Specimens: Yes     Findings: Successful 18G x 4 core bx left upper lobe mass via posterior approach    Complications: None immediate.    Anesthesia: local and IV Fentanyl

## 2024-01-04 NOTE — SEDATION DOCUMENTATION
Left lung bx completed. Band aid to site. Awake and hemodynamically stable for transfer to PACU. No blood seen in oral cavity. Report and care given to primary RN.

## 2024-01-04 NOTE — INTERVAL H&P NOTE
"Update: (This section must be completed if the H&P was completed greater than 24 hrs to procedure or admission)    H&P reviewed. After examining the patient, I find no changed to the H&P since it had been written.    Patient re-evaluated. Accept as history and physical.    Visit Vitals  /65   Pulse 62   Temp 97.6 °F (36.4 °C) (Oral)   Resp 14   Ht 4' 9\" (1.448 m)   Wt 53.5 kg (117 lb 15.1 oz)   SpO2 100%   BMI 25.52 kg/m²   OB Status Hysterectomy   Smoking Status Never   BSA 1.44 m²         Soren Brown MD/January 4, 2024/9:42 AM  "

## 2024-01-04 NOTE — DISCHARGE INSTRUCTIONS
Needle Biopsy of the Lung    WHAT YOU NEED TO KNOW:  A needle biopsy of the lung is a procedure to remove cells or tissue from your lung. You may have a fine needle aspiration biopsy (FNAB), or a core needle biopsy (CNB). A FNAB is used to remove cells through a thin needle. CNB uses a thicker needle to remove lung tissue. The samples are collected and tested for inflammation, infection, or cancer.     DISCHARGE INSTRUCTIONS:   Resume your normal diet. Small sips of flat soda will help with nausea. Limit your activity for 24 hours.    Wound Care:      - Remove band aid in 24 hours.    Contact Interventional Radiology at [phone] (DAISHA PATIENTS: Contact Interventional Radiology at [phone]) (LUCY PATIENTS: Contact Interventional Radiology at [phone]) if any of the following occur:    - You have a fever greater than 101*    - You cough up large amounts of bright red blood     - You have chest pain with breathing    - You have shortness of breath    -You have persistent nausea and vomiting    - You have pus, redness or swelling around your biopsy site    - You have questions or concerns about your condition or care

## 2024-01-11 ENCOUNTER — TELEPHONE (OUTPATIENT)
Dept: CARDIAC SURGERY | Facility: CLINIC | Age: 89
End: 2024-01-11

## 2024-01-11 ENCOUNTER — DOCUMENTATION (OUTPATIENT)
Dept: HEMATOLOGY ONCOLOGY | Facility: CLINIC | Age: 89
End: 2024-01-11

## 2024-01-11 ENCOUNTER — PATIENT OUTREACH (OUTPATIENT)
Dept: HEMATOLOGY ONCOLOGY | Facility: CLINIC | Age: 89
End: 2024-01-11

## 2024-01-11 DIAGNOSIS — C34.92 PRIMARY SQUAMOUS CELL CARCINOMA OF LEFT LUNG (HCC): Primary | ICD-10-CM

## 2024-01-11 DIAGNOSIS — C34.92 SQUAMOUS CELL CARCINOMA OF LEFT LUNG (HCC): Primary | ICD-10-CM

## 2024-01-11 DIAGNOSIS — R91.8 LUNG MASS: Primary | ICD-10-CM

## 2024-01-11 PROCEDURE — 88342 IMHCHEM/IMCYTCHM 1ST ANTB: CPT | Performed by: PATHOLOGY

## 2024-01-11 PROCEDURE — 88305 TISSUE EXAM BY PATHOLOGIST: CPT | Performed by: PATHOLOGY

## 2024-01-11 PROCEDURE — 88341 IMHCHEM/IMCYTCHM EA ADD ANTB: CPT | Performed by: PATHOLOGY

## 2024-01-11 NOTE — PROGRESS NOTES
Intake received/ Chart reviewed for completion of workup    Pathology completed: 1/4/24     Imaging completed: 12/26/23 PET CT  11/22/23 CT CAP w contrast        All records needed are in patients chart. No records retrieval needed at this time.    Forwarded to care coordinator for scheduling of hem/onc appointment at  on 1/25/24 per request of daughter Hanane (she is off that day).     Patient is scheduled with Rad/onc on 1/22/24        History of cancer diagnosis and treatment? Yes  Did patient receive previous radiation therapy? No  Did patient receive previous chemotherapy? No  Did patient complete prescribed treatment? Yes surgerty  Has patient previously undergone genetic or genomic testing? No

## 2024-01-11 NOTE — PROGRESS NOTES
Oncology Nurse Navigator:    Called patient for initial outreach from nurse navigator. Introduced myself and my role. Reviewed radiation oncology appointment and need for medical oncology appointment. Assessed barriers of care.     Patient lives with alone. Has very supportive family. Patient drives and if she cannot drive she states family can drive her. She is aware of [facility] should transportation become an issue. OSW referral placed.     RN Initial Assessment     What is your understanding of your diagnosis? Patient is aware she has cancer and needs to see medical and radiation oncology. ONN reviewed role of medical and radiation oncology.     Transportation for initial appts? Patient states she has transportation    Will someone be coming with you? Daughter or daughter in law    Review of Communication consent and update as needed. Yes    How do you prefer to receive information? Verbal and written    Do you smoke?  No  Previous smoker? Exposed to second hand smoke      Family history of cancer? Patient has a history of Stage IIA ascending colon adenocarcinoma s/p right hemicolectomy 4/13/23.  colon cancer basal cell carcinoma right forehead 2018    Interested in speaking with oncology Genetics? Unsure at this time will discuss with med/onc    Does pt need port? TBD      Systems Assessment     Are you having any pain? No       Are you eating and drinking your normal amount? Yes    Decrease in appetite? No    Have you had any weight loss? No - gaining weight    Nausea/vomiting? No      Can you get around independently?  Yes - uses cane with distance and steps    Assistance with ADLs? No      Are you experiencing any anxiety/depression that is new or worsening? No     Do you have any vision changes/headaches/dizziness? Vision is getting worse - states she has an appt with eye doctor on 2/3/24. Denies any headaches or dizziness.     Reports a dry cough in the morning (not new). Denies any SOB or  hemoptysis.      Any swelling new or worsening?  No    Patient has an established PCP.     Information provided on Cancer Support Community along with my contact information and OCC contact information via [employer] message.  Patient is aware she can reach out with any questions.  General assessment complete. Verbalized understanding expressed appreciation of call.     I also reached out to daughter Hanane. Reviewed upcoming appointment with rad/onc and need for med/onc. She is requesting appt with med/onc on 1/25/24 as she is off that day and would like to attend appointment. She was given my contact information. Verbalized understanding appreciative of call.

## 2024-01-11 NOTE — TELEPHONE ENCOUNTER
Called patient and went over the results of her biopsy. Placed consult for Med and Radiation Oncology. At patient's wishes also spoke to patient's daughter Hanane.     Miguel Richmond, DO  Thoracic Surgery

## 2024-01-12 ENCOUNTER — PATIENT OUTREACH (OUTPATIENT)
Dept: HEMATOLOGY ONCOLOGY | Facility: CLINIC | Age: 89
End: 2024-01-12

## 2024-01-12 NOTE — PROGRESS NOTES
MSW received a referral for this pt from Navigation.  Pt will be meeting Oncology on 1/22.  MSW will plan to introduce pt at that appointment to introduce self, the role of  and complete assessments.

## 2024-01-12 NOTE — PROGRESS NOTES
Contacted patient as directed by  GILES Arreola to schedule medical oncology consult .     Introduced myself and my role.      Type of appointment-Medical oncology consult   Provider-Dr. Martin  [id number]  Time-9:00am  Location-[facility]     Patient had no other questions or concerns at this time.  I then called patient's daughter Hanane and provided appointment details. She as well had no further questions or concerns . I provided my contact information in case any should arise.

## 2024-01-19 PROBLEM — C34.92 NON-SMALL CELL CARCINOMA OF LEFT LUNG (HCC): Status: ACTIVE | Noted: 2024-01-19

## 2024-01-22 ENCOUNTER — TELEPHONE (OUTPATIENT)
Dept: HEMATOLOGY ONCOLOGY | Facility: CLINIC | Age: 89
End: 2024-01-22

## 2024-01-22 ENCOUNTER — CLINICAL SUPPORT (OUTPATIENT)
Facility: HOSPITAL | Age: 89
End: 2024-01-22
Attending: RADIOLOGY

## 2024-01-22 ENCOUNTER — RADIATION ONCOLOGY CONSULT (OUTPATIENT)
Facility: HOSPITAL | Age: 89
End: 2024-01-22
Payer: MEDICARE

## 2024-01-22 VITALS
SYSTOLIC BLOOD PRESSURE: 136 MMHG | TEMPERATURE: 98.3 F | RESPIRATION RATE: 20 BRPM | OXYGEN SATURATION: 98 % | HEART RATE: 71 BPM | WEIGHT: 122 LBS | BODY MASS INDEX: 26.4 KG/M2 | DIASTOLIC BLOOD PRESSURE: 70 MMHG

## 2024-01-22 DIAGNOSIS — C34.92 SQUAMOUS CELL CARCINOMA OF LEFT LUNG (HCC): Primary | ICD-10-CM

## 2024-01-22 DIAGNOSIS — C34.92 SQUAMOUS CELL CARCINOMA OF LEFT LUNG (HCC): ICD-10-CM

## 2024-01-22 PROCEDURE — 99205 OFFICE O/P NEW HI 60 MIN: CPT | Performed by: RADIOLOGY

## 2024-01-22 NOTE — TELEPHONE ENCOUNTER
Patient Call    Who are you speaking with? Patient    If it is not the patient, are they listed on an active communication consent form? N/A   What is the reason for this call? She can't reschedule due to daughter's schedule   Does this require a call back? N/A   If a call back is required, please list best call back number N/a   If a call back is required, advise that a message will be forwarded to their care team and someone will return their call as soon as possible.   Did you relay this information to the patient? N/A

## 2024-01-22 NOTE — PROGRESS NOTES
Consultation - Radiation Oncology      MRN:[id number] : 10/4/1929  Encounter: [id number]  Patient Information: Marizol Jiménez      CHIEF COMPLAINT  No chief complaint on file.    Cancer Staging   No matching staging information was found for the patient.           History of Present Illness   Marizol Jiménez is a 89 y.o. female with a history of stage IIA pT3N0 colon adenocarcinoma status post right hemicolectomy who presented for evaluation of a left upper lobe lung nodule detected on postoperative CT chest/abdomen/pelvis.      CT chest/abdomen and pelvis on 23 demonstrated a 2.8 x 2.4 cm left upper lung mass with large hiatal hernia and changes related to interval partial colectomy.      She was referred to thoracic surgery and PET/CT with IR-guided biopsy was recommended.    PET/CT on 23 demonstrated a FDG avid left upper lobe mass measuring 2.8 cm with SUV 7.7.  There was no suspicious focal hilar activity.  There was mild FDG uptake in a precarcinal lymph node on the right, SUV 2.5, measuring 8 mm in short axis.  There were no additional findings suggestive of hypermetabolic malignancy in the abdomen/pelvis.    IR-guided biopsy was performed on 24 and pathology was consistent with non-small cell carcinoma with squamous differentiation.    She has been referred for consideration of radiation.    Upon interview, she endorses the above history.  She feels well and denies significant shortness of breath, chest pain or dyspnea.  She denies recent weight loss.  She is without additional acute concerns.    Historical Information   Oncology History   Non-small cell carcinoma of left lung (HCC)   2024 Biopsy    IR lung biopsy    A.  Lung, left upper lobe, biopsy:  Non-small cell carcinoma with squamous differentiation.      2024 Initial Diagnosis    Non-small cell carcinoma of left lung (HCC)           Past Medical History:   Diagnosis Date    BCC (basal cell carcinoma)     Diastolic  dysfunction     Dyslipidemia     Dysphagia     Glaucoma     Hashimoto's thyroiditis     Hyperparathyroidism (HCC)     Hypertension     Impaired fasting glucose     Insomnia     Osteopenia     S/P right hemicolectomy 04/13/2022    SCCA (squamous cell carcinoma) of skin      Past Surgical History:   Procedure Laterality Date    CATARACT EXTRACTION, BILATERAL      COLONOSCOPY      Complete, Onset - 10/25/05 - 10 years     HYSTERECTOMY      IR BIOPSY LUNG  1/4/2024    PARATHYROIDECTOMY      1st 1985 and 2nd 2002    AK LAPAROSCOPY COLECTOMY PARTIAL W/ANASTOMOSIS N/A 4/13/2023    Procedure: RESECTION COLON RIGHT LAPAROSCOPIC;  Surgeon: Manny Reid MD;  [facility];  Service: Colorectal    AK NDSC WRST SURG W/RLS TRANSVRS CARPL LIGM Right 05/02/2016    Procedure: RELEASE CARPAL TUNNEL ENDOSCOPIC;  Surgeon: Alexandre Diaz MD;  Location: [facility];  Service: Orthopedics    TONSILLECTOMY         Family History   Problem Relation Age of Onset    Ovarian cancer Mother     Stroke Father         CVA    Melanoma Maternal Uncle        Social History   Social History     Substance and Sexual Activity   Alcohol Use Yes    Comment: rarely, social      Social History     Substance and Sexual Activity   Drug Use No     Social History     Tobacco Use   Smoking Status Never   Smokeless Tobacco Never         Meds/Allergies     Current Outpatient Medications:     amLODIPine (NORVASC) 5 mg tablet, TAKE 1 TABLET (5 MG TOTAL) BY MOUTH DAILY., Disp: 90 tablet, Rfl: 1    ASPIRIN 81 PO, Take by mouth, Disp: , Rfl:     atorvastatin (LIPITOR) 20 mg tablet, Take 20 mg by mouth daily, Disp: , Rfl:     Biotin 1000 MCG tablet, Take 5,000 mcg by mouth daily, Disp: , Rfl:     Calcium 500 MG tablet, Take 1 tablet by mouth 3 (three) times a week , Disp: , Rfl:     cholecalciferol (VITAMIN D3) 1,000 units tablet, Take 1 tablet by mouth daily, Disp: , Rfl:     dorzolamide-timolol (COSOPT) 22.3-6.8 MG/ML ophthalmic solution, Administer 1  drop to both eyes 2 (two) times a day, Disp: , Rfl:     ferrous sulfate 324 (65 Fe) mg, TAKE 1 TABLET BY MOUTH EVERY DAY BEFORE BREAKFAST (Patient not taking: Reported on 11/14/2023), Disp: 90 tablet, Rfl: 1    latanoprost (XALATAN) 0.005 % ophthalmic solution, Administer 1 drop into the left eye daily at bedtime, Disp: , Rfl:     lisinopril (ZESTRIL) 10 mg tablet, TAKE 1 TABLET BY MOUTH EVERY DAY, Disp: 90 tablet, Rfl: 1    metoprolol tartrate (LOPRESSOR) 25 mg tablet, TAKE 1 TABLET ORALLY TWICE DAILY, Disp: 180 tablet, Rfl: 2    Multiple Vitamins-Minerals (CENTRUM SILVER ADULT 50+ PO), Take by mouth., Disp: , Rfl:     Multiple Vitamins-Minerals (PRESERVISION AREDS PO), Take 1 tablet by mouth 2 (two) times a day, Disp: , Rfl:     Omega-3 Fatty Acids (FISH OIL) 1,000 mg, Take 1,000 mg by mouth daily., Disp: , Rfl:     Synthroid 88 MCG tablet, Take 1 tablet (88 mcg total) by mouth daily, Disp: 90 tablet, Rfl: 3  Allergies   Allergen Reactions    Cyclogyl [Cyclopentolate Hcl]     Phenylephrine Other (See Comments)     Eye gtts red eye infected sore    Pred Forte [Prednisolone Acetate]          Review of Systems  Constitutional: Negative.    HENT: Negative.     Eyes: Negative.    Respiratory: Negative.     Cardiovascular:  Positive for leg swelling.        Left leg swelling due to bakers cyst   Gastrointestinal: Negative.    Endocrine: Negative.    Genitourinary: Negative.    Musculoskeletal: Negative.    Skin: Negative.    Allergic/Immunologic: Negative.    Neurological: Negative.    Hematological:  Bruises/bleeds easily.        Taking aspirin       OBJECTIVE:   There were no vitals taken for this visit.  Pain Assessment:  0  Performance Status: Karnofsky: 90 - Able to carry on normal activity; minor signs or symptoms of disease     Physical Exam  HENT:      Head: Normocephalic and atraumatic.   Eyes:      General: No scleral icterus.     Extraocular Movements: Extraocular movements intact.   Cardiovascular:      Rate  and Rhythm: Normal rate.   Pulmonary:      Effort: Pulmonary effort is normal.   Abdominal:      General: Abdomen is flat. There is no distension.   Musculoskeletal:         General: Normal range of motion.      Cervical back: Normal range of motion.   Skin:     General: Skin is warm and dry.   Neurological:      General: No focal deficit present.      Mental Status: She is alert.   Psychiatric:         Mood and Affect: Mood normal.        RESULTS  Lab Results    Chemistry        Component Value Date/Time    K 4.9 11/03/2023 1037     11/03/2023 1037    CO2 29 11/03/2023 1037    CO2 21 04/13/2023 1448    BUN 21 11/03/2023 1037    CREATININE 0.90 11/03/2023 1037        Component Value Date/Time    CALCIUM 10.2 11/03/2023 1037    ALKPHOS 72 11/03/2023 1037    AST 18 11/03/2023 1037    ALT 14 11/03/2023 1037            Lab Results   Component Value Date    WBC 6.27 11/03/2023    HGB 13.8 11/03/2023    HCT 42.8 11/03/2023    MCV 87 11/03/2023     11/03/2023         Imaging Studies  IR biopsy lung    Result Date: 1/4/2024  Narrative: IR BIOPSY LUNG PROCEDURE: CT-guided lung biopsy CLINICAL INDICATION: History of stage IIa colon cancer with 2.8 cm left upper lobe lung mass detected on PET/CT 12/26/2023 COMPARISON: PET/CT 12/26/2023 PERFORMING PHYSICIAN: Soren Brown MD ASSISTANT PHYSICIAN: None MEDICATIONS: 1% lidocaine (local). 100 mcg fentanyl. SEDATION TIME: None 0 CONTRAST: None FLUOROSCOPY TIME: None RADIATION DOSE: 500.3 mGy  (dose length product). This examination, like all CT scans performed in the [facility], was performed utilizing techniques to minimize radiation dose exposure, including the use of iterative reconstruction and automated exposure control. NUMBER OF IMAGES: 75 TECHNIQUE: Informed written consent was obtained from the patient after a thorough discussion of risks, benefits, and alternatives to the procedure . All questions were answered. The patient was brought to  the procedure room and a time-out was performed utilizing universal protocol. The patient was identified verbally and via wrist band. The patient was placed prone on the procedure table and localizing CT images were obtained. An appropriate skin entry site to gain access to the left upper lobe lung mass was identified and marked. The skin was prepped and draped in the usual sterile fashion. The skin and subcutaneous tissues were infiltrated with local anesthetic. Skin nick made with an 11 blade. A 17-gauge guiding cannula advanced to the periphery of the left upper lobe lung mass confirmed with CT imaging. 18-gauge biopsy cores were obtained (total 4) with tissue adequacy confirmed by onsite pathology team. Needle was removed. Post biopsy imaging reveals no evidence for immediate complication including pneumothorax. The patient tolerated the procedure well without difficulty or complication encountered and left the section in satisfactory stable condition. FINDINGS: As above.     Impression: Uneventful CT-guided biopsy left upper lobe lung mass as noted. Workstation performed: [id number]     NM PET CT skull base to mid thigh    Result Date: 12/26/2023  Narrative: PET/CT SCAN INDICATION: Pulmonary mass. History of colon cancer. R91.1: Solitary pulmonary nodule MODIFIER: PS COMPARISON: CT chest abdomen pelvis 11/22/2023 CELL TYPE: Invasive adenocarcinoma, ascending colon TECHNIQUE:   8.2 mCi F-18-FDG administered IV. Multiplanar attenuation corrected and non attenuation corrected PET images are available for interpretation, and contiguous, low dose, axial CT sections were obtained from the skull vertex through the femurs. Intravenous contrast material was not utilized. This examination, like all CT scans performed in the [facility], was performed utilizing techniques to minimize radiation dose exposure, including the use of iterative reconstruction and automated exposure control. Fasting serum  glucose: 88 mg/dl FINDINGS: VISUALIZED BRAIN: No acute abnormalities are seen. HEAD/NECK: Multinodular thyroid gland with variable uptake. A right thyroid nodule demonstrates SUV max of 6.8. This nodule measures up to 1.7 cm in size image 72 series 3. No FDG avid lymph nodes. CT images: Multinodular thyroid gland with small calcifications. CHEST: FDG avid left upper lobe mass, SUV max of 7.7. This corresponds to the 2.8 cm nodule seen on recent CT. No suspicious focal hilar activity. Mild FDG uptake in a precarinal lymph node on the right, SUV max of 2.5. This measures 8 mm short axis image 92 series 3. CT images: Extensive coronary artery calcifications. Large hiatal hernia. ABDOMEN: No FDG avid soft tissue lesions are seen. CT images: Cholelithiasis. Small renal cortical and parapelvic cysts. Right hemicolectomy. Small to moderate periumbilical hernia containing bowel without obstruction. PELVIS: No FDG avid soft tissue lesions are seen. CT images: Status post hysterectomy. OSSEOUS STRUCTURES: No FDG avid lesions are seen. Scattered mild foci of uptake along the spine corresponding to degenerative changes on CT. CT images: Multilevel degenerative changes of the spine.     Impression: 1. FDG avid left upper lobe mass suspicious for malignancy, could be either a primary lung cancer or possibly metastasis given the history of colon cancer. 2. Mild FDG uptake in a small precarinal lymph node, nonspecific could be reactive though metastasis is not entirely excluded. This can be assessed on follow-up PET. 3. No findings for hypermetabolic malignancy in the abdomen/pelvis. 4. Multinodular thyroid gland with variable uptake. Underlying thyroid malignancy is not excluded. This could be further assessed beginning with thyroid ultrasound. The study was marked in EPIC for significant notification. Workstation performed: [id number]         Pathology:See above.        ASSESSMENT  1. Squamous cell carcinoma of left lung (HCC)   "Ambulatory Referral to Radiation Oncology        Cancer Staging   No matching staging information was found for the patient.        PLAN/DISCUSSION  No orders of the defined types were placed in this encounter.         Marizol Jiménez is a 89 y.o. year old female with a history of stage IIA pT3N0 colon adenocarcinoma status post right hemicolectomy who presented for evaluation of a left upper lobe lung nodule detected on postoperative CT chest/abdomen/pelvis.      I reviewed her imaging and she has at least stage IA3 [id number] non-small cell carcinoma of the lung.      The PET/CT demonstrates possible disease in a 4R lymph node.  Mediastinal staging with EBUS/TBNA would be helpful to determine if there is N3 involvement, which would alter prognosis, need for further staging studies, radiotherapy treatment fields and fractionation.  If N3 disease is present, she would not be a candidate for SBRT and a conventionally fractionated treatment course, ideally with chemotherapy, would be appropriate.  She would also be considered for additional adjuvant systemic therapies depending upon performance status and tolerability.     I discussed the general principles of radiation therapy and treatment planning.  A final treatment plan will be dicussed after re-evaluation by thoracic surgery for kleber evaluation and possible sampling of the 4R node.       The patient's questions were answered to her satisfaction.      Final treatment recommendations will follow additional diagnostic studies.    Thank you for allowing me to participate in Ms. Jiménez's care.      Efrem Valdes MD  1/22/2024,3:38 PM      Portions of the record may have been created with voice recognition software.  Occasional wrong word or \"sound a like\" substitutions may have occurred due to the inherent limitations of voice recognition software.  Read the chart carefully and recognize, using context, where substitutions have occurred.        "

## 2024-01-22 NOTE — PROGRESS NOTES
Marizol Jiménez 10/4/1929 is a 89 y.o. female With a history of lap right hemicolectomy on 23 for a T3N0 colon adenocarcinoma was diagnosed with Non-small cell carcinoma of the left lung. She was found to have a lung nodule on her colon cancer surveillance CT. She underwent IR lung biopsy. She is being referred by Dr. Richmond and presents today for consult.       23 CT C/A/P w contrast  Status post interval partial colectomy.   2.8 x 2.4 cm left upper lobe lung mass this could represent primary bronchogenic carcinoma or metastatic lesion.   Large hiatal hernia.   Cholelithiasis.      23 Dr. Richmond  She is interested in getting a pathological diagnosis as she is interested in considering treatment.     Given her age, if she wishes to pursue treatment I would recommend non-surgical options.     Discussed with Dr. Soriano and her anatomy is not favorable for navigational bronchoscopy.   refer to IR for a biopsy.    order a PET CT.    Will talk on the phone after these are resulted with next steps which will likely involve medical and radiation oncology consults.       23 PET CT  1. FDG avid left upper lobe mass suspicious for malignancy, could be either a primary lung cancer or possibly metastasis given the history of colon cancer.  2. Mild FDG uptake in a small precarinal lymph node, nonspecific could be reactive though metastasis is not entirely excluded. This can be assessed on follow-up PET.  3. No findings for hypermetabolic malignancy in the abdomen/pelvis.  4. Multinodular thyroid gland with variable uptake. Underlying thyroid malignancy is not excluded. This could be further assessed beginning with thyroid ultrasound.    24 IR lung biopsy - Non-small cell carcinoma with squamous differentiation.     Upcomin24 Dr. Martin - for new lung cancer diagnosis  24 Dr. Gandhi    Oncology History   Non-small cell carcinoma of left lung (HCC)   2024 Biopsy    IR lung  biopsy    A.  Lung, left upper lobe, biopsy:  Non-small cell carcinoma with squamous differentiation.      1/19/2024 Initial Diagnosis    Non-small cell carcinoma of left lung (HCC)         Review of Systems:  Review of Systems   Constitutional: Negative.    HENT: Negative.     Eyes: Negative.    Respiratory: Negative.     Cardiovascular:  Positive for leg swelling.        Left leg swelling due to bakers cyst   Gastrointestinal: Negative.    Endocrine: Negative.    Genitourinary: Negative.    Musculoskeletal: Negative.    Skin: Negative.    Allergic/Immunologic: Negative.    Neurological: Negative.    Hematological:  Bruises/bleeds easily.        Taking aspirin       Clinical Trial: no      Pregnancy test needed:      ONCOTYPE/MAMMOPRINT results no    PFT no    Prior Radiation no    Teaching yes    MST - no    Implantable Devices (Port, Pacemaker, pain stimulator) no    Hip Replacement no      [unfilled]  Health Maintenance   Topic Date Due   • COVID-19 Vaccine (5 - 2023-24 season) 09/01/2023   • Fall Risk  09/22/2024   • Urinary Incontinence Screening  09/22/2024   • Medicare Annual Wellness Visit (AWV)  09/22/2024   • BMI: Followup Plan  09/22/2024   • BMI: Adult  01/04/2025   • Depression Screening  01/22/2025   • Zoster Vaccine  Completed   • Pneumococcal Vaccine: 65+ Years  Completed   • Influenza Vaccine  Completed   • HIB Vaccine  Aged Out   • IPV Vaccine  Aged Out   • Hepatitis A Vaccine  Aged Out   • Meningococcal ACWY Vaccine  Aged Out   • HPV Vaccine  Aged Out     Past Medical History:   Diagnosis Date   • BCC (basal cell carcinoma)    • Diastolic dysfunction    • Dyslipidemia    • Dysphagia    • Glaucoma    • Hashimoto's thyroiditis    • Hyperparathyroidism (HCC)    • Hypertension    • Impaired fasting glucose    • Insomnia    • Osteopenia    • S/P right hemicolectomy 04/13/2022   • SCCA (squamous cell carcinoma) of skin      Past Surgical History:   Procedure Laterality Date   • CATARACT  EXTRACTION, BILATERAL     • COLONOSCOPY      Complete, Onset - 10/25/05 - 10 years    • HYSTERECTOMY     • IR BIOPSY LUNG  1/4/2024   • PARATHYROIDECTOMY      1st 1985 and 2nd 2002   • [place] LAPAROSCOPY COLECTOMY PARTIAL W/ANASTOMOSIS N/A 4/13/2023    Procedure: RESECTION COLON RIGHT LAPAROSCOPIC;  Surgeon: Manny Reid MD;  [facility];  Service: Colorectal   • MA NDSC WRST SURG W/RLS TRANSVRS CARPL LIGM Right 05/02/2016    Procedure: RELEASE CARPAL TUNNEL ENDOSCOPIC;  Surgeon: Alexandre Diaz MD;  [facility];  Service: Orthopedics   • TONSILLECTOMY       Family History   Problem Relation Age of Onset   • Ovarian cancer Mother    • Stroke Father         CVA   • Melanoma Maternal Uncle      Social History     Tobacco Use   • Smoking status: Never   • Smokeless tobacco: Never   Vaping Use   • Vaping status: Never Used   Substance Use Topics   • Alcohol use: Yes     Comment: rarely, social    • Drug use: No        Current Outpatient Medications:   •  amLODIPine (NORVASC) 5 mg tablet, TAKE 1 TABLET (5 MG TOTAL) BY MOUTH DAILY., Disp: 90 tablet, Rfl: 1  •  ASPIRIN 81 PO, Take by mouth, Disp: , Rfl:   •  atorvastatin (LIPITOR) 20 mg tablet, Take 20 mg by mouth daily, Disp: , Rfl:   •  Biotin 1000 MCG tablet, Take 5,000 mcg by mouth daily, Disp: , Rfl:   •  Calcium 500 MG tablet, Take 1 tablet by mouth 3 (three) times a week , Disp: , Rfl:   •  cholecalciferol (VITAMIN D3) 1,000 units tablet, Take 1 tablet by mouth daily, Disp: , Rfl:   •  dorzolamide-timolol (COSOPT) 22.3-6.8 MG/ML ophthalmic solution, Administer 1 drop to both eyes 2 (two) times a day, Disp: , Rfl:   •  latanoprost (XALATAN) 0.005 % ophthalmic solution, Administer 1 drop into the left eye daily at bedtime, Disp: , Rfl:   •  lisinopril (ZESTRIL) 10 mg tablet, TAKE 1 TABLET BY MOUTH EVERY DAY, Disp: 90 tablet, Rfl: 1  •  metoprolol tartrate (LOPRESSOR) 25 mg tablet, TAKE 1 TABLET ORALLY TWICE DAILY, Disp: 180 tablet, Rfl: 2  •   Multiple Vitamins-Minerals (CENTRUM SILVER ADULT 50+ PO), Take by mouth., Disp: , Rfl:   •  Multiple Vitamins-Minerals (PRESERVISION AREDS PO), Take 1 tablet by mouth 2 (two) times a day, Disp: , Rfl:   •  Omega-3 Fatty Acids (FISH OIL) 1,000 mg, Take 1,000 mg by mouth daily., Disp: , Rfl:   •  Synthroid 88 MCG tablet, Take 1 tablet (88 mcg total) by mouth daily, Disp: 90 tablet, Rfl: 3  •  ferrous sulfate 324 (65 Fe) mg, TAKE 1 TABLET BY MOUTH EVERY DAY BEFORE BREAKFAST (Patient not taking: Reported on 11/14/2023), Disp: 90 tablet, Rfl: 1  Allergies   Allergen Reactions   • Cyclogyl [Cyclopentolate Hcl]    • Phenylephrine Other (See Comments)     Eye gtts red eye infected sore   • Pred Forte [Prednisolone Acetate]       Vitals:    01/22/24 1054   BP: 136/70   Pulse: 71   Resp: 20   Temp: 98.3 °F (36.8 °C)   SpO2: 98%   Weight: 55.3 kg (122 lb)     Pain Score: 0-No pain

## 2024-01-23 ENCOUNTER — TELEPHONE (OUTPATIENT)
Dept: HEMATOLOGY ONCOLOGY | Facility: CLINIC | Age: 89
End: 2024-01-23

## 2024-01-23 NOTE — TELEPHONE ENCOUNTER
Appointment Change  Cancel, Reschedule, Change to Virtual      Who are you speaking with? Child   If it is not the patient, is the caller listed on the communication consent form? Yes   Which provider is the appointment scheduled with? Dr. Richmond   When was the original appointment scheduled?    Please list date and time 2/5 9:30am   At which location is the appointment scheduled to take place? [place]   Was the appointment rescheduled?     Was the appointment changed from an in person visit to a virtual visit?    If so, please list the details of the change. 2/5 11am   What is the reason for the appointment change? Sched conflict       Was STAR transport scheduled? No   Does STAR transport need to be scheduled for the new visit (if applicable) No   Does the patient need an infusion appointment rescheduled? No   Does the patient have an upcoming infusion appointment scheduled? If so, when? No   Is the patient undergoing chemotherapy? No   For appointments cancelled with less than 24 hours:  Was the no-show policy reviewed? Yes

## 2024-01-25 ENCOUNTER — CONSULT (OUTPATIENT)
Age: 89
End: 2024-01-25
Payer: MEDICARE

## 2024-01-25 VITALS
DIASTOLIC BLOOD PRESSURE: 68 MMHG | SYSTOLIC BLOOD PRESSURE: 145 MMHG | HEIGHT: 57 IN | TEMPERATURE: 98 F | OXYGEN SATURATION: 99 % | BODY MASS INDEX: 25.89 KG/M2 | HEART RATE: 65 BPM | WEIGHT: 120 LBS | RESPIRATION RATE: 18 BRPM

## 2024-01-25 DIAGNOSIS — C34.92 NON-SMALL CELL CARCINOMA OF LEFT LUNG (HCC): Primary | ICD-10-CM

## 2024-01-25 DIAGNOSIS — C34.92 SQUAMOUS CELL CARCINOMA OF LEFT LUNG (HCC): ICD-10-CM

## 2024-01-25 PROCEDURE — 99204 OFFICE O/P NEW MOD 45 MIN: CPT | Performed by: INTERNAL MEDICINE

## 2024-01-25 NOTE — PROGRESS NOTES
[facility]  [address]  [facility] [address]  [facility] [id number]  [phone]     Date of Visit: 1/25/2024  Name: Marizol Jiménez   YOB: 1929         Assessment/Plan  This is a 89-year-old lady with a past medical history of stage IIa ascending colon adenocarcinoma, s/p right colectomy in April 2023, on surveillance without adjuvant chemotherapy.  Now diagnosed to have a new left upper lobe, cell carcinoma.  We reviewed her course so far, natural history of lung cancer, NCCN guidelines for management and prognostic information.    Agree with Dr. Valdes's assessment, and the need for mediastinal staging.  If this is stage Ia, and SBRT could be offered, and she should proceed with that.  No concurrent chemotherapy would be necessary.    However, if she has stage III disease, she would need concurrent chemoradiation. Despite her age, she is a good candidate for chemotherapy. Briefly discussed what chemotherapy would entail in this case and side effects to anticipate. I will provide her with literature for carboplatin, taxol.     We will follow-up on her final staging after she has undergone mediastinal staging.  I will tentatively see her back next month. We will cancel this appointment if this turns out to be stage I disease.     Return in 1 month (on 2/26/2024) for Office Visit.     All the patient's questions were answered to their apparent satisfaction.  Patient has been strongly urged to call with any questions or concerns.     Total time spent reviewing EMR, seeing patient in clinic visit, documenting visit, placing treatment orders, and communicating with other medical providers: 45 mins        Oncology History   Primary adenocarcinoma of ascending colon (HCC)   3/7/2023 Initial Diagnosis    Primary adenocarcinoma of ascending colon (HCC)     4/13/2023 -  Cancer Staged    Staging form: Colon and Rectum, AJCC 8th Edition  - Pathologic stage from  4/13/2023: Stage IIA (pT3, pN0, cM0) - Signed by Efrem Valdes MD on 1/22/2024  Stage prefix: Initial diagnosis  Total positive nodes: 0       Non-small cell carcinoma of left lung (HCC)   1/4/2024 Biopsy    IR lung biopsy    A.  Lung, left upper lobe, biopsy:  Non-small cell carcinoma with squamous differentiation.      1/19/2024 Initial Diagnosis    Non-small cell carcinoma of left lung (HCC)     1/22/2024 -  Cancer Staged    Staging form: Lung, AJCC 8th Edition  - Clinical stage from 1/22/2024: Stage IA3 (cT1c, cN0, cM0) - Signed by Efrem Valdes MD on 1/22/2024  Stage prefix: Initial diagnosis          Cancer Staging   Non-small cell carcinoma of left lung (HCC)  Staging form: Lung, AJCC 8th Edition  - Clinical stage from 1/22/2024: Stage IA3 (cT1c, cN0, cM0) - Signed by Efrem Valdes MD on 1/22/2024    Primary adenocarcinoma of ascending colon (HCC)  Staging form: Colon and Rectum, AJCC 8th Edition  - Pathologic stage from 4/13/2023: Stage IIA (pT3, pN0, cM0) - Signed by Efrem Valdes MD on 1/22/2024     Treatment Details   Treatment goal [No plan goal]   Plan Name FERUMOXYTOL (FERAHEME) DAYS 1 AND 8   Status Inactive   Start Date 3/20/2023   End Date    Provider JAN Morales   Chemotherapy [No matching medication found in this treatment plan]     Treatment Details   Treatment goal [No plan goal]   Plan Name FERUMOXYTOL (FERAHEME) DAYS 1 AND 8   Status Inactive   Start Date 3/23/2023   End Date 3/29/2023   Provider JAN Morales   Chemotherapy ferumoxytol (FERAHEME), 510 mg, Intravenous, Once, 1 of 1 cycle  Administration: 510 mg (3/23/2023), 510 mg (3/29/2023)          HISTORY OF PRESENT ILLNESS:   Marizol Jiménez is a 89 y.o. female  who has a referred for evaluation of a lung mass.  Patient has a history of primary adenocarcinoma of the ascending colon, s/p right hemicolectomy in April 2023- pT3 pN0c M0, stage IIa, negative margins.  No adjuvant therapy.    She had a CT scan  of the chest, abdomen and pelvis on November 22, 2023 which showed a 2.8 x 2.4 cm left upper lobe lung mass.  A 2 mm right apical pulmonary nodule.  No lymphadenopathy.  No evidence of distant metastatic disease.    A PET/CT on December 26 showed FDG avidity in the left upper lobe mass, SUV 7.7.  Mild FDG uptake in the precarinal lymph nodes on the right with an SUV of 2.5, measuring 8 mm in short axis.  Otherwise, no evidence of distant metastatic disease was found.    On January 4, 2024 she underwent a CT-guided lung biopsy which was consistent with squamous cell carcinoma.  Caris was sent and is pending.    Patient was seen by Dr. Valdes on January 22, 2024, who recommended mediastinal staging, given the right precarinal lymph node.  If this is stage Ia disease, he plans to offer SBRT.  Patient is scheduled to see cardiothoracic surgery again on February 5, 2024.    Patient is here today with her daughter to establish care with medical oncology.  She denies any history of smoking, but has had significant second hand smoke exposures all her life.    Subjective  She feels well overall and denies any complaints  She is independent of her ADLs and IADLs. She lives alone and is able to care for herself. Denies any major illnesses.    REVIEW OF SYSTEMS:  14 point ROS is negative      Current Outpatient Medications:     amLODIPine (NORVASC) 5 mg tablet, TAKE 1 TABLET (5 MG TOTAL) BY MOUTH DAILY., Disp: 90 tablet, Rfl: 1    ASPIRIN 81 PO, Take by mouth, Disp: , Rfl:     atorvastatin (LIPITOR) 20 mg tablet, Take 20 mg by mouth daily, Disp: , Rfl:     Biotin 1000 MCG tablet, Take 5,000 mcg by mouth daily, Disp: , Rfl:     Calcium 500 MG tablet, Take 1 tablet by mouth 3 (three) times a week , Disp: , Rfl:     cholecalciferol (VITAMIN D3) 1,000 units tablet, Take 1 tablet by mouth daily, Disp: , Rfl:     dorzolamide-timolol (COSOPT) 22.3-6.8 MG/ML ophthalmic solution, Administer 1 drop to both eyes 2 (two) times a day, Disp:  , Rfl:     latanoprost (XALATAN) 0.005 % ophthalmic solution, Administer 1 drop into the left eye daily at bedtime, Disp: , Rfl:     lisinopril (ZESTRIL) 10 mg tablet, TAKE 1 TABLET BY MOUTH EVERY DAY, Disp: 90 tablet, Rfl: 1    metoprolol tartrate (LOPRESSOR) 25 mg tablet, TAKE 1 TABLET ORALLY TWICE DAILY, Disp: 180 tablet, Rfl: 2    Multiple Vitamins-Minerals (CENTRUM SILVER ADULT 50+ PO), Take by mouth., Disp: , Rfl:     Multiple Vitamins-Minerals (PRESERVISION AREDS PO), Take 1 tablet by mouth 2 (two) times a day, Disp: , Rfl:     Omega-3 Fatty Acids (FISH OIL) 1,000 mg, Take 1,000 mg by mouth daily., Disp: , Rfl:     Synthroid 88 MCG tablet, Take 1 tablet (88 mcg total) by mouth daily, Disp: 90 tablet, Rfl: 3    ferrous sulfate 324 (65 Fe) mg, TAKE 1 TABLET BY MOUTH EVERY DAY BEFORE BREAKFAST (Patient not taking: Reported on 11/14/2023), Disp: 90 tablet, Rfl: 1     Allergies   Allergen Reactions    Cyclogyl [Cyclopentolate Hcl]     Phenylephrine Other (See Comments)     Eye gtts red eye infected sore    Pred Forte [Prednisolone Acetate]         ACTIVE PROBLEMS:  Patient Active Problem List   Diagnosis    De Quervain's tenosynovitis, right    Diastolic dysfunction    Dyslipidemia    Esophageal reflux    Glaucoma    Hypertension    Impaired fasting glucose    Insomnia    Left ventricular hypertrophy    Mixed urge and stress incontinence    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 tuberculosis    Thyroid nodule    Peripheral arterial disease (HCC)    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)    Hyperparathyroidism (HCC)    Hypothyroidism    Stage 3 chronic kidney disease, unspecified whether stage 3a or 3b CKD (HCC)    Primary adenocarcinoma of ascending colon (HCC)    Hypoalbuminemia    Anemia    Iron deficiency anemia    Complex renal cyst    Status post parathyroidectomy (HCC)    Non-small cell carcinoma of left lung (HCC)          Past Medical History:   Diagnosis Date    BCC (basal  cell carcinoma)     Diastolic dysfunction     Dyslipidemia     Dysphagia     Glaucoma     Hashimoto's thyroiditis     Hyperparathyroidism (HCC)     Hypertension     Impaired fasting glucose     Insomnia     Osteopenia     S/P right hemicolectomy 04/13/2022    SCCA (squamous cell carcinoma) of skin         Past Surgical History:   Procedure Laterality Date    CATARACT EXTRACTION, BILATERAL      COLONOSCOPY      Complete, Onset - 10/25/05 - 10 years     HYSTERECTOMY      IR BIOPSY LUNG  1/4/2024    PARATHYROIDECTOMY      1st 1985 and 2nd 2002    MT LAPAROSCOPY COLECTOMY PARTIAL W/ANASTOMOSIS N/A 4/13/2023    Procedure: RESECTION COLON RIGHT LAPAROSCOPIC;  Surgeon: Manny Reid MD;  Location: [facility];  Service: Colorectal    MT NDSC WRST SURG W/RLS TRANSVRS CARPL LIGM Right 05/02/2016    Procedure: RELEASE CARPAL TUNNEL ENDOSCOPIC;  Surgeon: Alexandre Diaz MD;  Location: [facility];  Service: Orthopedics    TONSILLECTOMY          Social History     Socioeconomic History    Marital status: Single     Spouse name: None    Number of children: None    Years of education: None    Highest education level: None   Occupational History    Occupation: Retired   Tobacco Use    Smoking status: Never    Smokeless tobacco: Never   Vaping Use    Vaping status: Never Used   Substance and Sexual Activity    Alcohol use: Yes     Comment: rarely, social     Drug use: No    Sexual activity: None   Other Topics Concern    None   Social History Narrative     per [employer]      Social Determinants of Health     Financial Resource Strain: Low Risk  (9/22/2023)    Overall Financial Resource Strain (CARDIA)     Difficulty of Paying Living Expenses: Not hard at all   Food Insecurity: No Food Insecurity (4/14/2023)    Hunger Vital Sign     Worried About Running Out of Food in the Last Year: Never true     Ran Out of Food in the Last Year: Never true   Transportation Needs: No Transportation Needs (9/22/2023)    PRAPARE -  "Transportation     Lack of Transportation (Medical): No     Lack of Transportation (Non-Medical): No   Physical Activity: Not on file   Stress: Not on file   Social Connections: Not on file   Intimate Partner Violence: Not on file   Housing Stability: Unknown (4/14/2023)    Housing Stability Vital Sign     Unable to Pay for Housing in the Last Year: No     Number of Places Lived in the Last Year: Not on file     Unstable Housing in the Last Year: No        Family History   Problem Relation Age of Onset    Ovarian cancer Mother     Stroke Father         CVA    Melanoma Maternal Uncle         Objective        Physical Exam  ECOG performance status: 0  Blood pressure 145/68, pulse 65, temperature 98 °F (36.7 °C), temperature source Temporal, resp. rate 18, height 4' 9\" (1.448 m), weight 54.4 kg (120 lb), SpO2 99%.     General appearance: Not in acute distress, well appearing and well nourished.    Alert and oriented.   Normal breath sounds bilaterally.  Clear to auscultation without any added sounds  Heart sounds are normal.  No murmurs noted.    No focal deficits.        I reviewed lab data in the chart as noted above.  Additional labs as noted below    WBC   Date Value Ref Range Status   11/03/2023 6.27 4.31 - 10.16 Thousand/uL Final   09/08/2023 5.82 4.31 - 10.16 Thousand/uL Final   04/14/2023 8.98 4.31 - 10.16 Thousand/uL Final     Hemoglobin   Date Value Ref Range Status   11/03/2023 13.8 11.5 - 15.4 g/dL Final   09/08/2023 12.8 11.5 - 15.4 g/dL Final   04/14/2023 9.4 (L) 11.5 - 15.4 g/dL Final     Platelets   Date Value Ref Range Status   11/03/2023 237 149 - 390 Thousands/uL Final   09/08/2023 244 149 - 390 Thousands/uL Final   04/14/2023 295 149 - 390 Thousands/uL Final     MCV   Date Value Ref Range Status   11/03/2023 87 82 - 98 fL Final   09/08/2023 87 82 - 98 fL Final   04/14/2023 85 82 - 98 fL Final      Potassium   Date Value Ref Range Status   11/03/2023 4.9 3.5 - 5.3 mmol/L Final   09/08/2023 4.7 3.5 - " 5.3 mmol/L Final   04/15/2023 4.4 3.5 - 5.3 mmol/L Final     Chloride   Date Value Ref Range Status   11/03/2023 108 96 - 108 mmol/L Final   09/08/2023 109 (H) 96 - 108 mmol/L Final   04/15/2023 112 (H) 96 - 108 mmol/L Final     CO2   Date Value Ref Range Status   11/03/2023 29 21 - 32 mmol/L Final   09/08/2023 29 21 - 32 mmol/L Final   04/15/2023 26 21 - 32 mmol/L Final     CO2, i-STAT   Date Value Ref Range Status   04/13/2023 21 21 - 32 mmol/L Final     BUN   Date Value Ref Range Status   11/03/2023 21 5 - 25 mg/dL Final   09/08/2023 21 5 - 25 mg/dL Final   04/15/2023 14 5 - 25 mg/dL Final     Creatinine   Date Value Ref Range Status   11/03/2023 0.90 0.60 - 1.30 mg/dL Final     Comment:     Standardized to IDMS reference method   09/08/2023 0.94 0.60 - 1.30 mg/dL Final     Comment:     Standardized to IDMS reference method   04/15/2023 0.92 0.60 - 1.30 mg/dL Final     Comment:     Standardized to IDMS reference method     Glucose   Date Value Ref Range Status   04/15/2023 134 65 - 140 mg/dL Final     Comment:     Specimen collection should occur prior to Sulfasalazine administration due to the potential for falsely depressed results. Specimen collection should occur prior to Sulfapyridine administration due to the potential for falsely elevated results.  If the patient is fasting, the ADA then defines impaired fasting glucose as > 100 mg/dL and diabetes as > or equal to 123 mg/dL.   04/14/2023 132 65 - 140 mg/dL Final     Comment:     Specimen collection should occur prior to Sulfasalazine administration due to the potential for falsely depressed results. Specimen collection should occur prior to Sulfapyridine administration due to the potential for falsely elevated results.  If the patient is fasting, the ADA then defines impaired fasting glucose as > 100 mg/dL and diabetes as > or equal to 123 mg/dL.   03/09/2023 84 65 - 140 mg/dL Final     Comment:     If the patient is fasting, the ADA then defines impaired  fasting glucose as > 100 mg/dL and diabetes as > or equal to 123 mg/dL.  Specimen collection should occur prior to Sulfasalazine administration due to the potential for falsely depressed results. Specimen collection should occur prior to Sulfapyridine administration due to the potential for falsely elevated results.     Calcium   Date Value Ref Range Status   11/03/2023 10.2 8.4 - 10.2 mg/dL Final   09/08/2023 9.8 8.4 - 10.2 mg/dL Final   04/15/2023 9.5 8.3 - 10.1 mg/dL Final     Albumin   Date Value Ref Range Status   11/03/2023 3.9 3.5 - 5.0 g/dL Final   09/08/2023 3.7 3.5 - 5.0 g/dL Final   03/09/2023 2.6 (L) 3.5 - 5.0 g/dL Final     Total Bilirubin   Date Value Ref Range Status   11/03/2023 0.78 0.20 - 1.00 mg/dL Final     Comment:     Use of this assay is not recommended for patients undergoing treatment with eltrombopag due to the potential for falsely elevated results.  N-acetyl-p-benzoquinone imine (metabolite of Acetaminophen) will generate erroneously low results in samples for patients that have taken an overdose of Acetaminophen.   09/08/2023 0.64 0.20 - 1.00 mg/dL Final     Comment:     Use of this assay is not recommended for patients undergoing treatment with eltrombopag due to the potential for falsely elevated results.  N-acetyl-p-benzoquinone imine (metabolite of Acetaminophen) will generate erroneously low results in samples for patients that have taken an overdose of Acetaminophen.   03/09/2023 0.23 0.20 - 1.00 mg/dL Final     Comment:     Use of this assay is not recommended for patients undergoing treatment with eltrombopag due to the potential for falsely elevated results.     Alkaline Phosphatase   Date Value Ref Range Status   11/03/2023 72 34 - 104 U/L Final   09/08/2023 64 34 - 104 U/L Final   03/09/2023 59 46 - 116 U/L Final     AST   Date Value Ref Range Status   11/03/2023 18 13 - 39 U/L Final   09/08/2023 14 13 - 39 U/L Final   03/09/2023 10 5 - 45 U/L Final     Comment:     Specimen  "collection should occur prior to Sulfasalazine administration due to the potential for falsely depressed results.      ALT   Date Value Ref Range Status   11/03/2023 14 7 - 52 U/L Final     Comment:     Specimen collection should occur prior to Sulfasalazine administration due to the potential for falsely depressed results.    09/08/2023 12 7 - 52 U/L Final     Comment:     Specimen collection should occur prior to Sulfasalazine administration due to the potential for falsely depressed results.    03/09/2023 14 12 - 78 U/L Final     Comment:     Specimen collection should occur prior to Sulfasalazine and/or Sulfapyridine administration due to the potential for falsely depressed results.       No results found for: \"LDH\"  No results found for: \"TSH\"  No results found for: \"B2CDRVO\"   Free T4   Date Value Ref Range Status   09/08/2023 1.52 (H) 0.61 - 1.12 ng/dL Final     Comment:     Specimens with biotin concentrations > 10 ng/mL can lead to significant (> 10%) positive bias in result.   08/03/2021 1.40 0.76 - 1.46 ng/dL Final     Comment:     Specimen collection should occur prior to Sulfasalazine administration due to the potential for falsely elevated results.         RECENT IMAGING:  Procedure: IR biopsy lung    Result Date: 1/4/2024  Narrative: IR BIOPSY LUNG PROCEDURE: CT-guided lung biopsy CLINICAL INDICATION: History of stage IIa colon cancer with 2.8 cm left upper lobe lung mass detected on PET/CT 12/26/2023 COMPARISON: PET/CT 12/26/2023 PERFORMING PHYSICIAN: Soren Brown MD ASSISTANT PHYSICIAN: None MEDICATIONS: 1% lidocaine (local). 100 mcg fentanyl. SEDATION TIME: None 0 CONTRAST: None FLUOROSCOPY TIME: None RADIATION DOSE: 500.3 mGy  (dose length product). This examination, like all CT scans performed in the [facility], was performed utilizing techniques to minimize radiation dose exposure, including the use of iterative reconstruction and automated exposure control. NUMBER OF " IMAGES: 75 TECHNIQUE: Informed written consent was obtained from the patient after a thorough discussion of risks, benefits, and alternatives to the procedure . All questions were answered. The patient was brought to the procedure room and a time-out was performed utilizing universal protocol. The patient was identified verbally and via wrist band. The patient was placed prone on the procedure table and localizing CT images were obtained. An appropriate skin entry site to gain access to the left upper lobe lung mass was identified and marked. The skin was prepped and draped in the usual sterile fashion. The skin and subcutaneous tissues were infiltrated with local anesthetic. Skin nick made with an 11 blade. A 17-gauge guiding cannula advanced to the periphery of the left upper lobe lung mass confirmed with CT imaging. 18-gauge biopsy cores were obtained (total 4) with tissue adequacy confirmed by onsite pathology team. Needle was removed. Post biopsy imaging reveals no evidence for immediate complication including pneumothorax. The patient tolerated the procedure well without difficulty or complication encountered and left the section in satisfactory stable condition. FINDINGS: As above.     Impression: Uneventful CT-guided biopsy left upper lobe lung mass as noted. Workstation performed: [id number]     Procedure: NM PET CT skull base to mid thigh    Result Date: 12/26/2023  Narrative: PET/CT SCAN INDICATION: Pulmonary mass. History of colon cancer. R91.1: Solitary pulmonary nodule MODIFIER: PS COMPARISON: CT chest abdomen pelvis 11/22/2023 CELL TYPE: Invasive adenocarcinoma, ascending colon TECHNIQUE:   8.2 mCi F-18-FDG administered IV. Multiplanar attenuation corrected and non attenuation corrected PET images are available for interpretation, and contiguous, low dose, axial CT sections were obtained from the skull vertex through the femurs. Intravenous contrast material was not utilized. This examination, like  all CT scans performed in the [facility], was performed utilizing techniques to minimize radiation dose exposure, including the use of iterative reconstruction and automated exposure control. Fasting serum glucose: 88 mg/dl FINDINGS: VISUALIZED BRAIN: No acute abnormalities are seen. HEAD/NECK: Multinodular thyroid gland with variable uptake. A right thyroid nodule demonstrates SUV max of 6.8. This nodule measures up to 1.7 cm in size image 72 series 3. No FDG avid lymph nodes. CT images: Multinodular thyroid gland with small calcifications. CHEST: FDG avid left upper lobe mass, SUV max of 7.7. This corresponds to the 2.8 cm nodule seen on recent CT. No suspicious focal hilar activity. Mild FDG uptake in a precarinal lymph node on the right, SUV max of 2.5. This measures 8 mm short axis image 92 series 3. CT images: Extensive coronary artery calcifications. Large hiatal hernia. ABDOMEN: No FDG avid soft tissue lesions are seen. CT images: Cholelithiasis. Small renal cortical and parapelvic cysts. Right hemicolectomy. Small to moderate periumbilical hernia containing bowel without obstruction. PELVIS: No FDG avid soft tissue lesions are seen. CT images: Status post hysterectomy. OSSEOUS STRUCTURES: No FDG avid lesions are seen. Scattered mild foci of uptake along the spine corresponding to degenerative changes on CT. CT images: Multilevel degenerative changes of the spine.     Impression: 1. FDG avid left upper lobe mass suspicious for malignancy, could be either a primary lung cancer or possibly metastasis given the history of colon cancer. 2. Mild FDG uptake in a small precarinal lymph node, nonspecific could be reactive though metastasis is not entirely excluded. This can be assessed on follow-up PET. 3. No findings for hypermetabolic malignancy in the abdomen/pelvis. 4. Multinodular thyroid gland with variable uptake. Underlying thyroid malignancy is not excluded. This could be further assessed  "beginning with thyroid ultrasound. The study was marked in EPIC for significant notification. Workstation performed: [id number]         Portions of the record may have been created with voice recognition software.  Occasional wrong word or \"sound a like\" substitutions may have occurred due to the inherent limitations of voice recognition software.  Read the chart carefully and recognize, using context, where substitutions have occurred.    "

## 2024-01-29 DIAGNOSIS — C34.12 CANCER OF UPPER LOBE OF LEFT LUNG (HCC): Primary | ICD-10-CM

## 2024-01-31 ENCOUNTER — PATIENT OUTREACH (OUTPATIENT)
Dept: HEMATOLOGY ONCOLOGY | Facility: CLINIC | Age: 89
End: 2024-01-31

## 2024-01-31 LAB
CARIS ALK: NEGATIVE
CARIS GENOMIC LOH - EXOME: NORMAL
CARIS MSI - EXOME: NORMAL
CARIS PD-L1 (22C3): POSITIVE
CARIS PD-L1 (SP263): POSITIVE
CARIS PD-L1 FDA (28-8): NEGATIVE
CARIS PD-L1 FDA(SP142): NEGATIVE
CARIS PTEN: POSITIVE
CARIS TMB - EXOME: NORMAL

## 2024-01-31 NOTE — PROGRESS NOTES
Biopsychosocial and Barriers Assessment    Cancer Diagnosis: Lung mass  Home/Cell Phone: [phone]  Emergency Contact: Sarbjit Jiménez, son, [phone]  Marital Status:   Interpretation concerns, speaks another language, preferred language:English  Cultural concerns:None reported  Ability to read or write: Literate    Caregiver/Support: Children, extended family  Children: 2;  1 son and 1 daughter  Child/Elder care: No    Housing: own home  Home Setup: [facility]  Lives With: alone  Daily Living Activities: Pt is able to care for self  Durable Medical Equipment: wheeled walker, cane  Ambulation: Pt uses a walker    Preferred Pharmacy: [facility], [facility]  High co-pays with insurance: No  High co-pays with medication coverage: No  No medication coverage: Pt has a prescription plan    Primary Care Provider: Alfreda Briseno DO  Hx of Home Health Care: No  Hx of Short term rehab: No  Mental Health Hx: No  Substance Abuse Hx: No  Employment: retired   Status/Location: No  Ability to pay bills: Yes  POA/LW/AD: yes  Transportation Plan/Concerns: Pt's daughter takes her to her appointments.      What do you know about your Cancer Diagnosis    What has your doctor told you about your cancer diagnosis: Pt is aware of her diagnosis.    What has your doctor told you about your cancer treatment: Pt is in the learning phase of her options.    What specific concerns do you have about your diagnosis and treatment: Pt expressed no concerns.    Have you been made aware of any hair loss associated with treatment: N/A    Additional Comments:    MSW received a referral for this pt and outreach was made this day.  The pt was open and receptive of this call.  The role of the  was described and explained and contact information was provided.  The pt lives alone and has the support from her son and daughter, who live local.  She has no issues with mobility or her ADL's.  Pt has no issues or concerns with her finances or  insurance.  The pt was pleasant and realistic.  She has no social work needs.  Pt states she has made no decisions with regards to treatment at this time.  MSW provided information on [facility] should she need this in the future.  Pt expressed an interest and she will call if needed.  Emotional support offered.

## 2024-02-04 NOTE — PROGRESS NOTES
Thoracic Consult  Assessment/Plan:  89yF with recent history of lap right hemicolectomy for a T3N0 colon adenocarcinoma who was found to have a left upper lobe lung nodule now biopsy proven NSCLC. There is a mildly avid 4 or 10R LN on PET.     Will perform EBUS for mediastinal staging to direct management of chemo/rads plans. Consent obtained in the office.     Miguel Richmond, DO  Thoracic Surgery         Thoracic History   Diagnosis: T3N0 ascending colon adenocarcinoma   Procedures/Surgeries: Lap right hemicolectomy   Pathology: colon adenocarcinoma   Adjuvant Therapy:       Subjective:    Patient ID: Marizol Jiménez is a 89 y.o. female referred to our office for the finding of a lung mass after her colon resection. She is a lifetime non-smoker. Her medical history includes GERD, hypothyroid, HTN, PAD. On 4/13/2023 she underwent a lap right hemicolectomy for a T3N0M0 adenocarcinoma. A surveillance CT chest, abdomen and pelvis was done 11/22/2023 showing the finding of a 2.8cm left upper lobe lung nodule. There was no prior preoperative CT chest for comparison.  At my initial consultation she stated that she denies cough, weight loss, chest pain, SOB, or weakness. She has recovered very well from her colon surgery. No immediate biological family cancer history. Her late  did have lung cancer.     After my last appointment she proceeded with workup including a PET and a biopsy. Biopsy would return as NSCLC. She was referred to Heme Onc and Rad Onc. PET showing a mildly 4 or 10R LN. She was referred back for mediastinal staging to direct management.     Data:  CT C (11/22/23): 2.8 x 2.4 cm left upper lobe lung mass this could represent primary bronchogenic carcinoma or metastatic lesion.   Path (1/4/24): IR Bx: NSCLC  PET (12/26/23): PET avid HARRIS nodule with small mildly avid 4 or 10R LN.     The following portions of the patient's history were reviewed and updated as appropriate: allergies, current  medications, past family history, past medical history, past social history, past surgical history, and problem list.    Past Medical History:   Diagnosis Date   • BCC (basal cell carcinoma)    • Diastolic dysfunction    • Dyslipidemia    • Dysphagia    • Glaucoma    • Hashimoto's thyroiditis    • Hyperparathyroidism (HCC)    • Hypertension    • Impaired fasting glucose    • Insomnia    • Osteopenia    • S/P right hemicolectomy 04/13/2022   • SCCA (squamous cell carcinoma) of skin       Past Surgical History:   Procedure Laterality Date   • CATARACT EXTRACTION, BILATERAL     • COLONOSCOPY      Complete, Onset - 10/25/05 - 10 years    • HYSTERECTOMY     • IR BIOPSY LUNG  1/4/2024   • PARATHYROIDECTOMY      1st 1985 and 2nd 2002   • [place] LAPAROSCOPY COLECTOMY PARTIAL W/ANASTOMOSIS N/A 4/13/2023    Procedure: RESECTION COLON RIGHT LAPAROSCOPIC;  Surgeon: Manny Reid MD;  [facility];  Service: Colorectal   • [place] NDSC WRST SURG W/RLS TRANSVRS CARPL LIGM Right 05/02/2016    Procedure: RELEASE CARPAL TUNNEL ENDOSCOPIC;  Surgeon: Alexandre Diaz MD;  Location: [facility];  Service: Orthopedics   • TONSILLECTOMY        Family History   Problem Relation Age of Onset   • Ovarian cancer Mother    • Stroke Father         CVA   • Melanoma Maternal Uncle       Social History     Socioeconomic History   • Marital status: Single     Spouse name: Not on file   • Number of children: Not on file   • Years of education: Not on file   • Highest education level: Not on file   Occupational History   • Occupation: Retired   Tobacco Use   • Smoking status: Never   • Smokeless tobacco: Never   Vaping Use   • Vaping status: Never Used   Substance and Sexual Activity   • Alcohol use: Yes     Comment: rarely, social    • Drug use: No   • Sexual activity: Not on file   Other Topics Concern   • Not on file   Social History Narrative     per [employer]      Social Determinants of Health     Financial Resource Strain: Low  Risk  (9/22/2023)    Overall Financial Resource Strain (CARDIA)    • Difficulty of Paying Living Expenses: Not hard at all   Food Insecurity: No Food Insecurity (4/14/2023)    Hunger Vital Sign    • Worried About Running Out of Food in the Last Year: Never true    • Ran Out of Food in the Last Year: Never true   Transportation Needs: No Transportation Needs (9/22/2023)    PRAPARE - Transportation    • Lack of Transportation (Medical): No    • Lack of Transportation (Non-Medical): No   Physical Activity: Not on file   Stress: Not on file   Social Connections: Not on file   Intimate Partner Violence: Not on file   Housing Stability: Unknown (4/14/2023)    Housing Stability Vital Sign    • Unable to Pay for Housing in the Last Year: No    • Number of Places Lived in the Last Year: Not on file    • Unstable Housing in the Last Year: No      Review of Systems   Constitutional:  Negative for chills and fever.   HENT:  Negative for trouble swallowing and voice change.    Eyes:  Negative for photophobia and visual disturbance.   Respiratory:  Negative for chest tightness and shortness of breath.    Cardiovascular:  Negative for chest pain and palpitations.   Gastrointestinal:  Negative for abdominal pain, diarrhea, nausea and vomiting.   Genitourinary:  Negative for dysuria and flank pain.   Musculoskeletal:  Negative for back pain and gait problem.   Skin:  Negative for rash and wound.   Allergic/Immunologic: Negative for environmental allergies and food allergies.   Neurological:  Negative for dizziness, weakness, light-headedness and numbness.         Objective:   Physical Exam  Vitals reviewed.   Constitutional:       Appearance: Normal appearance.   HENT:      Head: Normocephalic and atraumatic.   Cardiovascular:      Rate and Rhythm: Normal rate and regular rhythm.      Heart sounds: Normal heart sounds.   Pulmonary:      Effort: Pulmonary effort is normal. No respiratory distress.      Breath sounds: No stridor. No  "wheezing.   Abdominal:      General: Abdomen is flat. There is no distension.      Tenderness: There is no abdominal tenderness.   Musculoskeletal:      Cervical back: No tenderness.      Right lower leg: No edema.      Left lower leg: No edema.   Lymphadenopathy:      Cervical: No cervical adenopathy.   Skin:     General: Skin is warm and dry.   Neurological:      General: No focal deficit present.      Mental Status: She is alert and oriented to person, place, and time.   Psychiatric:         Mood and Affect: Mood normal.         Behavior: Behavior normal.         Thought Content: Thought content normal.         Judgment: Judgment normal.     /64 (BP Location: Left arm, Patient Position: Sitting, Cuff Size: Standard)   Pulse 59   Temp 98.1 °F (36.7 °C) (Temporal)   Resp 13   Ht 4' 9\" (1.448 m)   Wt 54.1 kg (119 lb 4.3 oz)   SpO2 99%   BMI 25.81 kg/m²     No Chest XR results available for this patient.   No CT Chest results available for this patient.  CT chest abdomen pelvis w contrast    Result Date: 11/29/2023  Narrative CT CHEST, ABDOMEN AND PELVIS WITH IV CONTRAST INDICATION:   C18.2: Malignant neoplasm of ascending colon. COMPARISON: CT abdomen pelvis 2/8/2023. TECHNIQUE: CT examination of the chest, abdomen and pelvis was performed. Multiplanar 2D reformatted images were created from the source data. This examination, like all CT scans performed in the [facility], was performed utilizing techniques to minimize radiation dose exposure, including the use of iterative reconstruction and automated exposure control. Radiation dose length product (DLP) for this visit:  720.09 mGy-cm IV Contrast:  100 mL of iohexol (OMNIPAQUE) Enteric Contrast: Enteric contrast was administered. FINDINGS: CHEST LUNGS: 2.8 x 2.4 cm left upper lobe lung mass is identified abutting the fissure. 2 mm right apical pulmonary nodule is noted series 603 image 25.  There is no tracheal or endobronchial lesion. " PLEURA:  Unremarkable. HEART/GREAT VESSELS: Heart is unremarkable for patient's age.  No thoracic aortic aneurysm. Large hiatal hernia is present. MEDIASTINUM AND RAISA:  Unremarkable. CHEST WALL AND LOWER NECK:  Unremarkable. ABDOMEN LIVER/BILIARY TREE: One or more subcentimeter sharply circumscribed low-density hepatic lesion(s) are noted, too small to accurately characterize, but statistically most likely to represent subcentimeter hepatic cysts.  No suspicious solid hepatic lesion  is identified.  Hepatic contours are normal.  No biliary dilatation. GALLBLADDER: There are gallstone(s) within the gallbladder, without pericholecystic inflammatory changes. SPLEEN:  Unremarkable. PANCREAS:  Unremarkable. ADRENAL GLANDS:  Unremarkable. KIDNEYS/URETERS: Stable complex 1.3 cm right interpolar renal cyst is noted. STOMACH AND BOWEL: Patient is status post interval partial colectomy. Colonic diverticulosis is present. APPENDIX:  No findings to suggest appendicitis. ABDOMINOPELVIC CAVITY:  No ascites.  No pneumoperitoneum.  No lymphadenopathy. VESSELS:  Unremarkable for patient's age. PELVIS REPRODUCTIVE ORGANS: Surgical changes of prior hysterectomy. URINARY BLADDER:  Unremarkable. ABDOMINAL WALL/INGUINAL REGIONS: Nonobstructing umbilical hernia is present. OSSEOUS STRUCTURES:  No acute fracture or destructive osseous lesion.     Impression Status post interval partial colectomy. 2.8 x 2.4 cm left upper lobe lung mass this could represent primary bronchogenic carcinoma or metastatic lesion. Large hiatal hernia. Cholelithiasis. Workstation performed: [id number]      No NM PET CT results available for this patient.   No Barium Swallow results available for this patient.

## 2024-02-04 NOTE — H&P (VIEW-ONLY)
Thoracic Consult  Assessment/Plan:  89yF with recent history of lap right hemicolectomy for a T3N0 colon adenocarcinoma who was found to have a left upper lobe lung nodule now biopsy proven NSCLC. There is a mildly avid 4 or 10R LN on PET.     Will perform EBUS for mediastinal staging to direct management of chemo/rads plans. Consent obtained in the office.     Miguel Richmond, DO  Thoracic Surgery         Thoracic History   Diagnosis: T3N0 ascending colon adenocarcinoma   Procedures/Surgeries: Lap right hemicolectomy   Pathology: colon adenocarcinoma   Adjuvant Therapy:       Subjective:    Patient ID: Marizol Jiménez is a 89 y.o. female referred to our office for the finding of a lung mass after her colon resection. She is a lifetime non-smoker. Her medical history includes GERD, hypothyroid, HTN, PAD. On 4/13/2023 she underwent a lap right hemicolectomy for a T3N0M0 adenocarcinoma. A surveillance CT chest, abdomen and pelvis was done 11/22/2023 showing the finding of a 2.8cm left upper lobe lung nodule. There was no prior preoperative CT chest for comparison.  At my initial consultation she stated that she denies cough, weight loss, chest pain, SOB, or weakness. She has recovered very well from her colon surgery. No immediate biological family cancer history. Her late  did have lung cancer.     After my last appointment she proceeded with workup including a PET and a biopsy. Biopsy would return as NSCLC. She was referred to Heme Onc and Rad Onc. PET showing a mildly 4 or 10R LN. She was referred back for mediastinal staging to direct management.     Data:  CT C (11/22/23): 2.8 x 2.4 cm left upper lobe lung mass this could represent primary bronchogenic carcinoma or metastatic lesion.   Path (1/4/24): IR Bx: NSCLC  PET (12/26/23): PET avid HARRIS nodule with small mildly avid 4 or 10R LN.     The following portions of the patient's history were reviewed and updated as appropriate: allergies, current  medications, past family history, past medical history, past social history, past surgical history, and problem list.    Past Medical History:   Diagnosis Date   • BCC (basal cell carcinoma)    • Diastolic dysfunction    • Dyslipidemia    • Dysphagia    • Glaucoma    • Hashimoto's thyroiditis    • Hyperparathyroidism (HCC)    • Hypertension    • Impaired fasting glucose    • Insomnia    • Osteopenia    • S/P right hemicolectomy 04/13/2022   • SCCA (squamous cell carcinoma) of skin       Past Surgical History:   Procedure Laterality Date   • CATARACT EXTRACTION, BILATERAL     • COLONOSCOPY      Complete, Onset - 10/25/05 - 10 years    • HYSTERECTOMY     • IR BIOPSY LUNG  1/4/2024   • PARATHYROIDECTOMY      1st 1985 and 2nd 2002   • [place] LAPAROSCOPY COLECTOMY PARTIAL W/ANASTOMOSIS N/A 4/13/2023    Procedure: RESECTION COLON RIGHT LAPAROSCOPIC;  Surgeon: Manny Reid MD;  [facility];  Service: Colorectal   • [place] NDSC WRST SURG W/RLS TRANSVRS CARPL LIGM Right 05/02/2016    Procedure: RELEASE CARPAL TUNNEL ENDOSCOPIC;  Surgeon: Alexandre Diaz MD;  Location: [facility];  Service: Orthopedics   • TONSILLECTOMY        Family History   Problem Relation Age of Onset   • Ovarian cancer Mother    • Stroke Father         CVA   • Melanoma Maternal Uncle       Social History     Socioeconomic History   • Marital status: Single     Spouse name: Not on file   • Number of children: Not on file   • Years of education: Not on file   • Highest education level: Not on file   Occupational History   • Occupation: Retired   Tobacco Use   • Smoking status: Never   • Smokeless tobacco: Never   Vaping Use   • Vaping status: Never Used   Substance and Sexual Activity   • Alcohol use: Yes     Comment: rarely, social    • Drug use: No   • Sexual activity: Not on file   Other Topics Concern   • Not on file   Social History Narrative     per [employer]      Social Determinants of Health     Financial Resource Strain: Low  Risk  (9/22/2023)    Overall Financial Resource Strain (CARDIA)    • Difficulty of Paying Living Expenses: Not hard at all   Food Insecurity: No Food Insecurity (4/14/2023)    Hunger Vital Sign    • Worried About Running Out of Food in the Last Year: Never true    • Ran Out of Food in the Last Year: Never true   Transportation Needs: No Transportation Needs (9/22/2023)    PRAPARE - Transportation    • Lack of Transportation (Medical): No    • Lack of Transportation (Non-Medical): No   Physical Activity: Not on file   Stress: Not on file   Social Connections: Not on file   Intimate Partner Violence: Not on file   Housing Stability: Unknown (4/14/2023)    Housing Stability Vital Sign    • Unable to Pay for Housing in the Last Year: No    • Number of Places Lived in the Last Year: Not on file    • Unstable Housing in the Last Year: No      Review of Systems   Constitutional:  Negative for chills and fever.   HENT:  Negative for trouble swallowing and voice change.    Eyes:  Negative for photophobia and visual disturbance.   Respiratory:  Negative for chest tightness and shortness of breath.    Cardiovascular:  Negative for chest pain and palpitations.   Gastrointestinal:  Negative for abdominal pain, diarrhea, nausea and vomiting.   Genitourinary:  Negative for dysuria and flank pain.   Musculoskeletal:  Negative for back pain and gait problem.   Skin:  Negative for rash and wound.   Allergic/Immunologic: Negative for environmental allergies and food allergies.   Neurological:  Negative for dizziness, weakness, light-headedness and numbness.         Objective:   Physical Exam  Vitals reviewed.   Constitutional:       Appearance: Normal appearance.   HENT:      Head: Normocephalic and atraumatic.   Cardiovascular:      Rate and Rhythm: Normal rate and regular rhythm.      Heart sounds: Normal heart sounds.   Pulmonary:      Effort: Pulmonary effort is normal. No respiratory distress.      Breath sounds: No stridor. No  "wheezing.   Abdominal:      General: Abdomen is flat. There is no distension.      Tenderness: There is no abdominal tenderness.   Musculoskeletal:      Cervical back: No tenderness.      Right lower leg: No edema.      Left lower leg: No edema.   Lymphadenopathy:      Cervical: No cervical adenopathy.   Skin:     General: Skin is warm and dry.   Neurological:      General: No focal deficit present.      Mental Status: She is alert and oriented to person, place, and time.   Psychiatric:         Mood and Affect: Mood normal.         Behavior: Behavior normal.         Thought Content: Thought content normal.         Judgment: Judgment normal.     /64 (BP Location: Left arm, Patient Position: Sitting, Cuff Size: Standard)   Pulse 59   Temp 98.1 °F (36.7 °C) (Temporal)   Resp 13   Ht 4' 9\" (1.448 m)   Wt 54.1 kg (119 lb 4.3 oz)   SpO2 99%   BMI 25.81 kg/m²     No Chest XR results available for this patient.   No CT Chest results available for this patient.  CT chest abdomen pelvis w contrast    Result Date: 11/29/2023  Narrative CT CHEST, ABDOMEN AND PELVIS WITH IV CONTRAST INDICATION:   C18.2: Malignant neoplasm of ascending colon. COMPARISON: CT abdomen pelvis 2/8/2023. TECHNIQUE: CT examination of the chest, abdomen and pelvis was performed. Multiplanar 2D reformatted images were created from the source data. This examination, like all CT scans performed in the [facility], was performed utilizing techniques to minimize radiation dose exposure, including the use of iterative reconstruction and automated exposure control. Radiation dose length product (DLP) for this visit:  720.09 mGy-cm IV Contrast:  100 mL of iohexol (OMNIPAQUE) Enteric Contrast: Enteric contrast was administered. FINDINGS: CHEST LUNGS: 2.8 x 2.4 cm left upper lobe lung mass is identified abutting the fissure. 2 mm right apical pulmonary nodule is noted series 603 image 25.  There is no tracheal or endobronchial lesion. " PLEURA:  Unremarkable. HEART/GREAT VESSELS: Heart is unremarkable for patient's age.  No thoracic aortic aneurysm. Large hiatal hernia is present. MEDIASTINUM AND RAISA:  Unremarkable. CHEST WALL AND LOWER NECK:  Unremarkable. ABDOMEN LIVER/BILIARY TREE: One or more subcentimeter sharply circumscribed low-density hepatic lesion(s) are noted, too small to accurately characterize, but statistically most likely to represent subcentimeter hepatic cysts.  No suspicious solid hepatic lesion  is identified.  Hepatic contours are normal.  No biliary dilatation. GALLBLADDER: There are gallstone(s) within the gallbladder, without pericholecystic inflammatory changes. SPLEEN:  Unremarkable. PANCREAS:  Unremarkable. ADRENAL GLANDS:  Unremarkable. KIDNEYS/URETERS: Stable complex 1.3 cm right interpolar renal cyst is noted. STOMACH AND BOWEL: Patient is status post interval partial colectomy. Colonic diverticulosis is present. APPENDIX:  No findings to suggest appendicitis. ABDOMINOPELVIC CAVITY:  No ascites.  No pneumoperitoneum.  No lymphadenopathy. VESSELS:  Unremarkable for patient's age. PELVIS REPRODUCTIVE ORGANS: Surgical changes of prior hysterectomy. URINARY BLADDER:  Unremarkable. ABDOMINAL WALL/INGUINAL REGIONS: Nonobstructing umbilical hernia is present. OSSEOUS STRUCTURES:  No acute fracture or destructive osseous lesion.     Impression Status post interval partial colectomy. 2.8 x 2.4 cm left upper lobe lung mass this could represent primary bronchogenic carcinoma or metastatic lesion. Large hiatal hernia. Cholelithiasis. Workstation performed: [id number]      No NM PET CT results available for this patient.   No Barium Swallow results available for this patient.

## 2024-02-05 ENCOUNTER — OFFICE VISIT (OUTPATIENT)
Dept: CARDIAC SURGERY | Facility: CLINIC | Age: 89
End: 2024-02-05
Payer: MEDICARE

## 2024-02-05 VITALS
SYSTOLIC BLOOD PRESSURE: 132 MMHG | OXYGEN SATURATION: 99 % | HEIGHT: 57 IN | TEMPERATURE: 98.1 F | BODY MASS INDEX: 25.73 KG/M2 | DIASTOLIC BLOOD PRESSURE: 64 MMHG | HEART RATE: 59 BPM | RESPIRATION RATE: 13 BRPM | WEIGHT: 119.27 LBS

## 2024-02-05 DIAGNOSIS — C34.92 SQUAMOUS CELL CARCINOMA OF LEFT LUNG (HCC): Primary | ICD-10-CM

## 2024-02-05 PROCEDURE — 99214 OFFICE O/P EST MOD 30 MIN: CPT | Performed by: SURGERY

## 2024-02-05 NOTE — LETTER
February 5, 2024     Kevin Martin MD  [address] [place] [address]    Patient: Marizol Jiménez   YOB: 1929   Date of Visit: 2/5/2024       Dear Dr. Martin:    Thank you for referring Marizol Jiménez to me for evaluation. Below are my notes for this consultation.    If you have questions, please do not hesitate to call me. I look forward to following your patient along with you.         Sincerely,        Miguel Richmond DO        CC: MD Miguel Marc DO  2/5/2024 11:21 AM  Sign when Signing Visit  Thoracic Consult  Assessment/Plan:  89yF with recent history of lap right hemicolectomy for a T3N0 colon adenocarcinoma who was found to have a left upper lobe lung nodule now biopsy proven NSCLC. There is a mildly avid 4 or 10R LN on PET.     Will perform EBUS for mediastinal staging to direct management of chemo/rads plans. Consent obtained in the office.     Miguel Richmond DO  Thoracic Surgery        Thoracic History  Diagnosis: T3N0 ascending colon adenocarcinoma   Procedures/Surgeries: Lap right hemicolectomy   Pathology: colon adenocarcinoma   Adjuvant Therapy:      Subjective:   Patient ID: Marizol Jiménez is a 89 y.o. female referred to our office for the finding of a lung mass after her colon resection. She is a lifetime non-smoker. Her medical history includes GERD, hypothyroid, HTN, PAD. On 4/13/2023 she underwent a lap right hemicolectomy for a T3N0M0 adenocarcinoma. A surveillance CT chest, abdomen and pelvis was done 11/22/2023 showing the finding of a 2.8cm left upper lobe lung nodule. There was no prior preoperative CT chest for comparison.  At my initial consultation she stated that she denies cough, weight loss, chest pain, SOB, or weakness. She has recovered very well from her colon surgery. No immediate biological family cancer history. Her late  did have lung cancer.     After my last appointment she proceeded with workup including a PET and a  biopsy. Biopsy would return as NSCLC. She was referred to Heme Onc and Rad Onc. PET showing a mildly 4 or 10R LN. She was referred back for mediastinal staging to direct management.     Data:  CT C (11/22/23): 2.8 x 2.4 cm left upper lobe lung mass this could represent primary bronchogenic carcinoma or metastatic lesion.   Path (1/4/24): IR Bx: NSCLC  PET (12/26/23): PET avid HARRIS nodule with small mildly avid 4 or 10R LN.     The following portions of the patient's history were reviewed and updated as appropriate: allergies, current medications, past family history, past medical history, past social history, past surgical history, and problem list.    Past Medical History:   Diagnosis Date   • BCC (basal cell carcinoma)    • Diastolic dysfunction    • Dyslipidemia    • Dysphagia    • Glaucoma    • Hashimoto's thyroiditis    • Hyperparathyroidism (HCC)    • Hypertension    • Impaired fasting glucose    • Insomnia    • Osteopenia    • S/P right hemicolectomy 04/13/2022   • SCCA (squamous cell carcinoma) of skin       Past Surgical History:   Procedure Laterality Date   • CATARACT EXTRACTION, BILATERAL     • COLONOSCOPY      Complete, Onset - 10/25/05 - 10 years    • HYSTERECTOMY     • IR BIOPSY LUNG  1/4/2024   • PARATHYROIDECTOMY      1st 1985 and 2nd 2002   • PA LAPAROSCOPY COLECTOMY PARTIAL W/ANASTOMOSIS N/A 4/13/2023    Procedure: RESECTION COLON RIGHT LAPAROSCOPIC;  Surgeon: Manny Reid MD;  [facility];  Service: Colorectal   • PA NDSC WRST SURG W/RLS TRANSVRS CARPL LIGM Right 05/02/2016    Procedure: RELEASE CARPAL TUNNEL ENDOSCOPIC;  Surgeon: Alexandre Diaz MD;  Location: [facility];  Service: Orthopedics   • TONSILLECTOMY        Family History   Problem Relation Age of Onset   • Ovarian cancer Mother    • Stroke Father         CVA   • Melanoma Maternal Uncle       Social History     Socioeconomic History   • Marital status: Single     Spouse name: Not on file   • Number of children: Not on  file   • Years of education: Not on file   • Highest education level: Not on file   Occupational History   • Occupation: Retired   Tobacco Use   • Smoking status: Never   • Smokeless tobacco: Never   Vaping Use   • Vaping status: Never Used   Substance and Sexual Activity   • Alcohol use: Yes     Comment: rarely, social    • Drug use: No   • Sexual activity: Not on file   Other Topics Concern   • Not on file   Social History Narrative     per [employer]      Social Determinants of Health     Financial Resource Strain: Low Risk  (9/22/2023)    Overall Financial Resource Strain (CARDIA)    • Difficulty of Paying Living Expenses: Not hard at all   Food Insecurity: No Food Insecurity (4/14/2023)    Hunger Vital Sign    • Worried About Running Out of Food in the Last Year: Never true    • Ran Out of Food in the Last Year: Never true   Transportation Needs: No Transportation Needs (9/22/2023)    PRAPARE - Transportation    • Lack of Transportation (Medical): No    • Lack of Transportation (Non-Medical): No   Physical Activity: Not on file   Stress: Not on file   Social Connections: Not on file   Intimate Partner Violence: Not on file   Housing Stability: Unknown (4/14/2023)    Housing Stability Vital Sign    • Unable to Pay for Housing in the Last Year: No    • Number of Places Lived in the Last Year: Not on file    • Unstable Housing in the Last Year: No      Review of Systems   Constitutional:  Negative for chills and fever.   HENT:  Negative for trouble swallowing and voice change.    Eyes:  Negative for photophobia and visual disturbance.   Respiratory:  Negative for chest tightness and shortness of breath.    Cardiovascular:  Negative for chest pain and palpitations.   Gastrointestinal:  Negative for abdominal pain, diarrhea, nausea and vomiting.   Genitourinary:  Negative for dysuria and flank pain.   Musculoskeletal:  Negative for back pain and gait problem.   Skin:  Negative for rash and wound.  "  Allergic/Immunologic: Negative for environmental allergies and food allergies.   Neurological:  Negative for dizziness, weakness, light-headedness and numbness.        Objective:  Physical Exam  Vitals reviewed.   Constitutional:       Appearance: Normal appearance.   HENT:      Head: Normocephalic and atraumatic.   Cardiovascular:      Rate and Rhythm: Normal rate and regular rhythm.      Heart sounds: Normal heart sounds.   Pulmonary:      Effort: Pulmonary effort is normal. No respiratory distress.      Breath sounds: No stridor. No wheezing.   Abdominal:      General: Abdomen is flat. There is no distension.      Tenderness: There is no abdominal tenderness.   Musculoskeletal:      Cervical back: No tenderness.      Right lower leg: No edema.      Left lower leg: No edema.   Lymphadenopathy:      Cervical: No cervical adenopathy.   Skin:     General: Skin is warm and dry.   Neurological:      General: No focal deficit present.      Mental Status: She is alert and oriented to person, place, and time.   Psychiatric:         Mood and Affect: Mood normal.         Behavior: Behavior normal.         Thought Content: Thought content normal.         Judgment: Judgment normal.     /64 (BP Location: Left arm, Patient Position: Sitting, Cuff Size: Standard)   Pulse 59   Temp 98.1 °F (36.7 °C) (Temporal)   Resp 13   Ht 4' 9\" (1.448 m)   Wt 54.1 kg (119 lb 4.3 oz)   SpO2 99%   BMI 25.81 kg/m²    No Chest XR results available for this patient.   No CT Chest results available for this patient.  CT chest abdomen pelvis w contrast    Result Date: 11/29/2023  Narrative CT CHEST, ABDOMEN AND PELVIS WITH IV CONTRAST INDICATION:   C18.2: Malignant neoplasm of ascending colon. COMPARISON: CT abdomen pelvis 2/8/2023. TECHNIQUE: CT examination of the chest, abdomen and pelvis was performed. Multiplanar 2D reformatted images were created from the source data. This examination, like all CT scans performed in the [facility] " [facility], was performed utilizing techniques to minimize radiation dose exposure, including the use of iterative reconstruction and automated exposure control. Radiation dose length product (DLP) for this visit:  720.09 mGy-cm IV Contrast:  100 mL of iohexol (OMNIPAQUE) Enteric Contrast: Enteric contrast was administered. FINDINGS: CHEST LUNGS: 2.8 x 2.4 cm left upper lobe lung mass is identified abutting the fissure. 2 mm right apical pulmonary nodule is noted series 603 image 25.  There is no tracheal or endobronchial lesion. PLEURA:  Unremarkable. HEART/GREAT VESSELS: Heart is unremarkable for patient's age.  No thoracic aortic aneurysm. Large hiatal hernia is present. MEDIASTINUM AND RAISA:  Unremarkable. CHEST WALL AND LOWER NECK:  Unremarkable. ABDOMEN LIVER/BILIARY TREE: One or more subcentimeter sharply circumscribed low-density hepatic lesion(s) are noted, too small to accurately characterize, but statistically most likely to represent subcentimeter hepatic cysts.  No suspicious solid hepatic lesion  is identified.  Hepatic contours are normal.  No biliary dilatation. GALLBLADDER: There are gallstone(s) within the gallbladder, without pericholecystic inflammatory changes. SPLEEN:  Unremarkable. PANCREAS:  Unremarkable. ADRENAL GLANDS:  Unremarkable. KIDNEYS/URETERS: Stable complex 1.3 cm right interpolar renal cyst is noted. STOMACH AND BOWEL: Patient is status post interval partial colectomy. Colonic diverticulosis is present. APPENDIX:  No findings to suggest appendicitis. ABDOMINOPELVIC CAVITY:  No ascites.  No pneumoperitoneum.  No lymphadenopathy. VESSELS:  Unremarkable for patient's age. PELVIS REPRODUCTIVE ORGANS: Surgical changes of prior hysterectomy. URINARY BLADDER:  Unremarkable. ABDOMINAL WALL/INGUINAL REGIONS: Nonobstructing umbilical hernia is present. OSSEOUS STRUCTURES:  No acute fracture or destructive osseous lesion.     Impression Status post interval partial colectomy. 2.8 x 2.4  cm left upper lobe lung mass this could represent primary bronchogenic carcinoma or metastatic lesion. Large hiatal hernia. Cholelithiasis. Workstation performed: [id number]      No NM PET CT results available for this patient.   No Barium Swallow results available for this patient.

## 2024-02-06 ENCOUNTER — ANESTHESIA EVENT (OUTPATIENT)
Dept: PERIOP | Facility: HOSPITAL | Age: 89
End: 2024-02-06
Payer: MEDICARE

## 2024-02-06 NOTE — PRE-PROCEDURE INSTRUCTIONS
Pre-Surgery Instructions:   Medication Instructions    amLODIPine (NORVASC) 5 mg tablet Take Day of Surgery; unless usually taken at night     ASPIRIN 81 PO Avoid 1 week prior to surgery      atorvastatin (LIPITOR) 20 mg tablet Take Day of Surgery; unless usually taken at night     Biotin 1000 MCG tablet Avoid 1 week prior to surgery      Calcium 500 MG tablet Avoid 1 week prior to surgery      cholecalciferol (VITAMIN D3) 1,000 units tablet Avoid 1 week prior to surgery      dorzolamide-timolol (COSOPT) 22.3-6.8 MG/ML ophthalmic solution Take Day of Surgery; unless usually taken at night     latanoprost (XALATAN) 0.005 % ophthalmic solution Take Day of Surgery; unless usually taken at night     lisinopril (ZESTRIL) 10 mg tablet Do not take day of surgery; continue as prescribed excluding DOS     metoprolol tartrate (LOPRESSOR) 25 mg tablet Take Day of Surgery; unless usually taken at night     Multiple Vitamins-Minerals (CENTRUM SILVER ADULT 50+ PO) Avoid 1 week prior to surgery      Multiple Vitamins-Minerals (PRESERVISION AREDS PO) Avoid 1 week prior to surgery      Omega-3 Fatty Acids (FISH OIL) 1,000 mg Avoid 1 week prior to surgery      Synthroid 88 MCG tablet Take Day of Surgery; unless usually taken at night     Medication instructions for day surgery reviewed. Please use only a sip of water to take your instructed medications. Avoid all over the counter vitamins, supplements and NSAIDS for one week prior to surgery per anesthesia guidelines. Tylenol is ok to take as needed.     You will receive a call one business day prior to surgery with an arrival time and hospital directions. If your surgery is scheduled on a Monday, the hospital will be calling you on the Friday prior to your surgery. If you have not heard from anyone by 8pm, please call the hospital supervisor through the hospital  at [phone]. ([place] [phone]).    Do not eat or drink anything after midnight the night before your  surgery, including candy, mints, lifesavers, or chewing gum. Do not drink alcohol 24hrs before your surgery. Try not to smoke at least 24hrs before your surgery.       Follow the pre surgery showering instructions as listed in the “My Surgical Experience Booklet” or otherwise provided by your surgeon's office. Do not use a blade to shave the surgical area 1 week before surgery. It is okay to use a clean electric clippers up to 24 hours before surgery. Do not apply any lotions, creams, including makeup, cologne, deodorant, or perfumes after showering on the day of your surgery. Do not use dry shampoo, hair spray, hair gel, or any type of hair products.     No contact lenses, eye make-up, or artificial eyelashes. Remove nail polish, including gel polish, and any artificial, gel, or acrylic nails if possible. Remove all jewelry including rings and body piercing jewelry.     Wear causal clothing that is easy to take on and off. Consider your type of surgery.    Keep any valuables, jewelry, piercings at home. Please bring any specially ordered equipment (sling, braces) if indicated.    Arrange for a responsible person to drive you to and from the hospital on the day of your surgery. Visitor Guidelines discussed.     Call the surgeon's office with any new illnesses, exposures, or additional questions prior to surgery.    Please reference your “My Surgical Experience Booklet” for additional information to prepare for your upcoming surgery.

## 2024-02-08 ENCOUNTER — APPOINTMENT (OUTPATIENT)
Dept: LAB | Facility: HOSPITAL | Age: 89
End: 2024-02-08
Payer: MEDICARE

## 2024-02-08 ENCOUNTER — LAB REQUISITION (OUTPATIENT)
Dept: LAB | Facility: HOSPITAL | Age: 89
End: 2024-02-08
Payer: MEDICARE

## 2024-02-08 DIAGNOSIS — Z00.00 ENCOUNTER FOR GENERAL ADULT MEDICAL EXAMINATION WITHOUT ABNORMAL FINDINGS: ICD-10-CM

## 2024-02-08 DIAGNOSIS — Z00.00 EXAMINATION: ICD-10-CM

## 2024-02-08 PROCEDURE — 86850 RBC ANTIBODY SCREEN: CPT | Performed by: SURGERY

## 2024-02-08 PROCEDURE — 86901 BLOOD TYPING SEROLOGIC RH(D): CPT | Performed by: SURGERY

## 2024-02-08 PROCEDURE — 36415 COLL VENOUS BLD VENIPUNCTURE: CPT

## 2024-02-08 PROCEDURE — 86900 BLOOD TYPING SEROLOGIC ABO: CPT | Performed by: SURGERY

## 2024-02-12 ENCOUNTER — ANESTHESIA (OUTPATIENT)
Dept: PERIOP | Facility: HOSPITAL | Age: 89
End: 2024-02-12
Payer: MEDICARE

## 2024-02-12 ENCOUNTER — HOSPITAL ENCOUNTER (OUTPATIENT)
Facility: HOSPITAL | Age: 89
Setting detail: OUTPATIENT SURGERY
Discharge: HOME | End: 2024-02-12
Attending: SURGERY | Admitting: SURGERY
Payer: MEDICARE

## 2024-02-12 VITALS
RESPIRATION RATE: 13 BRPM | WEIGHT: 118 LBS | BODY MASS INDEX: 25.46 KG/M2 | SYSTOLIC BLOOD PRESSURE: 147 MMHG | TEMPERATURE: 96.8 F | HEIGHT: 57 IN | HEART RATE: 60 BPM | OXYGEN SATURATION: 96 % | DIASTOLIC BLOOD PRESSURE: 69 MMHG

## 2024-02-12 DIAGNOSIS — C34.12 CANCER OF UPPER LOBE OF LEFT LUNG (HCC): ICD-10-CM

## 2024-02-12 PROBLEM — Z90.49 S/P RIGHT HEMICOLECTOMY: Status: ACTIVE | Noted: 2022-04-13

## 2024-02-12 PROCEDURE — 88173 CYTOPATH EVAL FNA REPORT: CPT | Performed by: PATHOLOGY

## 2024-02-12 PROCEDURE — 31652 BRONCH EBUS SAMPLNG 1/2 NODE: CPT | Performed by: SURGERY

## 2024-02-12 PROCEDURE — 88172 CYTP DX EVAL FNA 1ST EA SITE: CPT | Performed by: PATHOLOGY

## 2024-02-12 RX ORDER — ONDANSETRON 2 MG/ML
4 INJECTION INTRAMUSCULAR; INTRAVENOUS ONCE AS NEEDED
Status: DISCONTINUED | OUTPATIENT
Start: 2024-02-12 | End: 2024-02-12 | Stop reason: HOSPADM

## 2024-02-12 RX ORDER — ROCURONIUM BROMIDE 10 MG/ML
INJECTION, SOLUTION INTRAVENOUS AS NEEDED
Status: DISCONTINUED | OUTPATIENT
Start: 2024-02-12 | End: 2024-02-12

## 2024-02-12 RX ORDER — SODIUM CHLORIDE, SODIUM LACTATE, POTASSIUM CHLORIDE, CALCIUM CHLORIDE 600; 310; 30; 20 MG/100ML; MG/100ML; MG/100ML; MG/100ML
INJECTION, SOLUTION INTRAVENOUS CONTINUOUS PRN
Status: DISCONTINUED | OUTPATIENT
Start: 2024-02-12 | End: 2024-02-12

## 2024-02-12 RX ORDER — PROPOFOL 10 MG/ML
INJECTION, EMULSION INTRAVENOUS AS NEEDED
Status: DISCONTINUED | OUTPATIENT
Start: 2024-02-12 | End: 2024-02-12

## 2024-02-12 RX ORDER — FENTANYL CITRATE/PF 50 MCG/ML
25 SYRINGE (ML) INJECTION
Status: DISCONTINUED | OUTPATIENT
Start: 2024-02-12 | End: 2024-02-12 | Stop reason: HOSPADM

## 2024-02-12 RX ORDER — HYDRALAZINE HYDROCHLORIDE 20 MG/ML
5 INJECTION INTRAMUSCULAR; INTRAVENOUS
Status: DISCONTINUED | OUTPATIENT
Start: 2024-02-12 | End: 2024-02-12 | Stop reason: HOSPADM

## 2024-02-12 RX ORDER — ONDANSETRON 2 MG/ML
INJECTION INTRAMUSCULAR; INTRAVENOUS AS NEEDED
Status: DISCONTINUED | OUTPATIENT
Start: 2024-02-12 | End: 2024-02-12

## 2024-02-12 RX ORDER — PROPOFOL 10 MG/ML
INJECTION, EMULSION INTRAVENOUS CONTINUOUS PRN
Status: DISCONTINUED | OUTPATIENT
Start: 2024-02-12 | End: 2024-02-12

## 2024-02-12 RX ORDER — FENTANYL CITRATE 50 UG/ML
INJECTION, SOLUTION INTRAMUSCULAR; INTRAVENOUS AS NEEDED
Status: DISCONTINUED | OUTPATIENT
Start: 2024-02-12 | End: 2024-02-12

## 2024-02-12 RX ORDER — LIDOCAINE HYDROCHLORIDE 10 MG/ML
INJECTION, SOLUTION EPIDURAL; INFILTRATION; INTRACAUDAL; PERINEURAL AS NEEDED
Status: DISCONTINUED | OUTPATIENT
Start: 2024-02-12 | End: 2024-02-12

## 2024-02-12 RX ADMIN — FENTANYL CITRATE 50 MCG: 50 INJECTION INTRAMUSCULAR; INTRAVENOUS at 07:58

## 2024-02-12 RX ADMIN — HYDRALAZINE HYDROCHLORIDE 5 MG: 20 INJECTION, SOLUTION INTRAMUSCULAR; INTRAVENOUS at 09:33

## 2024-02-12 RX ADMIN — SODIUM CHLORIDE, SODIUM LACTATE, POTASSIUM CHLORIDE, AND CALCIUM CHLORIDE: .6; .31; .03; .02 INJECTION, SOLUTION INTRAVENOUS at 07:20

## 2024-02-12 RX ADMIN — ROCURONIUM BROMIDE 50 MG: 10 INJECTION, SOLUTION INTRAVENOUS at 07:58

## 2024-02-12 RX ADMIN — ONDANSETRON 4 MG: 2 INJECTION INTRAMUSCULAR; INTRAVENOUS at 07:58

## 2024-02-12 RX ADMIN — PROPOFOL 80 MCG/KG/MIN: 10 INJECTION, EMULSION INTRAVENOUS at 08:02

## 2024-02-12 RX ADMIN — PROPOFOL 100 MG: 10 INJECTION, EMULSION INTRAVENOUS at 07:58

## 2024-02-12 RX ADMIN — LIDOCAINE HYDROCHLORIDE 50 MG: 10 INJECTION, SOLUTION EPIDURAL; INFILTRATION; INTRACAUDAL; PERINEURAL at 07:58

## 2024-02-12 NOTE — OP NOTE
OPERATIVE REPORT  PATIENT NAME: Marizol Jiménez    :  10/4/1929  MRN: [id number]  Pt Location: [facility] OR ROOM 08    SURGERY DATE: 2024    Surgeons and Role:     * Miguel Richmond,  - Primary    Preop Diagnosis:  Cancer of upper lobe of left lung (HCC) [C34.12]    Post-Op Diagnosis Codes:     * Cancer of upper lobe of left lung (HCC) [C34.12]    Procedure(s):  ENDOBRONCHIAL ULTRASOUND (EBUS) with biopsies  BRONCHOSCOPY FLEXIBLE    Specimen(s):  ID Type Source Tests Collected by Time Destination   1 : level 4R FNA Lymph Node FINE NEEDLE ASPIRATION/BIOPSY Miguelrylee Bryant Basilio, DO 2024 0819    2 : level 7 FNA Lymph Node FINE NEEDLE ASPIRATION/BIOPSY Miguel Richmond, DO 2024 0832        Estimated Blood Loss:   Minimal    Drains:  * No LDAs found *    Anesthesia Type:   General    Operative Indications:  Cancer of upper lobe of left lung (HCC) [C34.12]      Operative Findings:  Prelim:  4R adequate and negative. This was the mildly PET avid LN.   7 is adequate and negative.     Complications:   None    Procedure and Technique:    The patient was brought to the operating room and placed in the supine position. After institution of general anesthesia utilizing a single lumen ETT the fiberoptic bronchoscope was inserted.  The trachea appears normal. The chencho is sharp. The airways are normal to the subsegmental level bilaterally. There are no endobronchial lesions and secretions are scant.  The standard bronchoscope was then exchanged for an endobronchial ultrasound scope and an endobronchial ultrasound exam is performed.  Using real-time ultrasound guidance, biopsies were performed of levels 4R and 7. There was no 4L and only another <3mm more lateral 4R.  Biopsies were performed both with and without suction and some specimen was placed on slides for rapid on-site evaluation.  At least 2 punctures of each kleber station were performed and specimen was placed in Cytolyte for later cell block  analysis.  At the completion of the endobronchial ultrasound, a standard bronchoscope was reinserted and the airways were suctioned clear.  There was no sign of ongoing bleeding.  The bronchoscope was removed, the patient was extubated, and brought to the recovery unit in stable condition having tolerated the procedure well.  Sponge and instrument counts were correct.       I was present for the entire procedure.    Patient Disposition:  PACU         SIGNATURE: Miguel Richmond DO  DATE: February 12, 2024  TIME: 8:55 AM

## 2024-02-12 NOTE — ANESTHESIA PREPROCEDURE EVALUATION
Procedure:  ENDOBRONCHIAL ULTRASOUND (EBUS) with biopsies ([place])  BRONCHOSCOPY FLEXIBLE ([place])    Relevant Problems   ANESTHESIA (within normal limits)      CARDIO   (+) Hypertension   (+) Peripheral arterial disease (HCC)      ENDO   (+) Hyperparathyroidism (HCC)   (+) Hypothyroidism      GI/HEPATIC   (+) Esophageal reflux   (+) Primary adenocarcinoma of ascending colon (HCC)      /RENAL   (+) Complex renal cyst   (+) Stage 3 chronic kidney disease, unspecified whether stage 3a or 3b CKD (HCC)      HEMATOLOGY   (+) Anemia   (+) Iron deficiency anemia      Respiratory   (+) Squamous cell carcinoma of left lung (HCC)      Other   (+) S/P right hemicolectomy   (+) Status post parathyroidectomy (HCC)       TTE report reviewed, EF 55-60%      Physical Exam    Airway    Mallampati score: III  TM Distance: >3 FB  Neck ROM: full     Dental    upper dentures and lower dentures,     Cardiovascular      Pulmonary      Other Findings  post-pubertal.      Anesthesia Plan  ASA Score- 3     Anesthesia Type- general with ASA Monitors.         Additional Monitors:     Airway Plan:            Plan Factors-Exercise tolerance (METS): >4 METS.    Chart reviewed. EKG reviewed.  Existing labs reviewed. Patient summary reviewed.    Patient is not a current smoker.              Induction- intravenous.    Postoperative Plan- Plan for postoperative opioid use.     Informed Consent- Anesthetic plan and risks discussed with patient.  I personally reviewed this patient with the CRNA. Discussed and agreed on the Anesthesia Plan with the CRNA..

## 2024-02-12 NOTE — ANESTHESIA POSTPROCEDURE EVALUATION
Post-Op Assessment Note    CV Status:  Stable  Pain Score: 0    Pain management: adequate       Mental Status:  Awake   Hydration Status:  Stable   PONV Controlled:  None   Airway Patency:  Patent and fixed obstruction     Post Op Vitals Reviewed: Yes    No anethesia notable event occurred.    Staff: Anesthesiologist, CRNA               BP   161/59   Temp      Pulse  64   Resp   20   SpO2   96

## 2024-02-12 NOTE — INTERVAL H&P NOTE
H&P reviewed. After examining the patient I find no changes in the patients condition since the H&P had been written.    Vitals:    02/12/24 0653   BP: 160/68   Pulse: 58   Resp: 17   Temp: 98.7 °F (37.1 °C)   SpO2: 99%     EBUS today. Ok with an ETT.    Miguel Richmond, DO  Thoracic Surgery

## 2024-02-14 PROCEDURE — 88173 CYTOPATH EVAL FNA REPORT: CPT | Performed by: PATHOLOGY

## 2024-02-14 PROCEDURE — 88172 CYTP DX EVAL FNA 1ST EA SITE: CPT | Performed by: PATHOLOGY

## 2024-02-26 ENCOUNTER — OFFICE VISIT (OUTPATIENT)
Age: 89
End: 2024-02-26
Payer: MEDICARE

## 2024-02-26 ENCOUNTER — RADIATION ONCOLOGY FOLLOW-UP (OUTPATIENT)
Facility: HOSPITAL | Age: 89
End: 2024-02-26
Payer: MEDICARE

## 2024-02-26 VITALS
DIASTOLIC BLOOD PRESSURE: 60 MMHG | WEIGHT: 121 LBS | HEIGHT: 57 IN | OXYGEN SATURATION: 98 % | HEART RATE: 62 BPM | RESPIRATION RATE: 18 BRPM | TEMPERATURE: 97.3 F | BODY MASS INDEX: 26.1 KG/M2 | SYSTOLIC BLOOD PRESSURE: 144 MMHG

## 2024-02-26 VITALS
OXYGEN SATURATION: 98 % | HEIGHT: 57 IN | SYSTOLIC BLOOD PRESSURE: 165 MMHG | WEIGHT: 121 LBS | TEMPERATURE: 97.7 F | BODY MASS INDEX: 26.1 KG/M2 | HEART RATE: 58 BPM | RESPIRATION RATE: 14 BRPM | DIASTOLIC BLOOD PRESSURE: 65 MMHG

## 2024-02-26 DIAGNOSIS — C18.2 PRIMARY ADENOCARCINOMA OF ASCENDING COLON (HCC): ICD-10-CM

## 2024-02-26 DIAGNOSIS — C34.92 SQUAMOUS CELL CARCINOMA OF LEFT LUNG (HCC): Primary | ICD-10-CM

## 2024-02-26 PROCEDURE — 99211 OFF/OP EST MAY X REQ PHY/QHP: CPT | Performed by: RADIOLOGY

## 2024-02-26 PROCEDURE — 99213 OFFICE O/P EST LOW 20 MIN: CPT | Performed by: INTERNAL MEDICINE

## 2024-02-26 PROCEDURE — 99213 OFFICE O/P EST LOW 20 MIN: CPT | Performed by: RADIOLOGY

## 2024-02-26 NOTE — PROGRESS NOTES
Follow-up - Radiation Oncology   Marizol Jiménez 10/4/1929 89 y.o. female [id number]      History of Present Illness   Cancer Staging   Primary adenocarcinoma of ascending colon (HCC)  Staging form: Colon and Rectum, AJCC 8th Edition  - Pathologic stage from 4/13/2023: Stage IIA (pT3, pN0, cM0) - Signed by Efrem Valdes MD on 1/22/2024  Stage prefix: Initial diagnosis  Total positive nodes: 0    Squamous cell carcinoma of left lung (HCC)  Staging form: Lung, AJCC 8th Edition  - Clinical stage from 1/22/2024: Stage IA3 (cT1c, cN0, cM0) - Signed by Efrem Valdes MD on 1/22/2024  Stage prefix: Initial diagnosis      Marizol Jiménez is a 89 y.o. female with a history of stage IIA pT3N0 colon adenocarcinoma status post right hemicolectomy who presented for evaluation of a left upper lobe lung nodule detected on postoperative CT chest/abdomen/pelvis.       CT chest/abdomen and pelvis on 11/22/23 demonstrated a 2.8 x 2.4 cm left upper lung mass with large hiatal hernia and changes related to interval partial colectomy.       She was referred to thoracic surgery and PET/CT with IR-guided biopsy was recommended.     PET/CT on 12/26/23 demonstrated a FDG avid left upper lobe mass measuring 2.8 cm with SUV 7.7.  There was no suspicious focal hilar activity.  There was mild FDG uptake in a precarcinal lymph node on the right, SUV 2.5, measuring 8 mm in short axis.  There were no additional findings suggestive of hypermetabolic malignancy in the abdomen/pelvis.     IR-guided biopsy was performed on 1/4/24 and pathology was consistent with non-small cell carcinoma with squamous differentiation.    On 2/12/24, she underwent EBUS and bronchoscopy.  Biopsies were performed of levels 4R and 7.  Pathology was negative for malignancy at both sites.     She presents for consideration of SBRT.    Upon interview, she feels well. She is without acute concerns.    Historical Information   Oncology History   Primary adenocarcinoma of  ascending colon (HCC)   3/7/2023 Initial Diagnosis    Primary adenocarcinoma of ascending colon (HCC)     4/13/2023 -  Cancer Staged    Staging form: Colon and Rectum, AJCC 8th Edition  - Pathologic stage from 4/13/2023: Stage IIA (pT3, pN0, cM0) - Signed by Efrem Valdes MD on 1/22/2024  Stage prefix: Initial diagnosis  Total positive nodes: 0       Squamous cell carcinoma of left lung (HCC)   1/4/2024 Initial Diagnosis    Non-small cell carcinoma of left lung (HCC)     1/4/2024 Biopsy    IR lung biopsy    A.  Lung, left upper lobe, biopsy:  Non-small cell carcinoma with squamous differentiation.      1/22/2024 -  Cancer Staged    Staging form: Lung, AJCC 8th Edition  - Clinical stage from 1/22/2024: Stage IA3 (cT1c, cN0, cM0) - Signed by Efrem Valdes MD on 1/22/2024  Stage prefix: Initial diagnosis           Past Medical History:   Diagnosis Date    BCC (basal cell carcinoma)     Diastolic dysfunction     Dyslipidemia     Dysphagia     Glaucoma     Hashimoto's thyroiditis     Hyperparathyroidism (HCC)     Hypertension     Impaired fasting glucose     Insomnia     Lung cancer (HCC)     Osteopenia     S/P right hemicolectomy 04/13/2022    SCCA (squamous cell carcinoma) of skin      Past Surgical History:   Procedure Laterality Date    CATARACT EXTRACTION, BILATERAL      COLONOSCOPY      Complete, Onset - 10/25/05 - 10 years     ENDOBRONCHIAL ULTRASOUND (EBUS) N/A 2/12/2024    Procedure: ENDOBRONCHIAL ULTRASOUND (EBUS) with biopsies;  Surgeon: Miguel Richmond DO;  [facility]: [facility];  Service: Thoracic    HYSTERECTOMY      IR BIOPSY LUNG  1/4/2024    PARATHYROIDECTOMY      1st 1985 and 2nd 2002    [facility] INCL FLUOR GDNCE DX W/CELL WASHG SPX N/A 2/12/2024    Procedure: BRONCHOSCOPY FLEXIBLE;  Surgeon: Miguel Richmond DO;  [facility];  Service: Thoracic    MI LAPAROSCOPY COLECTOMY PARTIAL W/ANASTOMOSIS N/A 4/13/2023    Procedure: RESECTION COLON RIGHT LAPAROSCOPIC;  Surgeon:  Manny Reid MD;  Location: [facility];  Service: Colorectal    PA [facility] WRST SURG W/RLS TRANSVRS CARPL LIGM Right 05/02/2016    Procedure: RELEASE CARPAL TUNNEL ENDOSCOPIC;  Surgeon: Alexandre Diaz MD;  Location: [facility];  Service: Orthopedics    TONSILLECTOMY         Social History   Social History     Substance and Sexual Activity   Alcohol Use Yes    Comment: rarely, social      Social History     Substance and Sexual Activity   Drug Use No     Social History     Tobacco Use   Smoking Status Never   Smokeless Tobacco Never         Meds/Allergies     Current Outpatient Medications:     amLODIPine (NORVASC) 5 mg tablet, TAKE 1 TABLET (5 MG TOTAL) BY MOUTH DAILY., Disp: 90 tablet, Rfl: 1    ASPIRIN 81 PO, Take by mouth in the morning, Disp: , Rfl:     atorvastatin (LIPITOR) 20 mg tablet, Take 20 mg by mouth daily, Disp: , Rfl:     Biotin 1000 MCG tablet, Take 5,000 mcg by mouth daily, Disp: , Rfl:     Calcium 500 MG tablet, Take 1 tablet by mouth 3 (three) times a week , Disp: , Rfl:     cholecalciferol (VITAMIN D3) 1,000 units tablet, Take 1 tablet by mouth daily, Disp: , Rfl:     dorzolamide-timolol (COSOPT) 22.3-6.8 MG/ML ophthalmic solution, Administer 1 drop to both eyes 2 (two) times a day, Disp: , Rfl:     lisinopril (ZESTRIL) 10 mg tablet, TAKE 1 TABLET BY MOUTH EVERY DAY, Disp: 90 tablet, Rfl: 1    metoprolol tartrate (LOPRESSOR) 25 mg tablet, TAKE 1 TABLET ORALLY TWICE DAILY, Disp: 180 tablet, Rfl: 2    Multiple Vitamins-Minerals (CENTRUM SILVER ADULT 50+ PO), Take by mouth., Disp: , Rfl:     Multiple Vitamins-Minerals (PRESERVISION AREDS PO), Take 1 tablet by mouth 2 (two) times a day, Disp: , Rfl:     Omega-3 Fatty Acids (FISH OIL) 1,000 mg, Take 1,000 mg by mouth daily., Disp: , Rfl:     Synthroid 88 MCG tablet, Take 1 tablet (88 mcg total) by mouth daily, Disp: 90 tablet, Rfl: 3    ferrous sulfate 324 (65 Fe) mg, TAKE 1 TABLET BY MOUTH EVERY DAY BEFORE BREAKFAST (Patient not taking:  "Reported on 11/14/2023), Disp: 90 tablet, Rfl: 1    latanoprost (XALATAN) 0.005 % ophthalmic solution, Administer 1 drop into the left eye daily at bedtime (Patient not taking: Reported on 2/26/2024), Disp: , Rfl:   Allergies   Allergen Reactions    Cyclogyl [Cyclopentolate Hcl]     Phenylephrine Other (See Comments)     Eye gtts red eye infected sore    Pred Forte [Prednisolone Acetate]          Review of Systems  Constitutional: Negative.    HENT: Negative.     Eyes: Negative.    Respiratory: Negative.     Cardiovascular:  Positive for leg swelling (chronic, LLE).        Left leg swelling due to bakers cyst   Gastrointestinal: Negative.    Endocrine: Negative.    Genitourinary: Negative.    Musculoskeletal: Negative.    Skin: Negative.    Allergic/Immunologic: Negative.    Neurological: Negative.    Hematological:  Bruises/bleeds easily.        Taking aspirin   Psychiatric/Behavioral:  Positive for sleep disturbance (occasional).        OBJECTIVE:   /60 (BP Location: Left arm, Patient Position: Sitting)   Pulse 62   Temp (!) 97.3 °F (36.3 °C) (Temporal)   Resp 18   Ht 4' 9\" (1.448 m)   Wt 54.9 kg (121 lb)   SpO2 98%   BMI 26.18 kg/m²   Pain Assessment:  0  Karnofsky: 90 - Able to carry on normal activity; minor signs or symptoms of disease     Physical Exam   HENT:      Head: Normocephalic and atraumatic.   Eyes:      General: No scleral icterus.     Extraocular Movements: Extraocular movements intact.   Cardiovascular:      Rate and Rhythm: Normal rate.   Pulmonary:      Effort: Pulmonary effort is normal.   Abdominal:      General: Abdomen is flat. There is no distension.   Musculoskeletal:         General: Normal range of motion.      Cervical back: Normal range of motion.   Skin:     General: Skin is warm and dry.   Neurological:      General: No focal deficit present.      Mental Status: She is alert.   Psychiatric:         Mood and Affect: Mood normal.     RESULTS    Lab Results:   Recent " Results (from the past 672 hour(s))   Type and screen    Collection Time: 02/08/24 10:32 AM   Result Value Ref Range    ABO Grouping A     Rh Factor Positive     Antibody Screen Negative     Specimen Expiration Date 20240307    Fine Needle Aspiration/Biopsy    Collection Time: 02/12/24  8:19 AM   Result Value Ref Range    Case Report       Non-gynecologic Cytology                          Case: [id number]                                  Authorizing Provider:  Miguel Richmond DO Collected:           02/12/2024 0819              Ordering Location:     [facility]      Received:            02/12/2024 0906                                     Hospital Operating Room                                                      Pathologist:           Mohamud Stubbs MD                                                    Specimens:   A) - Lymph Node, 4R                                                                                 B) - Lymph Node, 4R                                                                                 C) - Lymph Node, L7                                                                                 D) - Lymph Node, L7                                                                        Final Diagnosis       A.B. Lymph Node, 4R ( ThinPrep and smear preparations ):  Negative for malignancy.  Lymphocytes, macrophages and benign bronchial cells present.    Satisfactory for evaluation.       C.D. Lymph Node, L7 ( ThinPrep and smear preparations ):  Negative for malignancy.  Lymphocytes, macrophages and benign bronchial cells present.    Satisfactory for evaluation.      Intraoperative Consultation       B.  Lymphoid cells, polymorphous population. Macrophages and scant bronchial epithelial cells. Negative for carcinoma. Dr. Menjivar 2/12/24 8:20 AM Interpretation performed at [facility], [address] [facility] [address]    D.  Polymorphous population of lymphoid cells,  histiocytes and scant bronchial epithelial cells. Dr. Stubbs 2/12/24 8:30 AM Interpretation performed at [facility], [address] [place] [address]        Gross Description       A.  20ml, light pink, slightly cloudy, received in CytoLyt     B.  4 slides received (2 Diff Quik, 2 alcohol fixed).     C.  20ml, pink, cloudy, received in CytoLyt     D.  4 slides received (2 Diff Quik, 2 alcohol fixed).         Additional Information       Powered's FDA approved ,  and ThinPrep Imaging Duo System are utilized with strict adherence to the 's instruction manual to prepare gynecologic and non-gynecologic cytology specimens for the production of ThinPrep slides as well as for gynecologic ThinPrep imaging. These processes have been validated by our laboratory and/or by the .    These tests were developed and their performance characteristics determined by [facility] or [email or web address]. They may not be cleared or approved by the U.S. Food and Drug Administration. The FDA has determined that such clearance or approval is not necessary. These tests are used for clinical purposes. They should not be regarded as investigational or for research. This laboratory has been approved by CLIA 88, designated as a high-complexity laboratory and is qualified to perform these tests.    Interpretation performed at [facility], [address] [place] [address]         Imaging Studies:No results found.        Assessment/Plan:  No orders of the defined types were placed in this encounter.       Marizol Jiménez is a 89 y.o. female with a history of stage IIA pT3N0 colon adenocarcinoma status post right hemicolectomy who presented for evaluation of a left upper lobe lung nodule detected on postoperative CT chest/abdomen/pelvis.   She has stage IA3 [id number] squamous cell carcinoma of the left upper lobe of the lung.     I again reviewed that surgical resection with  "lobectomy and mediastinal staging remains the standard of care treatment for early stage NSCLC.  She is not an optimal surgical candidate.  In medically inoperable patients or those who refuse surgery, SBRT is a reasonable treatment strategy which is offered with curative intent. This can establish local control in the majority of cases; however, regional or distant progression may occur.  Imaging and clinical characteristics has been reviewed in a multidisciplinary fashion with input from thoracic surgery/medical oncology and the recommendation is to proceed with SBRT.     I discussed the logistics of radiotherapy including an anticipated 8 fraction course over ~ 2 weeks.  I also described the CT simulation process, tattoo marking and respiratory motion management.       I reviewed the risks of radiotherapy to the lung.  These include but are not limited to fatigue, distant failure, acute skin reaction, chest wall pain, radiation esophagitis/stricture, radiation pneumonitis/fibrosis requiring steroids or hospitalization, airway stenosis or stricture, pulmonary hemorrhage and increased risk of secondary malignancy.     After considering the above, she elected to proceed with treatment and informed consent was obtained today.    She will return for CT simulation next week and treatment will begin shortly thereafter.    All questions were answered to her satisfaction.    Thank you for involving me in Ms. Jiménez's care.    Efrem Valdes MD  2/26/2024,9:43 AM    Portions of the record may have been created with voice recognition software.  Occasional wrong word or \"sound a like\" substitutions may have occurred due to the inherent limitations of voice recognition software.  Read the chart carefully and recognize, using context, where substitutions have occurred.        "

## 2024-02-26 NOTE — PROGRESS NOTES
[facility]EMATOLOGY ONCOLOGY SPECIALISTS [facility]  [address]  [facility] [address]  [facility] [id number] [phone]     Date of Visit: 2/26/2024  Name: Marizol Jiménez   YOB: 1929         Assessment/Plan  This is a 89-year-old lady with a past medical history of stage IIa ascending colon adenocarcinoma, s/p right colectomy in April 2023, on surveillance without adjuvant chemotherapy.  Now diagnosed to have a new left upper lobe, squamous cell carcinoma- [id number], stage IA. Discussed results from recent bronchoscopy. Proceed with SBRT as planned by Dr. Valdes. No role for systemic therapy. She may continue to follow up with Rad onc and pulmonary for surveillance. She already follows with Dr. Reid for her colon cancer.    Return if symptoms worsen or fail to improve.     All the patient's questions were answered to their apparent satisfaction.  Patient has been strongly urged to call with any questions or concerns.     Total time spent reviewing EMR, seeing patient in clinic visit, documenting visit, placing treatment orders, and communicating with other medical providers: 15 mins        Oncology History Overview Note   with a history of stage IIA pT3N0 colon adenocarcinoma status post right hemicolectomy who presented for evaluation of a left upper lobe lung nodule detected on postoperative CT chest/abdomen/pelvis.       CT chest/abdomen and pelvis on 11/22/23 demonstrated a 2.8 x 2.4 cm left upper lung mass with large hiatal hernia and changes related to interval partial colectomy.       She was referred to thoracic surgery and PET/CT with IR-guided biopsy was recommended.     PET/CT on 12/26/23 demonstrated a FDG avid left upper lobe mass measuring 2.8 cm with SUV 7.7.  There was no suspicious focal hilar activity.  There was mild FDG uptake in a precarcinal lymph node on the right, SUV 2.5, measuring 8 mm in short axis.  There were no additional findings suggestive of  hypermetabolic malignancy in the abdomen/pelvis.     IR-guided biopsy was performed on 24 and pathology was consistent with non-small cell carcinoma with squamous differentiation.    She was last seen on 24 for radiation consult. Recommendation was made for mediastinal staging with EBUS/TBNA to determine if there is N3 involvement, which would alter prognosis, need for further staging studies, radiotherapy treatment fields and fractionation. She returns today s/p EBUS with biopsies on 24 by Dr Richmond.     24 EBUS with biopsies  Operative Findings:  Prelim:  4R adequate and negative. This was the mildly PET avid LN.   7 is adequate and negative.     Pathology:   A.B. Lymph Node, 4R ( ThinPrep and smear preparations ):  Negative for malignancy.  Lymphocytes, macrophages and benign bronchial cells present.   Satisfactory for evaluation.     C.D. Lymph Node, L7 ( ThinPrep and smear preparations ):  Negative for malignancy.  Lymphocytes, macrophages and benign bronchial cells present.   Satisfactory for evaluation.        Upcomin24 Hem Onc, Dr Martin  24 Colorectal surgery, Dr Reid           Primary adenocarcinoma of ascending colon (HCC)   3/7/2023 Initial Diagnosis    Primary adenocarcinoma of ascending colon (HCC)     2023 -  Cancer Staged    Staging form: Colon and Rectum, AJCC 8th Edition  - Pathologic stage from 2023: Stage IIA (pT3, pN0, cM0) - Signed by Efrem Valdes MD on 2024  Stage prefix: Initial diagnosis  Total positive nodes: 0       Squamous cell carcinoma of left lung (HCC)   2024 Initial Diagnosis    Non-small cell carcinoma of left lung (HCC)     2024 Biopsy    IR lung biopsy    A.  Lung, left upper lobe, biopsy:  Non-small cell carcinoma with squamous differentiation.      2024 -  Cancer Staged    Staging form: Lung, AJCC 8th Edition  - Clinical stage from 2024: Stage IA3 (cT1c, cN0, cM0) - Signed by Efrem Valdes MD on  1/22/2024  Stage prefix: Initial diagnosis          Cancer Staging   Non-small cell carcinoma of left lung (HCC)  Staging form: Lung, AJCC 8th Edition  - Clinical stage from 1/22/2024: Stage IA3 (cT1c, cN0, cM0) - Signed by Efrem Valdes MD on 1/22/2024    Primary adenocarcinoma of ascending colon (HCC)  Staging form: Colon and Rectum, AJCC 8th Edition  - Pathologic stage from 4/13/2023: Stage IIA (pT3, pN0, cM0) - Signed by Efrem Valdes MD on 1/22/2024     Treatment Details   Treatment goal [No plan goal]   Plan Name FERUMOXYTOL (FERAHEME) DAYS 1 AND 8   Status Inactive   Start Date 3/20/2023   End Date    Provider JAN Morales   Chemotherapy [No matching medication found in this treatment plan]     Treatment Details   Treatment goal [No plan goal]   Plan Name FERUMOXYTOL (FERAHEME) DAYS 1 AND 8   Status Inactive   Start Date 3/23/2023   End Date 3/29/2023   Provider JAN Morales   Chemotherapy ferumoxytol (FERAHEME), 510 mg, Intravenous, Once, 1 of 1 cycle  Administration: 510 mg (3/23/2023), 510 mg (3/29/2023)          HISTORY OF PRESENT ILLNESS:   Marizol Jiménez is a 89 y.o. female  who has a referred for evaluation of a lung mass.  Patient has a history of primary adenocarcinoma of the ascending colon, s/p right hemicolectomy in April 2023- pT3 pN0c M0, stage IIa, negative margins.  No adjuvant therapy.    She had a CT scan of the chest, abdomen and pelvis on November 22, 2023 which showed a 2.8 x 2.4 cm left upper lobe lung mass.  A 2 mm right apical pulmonary nodule.  No lymphadenopathy.  No evidence of distant metastatic disease.    A PET/CT on December 26 showed FDG avidity in the left upper lobe mass, SUV 7.7.  Mild FDG uptake in the precarinal lymph nodes on the right with an SUV of 2.5, measuring 8 mm in short axis.  Otherwise, no evidence of distant metastatic disease was found.    On January 4, 2024 she underwent a CT-guided lung biopsy which was consistent with squamous cell  carcinoma.  Caris was sent and is pending.    Patient was seen by Dr. Valdes on January 22, 2024, who recommended mediastinal staging, given the right precarinal lymph node.  If this is stage Ia disease, he plans to offer SBRT.      She denies any history of smoking, but has had significant second hand smoke exposures all her life.    She underwent EBUS and mediastinal staging on 2/12/24 and lymph node sampling from stations 4R and 7 were negative for malignancy. She is here today for a follow up visit.    Subjective  She feels well overall and denies any complaints    REVIEW OF SYSTEMS:  14 point ROS is negative      Current Outpatient Medications:     amLODIPine (NORVASC) 5 mg tablet, TAKE 1 TABLET (5 MG TOTAL) BY MOUTH DAILY., Disp: 90 tablet, Rfl: 1    ASPIRIN 81 PO, Take by mouth, Disp: , Rfl:     atorvastatin (LIPITOR) 20 mg tablet, Take 20 mg by mouth daily, Disp: , Rfl:     Biotin 1000 MCG tablet, Take 5,000 mcg by mouth daily, Disp: , Rfl:     Calcium 500 MG tablet, Take 1 tablet by mouth 3 (three) times a week , Disp: , Rfl:     cholecalciferol (VITAMIN D3) 1,000 units tablet, Take 1 tablet by mouth daily, Disp: , Rfl:     dorzolamide-timolol (COSOPT) 22.3-6.8 MG/ML ophthalmic solution, Administer 1 drop to both eyes 2 (two) times a day, Disp: , Rfl:     latanoprost (XALATAN) 0.005 % ophthalmic solution, Administer 1 drop into the left eye daily at bedtime, Disp: , Rfl:     lisinopril (ZESTRIL) 10 mg tablet, TAKE 1 TABLET BY MOUTH EVERY DAY, Disp: 90 tablet, Rfl: 1    metoprolol tartrate (LOPRESSOR) 25 mg tablet, TAKE 1 TABLET ORALLY TWICE DAILY, Disp: 180 tablet, Rfl: 2    Multiple Vitamins-Minerals (CENTRUM SILVER ADULT 50+ PO), Take by mouth., Disp: , Rfl:     Multiple Vitamins-Minerals (PRESERVISION AREDS PO), Take 1 tablet by mouth 2 (two) times a day, Disp: , Rfl:     Omega-3 Fatty Acids (FISH OIL) 1,000 mg, Take 1,000 mg by mouth daily., Disp: , Rfl:     Synthroid 88 MCG tablet, Take 1 tablet (88  mcg total) by mouth daily, Disp: 90 tablet, Rfl: 3    ferrous sulfate 324 (65 Fe) mg, TAKE 1 TABLET BY MOUTH EVERY DAY BEFORE BREAKFAST (Patient not taking: Reported on 11/14/2023), Disp: 90 tablet, Rfl: 1     Allergies   Allergen Reactions    Cyclogyl [Cyclopentolate Hcl]     Phenylephrine Other (See Comments)     Eye gtts red eye infected sore    Pred Forte [Prednisolone Acetate]         ACTIVE PROBLEMS:  Patient Active Problem List   Diagnosis    De Quervain's tenosynovitis, right    Diastolic dysfunction    Dyslipidemia    Esophageal reflux    Glaucoma    Hypertension    Impaired fasting glucose    Insomnia    Left ventricular hypertrophy    Mixed urge and stress incontinence    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 tuberculosis    Thyroid nodule    Peripheral arterial disease (HCC)    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)    Hyperparathyroidism (HCC)    Hypothyroidism    Stage 3 chronic kidney disease, unspecified whether stage 3a or 3b CKD (HCC)    Primary adenocarcinoma of ascending colon (HCC)    Hypoalbuminemia    Anemia    Iron deficiency anemia    Complex renal cyst    Status post parathyroidectomy (HCC)    Squamous cell carcinoma of left lung (HCC)    S/P right hemicolectomy          Past Medical History:   Diagnosis Date    BCC (basal cell carcinoma)     Diastolic dysfunction     Dyslipidemia     Dysphagia     Glaucoma     Hashimoto's thyroiditis     Hyperparathyroidism (HCC)     Hypertension     Impaired fasting glucose     Insomnia     Osteopenia     S/P right hemicolectomy 04/13/2022    SCCA (squamous cell carcinoma) of skin         Past Surgical History:   Procedure Laterality Date    CATARACT EXTRACTION, BILATERAL      COLONOSCOPY      Complete, Onset - 10/25/05 - 10 years     ENDOBRONCHIAL ULTRASOUND (EBUS) N/A 2/12/2024    Procedure: ENDOBRONCHIAL ULTRASOUND (EBUS) with biopsies;  Surgeon: Miguel Richmond DO;  Location: [facility];  Service: Thoracic     HYSTERECTOMY      IR BIOPSY LUNG  1/4/2024    PARATHYROIDECTOMY      1st 1985 and 2nd 2002    GA BRNCHSC INCL FLUOR GDNCE DX W/CELL WASHG SPX N/A 2/12/2024    Procedure: BRONCHOSCOPY FLEXIBLE;  Surgeon: Miguel Richmond DO;  Location[facility];  Service: Thoracic    GA LAPAROSCOPY COLECTOMY PARTIAL W/ANASTOMOSIS N/A 4/13/2023    Procedure: RESECTION COLON RIGHT LAPAROSCOPIC;  Surgeon: Manny Reid MD;  Location: [facility];  Service: Colorectal    GA NDSC WRST SURG W/RLS TRANSVRS CARPL LIGM Right 05/02/2016    Procedure: RELEASE CARPAL TUNNEL ENDOSCOPIC;  Surgeon: Alexandre Diaz MD;  Location: [facility];  Service: Orthopedics    TONSILLECTOMY          Social History     Socioeconomic History    Marital status: Single     Spouse name: Not on file    Number of children: Not on file    Years of education: Not on file    Highest education level: Not on file   Occupational History    Occupation: Retired   Tobacco Use    Smoking status: Never    Smokeless tobacco: Never   Vaping Use    Vaping status: Never Used   Substance and Sexual Activity    Alcohol use: Yes     Comment: rarely, social     Drug use: No    Sexual activity: Not on file   Other Topics Concern    Not on file   Social History Narrative     per [employer]      Social Determinants of Health     Financial Resource Strain: Low Risk  (9/22/2023)    Overall Financial Resource Strain (CARDIA)     Difficulty of Paying Living Expenses: Not hard at all   Food Insecurity: No Food Insecurity (4/14/2023)    Hunger Vital Sign     Worried About Running Out of Food in the Last Year: Never true     Ran Out of Food in the Last Year: Never true   Transportation Needs: No Transportation Needs (9/22/2023)    PRAPARE - Transportation     Lack of Transportation (Medical): No     Lack of Transportation (Non-Medical): No   Physical Activity: Not on file   Stress: Not on file   Social Connections: Not on file   Intimate Partner Violence: Not on file  "  Housing Stability: Unknown (4/14/2023)    Housing Stability Vital Sign     Unable to Pay for Housing in the Last Year: No     Number of Places Lived in the Last Year: Not on file     Unstable Housing in the Last Year: No        Family History   Problem Relation Age of Onset    Ovarian cancer Mother     Stroke Father         CVA    Melanoma Maternal Uncle         Objective        Physical Exam  ECOG performance status: 0  Blood pressure 165/65, pulse 58, temperature 97.7 °F (36.5 °C), temperature source Temporal, resp. rate 14, height 4' 9\" (1.448 m), weight 54.9 kg (121 lb), SpO2 98%.     General appearance: Not in acute distress, well appearing and well nourished.    Alert and oriented. Appears younger than stated age.      I reviewed lab data in the chart as noted above.  Additional labs as noted below    WBC   Date Value Ref Range Status   11/03/2023 6.27 4.31 - 10.16 Thousand/uL Final   09/08/2023 5.82 4.31 - 10.16 Thousand/uL Final   04/14/2023 8.98 4.31 - 10.16 Thousand/uL Final     Hemoglobin   Date Value Ref Range Status   11/03/2023 13.8 11.5 - 15.4 g/dL Final   09/08/2023 12.8 11.5 - 15.4 g/dL Final   04/14/2023 9.4 (L) 11.5 - 15.4 g/dL Final     Platelets   Date Value Ref Range Status   11/03/2023 237 149 - 390 Thousands/uL Final   09/08/2023 244 149 - 390 Thousands/uL Final   04/14/2023 295 149 - 390 Thousands/uL Final     MCV   Date Value Ref Range Status   11/03/2023 87 82 - 98 fL Final   09/08/2023 87 82 - 98 fL Final   04/14/2023 85 82 - 98 fL Final      Potassium   Date Value Ref Range Status   11/03/2023 4.9 3.5 - 5.3 mmol/L Final   09/08/2023 4.7 3.5 - 5.3 mmol/L Final   04/15/2023 4.4 3.5 - 5.3 mmol/L Final     Chloride   Date Value Ref Range Status   11/03/2023 108 96 - 108 mmol/L Final   09/08/2023 109 (H) 96 - 108 mmol/L Final   04/15/2023 112 (H) 96 - 108 mmol/L Final     CO2   Date Value Ref Range Status   11/03/2023 29 21 - 32 mmol/L Final   09/08/2023 29 21 - 32 mmol/L Final   04/15/2023 " 26 21 - 32 mmol/L Final     CO2, i-STAT   Date Value Ref Range Status   04/13/2023 21 21 - 32 mmol/L Final     BUN   Date Value Ref Range Status   11/03/2023 21 5 - 25 mg/dL Final   09/08/2023 21 5 - 25 mg/dL Final   04/15/2023 14 5 - 25 mg/dL Final     Creatinine   Date Value Ref Range Status   11/03/2023 0.90 0.60 - 1.30 mg/dL Final     Comment:     Standardized to IDMS reference method   09/08/2023 0.94 0.60 - 1.30 mg/dL Final     Comment:     Standardized to IDMS reference method   04/15/2023 0.92 0.60 - 1.30 mg/dL Final     Comment:     Standardized to IDMS reference method     Glucose   Date Value Ref Range Status   04/15/2023 134 65 - 140 mg/dL Final     Comment:     Specimen collection should occur prior to Sulfasalazine administration due to the potential for falsely depressed results. Specimen collection should occur prior to Sulfapyridine administration due to the potential for falsely elevated results.  If the patient is fasting, the ADA then defines impaired fasting glucose as > 100 mg/dL and diabetes as > or equal to 123 mg/dL.   04/14/2023 132 65 - 140 mg/dL Final     Comment:     Specimen collection should occur prior to Sulfasalazine administration due to the potential for falsely depressed results. Specimen collection should occur prior to Sulfapyridine administration due to the potential for falsely elevated results.  If the patient is fasting, the ADA then defines impaired fasting glucose as > 100 mg/dL and diabetes as > or equal to 123 mg/dL.   03/09/2023 84 65 - 140 mg/dL Final     Comment:     If the patient is fasting, the ADA then defines impaired fasting glucose as > 100 mg/dL and diabetes as > or equal to 123 mg/dL.  Specimen collection should occur prior to Sulfasalazine administration due to the potential for falsely depressed results. Specimen collection should occur prior to Sulfapyridine administration due to the potential for falsely elevated results.     Calcium   Date Value Ref  Range Status   11/03/2023 10.2 8.4 - 10.2 mg/dL Final   09/08/2023 9.8 8.4 - 10.2 mg/dL Final   04/15/2023 9.5 8.3 - 10.1 mg/dL Final     Albumin   Date Value Ref Range Status   11/03/2023 3.9 3.5 - 5.0 g/dL Final   09/08/2023 3.7 3.5 - 5.0 g/dL Final   03/09/2023 2.6 (L) 3.5 - 5.0 g/dL Final     Total Bilirubin   Date Value Ref Range Status   11/03/2023 0.78 0.20 - 1.00 mg/dL Final     Comment:     Use of this assay is not recommended for patients undergoing treatment with eltrombopag due to the potential for falsely elevated results.  N-acetyl-p-benzoquinone imine (metabolite of Acetaminophen) will generate erroneously low results in samples for patients that have taken an overdose of Acetaminophen.   09/08/2023 0.64 0.20 - 1.00 mg/dL Final     Comment:     Use of this assay is not recommended for patients undergoing treatment with eltrombopag due to the potential for falsely elevated results.  N-acetyl-p-benzoquinone imine (metabolite of Acetaminophen) will generate erroneously low results in samples for patients that have taken an overdose of Acetaminophen.   03/09/2023 0.23 0.20 - 1.00 mg/dL Final     Comment:     Use of this assay is not recommended for patients undergoing treatment with eltrombopag due to the potential for falsely elevated results.     Alkaline Phosphatase   Date Value Ref Range Status   11/03/2023 72 34 - 104 U/L Final   09/08/2023 64 34 - 104 U/L Final   03/09/2023 59 46 - 116 U/L Final     AST   Date Value Ref Range Status   11/03/2023 18 13 - 39 U/L Final   09/08/2023 14 13 - 39 U/L Final   03/09/2023 10 5 - 45 U/L Final     Comment:     Specimen collection should occur prior to Sulfasalazine administration due to the potential for falsely depressed results.      ALT   Date Value Ref Range Status   11/03/2023 14 7 - 52 U/L Final     Comment:     Specimen collection should occur prior to Sulfasalazine administration due to the potential for falsely depressed results.    09/08/2023 12 7 -  "52 U/L Final     Comment:     Specimen collection should occur prior to Sulfasalazine administration due to the potential for falsely depressed results.    03/09/2023 14 12 - 78 U/L Final     Comment:     Specimen collection should occur prior to Sulfasalazine and/or Sulfapyridine administration due to the potential for falsely depressed results.       No results found for: \"LDH\"  No results found for: \"TSH\"  No results found for: \"A9KRBRG\"   Free T4   Date Value Ref Range Status   09/08/2023 1.52 (H) 0.61 - 1.12 ng/dL Final     Comment:     Specimens with biotin concentrations > 10 ng/mL can lead to significant (> 10%) positive bias in result.   08/03/2021 1.40 0.76 - 1.46 ng/dL Final     Comment:     Specimen collection should occur prior to Sulfasalazine administration due to the potential for falsely elevated results.         RECENT IMAGING:  No results found.       Portions of the record may have been created with voice recognition software.  Occasional wrong word or \"sound a like\" substitutions may have occurred due to the inherent limitations of voice recognition software.  Read the chart carefully and recognize, using context, where substitutions have occurred.    "

## 2024-02-26 NOTE — PROGRESS NOTES
Marizol Jiménez 10/4/1929 is a 89 y.o. female   with a history of stage IIA pT3N0 colon adenocarcinoma status post right hemicolectomy who presented for evaluation of a left upper lobe lung nodule detected on postoperative CT chest/abdomen/pelvis.       CT chest/abdomen and pelvis on 23 demonstrated a 2.8 x 2.4 cm left upper lung mass with large hiatal hernia and changes related to interval partial colectomy.       She was referred to thoracic surgery and PET/CT with IR-guided biopsy was recommended.     PET/CT on 23 demonstrated a FDG avid left upper lobe mass measuring 2.8 cm with SUV 7.7.  There was no suspicious focal hilar activity.  There was mild FDG uptake in a precarcinal lymph node on the right, SUV 2.5, measuring 8 mm in short axis.  There were no additional findings suggestive of hypermetabolic malignancy in the abdomen/pelvis.     IR-guided biopsy was performed on 24 and pathology was consistent with non-small cell carcinoma with squamous differentiation.    She was last seen on 24 for radiation consult. Recommendation was made for mediastinal staging with EBUS/TBNA to determine if there is N3 involvement, which would alter prognosis, need for further staging studies, radiotherapy treatment fields and fractionation. She returns today s/p EBUS with biopsies on 24 by Dr Richmond.       Follow up visit     24 EBUS with biopsies  Operative Findings:  Prelim:  4R adequate and negative. This was the mildly PET avid LN.   7 is adequate and negative.     Pathology:   A.B. Lymph Node, 4R ( ThinPrep and smear preparations ):  Negative for malignancy.  Lymphocytes, macrophages and benign bronchial cells present.   Satisfactory for evaluation.     C.D. Lymph Node, L7 ( ThinPrep and smear preparations ):  Negative for malignancy.  Lymphocytes, macrophages and benign bronchial cells present.   Satisfactory for evaluation.        Upcomin24 Hem Onc, Dr Martin  24 Colorectal  surgery, Dr Reid          Oncology History   Primary adenocarcinoma of ascending colon (HCC)   3/7/2023 Initial Diagnosis    Primary adenocarcinoma of ascending colon (HCC)     4/13/2023 -  Cancer Staged    Staging form: Colon and Rectum, AJCC 8th Edition  - Pathologic stage from 4/13/2023: Stage IIA (pT3, pN0, cM0) - Signed by Efrem Valdes MD on 1/22/2024  Stage prefix: Initial diagnosis  Total positive nodes: 0       Squamous cell carcinoma of left lung (HCC)   1/4/2024 Initial Diagnosis    Non-small cell carcinoma of left lung (HCC)     1/4/2024 Biopsy    IR lung biopsy    A.  Lung, left upper lobe, biopsy:  Non-small cell carcinoma with squamous differentiation.      1/22/2024 -  Cancer Staged    Staging form: Lung, AJCC 8th Edition  - Clinical stage from 1/22/2024: Stage IA3 (cT1c, cN0, cM0) - Signed by Efrem Valdes MD on 1/22/2024  Stage prefix: Initial diagnosis           Review of Systems:  Review of Systems   Constitutional: Negative.    HENT: Negative.     Eyes: Negative.    Respiratory: Negative.     Cardiovascular:  Positive for leg swelling (chronic, LLE).        Left leg swelling due to bakers cyst   Gastrointestinal: Negative.    Endocrine: Negative.    Genitourinary: Negative.    Musculoskeletal: Negative.    Skin: Negative.    Allergic/Immunologic: Negative.    Neurological: Negative.    Hematological:  Bruises/bleeds easily.        Taking aspirin   Psychiatric/Behavioral:  Positive for sleep disturbance (occasional).        Clinical Trial: no    Pregnancy test needed:  not applicable    Teaching: [facility] RT packet with RT for lung cancer information given at last visit (1/22/24) - patient has at home    Health Maintenance   Topic Date Due    COVID-19 Vaccine (6 - 2023-24 season) 03/27/2024    Fall Risk  09/22/2024    Urinary Incontinence Screening  09/22/2024    Medicare Annual Wellness Visit (AWV)  09/22/2024    BMI: Followup Plan  09/22/2024    Depression Screening  01/22/2025     BMI: Adult  02/12/2025    Zoster Vaccine  Completed    Pneumococcal Vaccine: 65+ Years  Completed    Influenza Vaccine  Completed    HIB Vaccine  Aged Out    IPV Vaccine  Aged Out    Hepatitis A Vaccine  Aged Out    Meningococcal ACWY Vaccine  Aged Out    HPV Vaccine  Aged Out     Patient Active Problem List   Diagnosis    De Quervain's tenosynovitis, right    Diastolic dysfunction    Dyslipidemia    Esophageal reflux    Glaucoma    Hypertension    Impaired fasting glucose    Insomnia    Left ventricular hypertrophy    Mixed urge and stress incontinence    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 tuberculosis    Thyroid nodule    Peripheral arterial disease (HCC)    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)    Hyperparathyroidism (HCC)    Hypothyroidism    Stage 3 chronic kidney disease, unspecified whether stage 3a or 3b CKD (HCC)    Primary adenocarcinoma of ascending colon (HCC)    Hypoalbuminemia    Anemia    Iron deficiency anemia    Complex renal cyst    Status post parathyroidectomy (HCC)    Squamous cell carcinoma of left lung (HCC)    S/P right hemicolectomy     Past Medical History:   Diagnosis Date    BCC (basal cell carcinoma)     Diastolic dysfunction     Dyslipidemia     Dysphagia     Glaucoma     Hashimoto's thyroiditis     Hyperparathyroidism (HCC)     Hypertension     Impaired fasting glucose     Insomnia     Osteopenia     S/P right hemicolectomy 04/13/2022    SCCA (squamous cell carcinoma) of skin      Past Surgical History:   Procedure Laterality Date    CATARACT EXTRACTION, BILATERAL      COLONOSCOPY      Complete, Onset - 10/25/05 - 10 years     ENDOBRONCHIAL ULTRASOUND (EBUS) N/A 2/12/2024    Procedure: ENDOBRONCHIAL ULTRASOUND (EBUS) with biopsies;  Surgeon: Miguel Richmond DO;  [facility];  Service: Thoracic    HYSTERECTOMY      IR BIOPSY LUNG  1/4/2024    PARATHYROIDECTOMY      1st 1985 and 2nd 2002    AZ BRNCHSC INCL FLUOR GDNCE DX W/CELL WASHG  SPX N/A 2/12/2024    Procedure: BRONCHOSCOPY FLEXIBLE;  Surgeon: Miguel Richmond DO;  [facility];  Service: Thoracic    PA LAPAROSCOPY COLECTOMY PARTIAL W/ANASTOMOSIS N/A 4/13/2023    Procedure: RESECTION COLON RIGHT LAPAROSCOPIC;  Surgeon: Manny Reid MD;  Location: [facility];  Service: Colorectal    PA NDSC WRST SURG W/RLS TRANSVRS CARPL LIGM Right 05/02/2016    Procedure: RELEASE CARPAL TUNNEL ENDOSCOPIC;  Surgeon: Alexandre Diaz MD;  Location: [facility];  Service: Orthopedics    TONSILLECTOMY       Family History   Problem Relation Age of Onset    Ovarian cancer Mother     Stroke Father         CVA    Melanoma Maternal Uncle      Social History     Socioeconomic History    Marital status: Single     Spouse name: Not on file    Number of children: Not on file    Years of education: Not on file    Highest education level: Not on file   Occupational History    Occupation: Retired   Tobacco Use    Smoking status: Never    Smokeless tobacco: Never   Vaping Use    Vaping status: Never Used   Substance and Sexual Activity    Alcohol use: Yes     Comment: rarely, social     Drug use: No    Sexual activity: Not on file   Other Topics Concern    Not on file   Social History Narrative     per [employer]      Social Determinants of Health     Financial Resource Strain: Low Risk  (9/22/2023)    Overall Financial Resource Strain (CARDIA)     Difficulty of Paying Living Expenses: Not hard at all   Food Insecurity: No Food Insecurity (4/14/2023)    Hunger Vital Sign     Worried About Running Out of Food in the Last Year: Never true     Ran Out of Food in the Last Year: Never true   Transportation Needs: No Transportation Needs (9/22/2023)    PRAPARE - Transportation     Lack of Transportation (Medical): No     Lack of Transportation (Non-Medical): No   Physical Activity: Not on file   Stress: Not on file   Social Connections: Not on file   Intimate Partner Violence: Not on file   Housing  Stability: Unknown (4/14/2023)    Housing Stability Vital Sign     Unable to Pay for Housing in the Last Year: No     Number of Places Lived in the Last Year: Not on file     Unstable Housing in the Last Year: No       Current Outpatient Medications:     amLODIPine (NORVASC) 5 mg tablet, TAKE 1 TABLET (5 MG TOTAL) BY MOUTH DAILY., Disp: 90 tablet, Rfl: 1    ASPIRIN 81 PO, Take by mouth, Disp: , Rfl:     atorvastatin (LIPITOR) 20 mg tablet, Take 20 mg by mouth daily, Disp: , Rfl:     Biotin 1000 MCG tablet, Take 5,000 mcg by mouth daily, Disp: , Rfl:     Calcium 500 MG tablet, Take 1 tablet by mouth 3 (three) times a week , Disp: , Rfl:     cholecalciferol (VITAMIN D3) 1,000 units tablet, Take 1 tablet by mouth daily, Disp: , Rfl:     dorzolamide-timolol (COSOPT) 22.3-6.8 MG/ML ophthalmic solution, Administer 1 drop to both eyes 2 (two) times a day, Disp: , Rfl:     ferrous sulfate 324 (65 Fe) mg, TAKE 1 TABLET BY MOUTH EVERY DAY BEFORE BREAKFAST (Patient not taking: Reported on 11/14/2023), Disp: 90 tablet, Rfl: 1    latanoprost (XALATAN) 0.005 % ophthalmic solution, Administer 1 drop into the left eye daily at bedtime, Disp: , Rfl:     lisinopril (ZESTRIL) 10 mg tablet, TAKE 1 TABLET BY MOUTH EVERY DAY, Disp: 90 tablet, Rfl: 1    metoprolol tartrate (LOPRESSOR) 25 mg tablet, TAKE 1 TABLET ORALLY TWICE DAILY, Disp: 180 tablet, Rfl: 2    Multiple Vitamins-Minerals (CENTRUM SILVER ADULT 50+ PO), Take by mouth., Disp: , Rfl:     Multiple Vitamins-Minerals (PRESERVISION AREDS PO), Take 1 tablet by mouth 2 (two) times a day, Disp: , Rfl:     Omega-3 Fatty Acids (FISH OIL) 1,000 mg, Take 1,000 mg by mouth daily., Disp: , Rfl:     Synthroid 88 MCG tablet, Take 1 tablet (88 mcg total) by mouth daily, Disp: 90 tablet, Rfl: 3  Allergies   Allergen Reactions    Cyclogyl [Cyclopentolate Hcl]     Phenylephrine Other (See Comments)     Eye gtts red eye infected sore    Pred Forte [Prednisolone Acetate]      Vitals:    02/26/24  "0852   Height: 4' 9\" (1.448 m)         "

## 2024-02-29 ENCOUNTER — PATIENT OUTREACH (OUTPATIENT)
Dept: HEMATOLOGY ONCOLOGY | Facility: CLINIC | Age: 89
End: 2024-02-29

## 2024-02-29 NOTE — PROGRESS NOTES
I reached out and spoke with Marizol now that consults have been completed with the oncology teams to review for any barriers to care and offer supportive services as needed. I reviewed and updated the members assigned to the care team in [facility].   She knows the members of the care team as well as how and when to contact them with any needs.   She verbalizes managing the schedules well.   They are currently struggling with transportation. [facility] and other transportation options were reviewed. Patient provided transportation assistance with [facility] [employer]- forms completed and sent to dispatch. Marizol will remind radiation that she would like them to send her schedule to [facility] once completed.    She denies any uncontrolled symptoms. Discussed role of Palliative Care in symptom and side effect management. Declined referral at this time.  Patients states that they are eating and drinking as per usual with no unintentional weight loss. .    Patient does not smoke.   Patient states She is well supported by family and friends. . Community support groups discussed including the Cancer Support Community of the [facility]. Patient declined information at this time.   Patient feels they have adequate insurance coverage and deny any financial concerns at this time.     I reviewed the following potential barriers to determine plan for on going outreach.  No-Multimodality treatment    No-Presence of greater than 2 cancer related symptoms on presentation    Yes-Transportation barriers    No-Lack of social support    No-High anxiety    No-Difficultly comprehending treatment plan    No-History of non-compliance    No-Greater than 2 co-morbidities    No-Concurrent/additional care at Tertiary center ([facility], [facility])  No-Language barrier  No-Uninsured/ expressed financial concerns              No-Expresses concerns regarding memory/ forgetfulness   No-Active tobacco use  No-Primary caregiver to a family  member/friend  No-Stage 4 Diagnosis  This patient will require follow up outreach based on low complexity score of 0-1 qualifiers or barriers listed above. Plan to outreach in 4-6 weeks.  Marizol's Tx will be completed over a 2 week period may reach out sooner if needed. Discussed the plan for follow-up with the patient.   I have provided my direct contact information to the patient and welcome them to contact me if their needs as discussed above change. They were appreciative for the call.

## 2024-03-06 ENCOUNTER — APPOINTMENT (OUTPATIENT)
Facility: HOSPITAL | Age: 89
End: 2024-03-06
Payer: MEDICARE

## 2024-03-06 PROCEDURE — 77334 RADIATION TREATMENT AID(S): CPT | Performed by: RADIOLOGY

## 2024-03-14 PROCEDURE — 77293 RESPIRATOR MOTION MGMT SIMUL: CPT | Performed by: RADIOLOGY

## 2024-03-14 PROCEDURE — 77300 RADIATION THERAPY DOSE PLAN: CPT | Performed by: RADIOLOGY

## 2024-03-14 PROCEDURE — 77301 RADIOTHERAPY DOSE PLAN IMRT: CPT | Performed by: RADIOLOGY

## 2024-03-14 PROCEDURE — 77338 DESIGN MLC DEVICE FOR IMRT: CPT | Performed by: RADIOLOGY

## 2024-03-18 ENCOUNTER — APPOINTMENT (OUTPATIENT)
Facility: HOSPITAL | Age: 89
End: 2024-03-18
Payer: MEDICARE

## 2024-03-18 PROCEDURE — 77427 RADIATION TX MANAGEMENT X5: CPT | Performed by: RADIOLOGY

## 2024-03-18 PROCEDURE — 77014 CHG CT GUIDANCE RADIATION THERAPY FLDS PLACEMENT: CPT | Performed by: RADIOLOGY

## 2024-03-18 PROCEDURE — 77386 HB NTSTY MODUL RAD TX DLVR CPLX: CPT | Performed by: RADIOLOGY

## 2024-03-19 ENCOUNTER — APPOINTMENT (OUTPATIENT)
Facility: HOSPITAL | Age: 89
End: 2024-03-19
Attending: RADIOLOGY
Payer: MEDICARE

## 2024-03-19 PROCEDURE — 77386 HB NTSTY MODUL RAD TX DLVR CPLX: CPT | Performed by: RADIOLOGY

## 2024-03-19 PROCEDURE — 77014 CHG CT GUIDANCE RADIATION THERAPY FLDS PLACEMENT: CPT | Performed by: RADIOLOGY

## 2024-03-20 ENCOUNTER — APPOINTMENT (OUTPATIENT)
Facility: HOSPITAL | Age: 89
End: 2024-03-20
Attending: RADIOLOGY
Payer: MEDICARE

## 2024-03-20 PROCEDURE — 77014 CHG CT GUIDANCE RADIATION THERAPY FLDS PLACEMENT: CPT | Performed by: RADIOLOGY

## 2024-03-20 PROCEDURE — 77386 HB NTSTY MODUL RAD TX DLVR CPLX: CPT | Performed by: RADIOLOGY

## 2024-03-21 ENCOUNTER — APPOINTMENT (OUTPATIENT)
Facility: HOSPITAL | Age: 89
End: 2024-03-21
Payer: MEDICARE

## 2024-03-21 PROCEDURE — 77386 HB NTSTY MODUL RAD TX DLVR CPLX: CPT | Performed by: RADIOLOGY

## 2024-03-21 PROCEDURE — 77014 CHG CT GUIDANCE RADIATION THERAPY FLDS PLACEMENT: CPT | Performed by: RADIOLOGY

## 2024-03-22 ENCOUNTER — PATIENT OUTREACH (OUTPATIENT)
Dept: HEMATOLOGY ONCOLOGY | Facility: CLINIC | Age: 89
End: 2024-03-22

## 2024-03-22 ENCOUNTER — OFFICE VISIT (OUTPATIENT)
Dept: FAMILY MEDICINE CLINIC | Facility: HOSPITAL | Age: 89
End: 2024-03-22
Payer: MEDICARE

## 2024-03-22 ENCOUNTER — APPOINTMENT (OUTPATIENT)
Facility: HOSPITAL | Age: 89
End: 2024-03-22
Attending: RADIOLOGY
Payer: MEDICARE

## 2024-03-22 VITALS
DIASTOLIC BLOOD PRESSURE: 63 MMHG | SYSTOLIC BLOOD PRESSURE: 124 MMHG | WEIGHT: 120.8 LBS | TEMPERATURE: 97 F | BODY MASS INDEX: 26.06 KG/M2 | HEART RATE: 54 BPM | HEIGHT: 57 IN

## 2024-03-22 DIAGNOSIS — I10 PRIMARY HYPERTENSION: ICD-10-CM

## 2024-03-22 DIAGNOSIS — C18.2 PRIMARY ADENOCARCINOMA OF ASCENDING COLON (HCC): ICD-10-CM

## 2024-03-22 DIAGNOSIS — E21.3 HYPERPARATHYROIDISM (HCC): ICD-10-CM

## 2024-03-22 DIAGNOSIS — E06.3 HYPOTHYROIDISM DUE TO HASHIMOTO'S THYROIDITIS: Primary | ICD-10-CM

## 2024-03-22 DIAGNOSIS — E03.8 HYPOTHYROIDISM DUE TO HASHIMOTO'S THYROIDITIS: Primary | ICD-10-CM

## 2024-03-22 DIAGNOSIS — N18.30 STAGE 3 CHRONIC KIDNEY DISEASE, UNSPECIFIED WHETHER STAGE 3A OR 3B CKD (HCC): ICD-10-CM

## 2024-03-22 DIAGNOSIS — E04.1 THYROID NODULE: ICD-10-CM

## 2024-03-22 DIAGNOSIS — C34.92 SQUAMOUS CELL CARCINOMA OF LEFT LUNG (HCC): ICD-10-CM

## 2024-03-22 PROCEDURE — 77336 RADIATION PHYSICS CONSULT: CPT | Performed by: RADIOLOGY

## 2024-03-22 PROCEDURE — 77386 HB NTSTY MODUL RAD TX DLVR CPLX: CPT | Performed by: RADIOLOGY

## 2024-03-22 PROCEDURE — 77014 CHG CT GUIDANCE RADIATION THERAPY FLDS PLACEMENT: CPT | Performed by: RADIOLOGY

## 2024-03-22 PROCEDURE — 99214 OFFICE O/P EST MOD 30 MIN: CPT | Performed by: INTERNAL MEDICINE

## 2024-03-22 PROCEDURE — G2211 COMPLEX E/M VISIT ADD ON: HCPCS | Performed by: INTERNAL MEDICINE

## 2024-03-22 NOTE — PROGRESS NOTES
Name: Marizol Jiménez      : 10/4/1929      MRN: [id number]  Encounter Provider: Alfreda Briseno DO  Encounter Date: 3/22/2024   Encounter department: [facility] PRIMARY CARE [id number]     Assessment & Plan     1. Hypothyroidism due to Hashimoto's thyroiditis  Assessment & Plan:  TSH and Synthroid as per Endo, clinically euthyroid except some fatigue since starting radiation, will follow      2. Thyroid nodule  Assessment & Plan:  Has thyroid US ordered by Dr. Yi, variable uptake noted in thyroid gland on PET scan, will follow as well      3. Hyperparathyroidism (HCC)  Assessment & Plan:  S/p parathyroidectomy, PTH/Ca as per Endo      4. Squamous cell carcinoma of left lung (HCC)  Assessment & Plan:  Just dx , started radiation tx this week, following with Rad Onc, will follow      5. Primary adenocarcinoma of ascending colon (HCC)  Assessment & Plan:  CEA crept up a bit, CT C/A/P showed the newly dx lung CA above, had colonoscopy , has f/u with Dr. Reid      6. Stage 3 chronic kidney disease, unspecified whether stage 3a or 3b CKD (HCC)  Assessment & Plan:  Lab Results   Component Value Date    EGFR 54 2023    EGFR 52 2023    EGFR 53 04/15/2023    CREATININE 0.90 2023    CREATININE 0.94 2023    CREATININE 0.92 04/15/2023   GFR stable, has repeat BW ordered and urged to keep hydrated, will follow      7. Primary hypertension  Assessment & Plan:  BP great today, con't current meds, recheck in 6 mo             Subjective      HPI Pt here for follow up appt    Pt saw [facility] (Dr. Yi) in Sept after our last visit for f/u thyroid nodule/hypothyroidism/hyperparathyroidism - OV note reviewed.  Her Synthroid was continued at 88 mcg a day.  Her thyroid US was last done in  and an order was given (along with BW) to be done prior to next appt.  She was told to f/u in 1 yr.     Pt saw Colon and Rectal Surgery (Dr. Reid) in Nov for f/u colon CA history - OV  note reviewed.  CEA had gone up a bit and a CT C/A/P was ordered.  CT showed a 2.8 x 2.4 HARRIS lung mass.  Pt was notified of results and referred to Thoracic Surgery.   She saw Dr. Richmond in Dec - OV note reviewed - and it was felt this was likely a primary lung CA.  A PET CT was ordered and increase uptake of HARRIS mass was noted as was mild uptake of small precarinal LN. Variable uptake in the thyroid was also  noted.  Pt was then referred to IR and had a lung bx in Jan.  Path came back with NSCLC with squamous differentiation. She was then referred to Radiation Onc.  Pt saw Rad Onc Dr. Valdes on 1/22/24 - OV note reviewed. She also saw Med Onc (Dr. Martin) on 1/25/24 - OV note reviewed as well.  Was felt by both Surg Onc and Med Onc that mediastinal staging was needed.  She saw Thoracic surgery 2/5/24 and EBUS was scheduled for mediastinal staging. She had the EBUS 2/12/24.  She saw both Rad Onc and Med Onc back 2/25/24 for f/u.  She was staged at [id number] stage 1A.  SBRT with Dr. Valdes was recommended.  NO systemic therapy was felt to be needed.  She started radiation tx this week and states she is doing pretty well. She has some fatigue.  She notes no skin issues or GI issues.     BP at goal today and meds were reviewed and no changes have occurred.  She denies missing doses of meds or SE with the meds.  She does not check her BP outside the office.  She notes no frequent Ha's/dizziness/double vision/CP.       Colonoscopy 2/23    BW 9/23  FLP 8/21 - order given again today      Review of Systems   Constitutional:  Positive for fatigue. Negative for chills, fever and unexpected weight change.   HENT:  Negative for congestion, sore throat and trouble swallowing.    Eyes:  Negative for pain and visual disturbance.   Respiratory:  Negative for cough, shortness of breath and wheezing.    Cardiovascular:  Negative for chest pain, palpitations and leg swelling.   Gastrointestinal:  Negative for abdominal pain,  "constipation, diarrhea, nausea and vomiting.   Genitourinary:  Negative for difficulty urinating, dysuria and hematuria.   Musculoskeletal:  Negative for back pain and neck pain.   Skin:  Negative for rash and wound.   Neurological:  Negative for dizziness, light-headedness and headaches.   Hematological:  Does not bruise/bleed easily.   Psychiatric/Behavioral:  Negative for dysphoric mood.        Current Outpatient Medications on File Prior to Visit   Medication Sig    amLODIPine (NORVASC) 5 mg tablet TAKE 1 TABLET (5 MG TOTAL) BY MOUTH DAILY.    ASPIRIN 81 PO Take by mouth in the morning    atorvastatin (LIPITOR) 20 mg tablet Take 20 mg by mouth daily    Biotin 1000 MCG tablet Take 5,000 mcg by mouth daily    Calcium 500 MG tablet Take 1 tablet by mouth 3 (three) times a week     cholecalciferol (VITAMIN D3) 1,000 units tablet Take 1 tablet by mouth daily    dorzolamide-timolol (COSOPT) 22.3-6.8 MG/ML ophthalmic solution Administer 1 drop to both eyes 2 (two) times a day    ferrous sulfate 324 (65 Fe) mg TAKE 1 TABLET BY MOUTH EVERY DAY BEFORE BREAKFAST (Patient not taking: Reported on 11/14/2023)    latanoprost (XALATAN) 0.005 % ophthalmic solution Administer 1 drop into the left eye daily at bedtime (Patient not taking: Reported on 2/26/2024)    lisinopril (ZESTRIL) 10 mg tablet TAKE 1 TABLET BY MOUTH EVERY DAY    metoprolol tartrate (LOPRESSOR) 25 mg tablet TAKE 1 TABLET ORALLY TWICE DAILY    Multiple Vitamins-Minerals (CENTRUM SILVER ADULT 50+ PO) Take by mouth.    Multiple Vitamins-Minerals (PRESERVISION AREDS PO) Take 1 tablet by mouth 2 (two) times a day    Omega-3 Fatty Acids (FISH OIL) 1,000 mg Take 1,000 mg by mouth daily.    Synthroid 88 MCG tablet Take 1 tablet (88 mcg total) by mouth daily       Objective     /63 (BP Location: Left arm, Patient Position: Sitting, Cuff Size: Standard)   Pulse (!) 54   Temp (!) 97 °F (36.1 °C) (Tympanic)   Ht 4' 9\" (1.448 m)   Wt 54.8 kg (120 lb 12.8 oz)   " BMI 26.14 kg/m²     Physical Exam  Vitals and nursing note reviewed.   Constitutional:       General: She is not in acute distress.     Appearance: She is well-developed. She is not ill-appearing.   HENT:      Head: Normocephalic and atraumatic.   Eyes:      General:         Right eye: No discharge.         Left eye: No discharge.      Conjunctiva/sclera: Conjunctivae normal.   Neck:      Trachea: No tracheal deviation.   Cardiovascular:      Rate and Rhythm: Normal rate and regular rhythm.      Heart sounds: Murmur heard.      No friction rub.   Pulmonary:      Effort: Pulmonary effort is normal. No respiratory distress.      Breath sounds: Normal breath sounds. No wheezing, rhonchi or rales.   Abdominal:      General: There is no distension.      Palpations: Abdomen is soft.      Tenderness: There is no abdominal tenderness. There is no guarding or rebound.   Musculoskeletal:         General: No deformity or signs of injury.      Cervical back: Neck supple.   Skin:     General: Skin is warm.      Coloration: Skin is not pale.      Findings: No rash.   Neurological:      General: No focal deficit present.      Mental Status: She is alert. Mental status is at baseline.      Motor: No abnormal muscle tone.      Gait: Gait normal.   Psychiatric:         Mood and Affect: Mood normal.         Behavior: Behavior normal.         Thought Content: Thought content normal.         Judgment: Judgment normal.       Alfreda Briseno DO

## 2024-03-22 NOTE — PROGRESS NOTES
I reached out and spoke with Marizol to follow up with them and to review for any changes in barriers to care and offer supportive services as needed.    Barriers noted previously; none.     Current barriers and interventions provided: none    Marizol states that she is using [facility] which is working well for her. She reports that she's a little tired but doing well over all and continues with good support from her family.     I reviewed the following potential barriers and updated to determine plan for on going outreach.  No-Multimodality treatment    No-Presence of greater than 2 cancer related symptoms on presentation    No-Transportation barriers    No-Lack of social support    No-High anxiety    No-Difficultly comprehending treatment plan    No-History of non-compliance    No-Greater than 2 co-morbidities    No-Concurrent/additional care at Tertiary center ([facility], [facility])  No-Language barrier  No-Uninsured/ expressed financial concerns              No-Expresses concerns regarding memory/ forgetfulness   No-Active tobacco use  No-Primary caregiver to a family member/friend  No-Stage 4 Diagnosis  This patient has a short duration of Tx which will be completed next week on 3/29/24. She does not feel that she needs any additional follow up.   I have provided my direct contact information to the patient and welcome them to contact me if their needs as discussed above change. They were appreciative for the call.

## 2024-03-25 ENCOUNTER — APPOINTMENT (OUTPATIENT)
Facility: HOSPITAL | Age: 89
End: 2024-03-25
Attending: RADIOLOGY
Payer: MEDICARE

## 2024-03-25 PROCEDURE — 77427 RADIATION TX MANAGEMENT X5: CPT | Performed by: RADIOLOGY

## 2024-03-25 PROCEDURE — 77386 HB NTSTY MODUL RAD TX DLVR CPLX: CPT | Performed by: INTERNAL MEDICINE

## 2024-03-25 PROCEDURE — 77014 CHG CT GUIDANCE RADIATION THERAPY FLDS PLACEMENT: CPT | Performed by: INTERNAL MEDICINE

## 2024-03-26 ENCOUNTER — APPOINTMENT (OUTPATIENT)
Facility: HOSPITAL | Age: 89
End: 2024-03-26
Attending: RADIOLOGY
Payer: MEDICARE

## 2024-03-26 PROCEDURE — 77014 CHG CT GUIDANCE RADIATION THERAPY FLDS PLACEMENT: CPT | Performed by: RADIOLOGY

## 2024-03-26 PROCEDURE — 77386 HB NTSTY MODUL RAD TX DLVR CPLX: CPT | Performed by: RADIOLOGY

## 2024-03-27 ENCOUNTER — APPOINTMENT (OUTPATIENT)
Facility: HOSPITAL | Age: 89
End: 2024-03-27
Attending: RADIOLOGY
Payer: MEDICARE

## 2024-03-27 PROCEDURE — 77014 CHG CT GUIDANCE RADIATION THERAPY FLDS PLACEMENT: CPT | Performed by: RADIOLOGY

## 2024-03-27 PROCEDURE — 77386 HB NTSTY MODUL RAD TX DLVR CPLX: CPT | Performed by: RADIOLOGY

## 2024-03-28 ENCOUNTER — APPOINTMENT (OUTPATIENT)
Facility: HOSPITAL | Age: 89
End: 2024-03-28
Attending: RADIOLOGY
Payer: MEDICARE

## 2024-03-28 PROCEDURE — 77386 HB NTSTY MODUL RAD TX DLVR CPLX: CPT | Performed by: RADIOLOGY

## 2024-03-28 PROCEDURE — 77014 CHG CT GUIDANCE RADIATION THERAPY FLDS PLACEMENT: CPT | Performed by: RADIOLOGY

## 2024-03-29 ENCOUNTER — APPOINTMENT (OUTPATIENT)
Facility: HOSPITAL | Age: 89
End: 2024-03-29
Attending: RADIOLOGY
Payer: MEDICARE

## 2024-03-29 PROCEDURE — 77386 HB NTSTY MODUL RAD TX DLVR CPLX: CPT | Performed by: RADIOLOGY

## 2024-03-29 PROCEDURE — 77336 RADIATION PHYSICS CONSULT: CPT | Performed by: RADIOLOGY

## 2024-03-29 PROCEDURE — 77014 CHG CT GUIDANCE RADIATION THERAPY FLDS PLACEMENT: CPT | Performed by: RADIOLOGY

## 2024-04-04 ENCOUNTER — APPOINTMENT (OUTPATIENT)
Dept: LAB | Facility: HOSPITAL | Age: 89
End: 2024-04-04
Payer: MEDICARE

## 2024-04-04 DIAGNOSIS — Z90.89 STATUS POST PARATHYROIDECTOMY: ICD-10-CM

## 2024-04-04 DIAGNOSIS — E04.1 THYROID NODULE: ICD-10-CM

## 2024-04-04 DIAGNOSIS — Z98.890 STATUS POST PARATHYROIDECTOMY: ICD-10-CM

## 2024-04-04 DIAGNOSIS — R73.01 IMPAIRED FASTING GLUCOSE: ICD-10-CM

## 2024-04-04 DIAGNOSIS — E03.8 HYPOTHYROIDISM DUE TO HASHIMOTO'S THYROIDITIS: ICD-10-CM

## 2024-04-04 DIAGNOSIS — I73.9 PERIPHERAL ARTERIAL DISEASE (HCC): ICD-10-CM

## 2024-04-04 DIAGNOSIS — E78.5 DYSLIPIDEMIA: ICD-10-CM

## 2024-04-04 DIAGNOSIS — E88.09 HYPOALBUMINEMIA: ICD-10-CM

## 2024-04-04 DIAGNOSIS — D64.9 ANEMIA, UNSPECIFIED TYPE: ICD-10-CM

## 2024-04-04 DIAGNOSIS — N18.30 STAGE 3 CHRONIC KIDNEY DISEASE, UNSPECIFIED WHETHER STAGE 3A OR 3B CKD (HCC): ICD-10-CM

## 2024-04-04 DIAGNOSIS — E21.3 HYPERPARATHYROIDISM (HCC): ICD-10-CM

## 2024-04-04 DIAGNOSIS — C18.2 PRIMARY ADENOCARCINOMA OF ASCENDING COLON (HCC): ICD-10-CM

## 2024-04-04 DIAGNOSIS — I10 PRIMARY HYPERTENSION: ICD-10-CM

## 2024-04-04 DIAGNOSIS — E06.3 HYPOTHYROIDISM DUE TO HASHIMOTO'S THYROIDITIS: ICD-10-CM

## 2024-04-04 DIAGNOSIS — D50.9 IRON DEFICIENCY ANEMIA, UNSPECIFIED IRON DEFICIENCY ANEMIA TYPE: ICD-10-CM

## 2024-04-04 LAB
25(OH)D3 SERPL-MCNC: 48.6 NG/ML (ref 30–100)
ALBUMIN SERPL BCP-MCNC: 3.7 G/DL (ref 3.5–5)
ALP SERPL-CCNC: 67 U/L (ref 34–104)
ALT SERPL W P-5'-P-CCNC: 16 U/L (ref 7–52)
ANION GAP SERPL CALCULATED.3IONS-SCNC: 5 MMOL/L (ref 4–13)
AST SERPL W P-5'-P-CCNC: 16 U/L (ref 13–39)
BASOPHILS # BLD AUTO: 0.05 THOUSANDS/ÂΜL (ref 0–0.1)
BASOPHILS NFR BLD AUTO: 1 % (ref 0–1)
BILIRUB SERPL-MCNC: 0.72 MG/DL (ref 0.2–1)
BUN SERPL-MCNC: 22 MG/DL (ref 5–25)
CALCIUM SERPL-MCNC: 9.6 MG/DL (ref 8.4–10.2)
CEA SERPL-MCNC: 4.6 NG/ML (ref 0–3)
CHLORIDE SERPL-SCNC: 106 MMOL/L (ref 96–108)
CHOLEST SERPL-MCNC: 150 MG/DL
CO2 SERPL-SCNC: 32 MMOL/L (ref 21–32)
CREAT SERPL-MCNC: 0.9 MG/DL (ref 0.6–1.3)
EOSINOPHIL # BLD AUTO: 0.29 THOUSAND/ÂΜL (ref 0–0.61)
EOSINOPHIL NFR BLD AUTO: 5 % (ref 0–6)
ERYTHROCYTE [DISTWIDTH] IN BLOOD BY AUTOMATED COUNT: 15 % (ref 11.6–15.1)
EST. AVERAGE GLUCOSE BLD GHB EST-MCNC: 120 MG/DL
FERRITIN SERPL-MCNC: 41 NG/ML (ref 11–307)
GFR SERPL CREATININE-BSD FRML MDRD: 54 ML/MIN/1.73SQ M
GLUCOSE P FAST SERPL-MCNC: 91 MG/DL (ref 65–99)
HBA1C MFR BLD: 5.8 %
HCT VFR BLD AUTO: 43.2 % (ref 34.8–46.1)
HDLC SERPL-MCNC: 43 MG/DL
HGB BLD-MCNC: 13.4 G/DL (ref 11.5–15.4)
IMM GRANULOCYTES # BLD AUTO: 0.02 THOUSAND/UL (ref 0–0.2)
IMM GRANULOCYTES NFR BLD AUTO: 0 % (ref 0–2)
IRON SERPL-MCNC: 68 UG/DL (ref 50–212)
LDLC SERPL CALC-MCNC: 71 MG/DL (ref 0–100)
LYMPHOCYTES # BLD AUTO: 0.67 THOUSANDS/ÂΜL (ref 0.6–4.47)
LYMPHOCYTES NFR BLD AUTO: 12 % (ref 14–44)
MCH RBC QN AUTO: 27.7 PG (ref 26.8–34.3)
MCHC RBC AUTO-ENTMCNC: 31 G/DL (ref 31.4–37.4)
MCV RBC AUTO: 89 FL (ref 82–98)
MONOCYTES # BLD AUTO: 0.56 THOUSAND/ÂΜL (ref 0.17–1.22)
MONOCYTES NFR BLD AUTO: 10 % (ref 4–12)
NEUTROPHILS # BLD AUTO: 3.93 THOUSANDS/ÂΜL (ref 1.85–7.62)
NEUTS SEG NFR BLD AUTO: 72 % (ref 43–75)
NONHDLC SERPL-MCNC: 107 MG/DL
NRBC BLD AUTO-RTO: 0 /100 WBCS
PLATELET # BLD AUTO: 188 THOUSANDS/UL (ref 149–390)
PMV BLD AUTO: 12 FL (ref 8.9–12.7)
POTASSIUM SERPL-SCNC: 5.1 MMOL/L (ref 3.5–5.3)
PREALB SERPL-MCNC: 24.9 MG/DL (ref 17–34)
PROT SERPL-MCNC: 5.9 G/DL (ref 6.4–8.4)
RBC # BLD AUTO: 4.84 MILLION/UL (ref 3.81–5.12)
SODIUM SERPL-SCNC: 143 MMOL/L (ref 135–147)
TRIGL SERPL-MCNC: 181 MG/DL
WBC # BLD AUTO: 5.52 THOUSAND/UL (ref 4.31–10.16)

## 2024-04-04 PROCEDURE — 82728 ASSAY OF FERRITIN: CPT

## 2024-04-04 PROCEDURE — 82306 VITAMIN D 25 HYDROXY: CPT

## 2024-04-04 PROCEDURE — 83540 ASSAY OF IRON: CPT

## 2024-04-04 PROCEDURE — 84134 ASSAY OF PREALBUMIN: CPT

## 2024-04-04 PROCEDURE — 80061 LIPID PANEL: CPT

## 2024-04-04 PROCEDURE — 36415 COLL VENOUS BLD VENIPUNCTURE: CPT

## 2024-04-04 PROCEDURE — 82378 CARCINOEMBRYONIC ANTIGEN: CPT

## 2024-04-04 PROCEDURE — 80053 COMPREHEN METABOLIC PANEL: CPT

## 2024-04-04 PROCEDURE — 83036 HEMOGLOBIN GLYCOSYLATED A1C: CPT

## 2024-04-04 PROCEDURE — 85025 COMPLETE CBC W/AUTO DIFF WBC: CPT

## 2024-04-17 DIAGNOSIS — I10 ESSENTIAL HYPERTENSION: ICD-10-CM

## 2024-04-23 ENCOUNTER — PATIENT OUTREACH (OUTPATIENT)
Dept: HEMATOLOGY ONCOLOGY | Facility: CLINIC | Age: 89
End: 2024-04-23

## 2024-04-26 ENCOUNTER — TELEMEDICINE (OUTPATIENT)
Facility: HOSPITAL | Age: 89
End: 2024-04-26
Attending: RADIOLOGY

## 2024-04-26 DIAGNOSIS — C34.92 SQUAMOUS CELL CARCINOMA OF LEFT LUNG (HCC): Primary | ICD-10-CM

## 2024-04-26 PROCEDURE — 99024 POSTOP FOLLOW-UP VISIT: CPT | Performed by: RADIOLOGY

## 2024-04-26 NOTE — PROGRESS NOTES
Virtual Brief Visit    Assessment/Plan:  Ms. Jiménez is a 89 year old woman with the following history:  Oncology History Overview Note   with a history of stage IIA pT3N0 colon adenocarcinoma status post right hemicolectomy who presented for evaluation of a left upper lobe lung nodule detected on postoperative CT chest/abdomen/pelvis. She has stage IA3 [id number] squamous cell carcinoma of the left upper lobe of the lung. She completed a course of SBRT to left lung on 3/29. She is being contacted today for first follow up.       Upcoming appointments  5/28/24 Dr. Reid     Primary adenocarcinoma of ascending colon (HCC)   3/7/2023 Initial Diagnosis    Primary adenocarcinoma of ascending colon (HCC)     4/13/2023 -  Cancer Staged    Staging form: Colon and Rectum, AJCC 8th Edition  - Pathologic stage from 4/13/2023: Stage IIA (pT3, pN0, cM0) - Signed by Efrem Valdes MD on 1/22/2024  Stage prefix: Initial diagnosis  Total positive nodes: 0       3/18/2024 - 3/29/2024 Radiation    Treatment:  Course: C1    Plan ID Energy Fractions Dose per Fraction (cGy) Dose Correction (cGy) Total Dose Delivered (cGy) Elapsed Days   HARRIS 6X-FFF 10 / 10 665 0 6,650 11      Treatment dates:  C1: 3/18/2024 - 3/29/2024     Squamous cell carcinoma of left lung (HCC)   1/4/2024 Initial Diagnosis    Non-small cell carcinoma of left lung (HCC)     1/4/2024 Biopsy    IR lung biopsy    A.  Lung, left upper lobe, biopsy:  Non-small cell carcinoma with squamous differentiation.      1/22/2024 -  Cancer Staged    Staging form: Lung, AJCC 8th Edition  - Clinical stage from 1/22/2024: Stage IA3 (cT1c, cN0, cM0) - Signed by Efrem Valdes MD on 1/22/2024  Stage prefix: Initial diagnosis       3/18/2024 - 3/29/2024 Radiation    Plan ID Energy Fractions Dose per Fraction (cGy) Dose Correction (cGy) Total Dose Delivered (cGy) Elapsed Days   HARRIS 6X-FFF 10 / 10 665 0 6,650 11      Treatment dates:  C1: 3/18/2024 - 3/29/2024       Upon  interview, she feels well.  She denies cough, chest pain or shortness of breath.  Fatigue is resolved.      CEA was elevated on recent lab work ordered by colorectal surgery.  She will review this at an upcoming appointment.  This is unrelated to radiation treatment.    Going forward, she will return in 2 months with CT chest.  She was encouraged to call with questions/concerns in the interim.  Problem List Items Addressed This Visit       Squamous cell carcinoma of left lung (HCC) - Primary       Recent Visits  No visits were found meeting these conditions.  Showing recent visits within past 7 days and meeting all other requirements  Future Appointments  No visits were found meeting these conditions.  Showing future appointments within next 150 days and meeting all other requirements         Visit Time  Total Visit Duration: 5 minutes

## 2024-05-13 DIAGNOSIS — I10 ESSENTIAL HYPERTENSION: ICD-10-CM

## 2024-05-14 RX ORDER — AMLODIPINE BESYLATE 5 MG/1
5 TABLET ORAL DAILY
Qty: 90 TABLET | Refills: 1 | Status: SHIPPED | OUTPATIENT
Start: 2024-05-14

## 2024-05-28 ENCOUNTER — OFFICE VISIT (OUTPATIENT)
Age: 89
End: 2024-05-28
Payer: MEDICARE

## 2024-05-28 VITALS
WEIGHT: 120 LBS | SYSTOLIC BLOOD PRESSURE: 140 MMHG | BODY MASS INDEX: 25.89 KG/M2 | HEIGHT: 57 IN | DIASTOLIC BLOOD PRESSURE: 60 MMHG

## 2024-05-28 DIAGNOSIS — Z90.49 S/P RIGHT HEMICOLECTOMY: ICD-10-CM

## 2024-05-28 DIAGNOSIS — C18.2 PRIMARY ADENOCARCINOMA OF ASCENDING COLON (HCC): Primary | ICD-10-CM

## 2024-05-28 PROCEDURE — 99215 OFFICE O/P EST HI 40 MIN: CPT | Performed by: COLON & RECTAL SURGERY

## 2024-05-28 NOTE — PATIENT INSTRUCTIONS
ASSESSMENT:    Marizol returns today, she is doing well 1 year status post laparoscopic right hemicolectomy, T3N0(0/16) lymph nodes, good result, Stage IIA ascending colon adenocarcinoma/cancer, 4/13/2023, negative margins, she will not require adjuvant chemotherapy.    In the interval she had some mild elevation of CEA, follow-up CT chest/abdomen/pelvis with lung nodule, this was worked up and found to have left upper lobe, squamous cell carcinoma- [id number], stage IA, this has been treated with SBRT, she has completed 10 treatments at this time.     In the interval she has done nicely, gaining weight, no troubles with diet or bowel movements, no pain or any complaints on today's visit, still elevation of CEA 4.6 despite no abdominopelvic findings on PET/CT within the last 6 months.     PLAN:  -CEA ordered for 3 months from now  -Office visit in 6 months  CC:  Dr. Finn Richmond

## 2024-05-28 NOTE — PROGRESS NOTES
Colon and Rectal Surgery   Marizol Jiménez 89 y.o. female MRN: [id number]   Encounter: [id number]  05/28/24   10:45 AM        ASSESSMENT:    Marizol returns today, she is doing well 1 year status post laparoscopic right hemicolectomy, T3N0(0/16) lymph nodes, good result, Stage IIA ascending colon adenocarcinoma/cancer, 4/13/2023, negative margins, she will not require adjuvant chemotherapy.    In the interval she had some mild elevation of CEA, follow-up CT chest/abdomen/pelvis with lung nodule, this was worked up and found to have left upper lobe, squamous cell carcinoma- [id number], stage IA, this has been treated with SBRT, she has completed 10 treatments at this time.     In the interval she has done nicely, gaining weight, no troubles with diet or bowel movements, no pain or any complaints on today's visit, still elevation of CEA 4.6 despite no abdominopelvic findings on PET/CT within the last 6 months.     PLAN:  -CEA ordered for 3 months from now  -Office visit in 6 months  CC:  Dr. Finn VIVEROS  Marizol Jiménez is a 89 y.o. female is here today for surveillance of T3N0(0/16) lymph nodes, Stage IIA ascending colon adenocarcinoma/cancer, no chemotherapy. Her last office visit was on 11/14/23.     Diagnosed with new primary lung cancer interim     She is status post right hemicolectomy for ascending colon cancer on 4/13/23     Lab Results   Component Value Date    CEA 4.6 (H) 04/04/2024      Lab Results   Component Value Date    WBC 5.52 04/04/2024    HGB 13.4 04/04/2024    HCT 43.2 04/04/2024    MCV 89 04/04/2024     04/04/2024      Lab Results   Component Value Date    SODIUM 143 04/04/2024    K 5.1 04/04/2024     04/04/2024    CO2 32 04/04/2024    AGAP 5 04/04/2024    BUN 22 04/04/2024    CREATININE 0.90 04/04/2024    GLUC 134 04/15/2023    GLUF 91 04/04/2024    CALCIUM 9.6 04/04/2024    AST 16 04/04/2024    ALT 16 04/04/2024    ALKPHOS 67 04/04/2024    TP 5.9 (L)  04/04/2024    TBILI 0.72 04/04/2024    EGFR 54 04/04/2024        Lab Results   Component Value Date    CEA 4.6 (H) 04/04/2024      Lab Results   Component Value Date    WBC 5.52 04/04/2024    HGB 13.4 04/04/2024    HCT 43.2 04/04/2024    MCV 89 04/04/2024     04/04/2024      Lab Results   Component Value Date    SODIUM 143 04/04/2024    K 5.1 04/04/2024     04/04/2024    CO2 32 04/04/2024    AGAP 5 04/04/2024    BUN 22 04/04/2024    CREATININE 0.90 04/04/2024    GLUC 134 04/15/2023    GLUF 91 04/04/2024    CALCIUM 9.6 04/04/2024    AST 16 04/04/2024    ALT 16 04/04/2024    ALKPHOS 67 04/04/2024    TP 5.9 (L) 04/04/2024    TBILI 0.72 04/04/2024    EGFR 54 04/04/2024          Historical Information   Past Medical History:   Diagnosis Date    BCC (basal cell carcinoma)     Diastolic dysfunction     Dyslipidemia     Dysphagia     Glaucoma     Hashimoto's thyroiditis     Hyperparathyroidism (HCC)     Hypertension     Impaired fasting glucose     Insomnia     Lung cancer (HCC)     Osteopenia     Peripheral arterial disease (HCC) 02/03/2015    S/P right hemicolectomy 04/13/2022    SCCA (squamous cell carcinoma) of skin      Past Surgical History:   Procedure Laterality Date    CATARACT EXTRACTION, BILATERAL      COLONOSCOPY      Complete, Onset - 10/25/05 - 10 years     ENDOBRONCHIAL ULTRASOUND (EBUS) N/A 2/12/2024    Procedure: ENDOBRONCHIAL ULTRASOUND (EBUS) with biopsies;  Surgeon: Miguel Richmond DO;  [facility];  Service: Thoracic    HYSTERECTOMY      IR BIOPSY LUNG  1/4/2024    PARATHYROIDECTOMY      1st 1985 and 2nd 2002    [place] BRNCHSC INCL FLUOR GDNCE DX W/CELL WASHG SPX N/A 2/12/2024    Procedure: BRONCHOSCOPY FLEXIBLE;  Surgeon: Miguel Richmond DO;  [facility];  Service: Thoracic    GA LAPAROSCOPY COLECTOMY PARTIAL W/ANASTOMOSIS N/A 4/13/2023    Procedure: RESECTION COLON RIGHT LAPAROSCOPIC;  Surgeon: Manny Reid MD;  [facility];  Service:  Colorectal    [place] NDSC WRST SURG W/RLS TRANSVRS CARPL LIGM Right 05/02/2016    Procedure: RELEASE CARPAL TUNNEL ENDOSCOPIC;  Surgeon: Alexandre Diaz MD;  Location: [facility];  Service: Orthopedics    TONSILLECTOMY         Meds/Allergies       Current Outpatient Medications:     amLODIPine (NORVASC) 5 mg tablet, TAKE 1 TABLET (5 MG TOTAL) BY MOUTH DAILY., Disp: 90 tablet, Rfl: 1    ASPIRIN 81 PO, Take by mouth in the morning, Disp: , Rfl:     atorvastatin (LIPITOR) 20 mg tablet, Take 20 mg by mouth daily, Disp: , Rfl:     Biotin 1000 MCG tablet, Take 5,000 mcg by mouth daily, Disp: , Rfl:     Calcium 500 MG tablet, Take 1 tablet by mouth 3 (three) times a week , Disp: , Rfl:     cholecalciferol (VITAMIN D3) 1,000 units tablet, Take 1 tablet by mouth daily, Disp: , Rfl:     dorzolamide-timolol (COSOPT) 22.3-6.8 MG/ML ophthalmic solution, Administer 1 drop to both eyes 2 (two) times a day, Disp: , Rfl:     ferrous sulfate 324 (65 Fe) mg, TAKE 1 TABLET BY MOUTH EVERY DAY BEFORE BREAKFAST (Patient not taking: Reported on 11/14/2023), Disp: 90 tablet, Rfl: 1    latanoprost (XALATAN) 0.005 % ophthalmic solution, Administer 1 drop into the left eye daily at bedtime (Patient not taking: Reported on 2/26/2024), Disp: , Rfl:     lisinopril (ZESTRIL) 10 mg tablet, TAKE 1 TABLET BY MOUTH EVERY DAY, Disp: 90 tablet, Rfl: 1    metoprolol tartrate (LOPRESSOR) 25 mg tablet, TAKE 1 TABLET BY MOUTH TWICE A DAY, Disp: 180 tablet, Rfl: 2    Multiple Vitamins-Minerals (CENTRUM SILVER ADULT 50+ PO), Take by mouth., Disp: , Rfl:     Multiple Vitamins-Minerals (PRESERVISION AREDS PO), Take 1 tablet by mouth 2 (two) times a day, Disp: , Rfl:     Omega-3 Fatty Acids (FISH OIL) 1,000 mg, Take 1,000 mg by mouth daily., Disp: , Rfl:     Synthroid 88 MCG tablet, Take 1 tablet (88 mcg total) by mouth daily, Disp: 90 tablet, Rfl: 3      Allergies   Allergen Reactions    Cyclogyl [Cyclopentolate Hcl]     Phenylephrine Other (See Comments)      "Eye gtts red eye infected sore    Pred Forte [Prednisolone Acetate]          Social History   Social History     Substance and Sexual Activity   Alcohol Use Yes    Comment: rarely, social      Social History     Substance and Sexual Activity   Drug Use No     Social History     Tobacco Use   Smoking Status Never   Smokeless Tobacco Never         Family History:   Family History   Problem Relation Age of Onset    Ovarian cancer Mother     Stroke Father         CVA    Melanoma Maternal Uncle        Review of Systems   Constitutional: Negative.    HENT: Negative.     Eyes: Negative.    Respiratory: Negative.     Cardiovascular: Negative.    Gastrointestinal: Negative.    Endocrine: Negative.    Genitourinary: Negative.    Musculoskeletal: Negative.    Skin: Negative.    Allergic/Immunologic: Negative.    Neurological: Negative.    Hematological: Negative.    Psychiatric/Behavioral: Negative.         Objective   Current Vitals:   Vitals:    05/28/24 1024   BP: 140/60   Weight: 54.4 kg (120 lb)   Height: 4' 9\" (1.448 m)         Physical Exam:  General:no distress  Eyes:perrla/eomi  Pulm:no increased work of breathing, clear bilateral  CV:sinus  Abdomen:soft,nontender  Extremities:no edema        "

## 2024-06-13 ENCOUNTER — APPOINTMENT (OUTPATIENT)
Dept: LAB | Facility: HOSPITAL | Age: 89
End: 2024-06-13
Payer: MEDICARE

## 2024-06-13 DIAGNOSIS — C34.92 SQUAMOUS CELL CARCINOMA OF LEFT LUNG (HCC): ICD-10-CM

## 2024-06-13 LAB
CREAT SERPL-MCNC: 0.9 MG/DL (ref 0.6–1.3)
GFR SERPL CREATININE-BSD FRML MDRD: 54 ML/MIN/1.73SQ M

## 2024-06-13 PROCEDURE — 36415 COLL VENOUS BLD VENIPUNCTURE: CPT

## 2024-06-13 PROCEDURE — 82565 ASSAY OF CREATININE: CPT

## 2024-06-17 ENCOUNTER — HOSPITAL ENCOUNTER (OUTPATIENT)
Dept: CT IMAGING | Facility: HOSPITAL | Age: 89
Discharge: HOME | End: 2024-06-17
Attending: RADIOLOGY
Payer: MEDICARE

## 2024-06-17 DIAGNOSIS — C34.92 SQUAMOUS CELL CARCINOMA OF LEFT LUNG (HCC): ICD-10-CM

## 2024-06-17 PROCEDURE — 71260 CT THORAX DX C+: CPT

## 2024-06-17 RX ADMIN — IOHEXOL 55 ML: 350 INJECTION, SOLUTION INTRAVENOUS at 10:24

## 2024-06-26 DIAGNOSIS — I10 ESSENTIAL HYPERTENSION: ICD-10-CM

## 2024-06-26 RX ORDER — LISINOPRIL 10 MG/1
TABLET ORAL
Qty: 90 TABLET | Refills: 1 | Status: SHIPPED | OUTPATIENT
Start: 2024-06-26

## 2024-07-01 ENCOUNTER — OFFICE VISIT (OUTPATIENT)
Facility: HOSPITAL | Age: 89
End: 2024-07-01
Attending: RADIOLOGY
Payer: MEDICARE

## 2024-07-01 VITALS
HEART RATE: 73 BPM | SYSTOLIC BLOOD PRESSURE: 138 MMHG | WEIGHT: 122 LBS | RESPIRATION RATE: 18 BRPM | OXYGEN SATURATION: 96 % | TEMPERATURE: 97.3 F | BODY MASS INDEX: 26.4 KG/M2 | DIASTOLIC BLOOD PRESSURE: 67 MMHG

## 2024-07-01 DIAGNOSIS — C34.92 SQUAMOUS CELL CARCINOMA OF LEFT LUNG (HCC): Primary | ICD-10-CM

## 2024-07-01 PROCEDURE — 99211 OFF/OP EST MAY X REQ PHY/QHP: CPT | Performed by: RADIOLOGY

## 2024-07-01 PROCEDURE — 99213 OFFICE O/P EST LOW 20 MIN: CPT | Performed by: RADIOLOGY

## 2024-07-01 NOTE — PROGRESS NOTES
Marizol Jiménez 10/4/1929 is a 89 y.o. female with a history of stage IIA pT3N0 colon adenocarcinoma status post right hemicolectomy who presented for evaluation of a left upper lobe lung nodule detected on postoperative CT chest/abdomen/pelvis. She has stage IA3 [id number] squamous cell carcinoma of the left upper lobe of the lung. She completed a course of SBRT to left lung on 3/29/24. She was last contacted on 4/26/24. She is being seen today for routine follow up.      5/28/24 Dr. Reid  Doing well, gaining weight no trouble with diet or bowel movement. No complaints on today's visit  CEA 4.6 on 4/4/24   CEA 3 months from now  Office visit in 3 months      6/17/24 CT Chest Thorax w con  IMPRESSION:  1.  Decrease in size of the left upper lobe lung malignancy with associated evolving post radiation changes as described. Continued attention on follow-up is recommended.  2.  Stable mildly prominent mediastinal lymph nodes, nonspecific. No new or progressive metastatic disease within the chest.  3.  Multinodular thyroid gland, better assessed on prior dedicated ultrasound.        Upcoming appointments:  8/5/24  thyroid  9/30/24 Dr. Yi  12/6/24 Dr. Reid      Follow up visit     Oncology History   Primary adenocarcinoma of ascending colon (HCC)   3/7/2023 Initial Diagnosis    Primary adenocarcinoma of ascending colon (HCC)     4/13/2023 -  Cancer Staged    Staging form: Colon and Rectum, AJCC 8th Edition  - Pathologic stage from 4/13/2023: Stage IIA (pT3, pN0, cM0) - Signed by Efrem Valdes MD on 1/22/2024  Stage prefix: Initial diagnosis  Total positive nodes: 0       3/18/2024 - 3/29/2024 Radiation    Treatment:  Course: C1    Plan ID Energy Fractions Dose per Fraction (cGy) Dose Correction (cGy) Total Dose Delivered (cGy) Elapsed Days   HARRIS 6X-FFF 10 / 10 665 0 6,650 11      Treatment dates:  C1: 3/18/2024 - 3/29/2024     Squamous cell carcinoma of left lung (HCC)   1/4/2024 Initial Diagnosis     Non-small cell carcinoma of left lung (HCC)     1/4/2024 Biopsy    IR lung biopsy    A.  Lung, left upper lobe, biopsy:  Non-small cell carcinoma with squamous differentiation.      1/22/2024 -  Cancer Staged    Staging form: Lung, AJCC 8th Edition  - Clinical stage from 1/22/2024: Stage IA3 (cT1c, cN0, cM0) - Signed by Efrem Valdes MD on 1/22/2024  Stage prefix: Initial diagnosis       3/18/2024 - 3/29/2024 Radiation    Plan ID Energy Fractions Dose per Fraction (cGy) Dose Correction (cGy) Total Dose Delivered (cGy) Elapsed Days   HARRIS 6X-FFF 10 / 10 665 0 6,650 11      Treatment dates:  C1: 3/18/2024 - 3/29/2024         Review of Systems:  Review of Systems   Constitutional: Negative.    HENT:  Positive for hearing loss (bilateral hearing aides).    Eyes: Negative.         Wears glasses   Respiratory:  Positive for cough (mild in am- non productive. Reports chronic). Negative for shortness of breath.    Cardiovascular: Negative.    Gastrointestinal: Negative.    Endocrine: Negative.    Genitourinary: Negative.    Musculoskeletal: Negative.    Skin: Negative.    Allergic/Immunologic: Negative.    Neurological: Negative.    Hematological: Negative.    Psychiatric/Behavioral: Negative.             Health Maintenance   Topic Date Due    COVID-19 Vaccine (6 - 2023-24 season) 03/27/2024    Influenza Vaccine (1) 09/01/2024    Fall Risk  09/22/2024    Medicare Annual Wellness Visit (AWV)  09/22/2024    BMI: Followup Plan  09/22/2024    Urinary Incontinence Screening  09/22/2024    BMI: Adult  05/28/2025    Depression Screening  07/01/2025    RSV Vaccine Age 60+ Years  Completed    Zoster Vaccine  Completed    Pneumococcal Vaccine: 65+ Years  Completed    RSV Vaccine age 0-20 Months  Aged Out    HIB Vaccine  Aged Out    IPV Vaccine  Aged Out    Hepatitis A Vaccine  Aged Out    Meningococcal ACWY Vaccine  Aged Out    HPV Vaccine  Aged Out     Patient Active Problem List   Diagnosis    De Quervain's tenosynovitis, right     Diastolic dysfunction    Dyslipidemia    Esophageal reflux    Glaucoma    Hypertension    Impaired fasting glucose    Insomnia    Left ventricular hypertrophy    Mixed urge and stress incontinence    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 tuberculosis    Thyroid nodule    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)    Hyperparathyroidism (HCC)    Hypothyroidism    Stage 3 chronic kidney disease, unspecified whether stage 3a or 3b CKD (HCC)    Primary adenocarcinoma of ascending colon (HCC)    Hypoalbuminemia    Anemia    Iron deficiency anemia    Complex renal cyst    Status post parathyroidectomy    Squamous cell carcinoma of left lung (HCC)    S/P right hemicolectomy     Past Medical History:   Diagnosis Date    BCC (basal cell carcinoma)     Diastolic dysfunction     Dyslipidemia     Dysphagia     Glaucoma     Hashimoto's thyroiditis     Hyperparathyroidism (HCC)     Hypertension     Impaired fasting glucose     Insomnia     Lung cancer (HCC)     Osteopenia     Peripheral arterial disease (HCC) 02/03/2015    S/P right hemicolectomy 04/13/2022    SCCA (squamous cell carcinoma) of skin      Past Surgical History:   Procedure Laterality Date    CATARACT EXTRACTION, BILATERAL      COLONOSCOPY      Complete, Onset - 10/25/05 - 10 years     ENDOBRONCHIAL ULTRASOUND (EBUS) N/A 2/12/2024    Procedure: ENDOBRONCHIAL ULTRASOUND (EBUS) with biopsies;  Surgeon: Miguel Richmond DO;  [facility];  Service: Thoracic    HYSTERECTOMY      IR BIOPSY LUNG  1/4/2024    PARATHYROIDECTOMY      1st 1985 and 2nd 2002    [place] BRNCHSC INCL FLUOR GDNCE DX W/CELL WASHG SPX N/A 2/12/2024    Procedure: BRONCHOSCOPY FLEXIBLE;  Surgeon: Miguel Richmond DO;  [facility];  Service: Thoracic    [place] LAPAROSCOPY COLECTOMY PARTIAL W/ANASTOMOSIS N/A 4/13/2023    Procedure: RESECTION COLON RIGHT LAPAROSCOPIC;  Surgeon: Manny Reid MD;  [facility];  Service: Colorectal    [place]  NDSC WRST SURG W/RLS TRANSVRS CARPL LIGM Right 05/02/2016    Procedure: RELEASE CARPAL TUNNEL ENDOSCOPIC;  Surgeon: Alexandre Diaz MD;  Location: [facility] OR;  Service: Orthopedics    TONSILLECTOMY       Family History   Problem Relation Age of Onset    Ovarian cancer Mother     Stroke Father         CVA    Melanoma Maternal Uncle      Social History     Socioeconomic History    Marital status: Single     Spouse name: Not on file    Number of children: Not on file    Years of education: Not on file    Highest education level: Not on file   Occupational History    Occupation: Retired   Tobacco Use    Smoking status: Never    Smokeless tobacco: Never   Vaping Use    Vaping status: Never Used   Substance and Sexual Activity    Alcohol use: Yes     Comment: rarely, social     Drug use: No    Sexual activity: Not on file   Other Topics Concern    Not on file   Social History Narrative     per [employer]      Social Determinants of Health     Financial Resource Strain: Low Risk  (9/22/2023)    Overall Financial Resource Strain (CARDIA)     Difficulty of Paying Living Expenses: Not hard at all   Food Insecurity: No Food Insecurity (4/14/2023)    Hunger Vital Sign     Worried About Running Out of Food in the Last Year: Never true     Ran Out of Food in the Last Year: Never true   Transportation Needs: No Transportation Needs (9/22/2023)    PRAPARE - Transportation     Lack of Transportation (Medical): No     Lack of Transportation (Non-Medical): No   Physical Activity: Not on file   Stress: Not on file   Social Connections: Not on file   Intimate Partner Violence: Not on file   Housing Stability: Unknown (4/14/2023)    Housing Stability Vital Sign     Unable to Pay for Housing in the Last Year: No     Number of Places Lived in the Last Year: Not on file     Unstable Housing in the Last Year: No       Current Outpatient Medications:     amLODIPine (NORVASC) 5 mg tablet, TAKE 1 TABLET (5 MG TOTAL) BY MOUTH DAILY.,  Disp: 90 tablet, Rfl: 1    ASPIRIN 81 PO, Take 1 tablet by mouth every other day, Disp: , Rfl:     atorvastatin (LIPITOR) 20 mg tablet, Take 20 mg by mouth daily, Disp: , Rfl:     Biotin 1000 MCG tablet, Take 5,000 mcg by mouth daily, Disp: , Rfl:     Calcium 500 MG tablet, Take 1 tablet by mouth 3 (three) times a week , Disp: , Rfl:     cholecalciferol (VITAMIN D3) 1,000 units tablet, Take 1 tablet by mouth daily, Disp: , Rfl:     dorzolamide-timolol (COSOPT) 22.3-6.8 MG/ML ophthalmic solution, Administer 1 drop to both eyes 2 (two) times a day, Disp: , Rfl:     latanoprost (XALATAN) 0.005 % ophthalmic solution, Administer 1 drop into the left eye daily at bedtime, Disp: , Rfl:     lisinopril (ZESTRIL) 10 mg tablet, TAKE 1 TABLET BY MOUTH EVERY DAY, Disp: 90 tablet, Rfl: 1    metoprolol tartrate (LOPRESSOR) 25 mg tablet, TAKE 1 TABLET BY MOUTH TWICE A DAY, Disp: 180 tablet, Rfl: 2    Multiple Vitamins-Minerals (CENTRUM SILVER ADULT 50+ PO), Take by mouth., Disp: , Rfl:     Multiple Vitamins-Minerals (PRESERVISION AREDS PO), Take 1 tablet by mouth 2 (two) times a day, Disp: , Rfl:     Omega-3 Fatty Acids (FISH OIL) 1,000 mg, Take 1,000 mg by mouth daily., Disp: , Rfl:     Synthroid 88 MCG tablet, Take 1 tablet (88 mcg total) by mouth daily, Disp: 90 tablet, Rfl: 3    ferrous sulfate 324 (65 Fe) mg, TAKE 1 TABLET BY MOUTH EVERY DAY BEFORE BREAKFAST (Patient not taking: Reported on 11/14/2023), Disp: 90 tablet, Rfl: 1  Allergies   Allergen Reactions    Cyclogyl [Cyclopentolate Hcl]     Phenylephrine Other (See Comments)     Eye gtts red eye infected sore    Pred Forte [Prednisolone Acetate]      Vitals:    07/01/24 1439   BP: 138/67   Pulse: 73   Resp: 18   Temp: (!) 97.3 °F (36.3 °C)   TempSrc: Temporal   SpO2: 96%   Weight: 55.3 kg (122 lb)      Pain Score: 0-No pain

## 2024-07-01 NOTE — PROGRESS NOTES
Follow-up - Radiation Oncology   Marizol Jiménez 10/4/1929 89 y.o. female [id number]      History of Present Illness   Cancer Staging   Primary adenocarcinoma of ascending colon (HCC)  Staging form: Colon and Rectum, AJCC 8th Edition  - Pathologic stage from 4/13/2023: Stage IIA (pT3, pN0, cM0) - Signed by Efrem Valdes MD on 1/22/2024  Stage prefix: Initial diagnosis  Total positive nodes: 0    Squamous cell carcinoma of left lung (HCC)  Staging form: Lung, AJCC 8th Edition  - Clinical stage from 1/22/2024: Stage IA3 (cT1c, cN0, cM0) - Signed by Efrem Valdes MD on 1/22/2024  Stage prefix: Initial diagnosis      Marizol Jiménez is a 89 y.o. 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       Interval History:  5/28/24 Dr. Reid  Doing well, gaining weight no trouble with diet or bowel movement. No complaints on today's visit  CEA 4.6 on 4/4/24   CEA 3 months from now  Office visit in 3 months        6/17/24 CT Chest Thorax w con  IMPRESSION:  1.  Decrease in size of the left upper lobe lung malignancy with associated evolving post radiation changes as described. Continued attention on follow-up is recommended.  2.  Stable mildly prominent mediastinal lymph nodes, nonspecific. No new or progressive metastatic disease within the chest.  3.  Multinodular thyroid gland, better assessed on prior dedicated ultrasound.           Upcoming appointments:  8/5/24  thyroid  9/30/24 Dr. Yi  12/6/24 Dr. Reid    Upon interview, she endorses the above history.  She has stable mild cough.  She denies worsening shortness of breath, fevers or chills.  She is without additional acute concerns.    Historical Information   Oncology History   Primary adenocarcinoma of ascending colon (HCC)   3/7/2023 Initial Diagnosis    Primary adenocarcinoma of ascending colon (HCC)     4/13/2023 -  Cancer Staged    Staging form:  Colon and Rectum, AJCC 8th Edition  - Pathologic stage from 4/13/2023: Stage IIA (pT3, pN0, cM0) - Signed by Efrem Valdes MD on 1/22/2024  Stage prefix: Initial diagnosis  Total positive nodes: 0       3/18/2024 - 3/29/2024 Radiation    Treatment:  Course: C1    Plan ID Energy Fractions Dose per Fraction (cGy) Dose Correction (cGy) Total Dose Delivered (cGy) Elapsed Days   HARRIS 6X-FFF 10 / 10 665 0 6,650 11      Treatment dates:  C1: 3/18/2024 - 3/29/2024     Squamous cell carcinoma of left lung (HCC)   1/4/2024 Initial Diagnosis    Non-small cell carcinoma of left lung (HCC)     1/4/2024 Biopsy    IR lung biopsy    A.  Lung, left upper lobe, biopsy:  Non-small cell carcinoma with squamous differentiation.      1/22/2024 -  Cancer Staged    Staging form: Lung, AJCC 8th Edition  - Clinical stage from 1/22/2024: Stage IA3 (cT1c, cN0, cM0) - Signed by Efrem Valdes MD on 1/22/2024  Stage prefix: Initial diagnosis       3/18/2024 - 3/29/2024 Radiation    Plan ID Energy Fractions Dose per Fraction (cGy) Dose Correction (cGy) Total Dose Delivered (cGy) Elapsed Days   HARRIS 6X-FFF 10 / 10 665 0 6,650 11      Treatment dates:  C1: 3/18/2024 - 3/29/2024         Past Medical History:   Diagnosis Date    BCC (basal cell carcinoma)     Diastolic dysfunction     Dyslipidemia     Dysphagia     Glaucoma     Hashimoto's thyroiditis     Hyperparathyroidism (HCC)     Hypertension     Impaired fasting glucose     Insomnia     Lung cancer (HCC)     Osteopenia     Peripheral arterial disease (HCC) 02/03/2015    S/P right hemicolectomy 04/13/2022    SCCA (squamous cell carcinoma) of skin      Past Surgical History:   Procedure Laterality Date    CATARACT EXTRACTION, BILATERAL      COLONOSCOPY      Complete, Onset - 10/25/05 - 10 years     ENDOBRONCHIAL ULTRASOUND (EBUS) N/A 2/12/2024    Procedure: ENDOBRONCHIAL ULTRASOUND (EBUS) with biopsies;  Surgeon: Miguel Richmond DO;  Location: [facility];  Service: Thoracic     HYSTERECTOMY      IR BIOPSY LUNG  1/4/2024    PARATHYROIDECTOMY      1st 1985 and 2nd 2002    MA [facility] INCL FLUOR GDNCE DX W/CELL WASHG SPX N/A 2/12/2024    Procedure: BRONCHOSCOPY FLEXIBLE;  Surgeon: Miguel Richmond DO;  Location: [facility];  Service: Thoracic    MA LAPAROSCOPY COLECTOMY PARTIAL W/ANASTOMOSIS N/A 4/13/2023    Procedure: RESECTION COLON RIGHT LAPAROSCOPIC;  Surgeon: Manny Reid MD;  Location: [facility];  Service: Colorectal    MA NDSC WRST SURG W/RLS TRANSVRS CARPL LIGM Right 05/02/2016    Procedure: RELEASE CARPAL TUNNEL ENDOSCOPIC;  Surgeon: Alexandre Diaz MD;  Location: [facility];  Service: Orthopedics    TONSILLECTOMY         Social History   Social History     Substance and Sexual Activity   Alcohol Use Yes    Comment: rarely, social      Social History     Substance and Sexual Activity   Drug Use No     Social History     Tobacco Use   Smoking Status Never   Smokeless Tobacco Never         Meds/Allergies     Current Outpatient Medications:     amLODIPine (NORVASC) 5 mg tablet, TAKE 1 TABLET (5 MG TOTAL) BY MOUTH DAILY., Disp: 90 tablet, Rfl: 1    ASPIRIN 81 PO, Take 1 tablet by mouth every other day, Disp: , Rfl:     atorvastatin (LIPITOR) 20 mg tablet, Take 20 mg by mouth daily, Disp: , Rfl:     Biotin 1000 MCG tablet, Take 5,000 mcg by mouth daily, Disp: , Rfl:     Calcium 500 MG tablet, Take 1 tablet by mouth 3 (three) times a week , Disp: , Rfl:     cholecalciferol (VITAMIN D3) 1,000 units tablet, Take 1 tablet by mouth daily, Disp: , Rfl:     dorzolamide-timolol (COSOPT) 22.3-6.8 MG/ML ophthalmic solution, Administer 1 drop to both eyes 2 (two) times a day, Disp: , Rfl:     latanoprost (XALATAN) 0.005 % ophthalmic solution, Administer 1 drop into the left eye daily at bedtime, Disp: , Rfl:     lisinopril (ZESTRIL) 10 mg tablet, TAKE 1 TABLET BY MOUTH EVERY DAY, Disp: 90 tablet, Rfl: 1    metoprolol tartrate (LOPRESSOR) 25 mg tablet, TAKE 1 TABLET BY MOUTH TWICE A  DAY, Disp: 180 tablet, Rfl: 2    Multiple Vitamins-Minerals (CENTRUM SILVER ADULT 50+ PO), Take by mouth., Disp: , Rfl:     Multiple Vitamins-Minerals (PRESERVISION AREDS PO), Take 1 tablet by mouth 2 (two) times a day, Disp: , Rfl:     Omega-3 Fatty Acids (FISH OIL) 1,000 mg, Take 1,000 mg by mouth daily., Disp: , Rfl:     Synthroid 88 MCG tablet, Take 1 tablet (88 mcg total) by mouth daily, Disp: 90 tablet, Rfl: 3    ferrous sulfate 324 (65 Fe) mg, TAKE 1 TABLET BY MOUTH EVERY DAY BEFORE BREAKFAST (Patient not taking: Reported on 11/14/2023), Disp: 90 tablet, Rfl: 1  Allergies   Allergen Reactions    Cyclogyl [Cyclopentolate Hcl]     Phenylephrine Other (See Comments)     Eye gtts red eye infected sore    Pred Forte [Prednisolone Acetate]          Review of Systems  Constitutional: Negative.    HENT:  Positive for hearing loss (bilateral hearing aides).    Eyes: Negative.         Wears glasses   Respiratory:  Positive for cough (mild in am- non productive. Reports chronic). Negative for shortness of breath.    Cardiovascular: Negative.    Gastrointestinal: Negative.    Endocrine: Negative.    Genitourinary: Negative.    Musculoskeletal: Negative.    Skin: Negative.    Allergic/Immunologic: Negative.    Neurological: Negative.    Hematological: Negative.    Psychiatric/Behavioral: Negative.         OBJECTIVE:   /67   Pulse 73   Temp (!) 97.3 °F (36.3 °C) (Temporal)   Resp 18   Wt 55.3 kg (122 lb)   SpO2 96%   BMI 26.40 kg/m²   Pain Assessment:  0  Karnofsky: 90 - Able to carry on normal activity; minor signs or symptoms of disease     Physical Exam  HENT:      Head: Normocephalic and atraumatic.   Eyes:      General: No scleral icterus.     Extraocular Movements: Extraocular movements intact.   Cardiovascular:      Rate and Rhythm: Normal rate.   Pulmonary:      Effort: Pulmonary effort is normal.   Abdominal:      General: Abdomen is flat. There is no distension.   Musculoskeletal:         General:  Normal range of motion.      Cervical back: Normal range of motion.   Skin:     General: Skin is warm and dry.   Neurological:      General: No focal deficit present.      Mental Status: She is alert.   Psychiatric:         Mood and Affect: Mood normal.        RESULTS    Lab Results:   Recent Results (from the past 672 hour(s))   Creatinine, serum    Collection Time: 06/13/24 10:24 AM   Result Value Ref Range    Creatinine 0.90 0.60 - 1.30 mg/dL    eGFR 54 ml/min/1.73sq m       Imaging Studies:CT chest w contrast    Result Date: 6/24/2024  Narrative: CT CHEST WITH IV CONTRAST INDICATION: C34.92: Malignant neoplasm of unspecified part of left bronchus or lung. Squamous cell carcinoma of the left upper lobe status post SBRT 3/29/2024. Also with history of colon cancer status post right hemicolectomy. COMPARISON: PET/CT 12/26/2023. CT chest/abdomen/pelvis 11/22/2023. TECHNIQUE: CT examination of the chest was performed. Multiplanar 2D reformatted images were created from the source data. This examination, like all CT scans performed in the [facility], was performed utilizing techniques to minimize radiation dose exposure, including the use of iterative reconstruction and automated exposure control. Radiation dose length product (DLP) for this visit: 200.48 mGy-cm IV Contrast: 55 mL of iohexol (OMNIPAQUE) FINDINGS: LUNGS: Decrease in size of the spiculated left upper lobe nodule now measuring 1.8 x 2.5 x 1.7 cm (3/59; 601/105), previously 2.8 x 2.4 x 2.6 cm, in keeping with biopsy-proven lung malignancy status post radiation. Area of bandlike consolidation and fissural retraction posterior to the nodule involving the left lower lobe is favored to reflect evolving postradiation changes. Attention on follow-up imaging is recommended. Additional consolidation and groundglass opacities within the anterior left upper lobe also reflects sequelae of radiation. No new suspicious pulmonary nodules. No tracheal  "or endobronchial lesion.. PLEURA: Trace left pleural fluid. No pneumothorax. HEART/GREAT VESSELS: Heart is unremarkable for patient's age. No thoracic aortic aneurysm. Coronary artery calcifications. MEDIASTINUM AND RAISA: Large hiatal hernia. No new or progressive thoracic lymphadenopathy. Mildly prominent mediastinal lymph nodes, stable from prior studies. CHEST WALL AND LOWER NECK: Redemonstrated thyroid nodules, better assessed on prior dedicated ultrasound. VISUALIZED STRUCTURES IN THE UPPER ABDOMEN: Cholelithiasis. Partially imaged postsurgical changes of right hemicolectomy. OSSEOUS STRUCTURES: No acute fracture or destructive osseous lesion. Post radiation changes involving anterior left ribs. Degenerative changes of the spine.     Impression: 1.  Decrease in size of the left upper lobe lung malignancy with associated evolving post radiation changes as described. Continued attention on follow-up is recommended. 2.  Stable mildly prominent mediastinal lymph nodes, nonspecific. No new or progressive metastatic disease within the chest. 3.  Multinodular thyroid gland, better assessed on prior dedicated ultrasound. Workstation performed: [id number]           Assessment/Plan:  Orders Placed This Encounter   Procedures    CT chest wo contrast        Marizol Jiménez is a 89 y.o. female with stage IA3 [id number] non-small cell carcinoma of the lung who is seen in follow up status post 10 fraction SBRT (3/18/24 - 3/29/24).     She is without significant treatment related toxicity and imaging demonstrates evolving post radiation changes.    Going forward, CT chest will be repeated in 3 months.  She was encouraged to call with questions/concerns.    Efrem Valdes MD  7/1/2024,2:52 PM    Portions of the record may have been created with voice recognition software.  Occasional wrong word or \"sound a like\" substitutions may have occurred due to the inherent limitations of voice recognition software.  Read the chart " carefully and recognize, using context, where substitutions have occurred.

## 2024-07-31 ENCOUNTER — TELEPHONE (OUTPATIENT)
Age: 89
End: 2024-07-31

## 2024-07-31 NOTE — TELEPHONE ENCOUNTER
Rec'd call from patient requesting to schedule a new patient appointment in [place]. I explained to her that our [place] location is currently closed and offered to check availability for our [place] or other locations. Patient declined scheduling at this time.

## 2024-08-05 ENCOUNTER — HOSPITAL ENCOUNTER (OUTPATIENT)
Dept: ULTRASOUND IMAGING | Facility: HOSPITAL | Age: 89
Discharge: HOME | End: 2024-08-05
Attending: INTERNAL MEDICINE
Payer: MEDICARE

## 2024-08-05 DIAGNOSIS — E03.8 HYPOTHYROIDISM DUE TO HASHIMOTO'S THYROIDITIS: ICD-10-CM

## 2024-08-05 DIAGNOSIS — E06.3 HYPOTHYROIDISM DUE TO HASHIMOTO'S THYROIDITIS: ICD-10-CM

## 2024-08-05 DIAGNOSIS — E04.1 THYROID NODULE: ICD-10-CM

## 2024-08-05 PROCEDURE — 76536 US EXAM OF HEAD AND NECK: CPT

## 2024-08-26 DIAGNOSIS — E03.8 HYPOTHYROIDISM DUE TO HASHIMOTO'S THYROIDITIS: ICD-10-CM

## 2024-08-26 DIAGNOSIS — E06.3 HYPOTHYROIDISM DUE TO HASHIMOTO'S THYROIDITIS: ICD-10-CM

## 2024-08-27 RX ORDER — LEVOTHYROXINE SODIUM 88 MCG
88 TABLET ORAL DAILY
Qty: 90 TABLET | Refills: 1 | Status: SHIPPED | OUTPATIENT
Start: 2024-08-27

## 2024-08-28 ENCOUNTER — APPOINTMENT (OUTPATIENT)
Dept: LAB | Facility: HOSPITAL | Age: 89
End: 2024-08-28
Payer: MEDICARE

## 2024-08-28 DIAGNOSIS — Z90.89 STATUS POST PARATHYROIDECTOMY: ICD-10-CM

## 2024-08-28 DIAGNOSIS — Z98.890 STATUS POST PARATHYROIDECTOMY: ICD-10-CM

## 2024-08-28 DIAGNOSIS — E04.1 THYROID NODULE: ICD-10-CM

## 2024-08-28 DIAGNOSIS — E03.8 HYPOTHYROIDISM DUE TO HASHIMOTO'S THYROIDITIS: ICD-10-CM

## 2024-08-28 DIAGNOSIS — C18.2 PRIMARY ADENOCARCINOMA OF ASCENDING COLON (HCC): ICD-10-CM

## 2024-08-28 DIAGNOSIS — E21.3 HYPERPARATHYROIDISM (HCC): ICD-10-CM

## 2024-08-28 DIAGNOSIS — Z90.49 S/P RIGHT HEMICOLECTOMY: ICD-10-CM

## 2024-08-28 DIAGNOSIS — E06.3 HYPOTHYROIDISM DUE TO HASHIMOTO'S THYROIDITIS: ICD-10-CM

## 2024-08-28 LAB
ALBUMIN SERPL BCG-MCNC: 3.7 G/DL (ref 3.5–5)
ALP SERPL-CCNC: 66 U/L (ref 34–104)
ALT SERPL W P-5'-P-CCNC: 12 U/L (ref 7–52)
ANION GAP SERPL CALCULATED.3IONS-SCNC: 7 MMOL/L (ref 4–13)
AST SERPL W P-5'-P-CCNC: 13 U/L (ref 13–39)
BASOPHILS # BLD AUTO: 0.06 THOUSANDS/ÂΜL (ref 0–0.1)
BASOPHILS NFR BLD AUTO: 1 % (ref 0–1)
BILIRUB SERPL-MCNC: 0.57 MG/DL (ref 0.2–1)
BUN SERPL-MCNC: 31 MG/DL (ref 5–25)
CALCIUM SERPL-MCNC: 9.9 MG/DL (ref 8.4–10.2)
CEA SERPL-MCNC: 2.5 NG/ML (ref 0–3)
CHLORIDE SERPL-SCNC: 109 MMOL/L (ref 96–108)
CO2 SERPL-SCNC: 28 MMOL/L (ref 21–32)
CREAT SERPL-MCNC: 1.04 MG/DL (ref 0.6–1.3)
EOSINOPHIL # BLD AUTO: 0.3 THOUSAND/ÂΜL (ref 0–0.61)
EOSINOPHIL NFR BLD AUTO: 6 % (ref 0–6)
ERYTHROCYTE [DISTWIDTH] IN BLOOD BY AUTOMATED COUNT: 19.7 % (ref 11.6–15.1)
GFR SERPL CREATININE-BSD FRML MDRD: 46 ML/MIN/1.73SQ M
GLUCOSE P FAST SERPL-MCNC: 94 MG/DL (ref 65–99)
HCT VFR BLD AUTO: 41.4 % (ref 34.8–46.1)
HGB BLD-MCNC: 12.6 G/DL (ref 11.5–15.4)
IMM GRANULOCYTES # BLD AUTO: 0.02 THOUSAND/UL (ref 0–0.2)
IMM GRANULOCYTES NFR BLD AUTO: 0 % (ref 0–2)
LYMPHOCYTES # BLD AUTO: 0.89 THOUSANDS/ÂΜL (ref 0.6–4.47)
LYMPHOCYTES NFR BLD AUTO: 17 % (ref 14–44)
MCH RBC QN AUTO: 25.5 PG (ref 26.8–34.3)
MCHC RBC AUTO-ENTMCNC: 30.4 G/DL (ref 31.4–37.4)
MCV RBC AUTO: 84 FL (ref 82–98)
MONOCYTES # BLD AUTO: 0.55 THOUSAND/ÂΜL (ref 0.17–1.22)
MONOCYTES NFR BLD AUTO: 11 % (ref 4–12)
NEUTROPHILS # BLD AUTO: 3.33 THOUSANDS/ÂΜL (ref 1.85–7.62)
NEUTS SEG NFR BLD AUTO: 65 % (ref 43–75)
NRBC BLD AUTO-RTO: 0 /100 WBCS
PHOSPHATE SERPL-MCNC: 3.7 MG/DL (ref 2.3–4.1)
PLATELET # BLD AUTO: 225 THOUSANDS/UL (ref 149–390)
PMV BLD AUTO: 11.4 FL (ref 8.9–12.7)
POTASSIUM SERPL-SCNC: 5 MMOL/L (ref 3.5–5.3)
PROT SERPL-MCNC: 6.3 G/DL (ref 6.4–8.4)
PTH-INTACT SERPL-MCNC: 63.4 PG/ML (ref 12–88)
RBC # BLD AUTO: 4.95 MILLION/UL (ref 3.81–5.12)
SODIUM SERPL-SCNC: 144 MMOL/L (ref 135–147)
T4 FREE SERPL-MCNC: 1.34 NG/DL (ref 0.61–1.12)
TSH SERPL DL<=0.05 MIU/L-ACNC: 0.64 UIU/ML (ref 0.45–4.5)
WBC # BLD AUTO: 5.15 THOUSAND/UL (ref 4.31–10.16)

## 2024-08-28 PROCEDURE — 83970 ASSAY OF PARATHORMONE: CPT

## 2024-08-28 PROCEDURE — 80053 COMPREHEN METABOLIC PANEL: CPT

## 2024-08-28 PROCEDURE — 84439 ASSAY OF FREE THYROXINE: CPT

## 2024-08-28 PROCEDURE — 84443 ASSAY THYROID STIM HORMONE: CPT

## 2024-08-28 PROCEDURE — 85025 COMPLETE CBC W/AUTO DIFF WBC: CPT

## 2024-08-28 PROCEDURE — 82378 CARCINOEMBRYONIC ANTIGEN: CPT

## 2024-08-28 PROCEDURE — 84100 ASSAY OF PHOSPHORUS: CPT

## 2024-08-28 PROCEDURE — 36415 COLL VENOUS BLD VENIPUNCTURE: CPT

## 2024-09-11 ENCOUNTER — RA CDI HCC (OUTPATIENT)
Dept: OTHER | Facility: HOSPITAL | Age: 89
End: 2024-09-11

## 2024-09-25 ENCOUNTER — HOSPITAL ENCOUNTER (OUTPATIENT)
Dept: CT IMAGING | Facility: HOSPITAL | Age: 89
Discharge: HOME | End: 2024-09-25
Attending: RADIOLOGY
Payer: MEDICARE

## 2024-09-25 DIAGNOSIS — C34.92 SQUAMOUS CELL CARCINOMA OF LEFT LUNG (HCC): ICD-10-CM

## 2024-09-25 PROCEDURE — 71250 CT THORAX DX C-: CPT

## 2024-09-26 ENCOUNTER — OFFICE VISIT (OUTPATIENT)
Dept: FAMILY MEDICINE CLINIC | Facility: HOSPITAL | Age: 89
End: 2024-09-26
Payer: MEDICARE

## 2024-09-26 VITALS
BODY MASS INDEX: 26.54 KG/M2 | WEIGHT: 123 LBS | SYSTOLIC BLOOD PRESSURE: 128 MMHG | HEART RATE: 66 BPM | HEIGHT: 57 IN | TEMPERATURE: 97.9 F | OXYGEN SATURATION: 97 % | DIASTOLIC BLOOD PRESSURE: 70 MMHG

## 2024-09-26 DIAGNOSIS — R73.01 IMPAIRED FASTING GLUCOSE: ICD-10-CM

## 2024-09-26 DIAGNOSIS — E04.1 THYROID NODULE: ICD-10-CM

## 2024-09-26 DIAGNOSIS — Z00.00 MEDICARE ANNUAL WELLNESS VISIT, SUBSEQUENT: ICD-10-CM

## 2024-09-26 DIAGNOSIS — C34.92 SQUAMOUS CELL CARCINOMA OF LEFT LUNG (HCC): ICD-10-CM

## 2024-09-26 DIAGNOSIS — C18.2 PRIMARY ADENOCARCINOMA OF ASCENDING COLON (HCC): Primary | ICD-10-CM

## 2024-09-26 DIAGNOSIS — N39.41 URGE URINARY INCONTINENCE: ICD-10-CM

## 2024-09-26 DIAGNOSIS — I10 PRIMARY HYPERTENSION: ICD-10-CM

## 2024-09-26 DIAGNOSIS — E78.5 DYSLIPIDEMIA: ICD-10-CM

## 2024-09-26 DIAGNOSIS — Z23 ENCOUNTER FOR IMMUNIZATION: ICD-10-CM

## 2024-09-26 PROCEDURE — 99214 OFFICE O/P EST MOD 30 MIN: CPT | Performed by: INTERNAL MEDICINE

## 2024-09-26 PROCEDURE — G0439 PPPS, SUBSEQ VISIT: HCPCS | Performed by: INTERNAL MEDICINE

## 2024-09-26 PROCEDURE — G0008 ADMIN INFLUENZA VIRUS VAC: HCPCS

## 2024-09-26 PROCEDURE — 90662 IIV NO PRSV INCREASED AG IM: CPT

## 2024-09-26 NOTE — ASSESSMENT & PLAN NOTE
S/p radiation tx, following with radiation oncology, CT scan done yesterday and results pending, con't imaging and follow up as per specialist  Orders:    CBC and differential; Future    Comprehensive metabolic panel; Future    Lipid panel; Future    Iron Panel (Includes Ferritin, Iron Sat%, Iron, and TIBC); Future    Hemoglobin A1C; Future

## 2024-09-26 NOTE — ASSESSMENT & PLAN NOTE
BW annually - order given for April 25, will follow  Orders:    CBC and differential; Future    Comprehensive metabolic panel; Future    Lipid panel; Future    Iron Panel (Includes Ferritin, Iron Sat%, Iron, and TIBC); Future    Hemoglobin A1C; Future

## 2024-09-26 NOTE — PATIENT INSTRUCTIONS
Medicare Preventive Visit Patient Instructions  Thank you for completing your Welcome to Medicare Visit or Medicare Annual Wellness Visit today. Your next wellness visit will be due in one year (9/27/2025).  The screening/preventive services that you may require over the next 5-10 years are detailed below. Some tests may not apply to you based off risk factors and/or age. Screening tests ordered at today's visit but not completed yet may show as past due. Also, please note that scanned in results may not display below.  Preventive Screenings:  Service Recommendations Previous Testing/Comments   Colorectal Cancer Screening  * Colonoscopy    * Fecal Occult Blood Test (FOBT)/Fecal Immunochemical Test (FIT)  * Fecal DNA/Cologuard Test  * Flexible Sigmoidoscopy Age: 45-75 years old   Colonoscopy: every 10 years (may be performed more frequently if at higher risk)  OR  FOBT/FIT: every 1 year  OR  Cologuard: every 3 years  OR  Sigmoidoscopy: every 5 years  Screening may be recommended earlier than age 45 if at higher risk for colorectal cancer. Also, an individualized decision between you and your healthcare provider will decide whether screening between the ages of 76-85 would be appropriate. Colonoscopy: 02/17/2023  FOBT/FIT: Not on file  Cologuard: Not on file  Sigmoidoscopy: Not on file    Screening Not Indicated     Breast Cancer Screening Age: 40+ years old  Frequency: every 1-2 years  Not required if history of left and right mastectomy Mammogram: Not on file        Cervical Cancer Screening Between the ages of 21-29, pap smear recommended once every 3 years.   Between the ages of 30-65, can perform pap smear with HPV co-testing every 5 years.   Recommendations may differ for women with a history of total hysterectomy, cervical cancer, or abnormal pap smears in past. Pap Smear: Not on file    Screening Not Indicated   Hepatitis C Screening Once for adults born between 1945 and 1965  More frequently in patients at  high risk for Hepatitis C Hep C Antibody: Not on file        Diabetes Screening 1-2 times per year if you're at risk for diabetes or have pre-diabetes Fasting glucose: 94 mg/dL (8/28/2024)  A1C: 5.8 % (4/4/2024)  Screening Current   Cholesterol Screening Once every 5 years if you don't have a lipid disorder. May order more often based on risk factors. Lipid panel: 04/04/2024    Screening Current     Other Preventive Screenings Covered by Medicare:  Abdominal Aortic Aneurysm (AAA) Screening: covered once if your at risk. You're considered to be at risk if you have a family history of AAA.  Lung Cancer Screening: covers low dose CT scan once per year if you meet all of the following conditions: (1) Age 55-77; (2) No signs or symptoms of lung cancer; (3) Current smoker or have quit smoking within the last 15 years; (4) You have a tobacco smoking history of at least 20 pack years (packs per day multiplied by number of years you smoked); (5) You get a written order from a healthcare provider.  Glaucoma Screening: covered annually if you're considered high risk: (1) You have diabetes OR (2) Family history of glaucoma OR (3)  aged 50 and older OR (4)  American aged 65 and older  Osteoporosis Screening: covered every 2 years if you meet one of the following conditions: (1) You're estrogen deficient and at risk for osteoporosis based off medical history and other findings; (2) Have a vertebral abnormality; (3) On glucocorticoid therapy for more than 3 months; (4) Have primary hyperparathyroidism; (5) On osteoporosis medications and need to assess response to drug therapy.   Last bone density test (DXA Scan): Not on file.  HIV Screening: covered annually if you're between the age of 15-65. Also covered annually if you are younger than 15 and older than 65 with risk factors for HIV infection. For pregnant patients, it is covered up to 3 times per pregnancy.    Immunizations:  Immunization  Recommendations   Influenza Vaccine Annual influenza vaccination during flu season is recommended for all persons aged >= 6 months who do not have contraindications   Pneumococcal Vaccine   * Pneumococcal conjugate vaccine = PCV13 (Prevnar 13), PCV15 (Vaxneuvance), PCV20 (Prevnar 20)  * Pneumococcal polysaccharide vaccine = PPSV23 (Pneumovax) Adults 19-63 yo with certain risk factors or if 65+ yo  If never received any pneumonia vaccine: recommend Prevnar 20 (PCV20)  Give PCV20 if previously received 1 dose of PCV13 or PPSV23   Hepatitis B Vaccine 3 dose series if at intermediate or high risk (ex: diabetes, end stage renal disease, liver disease)   Respiratory syncytial virus (RSV) Vaccine - COVERED BY MEDICARE PART D  * RSVPreF3 (Arexvy) CDC recommends that adults 60 years of age and older may receive a single dose of RSV vaccine using shared clinical decision-making (SCDM)   Tetanus (Td) Vaccine - COST NOT COVERED BY MEDICARE PART B Following completion of primary series, a booster dose should be given every 10 years to maintain immunity against tetanus. Td may also be given as tetanus wound prophylaxis.   Tdap Vaccine - COST NOT COVERED BY MEDICARE PART B Recommended at least once for all adults. For pregnant patients, recommended with each pregnancy.   Shingles Vaccine (Shingrix) - COST NOT COVERED BY MEDICARE PART B  2 shot series recommended in those 19 years and older who have or will have weakened immune systems or those 50 years and older     Health Maintenance Due:  There are no preventive care reminders to display for this patient.  Immunizations Due:      Topic Date Due   • COVID-19 Vaccine (6 - 2023-24 season) 09/01/2024   • Influenza Vaccine (1) 09/01/2024     Advance Directives   What are advance directives?  Advance directives are legal documents that state your wishes and plans for medical care. These plans are made ahead of time in case you lose your ability to make decisions for yourself. Advance  directives can apply to any medical decision, such as the treatments you want, and if you want to donate organs.   What are the types of advance directives?  There are many types of advance directives, and each state has rules about how to use them. You may choose a combination of any of the following:  Living will:  This is a written record of the treatment you want. You can also choose which treatments you do not want, which to limit, and which to stop at a certain time. This includes surgery, medicine, IV fluid, and tube feedings.   Durable power of  for healthcare (DPAHC):  This is a written record that states who you want to make healthcare choices for you when you are unable to make them for yourself. This person, called a proxy, is usually a family member or a friend. You may choose more than 1 proxy.  Do not resuscitate (DNR) order:  A DNR order is used in case your heart stops beating or you stop breathing. It is a request not to have certain forms of treatment, such as CPR. A DNR order may be included in other types of advance directives.  Medical directive:  This covers the care that you want if you are in a coma, near death, or unable to make decisions for yourself. You can list the treatments you want for each condition. Treatment may include pain medicine, surgery, blood transfusions, dialysis, IV or tube feedings, and a ventilator (breathing machine).  Values history:  This document has questions about your views, beliefs, and how you feel and think about life. This information can help others choose the care that you would choose.  Why are advance directives important?  An advance directive helps you control your care. Although spoken wishes may be used, it is better to have your wishes written down. Spoken wishes can be misunderstood, or not followed. Treatments may be given even if you do not want them. An advance directive may make it easier for your family to make difficult choices about  your care.   Urinary Incontinence   Urinary incontinence (UI)  is when you lose control of your bladder. UI develops because your bladder cannot store or empty urine properly. The 3 most common types of UI are stress incontinence, urge incontinence, or both.  Medicines:   May be given to help strengthen your bladder control. Report any side effects of medication to your healthcare provider.  Do pelvic muscle exercises often:  Your pelvic muscles help you stop urinating. Squeeze these muscles tight for 5 seconds, then relax for 5 seconds. Gradually work up to squeezing for 10 seconds. Do 3 sets of 15 repetitions a day, or as directed. This will help strengthen your pelvic muscles and improve bladder control.  Train your bladder:  Go to the bathroom at set times, such as every 2 hours, even if you do not feel the urge to go. You can also try to hold your urine when you feel the urge to go. For example, hold your urine for 5 minutes when you feel the urge to go. As that becomes easier, hold your urine for 10 minutes.   Self-care:   Keep a UI record.  Write down how often you leak urine and how much you leak. Make a note of what you were doing when you leaked urine.  Drink liquids as directed. You may need to limit the amount of liquid you drink to help control your urine leakage. Do not drink any liquid right before you go to bed. Limit or do not have drinks that contain caffeine or alcohol.   Prevent constipation.  Eat a variety of high-fiber foods. Good examples are high-fiber cereals, beans, vegetables, and whole-grain breads. Walking is the best way to trigger your intestines to have a bowel movement.  Exercise regularly and maintain a healthy weight.  Weight loss and exercise will decrease pressure on your bladder and help you control your leakage.   Use a catheter as directed  to help empty your bladder. A catheter is a tiny, plastic tube that is put into your bladder to drain your urine.   Go to behavior therapy  as directed.  Behavior therapy may be used to help you learn to control your urge to urinate.    Weight Management   Why it is important to manage your weight:  Being overweight increases your risk of health conditions such as heart disease, high blood pressure, type 2 diabetes, and certain types of cancer. It can also increase your risk for osteoarthritis, sleep apnea, and other respiratory problems. Aim for a slow, steady weight loss. Even a small amount of weight loss can lower your risk of health problems.  How to lose weight safely:  A safe and healthy way to lose weight is to eat fewer calories and get regular exercise. You can lose up about 1 pound a week by decreasing the number of calories you eat by 500 calories each day.   Healthy meal plan for weight management:  A healthy meal plan includes a variety of foods, contains fewer calories, and helps you stay healthy. A healthy meal plan includes the following:  Eat whole-grain foods more often.  A healthy meal plan should contain fiber. Fiber is the part of grains, fruits, and vegetables that is not broken down by your body. Whole-grain foods are healthy and provide extra fiber in your diet. Some examples of whole-grain foods are whole-wheat breads and pastas, oatmeal, brown rice, and bulgur.  Eat a variety of vegetables every day.  Include dark, leafy greens such as spinach, kale, zaki greens, and mustard greens. Eat yellow and orange vegetables such as carrots, sweet potatoes, and winter squash.   Eat a variety of fruits every day.  Choose fresh or canned fruit (canned in its own juice or light syrup) instead of juice. Fruit juice has very little or no fiber.  Eat low-fat dairy foods.  Drink fat-free (skim) milk or 1% milk. Eat fat-free yogurt and low-fat cottage cheese. Try low-fat cheeses such as mozzarella and other reduced-fat cheeses.  Choose meat and other protein foods that are low in fat.  Choose beans or other legumes such as split peas or  lentils. Choose fish, skinless poultry (chicken or turkey), or lean cuts of red meat (beef or pork). Before you cook meat or poultry, cut off any visible fat.   Use less fat and oil.  Try baking foods instead of frying them. Add less fat, such as margarine, sour cream, regular salad dressing and mayonnaise to foods. Eat fewer high-fat foods. Some examples of high-fat foods include french fries, doughnuts, ice cream, and cakes.  Eat fewer sweets.  Limit foods and drinks that are high in sugar. This includes candy, cookies, regular soda, and sweetened drinks.  Exercise:  Exercise at least 30 minutes per day on most days of the week. Some examples of exercise include walking, biking, dancing, and swimming. You can also fit in more physical activity by taking the stairs instead of the elevator or parking farther away from stores. Ask your healthcare provider about the best exercise plan for you.      © Copyright [employer] 2018 Information is for End User's use only and may not be sold, redistributed or otherwise used for commercial purposes. All illustrations and images included in CareNotes® are the copyrighted property of [employer], [employer] or [employer]      Medicare Preventive Visit Patient Instructions  Thank you for completing your Welcome to Medicare Visit or Medicare Annual Wellness Visit today. Your next wellness visit will be due in one year (9/27/2025).  The screening/preventive services that you may require over the next 5-10 years are detailed below. Some tests may not apply to you based off risk factors and/or age. Screening tests ordered at today's visit but not completed yet may show as past due. Also, please note that scanned in results may not display below.  Preventive Screenings:  Service Recommendations Previous Testing/Comments   Colorectal Cancer Screening  * Colonoscopy    * Fecal Occult Blood Test (FOBT)/Fecal Immunochemical Test (FIT)  * Fecal DNA/Cologuard Test  * Flexible  Sigmoidoscopy Age: 45-75 years old   Colonoscopy: every 10 years (may be performed more frequently if at higher risk)  OR  FOBT/FIT: every 1 year  OR  Cologuard: every 3 years  OR  Sigmoidoscopy: every 5 years  Screening may be recommended earlier than age 45 if at higher risk for colorectal cancer. Also, an individualized decision between you and your healthcare provider will decide whether screening between the ages of 76-85 would be appropriate. Colonoscopy: 02/17/2023  FOBT/FIT: Not on file  Cologuard: Not on file  Sigmoidoscopy: Not on file    Screening Not Indicated     Breast Cancer Screening Age: 40+ years old  Frequency: every 1-2 years  Not required if history of left and right mastectomy Mammogram: Not on file        Cervical Cancer Screening Between the ages of 21-29, pap smear recommended once every 3 years.   Between the ages of 30-65, can perform pap smear with HPV co-testing every 5 years.   Recommendations may differ for women with a history of total hysterectomy, cervical cancer, or abnormal pap smears in past. Pap Smear: Not on file    Screening Not Indicated   Hepatitis C Screening Once for adults born between 1945 and 1965  More frequently in patients at high risk for Hepatitis C Hep C Antibody: Not on file        Diabetes Screening 1-2 times per year if you're at risk for diabetes or have pre-diabetes Fasting glucose: 94 mg/dL (8/28/2024)  A1C: 5.8 % (4/4/2024)  Screening Current   Cholesterol Screening Once every 5 years if you don't have a lipid disorder. May order more often based on risk factors. Lipid panel: 04/04/2024    Screening Current     Other Preventive Screenings Covered by Medicare:  Abdominal Aortic Aneurysm (AAA) Screening: covered once if your at risk. You're considered to be at risk if you have a family history of AAA.  Lung Cancer Screening: covers low dose CT scan once per year if you meet all of the following conditions: (1) Age 55-77; (2) No signs or symptoms of lung  cancer; (3) Current smoker or have quit smoking within the last 15 years; (4) You have a tobacco smoking history of at least 20 pack years (packs per day multiplied by number of years you smoked); (5) You get a written order from a healthcare provider.  Glaucoma Screening: covered annually if you're considered high risk: (1) You have diabetes OR (2) Family history of glaucoma OR (3)  aged 50 and older OR (4)  American aged 65 and older  Osteoporosis Screening: covered every 2 years if you meet one of the following conditions: (1) You're estrogen deficient and at risk for osteoporosis based off medical history and other findings; (2) Have a vertebral abnormality; (3) On glucocorticoid therapy for more than 3 months; (4) Have primary hyperparathyroidism; (5) On osteoporosis medications and need to assess response to drug therapy.   Last bone density test (DXA Scan): Not on file.  HIV Screening: covered annually if you're between the age of 15-65. Also covered annually if you are younger than 15 and older than 65 with risk factors for HIV infection. For pregnant patients, it is covered up to 3 times per pregnancy.    Immunizations:  Immunization Recommendations   Influenza Vaccine Annual influenza vaccination during flu season is recommended for all persons aged >= 6 months who do not have contraindications   Pneumococcal Vaccine   * Pneumococcal conjugate vaccine = PCV13 (Prevnar 13), PCV15 (Vaxneuvance), PCV20 (Prevnar 20)  * Pneumococcal polysaccharide vaccine = PPSV23 (Pneumovax) Adults 19-63 yo with certain risk factors or if 65+ yo  If never received any pneumonia vaccine: recommend Prevnar 20 (PCV20)  Give PCV20 if previously received 1 dose of PCV13 or PPSV23   Hepatitis B Vaccine 3 dose series if at intermediate or high risk (ex: diabetes, end stage renal disease, liver disease)   Respiratory syncytial virus (RSV) Vaccine - COVERED BY MEDICARE PART D  * RSVPreF3 (Arexvy) CDC recommends  that adults 60 years of age and older may receive a single dose of RSV vaccine using shared clinical decision-making (SCDM)   Tetanus (Td) Vaccine - COST NOT COVERED BY MEDICARE PART B Following completion of primary series, a booster dose should be given every 10 years to maintain immunity against tetanus. Td may also be given as tetanus wound prophylaxis.   Tdap Vaccine - COST NOT COVERED BY MEDICARE PART B Recommended at least once for all adults. For pregnant patients, recommended with each pregnancy.   Shingles Vaccine (Shingrix) - COST NOT COVERED BY MEDICARE PART B  2 shot series recommended in those 19 years and older who have or will have weakened immune systems or those 50 years and older     Health Maintenance Due:  There are no preventive care reminders to display for this patient.  Immunizations Due:      Topic Date Due   • COVID-19 Vaccine (6 - 2023-24 season) 09/01/2024   • Influenza Vaccine (1) 09/01/2024     Advance Directives   What are advance directives?  Advance directives are legal documents that state your wishes and plans for medical care. These plans are made ahead of time in case you lose your ability to make decisions for yourself. Advance directives can apply to any medical decision, such as the treatments you want, and if you want to donate organs.   What are the types of advance directives?  There are many types of advance directives, and each state has rules about how to use them. You may choose a combination of any of the following:  Living will:  This is a written record of the treatment you want. You can also choose which treatments you do not want, which to limit, and which to stop at a certain time. This includes surgery, medicine, IV fluid, and tube feedings.   Durable power of  for healthcare (DPAHC):  This is a written record that states who you want to make healthcare choices for you when you are unable to make them for yourself. This person, called a proxy, is usually  a family member or a friend. You may choose more than 1 proxy.  Do not resuscitate (DNR) order:  A DNR order is used in case your heart stops beating or you stop breathing. It is a request not to have certain forms of treatment, such as CPR. A DNR order may be included in other types of advance directives.  Medical directive:  This covers the care that you want if you are in a coma, near death, or unable to make decisions for yourself. You can list the treatments you want for each condition. Treatment may include pain medicine, surgery, blood transfusions, dialysis, IV or tube feedings, and a ventilator (breathing machine).  Values history:  This document has questions about your views, beliefs, and how you feel and think about life. This information can help others choose the care that you would choose.  Why are advance directives important?  An advance directive helps you control your care. Although spoken wishes may be used, it is better to have your wishes written down. Spoken wishes can be misunderstood, or not followed. Treatments may be given even if you do not want them. An advance directive may make it easier for your family to make difficult choices about your care.   Urinary Incontinence   Urinary incontinence (UI)  is when you lose control of your bladder. UI develops because your bladder cannot store or empty urine properly. The 3 most common types of UI are stress incontinence, urge incontinence, or both.  Medicines:   May be given to help strengthen your bladder control. Report any side effects of medication to your healthcare provider.  Do pelvic muscle exercises often:  Your pelvic muscles help you stop urinating. Squeeze these muscles tight for 5 seconds, then relax for 5 seconds. Gradually work up to squeezing for 10 seconds. Do 3 sets of 15 repetitions a day, or as directed. This will help strengthen your pelvic muscles and improve bladder control.  Train your bladder:  Go to the bathroom at set  times, such as every 2 hours, even if you do not feel the urge to go. You can also try to hold your urine when you feel the urge to go. For example, hold your urine for 5 minutes when you feel the urge to go. As that becomes easier, hold your urine for 10 minutes.   Self-care:   Keep a UI record.  Write down how often you leak urine and how much you leak. Make a note of what you were doing when you leaked urine.  Drink liquids as directed. You may need to limit the amount of liquid you drink to help control your urine leakage. Do not drink any liquid right before you go to bed. Limit or do not have drinks that contain caffeine or alcohol.   Prevent constipation.  Eat a variety of high-fiber foods. Good examples are high-fiber cereals, beans, vegetables, and whole-grain breads. Walking is the best way to trigger your intestines to have a bowel movement.  Exercise regularly and maintain a healthy weight.  Weight loss and exercise will decrease pressure on your bladder and help you control your leakage.   Use a catheter as directed  to help empty your bladder. A catheter is a tiny, plastic tube that is put into your bladder to drain your urine.   Go to behavior therapy as directed.  Behavior therapy may be used to help you learn to control your urge to urinate.    Weight Management   Why it is important to manage your weight:  Being overweight increases your risk of health conditions such as heart disease, high blood pressure, type 2 diabetes, and certain types of cancer. It can also increase your risk for osteoarthritis, sleep apnea, and other respiratory problems. Aim for a slow, steady weight loss. Even a small amount of weight loss can lower your risk of health problems.  How to lose weight safely:  A safe and healthy way to lose weight is to eat fewer calories and get regular exercise. You can lose up about 1 pound a week by decreasing the number of calories you eat by 500 calories each day.   Healthy meal plan  for weight management:  A healthy meal plan includes a variety of foods, contains fewer calories, and helps you stay healthy. A healthy meal plan includes the following:  Eat whole-grain foods more often.  A healthy meal plan should contain fiber. Fiber is the part of grains, fruits, and vegetables that is not broken down by your body. Whole-grain foods are healthy and provide extra fiber in your diet. Some examples of whole-grain foods are whole-wheat breads and pastas, oatmeal, brown rice, and bulgur.  Eat a variety of vegetables every day.  Include dark, leafy greens such as spinach, kale, zaki greens, and mustard greens. Eat yellow and orange vegetables such as carrots, sweet potatoes, and winter squash.   Eat a variety of fruits every day.  Choose fresh or canned fruit (canned in its own juice or light syrup) instead of juice. Fruit juice has very little or no fiber.  Eat low-fat dairy foods.  Drink fat-free (skim) milk or 1% milk. Eat fat-free yogurt and low-fat cottage cheese. Try low-fat cheeses such as mozzarella and other reduced-fat cheeses.  Choose meat and other protein foods that are low in fat.  Choose beans or other legumes such as split peas or lentils. Choose fish, skinless poultry (chicken or turkey), or lean cuts of red meat (beef or pork). Before you cook meat or poultry, cut off any visible fat.   Use less fat and oil.  Try baking foods instead of frying them. Add less fat, such as margarine, sour cream, regular salad dressing and mayonnaise to foods. Eat fewer high-fat foods. Some examples of high-fat foods include french fries, doughnuts, ice cream, and cakes.  Eat fewer sweets.  Limit foods and drinks that are high in sugar. This includes candy, cookies, regular soda, and sweetened drinks.  Exercise:  Exercise at least 30 minutes per day on most days of the week. Some examples of exercise include walking, biking, dancing, and swimming. You can also fit in more physical activity by taking  "the stairs instead of the elevator or parking farther away from stores. Ask your healthcare provider about the best exercise plan for you.      © Copyright [employer] 2018 Information is for End User's use only and may not be sold, redistributed or otherwise used for commercial purposes. All illustrations and images included in CareNotes® are the copyrighted property of [employer] or [employer]      Patient Education     Urinary incontinence in females   The Basics   Written by the doctors and editors at [facility]   What is urinary incontinence? -- Urinary incontinence is the medical term for when a person leaks urine or loses bladder control.  Incontinence is a very common problem, but it is not a normal part of aging. If you have this problem, there are treatments that can help. There are also things that you can do on your own to stop or reduce urine leakage so you don't have to \"just live with it.\"  What are the symptoms of incontinence? -- There are different types of incontinence. Each causes different symptoms. The 3 most common types are:   Stress incontinence - With stress incontinence, you leak urine when you laugh, cough, sneeze, or do anything that \"stresses\" the belly. Stress incontinence is most common in females, especially those who have had a baby.   Urgency incontinence - With urgency incontinence, you feel a strong need to urinate all of a sudden. This is also known as \"urge incontinence.\" Often, the \"urge\" is so strong that you can't make it to the bathroom in time. \"Overactive bladder\" is another term for having a sudden, frequent urge to urinate. People with overactive bladder might or might not actually leak urine.   Mixed incontinence - With mixed incontinence, you have symptoms of both stress and urgency incontinence.  Is there anything I can do on my own to feel better? -- Yes. Here are some things that can help reduce urine leaks:   Reduce the amount of liquid that you " "drink, especially a few hours before bed.   Cut down on any foods or drinks that make your symptoms worse. Some people find that alcohol, caffeine, or spicy or acidic foods irritate the bladder.   Try to lose weight, if you are overweight. Your doctor or nurse can help you do this in a healthy way.   If you have diabetes, keep your blood sugar as close to your goal level as possible.   If you take medicines called diuretics, plan ahead. These medicines increase the need to urinate. Try to take them when you know you will be near a bathroom for a few hours. If you keep having problems with leakage because of diuretics, ask your doctor if you can take a lower dose or switch to a different medicine.  These techniques can also help improve bladder control:   Bladder retraining - During bladder retraining, you go to the bathroom at scheduled times. For instance, you might decide that you will go every hour. Make yourself go every hour, even if you don't feel like you need to. Try to wait the whole hour, even if you need to go sooner. Then, once you get used to going every hour, increase the amount of time you wait in between bathroom visits. Over time, you might be able to \"retrain\" your bladder to wait 3 or 4 hours between bathroom visits.   Pelvic floor muscle training - This involves learning exercises to strengthen and relax your pelvic muscles. These include the muscles that control the flow of urine and bowel movements. When done right, these exercises can help. But people often do them wrong. Ask your doctor or nurse how to do them right. Your doctor might suggest working with a physical therapist who has special training in these exercises.  Should I see my doctor or nurse? -- Yes. Your doctor or nurse can find out what might be causing your incontinence. They can also suggest ways to relieve the problem.  When you speak to your doctor or nurse, ask if any of the medicines you take could be causing your symptoms. " Some medicines can cause incontinence or make it worse.  Some people choose to wear pads or special underwear. These can help if you accidentally leak urine once in a while. But they can also cause skin irritation if you use them a lot. If you have incontinence, ask your doctor or nurse how to treat it.  How is incontinence treated? -- The treatment options differ depending on what type of incontinence you have. Some of the options include:   Medicines to relax the bladder   Surgery to repair the tissues that support the bladder or to improve the flow of urine   Electrical stimulation of the nerves that relax the bladder  Urinary incontinence is more common in people who have been through menopause. (Menopause is when you stop having monthly periods). Some people have vaginal dryness after menopause. If this is the case for you, a treatment called vaginal estrogen might help.  What will my life be like? -- Many people with incontinence can regain bladder control or at least reduce the amount of leakage they have. The most important thing is to tell your doctor or nurse. Then, work with them to find an approach that helps you.  All topics are updated as new evidence becomes available and our peer review process is complete.  This topic retrieved from [employer] on: Feb 26, 2024.  Topic 33657 Version 18.0  Release: 32.2.4 - C32.56  © 2024 UpToDate, Inc. and/or its affiliates. All rights reserved.  figure 1: Location of the bladder     This drawing shows the side view of a woman's body. The bladder is in front of the vagina. The urethra is the tube that carries urine from the bladder out of the body.  Graphic 631460 Version 1.0  Consumer Information Use and Disclaimer   Disclaimer: This generalized information is a limited summary of diagnosis, treatment, and/or medication information. It is not meant to be comprehensive and should be used as a tool to help the user understand and/or assess potential diagnostic and  "treatment options. It does NOT include all information about conditions, treatments, medications, side effects, or risks that may apply to a specific patient. It is not intended to be medical advice or a substitute for the medical advice, diagnosis, or treatment of a health care provider based on the health care provider's examination and assessment of a patient's specific and unique circumstances. Patients must speak with a health care provider for complete information about their health, medical questions, and treatment options, including any risks or benefits regarding use of medications. This information does not endorse any treatments or medications as safe, effective, or approved for treating a specific patient. UpToDate, Inc. and its affiliates disclaim any warranty or liability relating to this information or the use thereof.The use of this information is governed by the Terms of Use, available at https://www.[employer]/en/know/clinical-effectiveness-terms. 2024© UpToDate, Inc. and its affiliates and/or licensors. All rights reserved.  Copyright   © 2024 UpToDate, Inc. and/or its affiliates. All rights reserved.    Patient Education     Pelvic floor muscle exercises   The Basics   Written by the doctors and editors at [employer]   What is the pelvic floor? -- The \"pelvic floor\" is the name for the muscles that support the organs in the pelvis. These organs include the bladder and rectum. In the female pelvis, they also include the uterus (figure 1).  What are pelvic floor muscle exercises? -- These are exercises that can make your pelvic floor muscles stronger. They involve learning ways to tighten and relax specific muscles.  Pelvic floor muscle exercises can help keep you from leaking urine, gas, or bowel movements. They can also help with a condition called \"pelvic organ prolapse.\" This is when the organs in the lower belly drop down and press against or bulge into the vagina.  One way to strengthen " "your pelvic floor muscles is to do exercises. These are also known as \"Kegel\" exercises.  How do I do pelvic floor muscle exercises? -- If you want to try pelvic floor muscle exercises, start by talking to your doctor or nurse. They can talk to you about whether these exercises can help you. They can also teach you how to do them correctly.  You will need to learn which muscles to tighten and relax. It is sometimes hard to figure out the right muscles.  Some ways you can practice:   People with female or male anatomy - Squeeze the muscles you would use to avoid passing gas.   People with female anatomy - Put a finger inside your vagina and squeeze the muscles around your finger. Or you can imagine that you are sitting on a marble and have to pick it up using your vagina.   People with male anatomy - Squeeze the muscles that control the flow of urine. These exercises might help reduce urine leaks in people who have had surgery to treat prostate cancer or an enlarged prostate.  No matter how you learn to do pelvic floor muscle exercises, know that the muscles involved are not in your belly, thighs, or buttocks.  After you learn which muscles to tighten and relax, you can do the exercises in any position (standing, sitting, or lying down).  Should I see a physical therapist? -- Your doctor or nurse might suggest working with a physical therapist who has special training in pelvic floor issues. They can check your muscle strength and teach you specific exercises.  How often should I do the exercises? -- A common approach is to try to do a set of the exercises 3 times a day.  For each set, do the following about 10 times:   Squeeze your pelvic muscles.   Hold the muscles tight for about 10 seconds.   Relax the muscles completely.  Keep up this routine for at least a few months. You will probably notice results, but it might take a few weeks to several months.  How do pelvic floor muscle exercises help? -- Pelvic floor " "muscle exercises can help:   Prevent urine leaks in people who have \"stress incontinence\" - This means that they leak urine when they cough, laugh, sneeze, or strain.   Control sudden urges to urinate - These happen to people with \"urinary urgency\" or \"urge incontinence.\"   Control the release of gas or bowel movements   Improve symptoms caused by pelvic organ prolapse - These can include pressure or bulging in the vagina. If you have these symptoms, see your doctor or nurse to find out the cause.  It might take a few months of doing the exercises regularly before you notice them working. If you have been doing pelvic floor muscle exercises for several months and they don't seem to be making a difference, tell your doctor or nurse. They might suggest seeing a physical therapist or trying other treatments for your condition.  All topics are updated as new evidence becomes available and our peer review process is complete.  This topic retrieved from [employer] on: Feb 26, 2024.  Topic 52217 Version 15.0  Release: 32.2.4 - C32.56  © 2024 UpToDate, Inc. and/or its affiliates. All rights reserved.  figure 1: Pelvic floor muscles     The \"pelvic floor\" is a group of muscles that support the organs in the pelvis. In females, these organs include the uterus, bladder, and rectum.  Graphic [id number] Version 2.0  Consumer Information Use and Disclaimer   Disclaimer: This generalized information is a limited summary of diagnosis, treatment, and/or medication information. It is not meant to be comprehensive and should be used as a tool to help the user understand and/or assess potential diagnostic and treatment options. It does NOT include all information about conditions, treatments, medications, side effects, or risks that may apply to a specific patient. It is not intended to be medical advice or a substitute for the medical advice, diagnosis, or treatment of a health care provider based on the health care provider's examination " and assessment of a patient's specific and unique circumstances. Patients must speak with a health care provider for complete information about their health, medical questions, and treatment options, including any risks or benefits regarding use of medications. This information does not endorse any treatments or medications as safe, effective, or approved for treating a specific patient. UpToDate, Inc. and its affiliates disclaim any warranty or liability relating to this information or the use thereof.The use of this information is governed by the Terms of Use, available at https://www.[employer].com/en/know/clinical-effectiveness-terms. 2024© UpToDate, Inc. and its affiliates and/or licensors. All rights reserved.  Copyright   © 2024 UpToDate, Inc. and/or its affiliates. All rights reserved.

## 2024-09-26 NOTE — PROGRESS NOTES
Ambulatory Visit  Name: Marizol Jiménez      : 10/4/1929      MRN: [id number]  Encounter Provider: Alfreda Briseno DO  Encounter Date: 2024   Encounter department: [facility] [address]     Assessment & Plan  Primary adenocarcinoma of ascending colon (HCC)  S/p resection, following with Colorectal surgery, CEA has come down, con't follow up and imaging as per specialist  Orders:    CBC and differential; Future    Comprehensive metabolic panel; Future    Lipid panel; Future    Iron Panel (Includes Ferritin, Iron Sat%, Iron, and TIBC); Future    Hemoglobin A1C; Future    Squamous cell carcinoma of left lung (HCC)  S/p radiation tx, following with radiation oncology, CT scan done yesterday and results pending, con't imaging and follow up as per specialist  Orders:    CBC and differential; Future    Comprehensive metabolic panel; Future    Lipid panel; Future    Iron Panel (Includes Ferritin, Iron Sat%, Iron, and TIBC); Future    Hemoglobin A1C; Future    Thyroid nodule  Thyroid US reviewed, TSH wnl, on Synthroid, has f/u with Endo next week, will follow  Orders:    CBC and differential; Future    Comprehensive metabolic panel; Future    Lipid panel; Future    Iron Panel (Includes Ferritin, Iron Sat%, Iron, and TIBC); Future    Hemoglobin A1C; Future    Primary hypertension  BP at goal, con't current meds, recheck in 6 mos  Orders:    CBC and differential; Future    Comprehensive metabolic panel; Future    Lipid panel; Future    Iron Panel (Includes Ferritin, Iron Sat%, Iron, and TIBC); Future    Hemoglobin A1C; Future    Impaired fasting glucose  BW annually - order given for , will follow  Orders:    CBC and differential; Future    Comprehensive metabolic panel; Future    Lipid panel; Future    Iron Panel (Includes Ferritin, Iron Sat%, Iron, and TIBC); Future    Hemoglobin A1C; Future    Dyslipidemia  BW annually - order given for , con't current statin for now,  will follow  Orders:    CBC and differential; Future    Comprehensive metabolic panel; Future    Lipid panel; Future    Iron Panel (Includes Ferritin, Iron Sat%, Iron, and TIBC); Future    Hemoglobin A1C; Future    Medicare annual wellness visit, subsequent         BMI 26.0-26.9,adult  Healthy diet and keeping active encouraged  Orders:    CBC and differential; Future    Comprehensive metabolic panel; Future    Lipid panel; Future    Iron Panel (Includes Ferritin, Iron Sat%, Iron, and TIBC); Future    Hemoglobin A1C; Future    Urge urinary incontinence  Pelvic therapy reviewed - pt deferring for now, urged regular toileting schedule and avoid triggers, call with s/sx of UTI       Encounter for immunization    Orders:    influenza vaccine, high-dose, PF 0.5 mL (Fluzone High Dose)      Depression Screening and Follow-up Plan: Patient was screened for depression during today's encounter. They screened negative with a PHQ-2 score of 0.    Urinary Incontinence Plan of Care: counseling topics discussed: practice Kegel (pelvic floor strengthening) exercises, use restroom every 2 hours, limit alcohol, caffeine, spicy foods, and acidic foods, limiting fluid intake 3-4 hours before bed and preventing constipation.       Preventive health issues were discussed with patient, and age appropriate screening tests were ordered as noted in patient's After Visit Summary. Personalized health advice and appropriate referrals for health education or preventive services given if needed, as noted in patient's After Visit Summary.    Colonoscopy 2/23    BW 8/24  FLP 4/24    Pt will get her COVID shot at [facility] as per her wishes  Flu vaccine given today      History of Present Illness     HPI Pt here for follow up appt and AWV    Pt saw Rad Onc (Dr. Valdes) and Colorectal surgery (Dr. Reid) for f/u of her lung CA and colon CA - OV notes reviewed.  She had CT chest 6/24 and slight decrease in size of HARRIS malignancy was noted.   Stable mildly B/L mediastinal lymph nodes were noted. She had another CT yesterday but results are still pending.  Her CEA decreased and was 2.5 in Aug. She notes no abd pain/N/V/changes in stools/blood in stools.  She denies wgt loss/night sweats. She has some fatigue.     Pt had her thyroid US in Aug - stable thyroid nodules noted - with several again meeting criteria fo biopsy. She has f/u with Dr. Yi next week.  She is taking  her Synthroid daily.  Her last TSH was 0.641 8/28/24.  She notes no issues swallowing/changes in voice.      BP at goal today and meds were reviewed and no changes have occurred.  She denies missing doses of meds or SE with the meds.  She does not check her BP outside the office.  She notes no frequent Ha's/dizziness/double vision/CP.         Patient Care Team:  Alfreda Briseno DO as PCP - General  Arvin Soriano DO as PCP - Endocrinology (Endocrinology)  MD Lisandro Ricci DO as Cardiologist (Cardiology)  Paulette Rowan MD as Consulting Physician (Ophthalmology)  Ga Lambert MD (Plastic Surgery)  Efrem Valdes MD (Radiation Oncology)  Miguel Richmond DO (Thoracic Surgery)    Review of Systems   Constitutional:  Positive for fatigue. Negative for chills, fever and unexpected weight change.   HENT:  Negative for congestion and trouble swallowing.    Eyes:  Negative for pain and visual disturbance.   Respiratory:  Negative for cough, shortness of breath and wheezing.    Cardiovascular:  Negative for chest pain and palpitations.   Gastrointestinal:  Negative for abdominal pain, blood in stool, constipation, diarrhea, nausea and vomiting.   Genitourinary:  Negative for difficulty urinating and dysuria.   Musculoskeletal:  Negative for back pain and neck pain.   Skin:  Negative for rash and wound.   Neurological:  Negative for dizziness, light-headedness and headaches.   Hematological:  Does not bruise/bleed easily.    Psychiatric/Behavioral:  Negative for confusion and dysphoric mood.      Medical History Reviewed by provider this encounter:  Tobacco  Allergies  Meds  Problems  Med Hx  Surg Hx  Fam Hx       Annual Wellness Visit Questionnaire   Marizol is here for her Subsequent Wellness visit. Last Medicare Wellness visit information reviewed, patient interviewed and updates made to the record today.      Health Risk Assessment:   Patient rates overall health as good. Patient feels that their physical health rating is slightly worse. Patient is satisfied with their life. Eyesight was rated as slightly worse. Hearing was rated as same. Patient feels that their emotional and mental health rating is same. Patients states they are never, rarely angry. Patient states they are sometimes unusually tired/fatigued. Pain experienced in the last 7 days has been none. Patient states that she has experienced no weight loss or gain in last 6 months. Physical health worse with dx of lung CA this year   Eyesight - wears glasses, on eye drops, has optho appt every 6 mos    Depression Screening:   PHQ-2 Score: 0      Fall Risk Screening:   In the past year, patient has experienced: no history of falling in past year      Urinary Incontinence Screening:   Patient has leaked urine accidently in the last six months. Urge incontinence     Home Safety:  Patient has trouble with stairs inside or outside of their home. Patient has working smoke alarms and has working carbon monoxide detector. Home safety hazards include: loose rugs on the floor.     Nutrition:   Current diet is No Added Salt and Regular.     Medications:   Patient is currently taking over-the-counter supplements. OTC medications include: see medication list. Patient is able to manage medications.     Activities of Daily Living (ADLs)/Instrumental Activities of Daily Living (IADLs):   Walk and transfer into and out of bed and chair?: Yes  Dress and groom yourself?: Yes    Bathe  or shower yourself?: Yes    Feed yourself? Yes  Do your laundry/housekeeping?: Yes  Manage your money, pay your bills and track your expenses?: Yes  Make your own meals?: Yes    Do your own shopping?: Yes    Previous Hospitalizations:   Any hospitalizations or ED visits within the last 12 months?: No      Advance Care Planning:   Living will: Yes    Advanced directive: Yes      Cognitive Screening:   Provider or family/friend/caregiver concerned regarding cognition?: No    PREVENTIVE SCREENINGS      Cardiovascular Screening:    General: Screening Current, Risks and Benefits Discussed and History Lipid Disorder      Diabetes Screening:     General: Screening Current and Risks and Benefits Discussed      Colorectal Cancer Screening:     General: Risks and Benefits Discussed and Screening Current      Breast Cancer Screening:     General: Risks and Benefits Discussed and Screening Not Indicated      Cervical Cancer Screening:    General: Screening Not Indicated and Risks and Benefits Discussed      Osteoporosis Screening:    General: Risks and Benefits Discussed and Screening Not Indicated      Abdominal Aortic Aneurysm (AAA) Screening:        General: Risks and Benefits Discussed and Screening Not Indicated      Lung Cancer Screening:     General: Screening Not Indicated, History Lung Cancer and Risks and Benefits Discussed      Hepatitis C Screening:    General: Screening Not Indicated and Risks and Benefits Discussed    Screening, Brief Intervention, and Referral to Treatment (SBIRT)    Screening  Typical number of drinks in a day: 0  Typical number of drinks in a week: 0  Interpretation: Low risk drinking behavior.    Other Counseling Topics:   Car/seat belt/driving safety and regular weightbearing exercise.     Social Determinants of Health     Financial Resource Strain: Low Risk  (9/22/2023)    Overall Financial Resource Strain (CARDIA)     Difficulty of Paying Living Expenses: Not hard at all   Food Insecurity:  "No Food Insecurity (9/26/2024)    Hunger Vital Sign     Worried About Running Out of Food in the Last Year: Never true     Ran Out of Food in the Last Year: Never true   Transportation Needs: No Transportation Needs (9/26/2024)    PRAPARE - Transportation     Lack of Transportation (Medical): No     Lack of Transportation (Non-Medical): No   Housing Stability: Low Risk  (9/26/2024)    Housing Stability Vital Sign     Unable to Pay for Housing in the Last Year: No     Number of Times Moved in the Last Year: 0     Homeless in the Last Year: No   Utilities: Not At Risk (9/26/2024)    [facility] Utilities     Threatened with loss of utilities: No     Vision Screening    Right eye Left eye Both eyes   Without correction      With correction 20/70 20/50 20/50       Objective     /70   Pulse 66   Temp 97.9 °F (36.6 °C)   Ht 4' 9\" (1.448 m)   Wt 55.8 kg (123 lb)   SpO2 97%   BMI 26.62 kg/m²     Physical Exam  Vitals and nursing note reviewed.   Constitutional:       General: She is not in acute distress.     Appearance: She is well-developed. She is not ill-appearing.   HENT:      Head: Normocephalic and atraumatic.      Right Ear: External ear normal.      Left Ear: External ear normal.      Ears:      Comments: Hearing aids B/L     Mouth/Throat:      Mouth: Mucous membranes are moist.      Pharynx: Oropharynx is clear. No oropharyngeal exudate.   Eyes:      General:         Right eye: No discharge.         Left eye: No discharge.      Conjunctiva/sclera: Conjunctivae normal.   Neck:      Thyroid: No thyromegaly.      Trachea: No tracheal deviation.   Cardiovascular:      Rate and Rhythm: Normal rate and regular rhythm.      Heart sounds: Murmur heard.   Pulmonary:      Effort: Pulmonary effort is normal. No respiratory distress.      Breath sounds: Normal breath sounds. No wheezing, rhonchi or rales.   Abdominal:      General: There is no distension.      Palpations: Abdomen is soft.      Tenderness: There is no " abdominal tenderness. There is no guarding or rebound.   Musculoskeletal:         General: No deformity or signs of injury.      Cervical back: Neck supple.   Lymphadenopathy:      Cervical: Cervical adenopathy present.   Skin:     General: Skin is warm and dry.      Coloration: Skin is not pale.      Findings: No bruising or rash.   Neurological:      General: No focal deficit present.      Mental Status: She is alert. Mental status is at baseline.      Motor: No abnormal muscle tone.      Gait: Gait normal.   Psychiatric:         Mood and Affect: Mood normal.         Behavior: Behavior normal.         Thought Content: Thought content normal.         Judgment: Judgment normal.

## 2024-09-26 NOTE — ASSESSMENT & PLAN NOTE
BP at goal, con't current meds, recheck in 6 mos  Orders:    CBC and differential; Future    Comprehensive metabolic panel; Future    Lipid panel; Future    Iron Panel (Includes Ferritin, Iron Sat%, Iron, and TIBC); Future    Hemoglobin A1C; Future

## 2024-09-26 NOTE — ASSESSMENT & PLAN NOTE
Thyroid US reviewed, TSH wnl, on Synthroid, has f/u with Endo next week, will follow  Orders:    CBC and differential; Future    Comprehensive metabolic panel; Future    Lipid panel; Future    Iron Panel (Includes Ferritin, Iron Sat%, Iron, and TIBC); Future    Hemoglobin A1C; Future

## 2024-09-26 NOTE — ASSESSMENT & PLAN NOTE
BW annually - order given for April 25, con't current statin for now, will follow  Orders:    CBC and differential; Future    Comprehensive metabolic panel; Future    Lipid panel; Future    Iron Panel (Includes Ferritin, Iron Sat%, Iron, and TIBC); Future    Hemoglobin A1C; Future

## 2024-09-30 ENCOUNTER — OFFICE VISIT (OUTPATIENT)
Dept: ENDOCRINOLOGY | Facility: HOSPITAL | Age: 89
End: 2024-09-30
Payer: MEDICARE

## 2024-09-30 VITALS
BODY MASS INDEX: 26.75 KG/M2 | HEART RATE: 68 BPM | DIASTOLIC BLOOD PRESSURE: 70 MMHG | WEIGHT: 124 LBS | HEIGHT: 57 IN | SYSTOLIC BLOOD PRESSURE: 138 MMHG

## 2024-09-30 DIAGNOSIS — E21.3 HYPERPARATHYROIDISM (HCC): ICD-10-CM

## 2024-09-30 DIAGNOSIS — Z98.890 STATUS POST PARATHYROIDECTOMY: ICD-10-CM

## 2024-09-30 DIAGNOSIS — E06.3 HYPOTHYROIDISM DUE TO HASHIMOTO'S THYROIDITIS: Primary | ICD-10-CM

## 2024-09-30 DIAGNOSIS — Z90.89 STATUS POST PARATHYROIDECTOMY: ICD-10-CM

## 2024-09-30 DIAGNOSIS — E04.1 THYROID NODULE: ICD-10-CM

## 2024-09-30 PROCEDURE — 99214 OFFICE O/P EST MOD 30 MIN: CPT | Performed by: INTERNAL MEDICINE

## 2024-09-30 NOTE — PATIENT INSTRUCTIONS
The thyroid blood work is ok.     Continue the same synthroid 88 mcg daily.     The thyroid ultrasound shows stable thyroid nodules.     Follow up in 1 year with blood work.

## 2024-09-30 NOTE — PROGRESS NOTES
10/2/2024    Assessment & Plan      Diagnoses and all orders for this visit:    Hypothyroidism due to Hashimoto's thyroiditis  -     Comprehensive metabolic panel; Future  -     Phosphorus; Future  -     PTH, intact; Future  -     T4, free; Future  -     TSH, 3rd generation; Future    Thyroid nodule  -     Comprehensive metabolic panel; Future  -     Phosphorus; Future  -     PTH, intact; Future  -     T4, free; Future  -     TSH, 3rd generation; Future    Status post parathyroidectomy  -     Comprehensive metabolic panel; Future  -     Phosphorus; Future  -     PTH, intact; Future  -     T4, free; Future  -     TSH, 3rd generation; Future    Hyperparathyroidism (HCC)  -     Comprehensive metabolic panel; Future  -     Phosphorus; Future  -     PTH, intact; Future  -     T4, free; Future  -     TSH, 3rd generation; Future        Assessment & Plan  1. Hypothyroidism.  She has hypothyroidism due to Hashimoto's thyroiditis. Most recent thyroid function studies are normal, signifying biochemical euthyroidism. She will continue the same Synthroid 88 mcg daily.    2. Thyroid nodules.  Her recent thyroid ultrasound shows stable size thyroid nodules. She has no compressive thyroid symptoms. At this point, serial ultrasounds to follow her thyroid nodules will not be necessary given her age.    3. Hyperparathyroidism, post parathyroid adenoma removal.  Her most recent calcium level is normal along with her parathyroid hormone level and phosphorus. There is no recurrence of hyperparathyroidism. She will continue to drink plenty of water, and these levels will be followed over time.    Follow-up  Return in 1 year for follow up.    CC: Hypothyroid, thyroid nodule, parathyroid follow-up      History of Present Illness    HPI: Marizol Jiménez is a 89-year-old female with a history of hypothyroidism due to Hashimoto's thyroiditis, a thyroid nodule, and hyperparathyroidism, here for a follow-up visit.    She has hypothyroidism  diagnosed many years ago.  She is currently on a daily regimen of Synthroid 88 mcg for her thyroid condition. She reports a tendency to feel cold but does not experience shaky hands, palpitations, diarrhea, or constipation. She notes that her skin is dry, her nails are brittle, and she is experiencing hair loss. There are no issues with swallowing.    Sleep disturbances are present, characterized by difficulty falling asleep and frequent awakenings during the night. If sleep is insufficient, she feels fatigued during the day.    She has had hyperparathyroidism and required 2 surgeries for primary hyperparathyroidism with a 2nd 1 at the [facility].1st surgery 12/1985 and 2nd one was July 2002.  Calcium has been maintained in the upper normal range since surgery.  She reports frequent urination, particularly when sleeping on her side. However, she can sleep through the night without needing to urinate if she sleeps on her stomach. She does not experience excessive thirst, numbness, or tingling.    No new back or joint pain is reported. She remains mentally sharp for her age, engaging in activities such as crossword puzzles and cooking. She does not report any symptoms of anxiety or depression.        Historical Information   Past Medical History:   Diagnosis Date    BCC (basal cell carcinoma)     Diastolic dysfunction     Dyslipidemia     Dysphagia     Glaucoma     Hashimoto's thyroiditis     Hyperparathyroidism (HCC)     Hypertension     Impaired fasting glucose     Insomnia     Lung cancer (HCC)     Osteopenia     Peripheral arterial disease (HCC) 02/03/2015    S/P right hemicolectomy 04/13/2022    SCCA (squamous cell carcinoma) of skin      Past Surgical History:   Procedure Laterality Date    CATARACT EXTRACTION, BILATERAL      COLONOSCOPY      Complete, Onset - 10/25/05 - 10 years     ENDOBRONCHIAL ULTRASOUND (EBUS) N/A 2/12/2024    Procedure: ENDOBRONCHIAL ULTRASOUND (EBUS) with biopsies;  Surgeon: Miguel Bryant  Basilio DO;  Location: [facility];  Service: Thoracic    HYSTERECTOMY      IR BIOPSY LUNG  1/4/2024    PARATHYROIDECTOMY      1st 1985 and 2nd 2002    [facility] INCL FLUOR GDNCE DX W/CELL WASHG SPX N/A 2/12/2024    Procedure: BRONCHOSCOPY FLEXIBLE;  Surgeon: Miguel Richmond DO;  Location: [facility];  Service: Thoracic    [place] LAPAROSCOPY COLECTOMY PARTIAL W/ANASTOMOSIS N/A 4/13/2023    Procedure: RESECTION COLON RIGHT LAPAROSCOPIC;  Surgeon: Manny Reid MD;  Location: [facility];  Service: Colorectal    [place] [facility] WRST SURG W/RLS TRANSVRS CARPL LIGM Right 05/02/2016    Procedure: RELEASE CARPAL TUNNEL ENDOSCOPIC;  Surgeon: Alexandre Diaz MD;  Location: [facility];  Service: Orthopedics    TONSILLECTOMY       Social History   Social History     Substance and Sexual Activity   Alcohol Use Yes    Comment: rarely, social      Social History     Substance and Sexual Activity   Drug Use No     Social History     Tobacco Use   Smoking Status Never   Smokeless Tobacco Never     Family History:   Family History   Problem Relation Age of Onset    Ovarian cancer Mother     Stroke Father         CVA    Melanoma Maternal Uncle        Meds/Allergies   Current Outpatient Medications   Medication Sig Dispense Refill    amLODIPine (NORVASC) 5 mg tablet TAKE 1 TABLET (5 MG TOTAL) BY MOUTH DAILY. 90 tablet 1    ASPIRIN 81 PO Take 1 tablet by mouth every other day      atorvastatin (LIPITOR) 20 mg tablet Take 20 mg by mouth daily      Biotin 1000 MCG tablet Take 5,000 mcg by mouth daily      Calcium 500 MG tablet Take 1 tablet by mouth 3 (three) times a week       cholecalciferol (VITAMIN D3) 1,000 units tablet Take 1 tablet by mouth daily      dorzolamide-timolol (COSOPT) 22.3-6.8 MG/ML ophthalmic solution Administer 1 drop to both eyes 2 (two) times a day      latanoprost (XALATAN) 0.005 % ophthalmic solution Administer 1 drop into the left eye daily at bedtime      lisinopril (ZESTRIL) 10 mg tablet TAKE 1 TABLET BY  "MOUTH EVERY DAY 90 tablet 1    metoprolol tartrate (LOPRESSOR) 25 mg tablet TAKE 1 TABLET BY MOUTH TWICE A  tablet 2    Multiple Vitamins-Minerals (CENTRUM SILVER ADULT 50+ PO) Take by mouth.      Multiple Vitamins-Minerals (PRESERVISION AREDS PO) Take 1 tablet by mouth 2 (two) times a day      Omega-3 Fatty Acids (FISH OIL) 1,000 mg Take 1,000 mg by mouth daily.      Synthroid 88 MCG tablet TAKE 1 TABLET BY MOUTH EVERY DAY 90 tablet 1    ferrous sulfate 324 (65 Fe) mg TAKE 1 TABLET BY MOUTH EVERY DAY BEFORE BREAKFAST (Patient not taking: Reported on 11/14/2023) 90 tablet 1     No current facility-administered medications for this visit.     Allergies   Allergen Reactions    Cyclogyl [Cyclopentolate Hcl]     Phenylephrine Other (See Comments)     Eye gtts red eye infected sore    Pred Forte [Prednisolone Acetate]        Objective   Vitals: Blood pressure 138/70, pulse 68, height 4' 9\" (1.448 m), weight 56.2 kg (124 lb).  Invasive Devices       Peripheral Intravenous Line  Duration             Peripheral IV 06/17/24 Left Antecubital 107 days                    Physical Exam    Eyes show no lid lag, stare, proptosis, or periorbital edema.  Neck shows a healed anterior neck scar. Thyroid is low in the neck and not particularly enlarged without palpable nodules. A 1 to 2 cm palpable mass is present on the left side of the neck directly under the skin, consistent with a lipoma.  Lungs are clear to auscultation.  Heart has a regular rate and rhythm. No murmurs.  No tremor in the outstretched hands. Patellar deep tendon reflexes are normal. No CVA tenderness. No spinous process tenderness.      The history was obtained from the review of the chart and from the patient.    Lab Results:      Blood work performed on 8/28/2024 showed a CMP with a chloride of 109, BUN 31, calcium 9.9 in the setting of an albumin of 3.7 and a glucose of 94 but was otherwise normal.  Phosphorus is 3.7.    Intact parathyroid hormone level " is 63.4.    CBC is normal except for a low MCH, MCHC.    TSH is 0.641 with a free T4 of 1.34.    Lab Results   Component Value Date    CREATININE 1.04 08/28/2024    CREATININE 0.90 06/13/2024    CREATININE 0.90 04/04/2024    BUN 31 (H) 08/28/2024    K 5.0 08/28/2024     (H) 08/28/2024    CO2 28 08/28/2024     eGFR   Date Value Ref Range Status   08/28/2024 46 ml/min/1.73sq m Final         Lab Results   Component Value Date    HDL 43 (L) 04/04/2024    TRIG 181 (H) 04/04/2024       Lab Results   Component Value Date    ALT 12 08/28/2024    AST 13 08/28/2024    ALKPHOS 66 08/28/2024       Lab Results   Component Value Date    FREET4 1.34 (H) 08/28/2024             Future Appointments   Date Time Provider Department Center   12/6/2024  1:40 PM Manny Reid MD [facility] SURG [facility]   2/3/2025  1:30 PM [facility]B CT 1 UB CT  HOSPITAL   2/10/2025  2:00 PM Efrem Valdes MD [facility] Rad Onc  HOSPITAL   4/17/2025  9:20 AM Alfreda Briseno DO [facility]   9/30/2025  1:40 PM Nehal Yi MD ENDO [facility]

## 2024-10-02 ENCOUNTER — OFFICE VISIT (OUTPATIENT)
Facility: HOSPITAL | Age: 89
End: 2024-10-02
Attending: RADIOLOGY
Payer: MEDICARE

## 2024-10-02 VITALS
SYSTOLIC BLOOD PRESSURE: 130 MMHG | RESPIRATION RATE: 18 BRPM | OXYGEN SATURATION: 98 % | HEART RATE: 88 BPM | TEMPERATURE: 97.2 F | DIASTOLIC BLOOD PRESSURE: 64 MMHG

## 2024-10-02 DIAGNOSIS — C34.92 SQUAMOUS CELL CARCINOMA OF LEFT LUNG (HCC): Primary | ICD-10-CM

## 2024-10-02 PROCEDURE — 99211 OFF/OP EST MAY X REQ PHY/QHP: CPT | Performed by: RADIOLOGY

## 2024-10-02 PROCEDURE — 99213 OFFICE O/P EST LOW 20 MIN: CPT | Performed by: RADIOLOGY

## 2024-10-02 NOTE — PROGRESS NOTES
Follow-up - Radiation Oncology   Marizol Jiménez 10/4/1929 89 y.o. female [id number]      History of Present Illness   Cancer Staging   Primary adenocarcinoma of ascending colon (HCC)  Staging form: Colon and Rectum, AJCC 8th Edition  - Pathologic stage from 2023: Stage IIA (pT3, pN0, cM0) - Signed by Efrem Valdes MD on 2024  Stage prefix: Initial diagnosis  Total positive nodes: 0    Squamous cell carcinoma of left lung (HCC)  Staging form: Lung, AJCC 8th Edition  - Clinical stage from 2024: Stage IA3 (cT1c, cN0, cM0) - Signed by Efrem Valdes MD on 2024  Stage prefix: Initial diagnosis      Marizol Jiménez is a 89 y.o. female  with stage IA3 [id number] non-small cell carcinoma of the lung who is seen in follow up status post 10 fraction SBRT (3/18/24 - 3/29/24).       Interval History:  24 CT Chest   IMPRESSION:  Continued decrease in size of left apical malignancy with surrounding radiation change present.  Large hiatal hernia.  Cholelithiasis.     Upcomin24 Dr. Reid    Upon interview, she endorses the above history.  She denies worsening shortness of breath or cough.  She remains functional without limitations.  She is without additional acute concerns.    Historical Information   Oncology History   Primary adenocarcinoma of ascending colon (HCC)   3/7/2023 Initial Diagnosis    Primary adenocarcinoma of ascending colon (HCC)     2023 -  Cancer Staged    Staging form: Colon and Rectum, AJCC 8th Edition  - Pathologic stage from 2023: Stage IIA (pT3, pN0, cM0) - Signed by Efrem Valdes MD on 2024  Stage prefix: Initial diagnosis  Total positive nodes: 0       3/18/2024 - 3/29/2024 Radiation    Treatment:  Course: C1    Plan ID Energy Fractions Dose per Fraction (cGy) Dose Correction (cGy) Total Dose Delivered (cGy) Elapsed Days   HARRIS 6X-FFF 10 / 10 665 0 6,650 11      Treatment dates:  C1: 3/18/2024 - 3/29/2024     Squamous cell carcinoma of left  lung (HCC)   1/4/2024 Initial Diagnosis    Non-small cell carcinoma of left lung (HCC)     1/4/2024 Biopsy    IR lung biopsy    A.  Lung, left upper lobe, biopsy:  Non-small cell carcinoma with squamous differentiation.      1/22/2024 -  Cancer Staged    Staging form: Lung, AJCC 8th Edition  - Clinical stage from 1/22/2024: Stage IA3 (cT1c, cN0, cM0) - Signed by Efrem Valdes MD on 1/22/2024  Stage prefix: Initial diagnosis       3/18/2024 - 3/29/2024 Radiation    Plan ID Energy Fractions Dose per Fraction (cGy) Dose Correction (cGy) Total Dose Delivered (cGy) Elapsed Days   HARRIS 6X-FFF 10 / 10 665 0 6,650 11      Treatment dates:  C1: 3/18/2024 - 3/29/2024         Past Medical History:   Diagnosis Date    BCC (basal cell carcinoma)     Diastolic dysfunction     Dyslipidemia     Dysphagia     Glaucoma     Hashimoto's thyroiditis     Hyperparathyroidism (HCC)     Hypertension     Impaired fasting glucose     Insomnia     Lung cancer (HCC)     Osteopenia     Peripheral arterial disease (HCC) 02/03/2015    S/P right hemicolectomy 04/13/2022    SCCA (squamous cell carcinoma) of skin      Past Surgical History:   Procedure Laterality Date    CATARACT EXTRACTION, BILATERAL      COLONOSCOPY      Complete, Onset - 10/25/05 - 10 years     ENDOBRONCHIAL ULTRASOUND (EBUS) N/A 2/12/2024    Procedure: ENDOBRONCHIAL ULTRASOUND (EBUS) with biopsies;  Surgeon: Miguel Richmond DO;  [facility];  Service: Thoracic    HYSTERECTOMY      IR BIOPSY LUNG  1/4/2024    PARATHYROIDECTOMY      1st 1985 and 2nd 2002    [facility] INCL FLUOR GDNCE DX W/CELL WASHG SPX N/A 2/12/2024    Procedure: BRONCHOSCOPY FLEXIBLE;  Surgeon: Miguel Richmond DO;  [facility];  Service: Thoracic    [place] LAPAROSCOPY COLECTOMY PARTIAL W/ANASTOMOSIS N/A 4/13/2023    Procedure: RESECTION COLON RIGHT LAPAROSCOPIC;  Surgeon: Manny Reid MD;  [facility];  Service: Colorectal    [place] NDSC WRST SURG W/RLS TRANSVRS CARPL  LIGM Right 05/02/2016    Procedure: RELEASE CARPAL TUNNEL ENDOSCOPIC;  Surgeon: Alexandre Diaz MD;  [facility];  Service: Orthopedics    TONSILLECTOMY         Social History   Social History     Substance and Sexual Activity   Alcohol Use Yes    Comment: rarely, social      Social History     Substance and Sexual Activity   Drug Use No     Social History     Tobacco Use   Smoking Status Never   Smokeless Tobacco Never         Meds/Allergies     Current Outpatient Medications:     amLODIPine (NORVASC) 5 mg tablet, TAKE 1 TABLET (5 MG TOTAL) BY MOUTH DAILY., Disp: 90 tablet, Rfl: 1    ASPIRIN 81 PO, Take 1 tablet by mouth every other day, Disp: , Rfl:     atorvastatin (LIPITOR) 20 mg tablet, Take 20 mg by mouth daily, Disp: , Rfl:     Biotin 1000 MCG tablet, Take 5,000 mcg by mouth daily, Disp: , Rfl:     Calcium 500 MG tablet, Take 1 tablet by mouth 3 (three) times a week , Disp: , Rfl:     cholecalciferol (VITAMIN D3) 1,000 units tablet, Take 1 tablet by mouth daily, Disp: , Rfl:     dorzolamide-timolol (COSOPT) 22.3-6.8 MG/ML ophthalmic solution, Administer 1 drop to both eyes 2 (two) times a day, Disp: , Rfl:     latanoprost (XALATAN) 0.005 % ophthalmic solution, Administer 1 drop into the left eye daily at bedtime, Disp: , Rfl:     lisinopril (ZESTRIL) 10 mg tablet, TAKE 1 TABLET BY MOUTH EVERY DAY, Disp: 90 tablet, Rfl: 1    metoprolol tartrate (LOPRESSOR) 25 mg tablet, TAKE 1 TABLET BY MOUTH TWICE A DAY, Disp: 180 tablet, Rfl: 2    Multiple Vitamins-Minerals (CENTRUM SILVER ADULT 50+ PO), Take by mouth., Disp: , Rfl:     Multiple Vitamins-Minerals (PRESERVISION AREDS PO), Take 1 tablet by mouth 2 (two) times a day, Disp: , Rfl:     Omega-3 Fatty Acids (FISH OIL) 1,000 mg, Take 1,000 mg by mouth daily., Disp: , Rfl:     Synthroid 88 MCG tablet, TAKE 1 TABLET BY MOUTH EVERY DAY, Disp: 90 tablet, Rfl: 1    ferrous sulfate 324 (65 Fe) mg, TAKE 1 TABLET BY MOUTH EVERY DAY BEFORE BREAKFAST (Patient not  taking: Reported on 11/14/2023), Disp: 90 tablet, Rfl: 1  Allergies   Allergen Reactions    Cyclogyl [Cyclopentolate Hcl]     Phenylephrine Other (See Comments)     Eye gtts red eye infected sore    Pred Forte [Prednisolone Acetate]          Review of Systems  Constitutional:  Positive for fatigue.   HENT:  Positive for hearing loss (bilateral hearing aides).    Eyes: Negative.         Wears glasses   Respiratory:  Positive for cough (mild in am- non productive. Reports chronic) and shortness of breath (infrequently).    Cardiovascular: Negative.    Gastrointestinal: Negative.    Endocrine: Negative.    Genitourinary:  Positive for frequency.   Musculoskeletal: Negative.    Skin: Negative.    Allergic/Immunologic: Negative.    Neurological: Negative.    Hematological: Negative.    Psychiatric/Behavioral:  Positive for sleep disturbance.        OBJECTIVE:   /64   Pulse 88   Temp (!) 97.2 °F (36.2 °C)   Resp 18   SpO2 98%   Pain Assessment:  0  Karnofsky: 80 - Normal activity with effort; some signs or symptoms of disease    Physical Exam   HENT:      Head: Normocephalic and atraumatic.   Eyes:      General: No scleral icterus.     Extraocular Movements: Extraocular movements intact.   Cardiovascular:      Rate and Rhythm: Normal rate.   Pulmonary:      Effort: Pulmonary effort is normal.   Abdominal:      General: Abdomen is flat. There is no distension.   Musculoskeletal:         General: Normal range of motion.      Cervical back: Normal range of motion.   Skin:     General: Skin is warm and dry.   Neurological:      General: No focal deficit present.      Mental Status: She is alert.   Psychiatric:         Mood and Affect: Mood normal.         RESULTS    Lab Results: No results found for this or any previous visit (from the past 672 hour(s)).    Imaging Studies:CT chest wo contrast    Result Date: 9/27/2024  Narrative: CT CHEST WITHOUT IV CONTRAST INDICATION: C34.92: Malignant neoplasm of unspecified  part of left bronchus or lung. COMPARISON: 6/17/2024 TECHNIQUE: CT examination of the chest was performed without intravenous contrast. Multiplanar 2D reformatted images were created from the source data. This examination, like all CT scans performed in the [facility], was performed utilizing techniques to minimize radiation dose exposure, including the use of iterative reconstruction and automated exposure control. Radiation dose length product (DLP) for this visit: 204.98 mGy-cm FINDINGS: LUNGS: Continued decrease in size of left apical malignancy is noted now measuring approximately 2.0 x 1.8 cm, direct measurement challenging secondary to surrounding radiation change. There is no tracheal or endobronchial lesion. PLEURA: Unremarkable. HEART/GREAT VESSELS: Heart is unremarkable for patient's age. No thoracic aortic aneurysm. Large hiatal hernia is again identified. MEDIASTINUM AND RAISA: Subcentimeter mediastinal lymph nodes are present. CHEST WALL AND LOWER NECK: Unremarkable. VISUALIZED STRUCTURES IN THE UPPER ABDOMEN: Cholelithiasis is present. OSSEOUS STRUCTURES: No acute fracture or destructive osseous lesion.     Impression: Continued decrease in size of left apical malignancy with surrounding radiation change present. Large hiatal hernia. Cholelithiasis. Workstation performed: [id number]           Assessment/Plan:  No orders of the defined types were placed in this encounter.       Marizol Jiménez is a 89 y.o. year old female with stage IA3 [id number] non-small cell carcinoma of the lung who is seen in follow up status post 10 fraction SBRT (3/18/24 - 3/29/24).      She is without significant treatment related toxicity and imaging continues to demonstrate evolving post radiation changes.     Going forward, CT chest will be repeated in 4 months.  She was encouraged to call with questions/concerns.    Efrem Valdes MD  10/2/2024,2:32 PM    Portions of the record may have been created with  "voice recognition software.  Occasional wrong word or \"sound a like\" substitutions may have occurred due to the inherent limitations of voice recognition software.  Read the chart carefully and recognize, using context, where substitutions have occurred.        "

## 2024-10-02 NOTE — PROGRESS NOTES
Marizol Jiménez 10/4/1929 is a 89 y.o. female with a history of stage IIA pT3N0 colon adenocarcinoma status post right hemicolectomy who presented for evaluation of a left upper lobe lung nodule detected on postoperative CT chest/abdomen/pelvis. She has stage IA3 [id number] squamous cell carcinoma of the left upper lobe of the lung. She completed a course of SBRT to left lung on 3/29/24. She was last seen on 24. She is being seen today for routine follow up.       24 CT Chest   IMPRESSION:  Continued decrease in size of left apical malignancy with surrounding radiation change present.  Large hiatal hernia.  Cholelithiasis.    Upcomin24 Dr. Reid    Follow up visit     Oncology History   Primary adenocarcinoma of ascending colon (HCC)   3/7/2023 Initial Diagnosis    Primary adenocarcinoma of ascending colon (HCC)     2023 -  Cancer Staged    Staging form: Colon and Rectum, AJCC 8th Edition  - Pathologic stage from 2023: Stage IIA (pT3, pN0, cM0) - Signed by Efrem Valdes MD on 2024  Stage prefix: Initial diagnosis  Total positive nodes: 0       3/18/2024 - 3/29/2024 Radiation    Treatment:  Course: C1    Plan ID Energy Fractions Dose per Fraction (cGy) Dose Correction (cGy) Total Dose Delivered (cGy) Elapsed Days   HARRIS 6X-FFF 10 / 10 665 0 6,650 11      Treatment dates:  C1: 3/18/2024 - 3/29/2024     Squamous cell carcinoma of left lung (HCC)   2024 Initial Diagnosis    Non-small cell carcinoma of left lung (HCC)     2024 Biopsy    IR lung biopsy    A.  Lung, left upper lobe, biopsy:  Non-small cell carcinoma with squamous differentiation.      2024 -  Cancer Staged    Staging form: Lung, AJCC 8th Edition  - Clinical stage from 2024: Stage IA3 (cT1c, cN0, cM0) - Signed by Efrem Valdes MD on 2024  Stage prefix: Initial diagnosis       3/18/2024 - 3/29/2024 Radiation    Plan ID Energy Fractions Dose per Fraction (cGy) Dose Correction (cGy) Total Dose  Delivered (cGy) Elapsed Days   HARRIS 6X-FFF 10 / 10 665 0 6,650 11      Treatment dates:  C1: 3/18/2024 - 3/29/2024         Review of Systems:  Review of Systems   Constitutional:  Positive for fatigue.   HENT:  Positive for hearing loss (bilateral hearing aides).    Eyes: Negative.         Wears glasses   Respiratory:  Positive for cough (mild in am- non productive. Reports chronic) and shortness of breath (infrequently).    Cardiovascular: Negative.    Gastrointestinal: Negative.    Endocrine: Negative.    Genitourinary:  Positive for frequency.   Musculoskeletal: Negative.    Skin: Negative.    Allergic/Immunologic: Negative.    Neurological: Negative.    Hematological: Negative.    Psychiatric/Behavioral:  Positive for sleep disturbance.          Health Maintenance   Topic Date Due    COVID-19 Vaccine (6 - 2023-24 season) 09/01/2024    BMI: Followup Plan  09/22/2024    Fall Risk  09/26/2025    Depression Screening  09/26/2025    Urinary Incontinence Screening  09/26/2025    Medicare Annual Wellness Visit (AWV)  09/26/2025    BMI: Adult  09/30/2025    RSV Vaccine Age 60+ Years  Completed    Zoster Vaccine  Completed    Pneumococcal Vaccine: 65+ Years  Completed    Influenza Vaccine  Completed    RSV Vaccine age 0-20 Months  Aged Out    HIB Vaccine  Aged Out    IPV Vaccine  Aged Out    Hepatitis A Vaccine  Aged Out    Meningococcal ACWY Vaccine  Aged Out    HPV Vaccine  Aged Out     Patient Active Problem List   Diagnosis    De Quervain's tenosynovitis, right    Diastolic dysfunction    Dyslipidemia    Esophageal reflux    Glaucoma    Hypertension    Impaired fasting glucose    Insomnia    Left ventricular hypertrophy    Mixed urge and stress incontinence    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 tuberculosis    Thyroid nodule    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)    Hyperparathyroidism (HCC)    Hypothyroidism    Stage 3 chronic kidney disease, unspecified whether stage 3a or 3b  CKD (HCC)    Primary adenocarcinoma of ascending colon (HCC)    Hypoalbuminemia    Anemia    Iron deficiency anemia    Complex renal cyst    Status post parathyroidectomy    Squamous cell carcinoma of left lung (HCC)    S/P right hemicolectomy     Past Medical History:   Diagnosis Date    BCC (basal cell carcinoma)     Diastolic dysfunction     Dyslipidemia     Dysphagia     Glaucoma     Hashimoto's thyroiditis     Hyperparathyroidism (HCC)     Hypertension     Impaired fasting glucose     Insomnia     Lung cancer (HCC)     Osteopenia     Peripheral arterial disease (HCC) 02/03/2015    S/P right hemicolectomy 04/13/2022    SCCA (squamous cell carcinoma) of skin      Past Surgical History:   Procedure Laterality Date    CATARACT EXTRACTION, BILATERAL      COLONOSCOPY      Complete, Onset - 10/25/05 - 10 years     ENDOBRONCHIAL ULTRASOUND (EBUS) N/A 2/12/2024    Procedure: ENDOBRONCHIAL ULTRASOUND (EBUS) with biopsies;  Surgeon: Miguel Richmond DO;  [facility];  Service: Thoracic    HYSTERECTOMY      IR BIOPSY LUNG  1/4/2024    PARATHYROIDECTOMY      1st 1985 and 2nd 2002    [facility] INCL FLUOR GDNCE DX W/CELL WASHG SPX N/A 2/12/2024    Procedure: BRONCHOSCOPY FLEXIBLE;  Surgeon: Miguel Richmond DO;  [facility];  Service: Thoracic    AL LAPAROSCOPY COLECTOMY PARTIAL W/ANASTOMOSIS N/A 4/13/2023    Procedure: RESECTION COLON RIGHT LAPAROSCOPIC;  Surgeon: Manny Reid MD;  [facility];  Service: Colorectal    AL [facility] WRST SURG W/RLS TRANSVRS CARPL LIGM Right 05/02/2016    Procedure: RELEASE CARPAL TUNNEL ENDOSCOPIC;  Surgeon: Alexandre Diaz MD;  Location[facility];  Service: Orthopedics    TONSILLECTOMY       Family History   Problem Relation Age of Onset    Ovarian cancer Mother     Stroke Father         CVA    Melanoma Maternal Uncle      Social History     Socioeconomic History    Marital status: Single     Spouse name: Not on file    Number of children: Not  on file    Years of education: Not on file    Highest education level: Not on file   Occupational History    Occupation: Retired   Tobacco Use    Smoking status: Never    Smokeless tobacco: Never   Vaping Use    Vaping status: Never Used   Substance and Sexual Activity    Alcohol use: Yes     Comment: rarely, social     Drug use: No    Sexual activity: Not on file   Other Topics Concern    Not on file   Social History Narrative     per [employer]      Social Determinants of Health     Financial Resource Strain: Low Risk  (9/22/2023)    Overall Financial Resource Strain (CARDIA)     Difficulty of Paying Living Expenses: Not hard at all   Food Insecurity: No Food Insecurity (9/26/2024)    Hunger Vital Sign     Worried About Running Out of Food in the Last Year: Never true     Ran Out of Food in the Last Year: Never true   Transportation Needs: No Transportation Needs (9/26/2024)    PRAPARE - Transportation     Lack of Transportation (Medical): No     Lack of Transportation (Non-Medical): No   Physical Activity: Not on file   Stress: Not on file   Social Connections: Not on file   Intimate Partner Violence: Not on file   Housing Stability: Low Risk  (9/26/2024)    Housing Stability Vital Sign     Unable to Pay for Housing in the Last Year: No     Number of Times Moved in the Last Year: 0     Homeless in the Last Year: No       Current Outpatient Medications:     amLODIPine (NORVASC) 5 mg tablet, TAKE 1 TABLET (5 MG TOTAL) BY MOUTH DAILY., Disp: 90 tablet, Rfl: 1    ASPIRIN 81 PO, Take 1 tablet by mouth every other day, Disp: , Rfl:     atorvastatin (LIPITOR) 20 mg tablet, Take 20 mg by mouth daily, Disp: , Rfl:     Biotin 1000 MCG tablet, Take 5,000 mcg by mouth daily, Disp: , Rfl:     Calcium 500 MG tablet, Take 1 tablet by mouth 3 (three) times a week , Disp: , Rfl:     cholecalciferol (VITAMIN D3) 1,000 units tablet, Take 1 tablet by mouth daily, Disp: , Rfl:     dorzolamide-timolol (COSOPT) 22.3-6.8 MG/ML  ophthalmic solution, Administer 1 drop to both eyes 2 (two) times a day, Disp: , Rfl:     latanoprost (XALATAN) 0.005 % ophthalmic solution, Administer 1 drop into the left eye daily at bedtime, Disp: , Rfl:     lisinopril (ZESTRIL) 10 mg tablet, TAKE 1 TABLET BY MOUTH EVERY DAY, Disp: 90 tablet, Rfl: 1    metoprolol tartrate (LOPRESSOR) 25 mg tablet, TAKE 1 TABLET BY MOUTH TWICE A DAY, Disp: 180 tablet, Rfl: 2    Multiple Vitamins-Minerals (CENTRUM SILVER ADULT 50+ PO), Take by mouth., Disp: , Rfl:     Multiple Vitamins-Minerals (PRESERVISION AREDS PO), Take 1 tablet by mouth 2 (two) times a day, Disp: , Rfl:     Omega-3 Fatty Acids (FISH OIL) 1,000 mg, Take 1,000 mg by mouth daily., Disp: , Rfl:     Synthroid 88 MCG tablet, TAKE 1 TABLET BY MOUTH EVERY DAY, Disp: 90 tablet, Rfl: 1    ferrous sulfate 324 (65 Fe) mg, TAKE 1 TABLET BY MOUTH EVERY DAY BEFORE BREAKFAST (Patient not taking: Reported on 11/14/2023), Disp: 90 tablet, Rfl: 1  Allergies   Allergen Reactions    Cyclogyl [Cyclopentolate Hcl]     Phenylephrine Other (See Comments)     Eye gtts red eye infected sore    Pred Forte [Prednisolone Acetate]      Vitals:    10/02/24 1407   BP: 130/64   Pulse: 88   Resp: 18   Temp: (!) 97.2 °F (36.2 °C)   SpO2: 98%      Pain Score: 0-No pain

## 2024-11-12 DIAGNOSIS — I10 ESSENTIAL HYPERTENSION: ICD-10-CM

## 2024-11-13 RX ORDER — AMLODIPINE BESYLATE 5 MG/1
5 TABLET ORAL DAILY
Qty: 90 TABLET | Refills: 1 | Status: SHIPPED | OUTPATIENT
Start: 2024-11-13

## 2024-12-03 NOTE — PROGRESS NOTES
Colon and Rectal Surgery   Marizol Jiménez 89 y.o. female MRN: [id number]   Encounter: [id number]  12/03/24   10:06 AM        ASSESSMENT:        PLAN:        HPI  Marizol Jiménez is a 89 y.o. female who is here for a 6 month follow up for T3N0(0/16) lymph nodes, Stage IIA ascending colon adenocarcinoma/cancer, no chemotherapy. Last seen in the office on 5/28/2024.    She is status post right hemicolectomy for ascending colon cancer on 4/13/2023.    Last colonoscopy was performed on 02/17/2023 by Walter Tran.     Lab Results   Component Value Date    CEA 2.5 08/28/2024     Lab Results   Component Value Date    SODIUM 144 08/28/2024    K 5.0 08/28/2024     (H) 08/28/2024    CO2 28 08/28/2024    AGAP 7 08/28/2024    BUN 31 (H) 08/28/2024    CREATININE 1.04 08/28/2024    GLUF 94 08/28/2024    CALCIUM 9.9 08/28/2024    AST 13 08/28/2024    ALT 12 08/28/2024    ALKPHOS 66 08/28/2024    TP 6.3 (L) 08/28/2024    TBILI 0.57 08/28/2024    EGFR 46 08/28/2024     Lab Results   Component Value Date    WBC 5.15 08/28/2024    HGB 12.6 08/28/2024    HCT 41.4 08/28/2024    MCV 84 08/28/2024     08/28/2024     Historical Information   Past Medical History:   Diagnosis Date    BCC (basal cell carcinoma)     Diastolic dysfunction     Dyslipidemia     Dysphagia     Glaucoma     Hashimoto's thyroiditis     Hyperparathyroidism (HCC)     Hypertension     Impaired fasting glucose     Insomnia     Lung cancer (HCC)     Osteopenia     Peripheral arterial disease (HCC) 02/03/2015    S/P right hemicolectomy 04/13/2022    SCCA (squamous cell carcinoma) of skin      Past Surgical History:   Procedure Laterality Date    CATARACT EXTRACTION, BILATERAL      COLONOSCOPY      Complete, Onset - 10/25/05 - 10 years     ENDOBRONCHIAL ULTRASOUND (EBUS) N/A 2/12/2024    Procedure: ENDOBRONCHIAL ULTRASOUND (EBUS) with biopsies;  Surgeon: Miguel Richmond DO;  [facility];  Service: Thoracic    HYSTERECTOMY      IR BIOPSY LUNG   1/4/2024    PARATHYROIDECTOMY      1st 1985 and 2nd 2002    [facility] W/CELL WASHG SPX N/A 2/12/2024    Procedure: BRONCHOSCOPY FLEXIBLE;  Surgeon: Miguel Richmond DO;  [facility];  Service: Thoracic    OK LAPAROSCOPY COLECTOMY PARTIAL W/ANASTOMOSIS N/A 4/13/2023    Procedure: RESECTION COLON RIGHT LAPAROSCOPIC;  Surgeon: Manny Reid MD;  [facility];  Service: Colorectal    OK NDSC WRST SURG W/RLS TRANSVRS CARPL LIGM Right 05/02/2016    Procedure: RELEASE CARPAL TUNNEL ENDOSCOPIC;  Surgeon: Alexandre Diaz MD;  [facility];  Service: Orthopedics    TONSILLECTOMY         Meds/Allergies       Current Outpatient Medications:     amLODIPine (NORVASC) 5 mg tablet, TAKE 1 TABLET (5 MG TOTAL) BY MOUTH DAILY., Disp: 90 tablet, Rfl: 1    ASPIRIN 81 PO, Take 1 tablet by mouth every other day, Disp: , Rfl:     atorvastatin (LIPITOR) 20 mg tablet, Take 20 mg by mouth daily, Disp: , Rfl:     Biotin 1000 MCG tablet, Take 5,000 mcg by mouth daily, Disp: , Rfl:     Calcium 500 MG tablet, Take 1 tablet by mouth 3 (three) times a week , Disp: , Rfl:     cholecalciferol (VITAMIN D3) 1,000 units tablet, Take 1 tablet by mouth daily, Disp: , Rfl:     dorzolamide-timolol (COSOPT) 22.3-6.8 MG/ML ophthalmic solution, Administer 1 drop to both eyes 2 (two) times a day, Disp: , Rfl:     ferrous sulfate 324 (65 Fe) mg, TAKE 1 TABLET BY MOUTH EVERY DAY BEFORE BREAKFAST (Patient not taking: Reported on 11/14/2023), Disp: 90 tablet, Rfl: 1    latanoprost (XALATAN) 0.005 % ophthalmic solution, Administer 1 drop into the left eye daily at bedtime, Disp: , Rfl:     lisinopril (ZESTRIL) 10 mg tablet, TAKE 1 TABLET BY MOUTH EVERY DAY, Disp: 90 tablet, Rfl: 1    metoprolol tartrate (LOPRESSOR) 25 mg tablet, TAKE 1 TABLET BY MOUTH TWICE A DAY, Disp: 180 tablet, Rfl: 2    Multiple Vitamins-Minerals (CENTRUM SILVER ADULT 50+ PO), Take by mouth., Disp: , Rfl:     Multiple  Vitamins-Minerals (PRESERVISION AREDS PO), Take 1 tablet by mouth 2 (two) times a day, Disp: , Rfl:     Omega-3 Fatty Acids (FISH OIL) 1,000 mg, Take 1,000 mg by mouth daily., Disp: , Rfl:     Synthroid 88 MCG tablet, TAKE 1 TABLET BY MOUTH EVERY DAY, Disp: 90 tablet, Rfl: 1      Allergies   Allergen Reactions    Cyclogyl [Cyclopentolate Hcl]     Phenylephrine Other (See Comments)     Eye gtts red eye infected sore    Pred Forte [Prednisolone Acetate]          Social History   Social History     Substance and Sexual Activity   Alcohol Use Yes    Comment: rarely, social      Social History     Substance and Sexual Activity   Drug Use No     Social History     Tobacco Use   Smoking Status Never   Smokeless Tobacco Never         Family History:   Family History   Problem Relation Age of Onset    Ovarian cancer Mother     Stroke Father         CVA    Melanoma Maternal Uncle        Review of Systems    Objective     Current Vitals:   There were no vitals filed for this visit.      Physical Exam:  General:no distress  Eyes:perrla/eomi  ENT:moist mucus membranes  Neck:supple  Pulm:no increased work of breathing  CV:sinus  Abdomen:soft,nontender  Rectal:normal perianal skin/sphincter tone, no masses palpated  Extremities:no edema  Lymphatics:no neck/axillary/groin lymphadenopathy

## 2024-12-06 ENCOUNTER — OFFICE VISIT (OUTPATIENT)
Age: 89
End: 2024-12-06
Payer: MEDICARE

## 2024-12-06 VITALS
BODY MASS INDEX: 26.54 KG/M2 | SYSTOLIC BLOOD PRESSURE: 130 MMHG | DIASTOLIC BLOOD PRESSURE: 68 MMHG | HEIGHT: 57 IN | WEIGHT: 123 LBS

## 2024-12-06 DIAGNOSIS — C18.2 PRIMARY ADENOCARCINOMA OF ASCENDING COLON (HCC): Primary | ICD-10-CM

## 2024-12-06 PROCEDURE — 99215 OFFICE O/P EST HI 40 MIN: CPT | Performed by: COLON & RECTAL SURGERY

## 2024-12-06 NOTE — ASSESSMENT & PLAN NOTE
ASSESSMENT:    Marizol returns today, she is doing well 1 year 8months status post laparoscopic right hemicolectomy, T3N0(0/16) lymph nodes, good result, Stage IIA ascending colon adenocarcinoma/cancer, 4/13/2023, negative margins, she will not require adjuvant chemotherapy.     In the interval she had some mild elevation of CEA, follow-up CT chest/abdomen/pelvis with lung nodule, this was worked up and found to have left upper lobe, squamous cell carcinoma- [id number], stage IA, this has been treated with SBRT completed in June of this year by her report.     In the interval she has done nicely, gaining weight up 3lbs to 123lb today, no troubles with diet or bowel movements, no pain or any complaints on today's visit, CEA normalized 8/2024.     PLAN:  -CEA 6months  -Office visit 9months  -Will hold on any additional abdominopelvic imaging to avoid contrast, and hold on colonoscopy to avoid prep/fall risk at age 89 unless any new alarm symptoms suggesting additional workup, as she agrees to minimize ongoing workups at her age  CC:  Dr. Finn Richmond        Orders:    CEA; Future

## 2024-12-06 NOTE — PROGRESS NOTES
Colon and Rectal Surgery   Marizol Jiménez 89 y.o. female MRN: [id number]   Encounter: [id number]  24   2:14 PM      Name: Marizol Jiménez      : 10/4/1929      MRN: [id number]  Encounter Provider: Manny Reid MD  Encounter Date: 2024   Encounter department: [facility] COLON AND RECTAL SURGERY [place]  :  Assessment & Plan  Primary adenocarcinoma of ascending colon (HCC)  ASSESSMENT:    Marizol returns today, she is doing well 1 year 8months status post laparoscopic right hemicolectomy, T3N0(0/16) lymph nodes, good result, Stage IIA ascending colon adenocarcinoma/cancer, 2023, negative margins, she will not require adjuvant chemotherapy.     In the interval she had some mild elevation of CEA, follow-up CT chest/abdomen/pelvis with lung nodule, this was worked up and found to have left upper lobe, squamous cell carcinoma- [id number], stage IA, this has been treated with SBRT completed in  of this year by her report.     In the interval she has done nicely, gaining weight up 3lbs to 123lb today, no troubles with diet or bowel movements, no pain or any complaints on today's visit, CEA normalized 2024.     PLAN:  -CEA 6months  -Office visit 9months  -Will hold on any additional abdominopelvic imaging to avoid contrast, and hold on colonoscopy to avoid prep/fall risk at age 89 unless any new alarm symptoms suggesting additional workup, as she agrees to minimize ongoing workups at her age  CC:  Dr. Finn Richmond        Orders:    CEA; Future        History of Present Illness   HPI  Marizol Jiménez is a 89 y.o. female who is here for a 6 month follow up for T3N0(0/16) lymph nodes, Stage IIA ascending colon adenocarcinoma/cancer, no chemotherapy. Last seen in the office on 2024.    She is status post right hemicolectomy for ascending colon cancer on 2023.    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     CT chest without contrast  9/25/24  IMPRESSION:  - Continued decrease in size of left apical malignancy with surrounding radiation change present.  - Large hiatal hernia.  - Cholelithiasis.    She reports daily bowel movements. The consistency of her stool is mostly soft and formed. She denies rectal bleeding and blood in the stool.     Last colonoscopy was performed on 02/17/2023 by Dr. Walter Tran.     Lab Results   Component Value Date    CEA 2.5 08/28/2024     Lab Results   Component Value Date    SODIUM 144 08/28/2024    K 5.0 08/28/2024     (H) 08/28/2024    CO2 28 08/28/2024    AGAP 7 08/28/2024    BUN 31 (H) 08/28/2024    CREATININE 1.04 08/28/2024    GLUF 94 08/28/2024    CALCIUM 9.9 08/28/2024    AST 13 08/28/2024    ALT 12 08/28/2024    ALKPHOS 66 08/28/2024    TP 6.3 (L) 08/28/2024    TBILI 0.57 08/28/2024    EGFR 46 08/28/2024     Lab Results   Component Value Date    WBC 5.15 08/28/2024    HGB 12.6 08/28/2024    HCT 41.4 08/28/2024    MCV 84 08/28/2024     08/28/2024     Historical Information   Past Medical History:   Diagnosis Date    BCC (basal cell carcinoma)     Diastolic dysfunction     Dyslipidemia     Dysphagia     Glaucoma     Hashimoto's thyroiditis     Hyperparathyroidism (HCC)     Hypertension     Impaired fasting glucose     Insomnia     Lung cancer (HCC)     Osteopenia     Peripheral arterial disease (HCC) 02/03/2015    S/P right hemicolectomy 04/13/2022    SCCA (squamous cell carcinoma) of skin      Past Surgical History:   Procedure Laterality Date    CATARACT EXTRACTION, BILATERAL      COLONOSCOPY      Complete, Onset - 10/25/05 - 10 years     ENDOBRONCHIAL ULTRASOUND (EBUS) N/A 2/12/2024    Procedure: ENDOBRONCHIAL ULTRASOUND (EBUS) with biopsies;  Surgeon: Miguel Richmond DO;  [facility];  Service: Thoracic    HYSTERECTOMY      IR BIOPSY LUNG  1/4/2024    PARATHYROIDECTOMY      1st 1985 and 2nd 2002    [facility] INCL FLUOR GDNCE DX W/CELL WASHG SPX N/A 2/12/2024    Procedure:  BRONCHOSCOPY FLEXIBLE;  Surgeon: Miguel Richmond DO;  [facility];  Service: Thoracic    ME LAPAROSCOPY COLECTOMY PARTIAL W/ANASTOMOSIS N/A 4/13/2023    Procedure: RESECTION COLON RIGHT LAPAROSCOPIC;  Surgeon: Manny Reid MD;  [facility];  Service: Colorectal    ME NDSC WRST SURG W/RLS TRANSVRS CARPL LIGM Right 05/02/2016    Procedure: RELEASE CARPAL TUNNEL ENDOSCOPIC;  Surgeon: Alexandre Diaz MD;  [facility];  Service: Orthopedics    TONSILLECTOMY         Meds/Allergies       Current Outpatient Medications:     amLODIPine (NORVASC) 5 mg tablet, TAKE 1 TABLET (5 MG TOTAL) BY MOUTH DAILY., Disp: 90 tablet, Rfl: 1    ASPIRIN 81 PO, Take 1 tablet by mouth every other day, Disp: , Rfl:     atorvastatin (LIPITOR) 20 mg tablet, Take 20 mg by mouth daily, Disp: , Rfl:     Biotin 1000 MCG tablet, Take 5,000 mcg by mouth daily, Disp: , Rfl:     Calcium 500 MG tablet, Take 1 tablet by mouth 3 (three) times a week , Disp: , Rfl:     cholecalciferol (VITAMIN D3) 1,000 units tablet, Take 1 tablet by mouth daily, Disp: , Rfl:     dorzolamide-timolol (COSOPT) 22.3-6.8 MG/ML ophthalmic solution, Administer 1 drop to both eyes 2 (two) times a day, Disp: , Rfl:     latanoprost (XALATAN) 0.005 % ophthalmic solution, Administer 1 drop into the left eye daily at bedtime, Disp: , Rfl:     lisinopril (ZESTRIL) 10 mg tablet, TAKE 1 TABLET BY MOUTH EVERY DAY, Disp: 90 tablet, Rfl: 1    metoprolol tartrate (LOPRESSOR) 25 mg tablet, TAKE 1 TABLET BY MOUTH TWICE A DAY, Disp: 180 tablet, Rfl: 2    Multiple Vitamins-Minerals (CENTRUM SILVER ADULT 50+ PO), Take by mouth., Disp: , Rfl:     Multiple Vitamins-Minerals (PRESERVISION AREDS PO), Take 1 tablet by mouth 2 (two) times a day, Disp: , Rfl:     Omega-3 Fatty Acids (FISH OIL) 1,000 mg, Take 1,000 mg by mouth daily., Disp: , Rfl:     Synthroid 88 MCG tablet, TAKE 1 TABLET BY MOUTH EVERY DAY, Disp: 90 tablet, Rfl: 1    ferrous sulfate 324 (65 Fe)  "mg, TAKE 1 TABLET BY MOUTH EVERY DAY BEFORE BREAKFAST (Patient not taking: Reported on 11/14/2023), Disp: 90 tablet, Rfl: 1      Allergies   Allergen Reactions    Cyclogyl [Cyclopentolate Hcl]     Phenylephrine Other (See Comments)     Eye gtts red eye infected sore    Pred Forte [Prednisolone Acetate]          Social History   Social History     Substance and Sexual Activity   Alcohol Use Yes    Comment: rarely, social      Social History     Substance and Sexual Activity   Drug Use No     Social History     Tobacco Use   Smoking Status Never   Smokeless Tobacco Never         Family History:   Family History   Problem Relation Age of Onset    Ovarian cancer Mother     Stroke Father         CVA    Melanoma Maternal Uncle      Objective     Current Vitals:   Vitals:    12/06/24 1324   BP: 130/68   Weight: 55.8 kg (123 lb)   Height: 4' 9\" (1.448 m)     Physical Exam:  General:no distress  Neck:supple  Pulm:no increased work of breathing, clear bilateral  CV:sinus  Abdomen:soft,nontender  Extremities:no edema        "

## 2024-12-21 DIAGNOSIS — I10 ESSENTIAL HYPERTENSION: ICD-10-CM

## 2024-12-23 RX ORDER — LISINOPRIL 10 MG/1
10 TABLET ORAL DAILY
Qty: 90 TABLET | Refills: 1 | Status: SHIPPED | OUTPATIENT
Start: 2024-12-23

## 2025-01-02 DIAGNOSIS — I10 ESSENTIAL HYPERTENSION: ICD-10-CM

## 2025-01-03 RX ORDER — METOPROLOL TARTRATE 25 MG/1
25 TABLET, FILM COATED ORAL 2 TIMES DAILY
Qty: 180 TABLET | Refills: 1 | Status: SHIPPED | OUTPATIENT
Start: 2025-01-03

## 2025-01-08 DIAGNOSIS — E78.5 DYSLIPIDEMIA: Primary | ICD-10-CM

## 2025-01-08 RX ORDER — ATORVASTATIN CALCIUM 20 MG/1
20 TABLET, FILM COATED ORAL DAILY
Qty: 30 TABLET | Refills: 5 | Status: SHIPPED | OUTPATIENT
Start: 2025-01-08

## 2025-01-08 NOTE — TELEPHONE ENCOUNTER
Reason for call:   [x] Refill   [] Prior Auth  [] Other:     Office:   [x] PCP/Provider -   [] Specialty/Provider -     Medication: atorvastatin (LIPITOR) 20 mg tablet Take 20 mg by mouth daily     Pharmacy: [id number] [place] [facility] [place] - [address]      Does the patient have enough for 3 days?   [] Yes   [x] No - Send as HP to POD

## 2025-01-31 ENCOUNTER — TELEPHONE (OUTPATIENT)
Age: OVER 89
End: 2025-01-31

## 2025-01-31 DIAGNOSIS — E06.3 HYPOTHYROIDISM DUE TO HASHIMOTO'S THYROIDITIS: ICD-10-CM

## 2025-01-31 DIAGNOSIS — E78.5 DYSLIPIDEMIA: ICD-10-CM

## 2025-01-31 RX ORDER — ATORVASTATIN CALCIUM 20 MG/1
20 TABLET, FILM COATED ORAL DAILY
Qty: 90 TABLET | Refills: 1 | Status: SHIPPED | OUTPATIENT
Start: 2025-01-31

## 2025-01-31 NOTE — TELEPHONE ENCOUNTER
Patient said that [employer] told her synthroid is no longer covered. Patient is asking if the doctor can call her something else in. I asked patient if they told her what would be covered and they did not.

## 2025-02-04 RX ORDER — LEVOTHYROXINE SODIUM 88 UG/1
88 TABLET ORAL DAILY
Qty: 90 TABLET | Refills: 1 | Status: SHIPPED | OUTPATIENT
Start: 2025-02-04

## 2025-02-04 NOTE — TELEPHONE ENCOUNTER
Patient calling back, relayed the above message and she states she is okay with switching to Levothyroxine.  Please send script for Levothyroxine 88 mcg to the [facility] in [employer] in [place].  Thank you

## 2025-02-05 ENCOUNTER — HOSPITAL ENCOUNTER (OUTPATIENT)
Dept: CT IMAGING | Facility: HOSPITAL | Age: OVER 89
Discharge: HOME | End: 2025-02-05
Attending: RADIOLOGY
Payer: MEDICARE

## 2025-02-05 DIAGNOSIS — C34.92 SQUAMOUS CELL CARCINOMA OF LEFT LUNG (HCC): ICD-10-CM

## 2025-02-05 PROCEDURE — 71250 CT THORAX DX C-: CPT

## 2025-02-10 ENCOUNTER — OFFICE VISIT (OUTPATIENT)
Facility: HOSPITAL | Age: OVER 89
End: 2025-02-10
Attending: RADIOLOGY
Payer: MEDICARE

## 2025-02-10 VITALS
OXYGEN SATURATION: 99 % | DIASTOLIC BLOOD PRESSURE: 74 MMHG | HEART RATE: 65 BPM | TEMPERATURE: 97.5 F | RESPIRATION RATE: 18 BRPM | SYSTOLIC BLOOD PRESSURE: 132 MMHG

## 2025-02-10 DIAGNOSIS — C34.92 SQUAMOUS CELL CARCINOMA OF LEFT LUNG (HCC): Primary | ICD-10-CM

## 2025-02-10 PROCEDURE — 99211 OFF/OP EST MAY X REQ PHY/QHP: CPT | Performed by: RADIOLOGY

## 2025-02-10 PROCEDURE — 99213 OFFICE O/P EST LOW 20 MIN: CPT | Performed by: RADIOLOGY

## 2025-02-10 NOTE — ASSESSMENT & PLAN NOTE
Ms. Jiménez is a 89 year old woman with stage IA3 [id number] non-small cell carcinoma of the lung who is seen in follow up status post 10 fraction SBRT (3/18/24 - 3/29/24).     Surveillance CT imaging demonstrates evolving post-treatment changes without evidence of progressive disease.  Going forward, CT chest will be repeated in 6 months and she will return at that time.  She was encouraged to call with questions/concerns in the interim.  Orders:    CT chest wo contrast; Future

## 2025-02-10 NOTE — PROGRESS NOTES
Marizol Jiménez 10/4/1929 is a 89 y.o. female with a history of stage IIA pT3N0 colon adenocarcinoma status post right hemicolectomy who presented for evaluation of a left upper lobe lung nodule detected on postoperative CT chest/abdomen/pelvis. She has stage IA3 [id number] squamous cell carcinoma of the left upper lobe of the lung. She completed a 10 fraction course of SBRT to left lung on 3/29/24. She was last seen on 10/2/24. She is being seen today for routine follow up.        24 Dr. Reid  Doing well 1 year 8 months post right hemicolectomy 2023, negative margins, she will not require adjuvant chemotherapy. No troubles with diet or bowel movements  PLAN:  -CEA 6months  -Office visit 9months  -Will hold on any additional abdominopelvic imaging to avoid contrast, and hold on colonoscopy to avoid prep/fall risk at age 89 unless any new alarm symptoms suggesting additional workup, as she agrees to minimize ongoing workups at her age      25 CT chest   IMPRESSION:  Interval mild decrease in size of left upper lobe malignancy with surrounding post therapy changes.       Upcomin25 Jeanette      Follow up visit     Oncology History   Primary adenocarcinoma of ascending colon (HCC)   3/7/2023 Initial Diagnosis    Primary adenocarcinoma of ascending colon (HCC)     2023 -  Cancer Staged    Staging form: Colon and Rectum, AJCC 8th Edition  - Pathologic stage from 2023: Stage IIA (pT3, pN0, cM0) - Signed by Efrem Valdes MD on 2024  Stage prefix: Initial diagnosis  Total positive nodes: 0       3/18/2024 - 3/29/2024 Radiation    Treatment:  Course: C1    Plan ID Energy Fractions Dose per Fraction (cGy) Dose Correction (cGy) Total Dose Delivered (cGy) Elapsed Days   HARRIS 6X-FFF 10 / 10 665 0 6,650 11      Treatment dates:  C1: 3/18/2024 - 3/29/2024     Squamous cell carcinoma of left lung (HCC)   2024 Initial Diagnosis    Non-small cell carcinoma of left lung (HCC)      1/4/2024 Biopsy    IR lung biopsy    A.  Lung, left upper lobe, biopsy:  Non-small cell carcinoma with squamous differentiation.      1/22/2024 -  Cancer Staged    Staging form: Lung, AJCC 8th Edition  - Clinical stage from 1/22/2024: Stage IA3 (cT1c, cN0, cM0) - Signed by Efrem Valdes MD on 1/22/2024  Stage prefix: Initial diagnosis       3/18/2024 - 3/29/2024 Radiation    Plan ID Energy Fractions Dose per Fraction (cGy) Dose Correction (cGy) Total Dose Delivered (cGy) Elapsed Days   HARRIS 6X-FFF 10 / 10 665 0 6,650 11      Treatment dates:  C1: 3/18/2024 - 3/29/2024         Review of Systems:  Review of Systems   Constitutional:  Positive for fatigue.   HENT:  Positive for hearing loss (bilateral hearing aides).    Eyes: Negative.         Wears glasses   Respiratory:  Positive for cough (intermittent cough in am. Improved since last RT visit) and shortness of breath (on occasion depeding on activity).    Cardiovascular:  Positive for leg swelling (intermittently).   Gastrointestinal: Negative.  Negative for anal bleeding and blood in stool.   Endocrine: Negative.    Genitourinary:  Positive for frequency. Negative for dysuria.   Musculoskeletal:  Positive for arthralgias (knees intermittently).   Skin: Negative.    Allergic/Immunologic: Negative.    Neurological: Negative.    Hematological:  Bruises/bleeds easily.   Psychiatric/Behavioral:  Positive for sleep disturbance (chronic).          Health Maintenance   Topic Date Due    BMI: Followup Plan  09/22/2024    COVID-19 Vaccine (7 - 2024-25 season) 12/12/2024    Fall Risk  09/26/2025    Depression Screening  09/26/2025    Urinary Incontinence Screening  09/26/2025    Medicare Annual Wellness Visit (AWV)  09/26/2025    BMI: Adult  12/06/2025    RSV Vaccine for Pregnant Patients and Patients Age 60+ Years  Completed    Zoster Vaccine  Completed    Pneumococcal Vaccine: 65+ Years  Completed    Influenza Vaccine  Completed    Meningococcal B Vaccine  Aged Out     RSV Vaccine age 0-20 Months  Aged Out    HIB Vaccine  Aged Out    IPV Vaccine  Aged Out    Hepatitis A Vaccine  Aged Out    Meningococcal ACWY Vaccine  Aged Out    HPV Vaccine  Aged Out     Patient Active Problem List   Diagnosis    De Quervain's tenosynovitis, right    Diastolic dysfunction    Dyslipidemia    Esophageal reflux    Glaucoma    Hypertension    Impaired fasting glucose    Insomnia    Left ventricular hypertrophy    Mixed urge and stress incontinence    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 tuberculosis    Thyroid nodule    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)    Hyperparathyroidism (HCC)    Hypothyroidism    Stage 3 chronic kidney disease, unspecified whether stage 3a or 3b CKD (HCC)    Primary adenocarcinoma of ascending colon (HCC)    Hypoalbuminemia    Anemia    Iron deficiency anemia    Complex renal cyst    Status post parathyroidectomy    Squamous cell carcinoma of left lung (HCC)    S/P right hemicolectomy     Past Medical History:   Diagnosis Date    BCC (basal cell carcinoma)     Diastolic dysfunction     Dyslipidemia     Dysphagia     Glaucoma     Hashimoto's thyroiditis     Hyperparathyroidism (HCC)     Hypertension     Impaired fasting glucose     Insomnia     Lung cancer (HCC)     Osteopenia     Peripheral arterial disease (HCC) 02/03/2015    S/P right hemicolectomy 04/13/2022    SCCA (squamous cell carcinoma) of skin      Past Surgical History:   Procedure Laterality Date    CATARACT EXTRACTION, BILATERAL      COLONOSCOPY      Complete, Onset - 10/25/05 - 10 years     ENDOBRONCHIAL ULTRASOUND (EBUS) N/A 2/12/2024    Procedure: ENDOBRONCHIAL ULTRASOUND (EBUS) with biopsies;  Surgeon: Miguel Richmond DO;  [facility]: [facility];  Service: Thoracic    HYSTERECTOMY      IR BIOPSY LUNG  1/4/2024    PARATHYROIDECTOMY      1st 1985 and 2nd 2002    [facility] INCL FLUOR GDNCE DX W/CELL WASHG SPX N/A 2/12/2024    Procedure: BRONCHOSCOPY FLEXIBLE;   Surgeon: Miguel Richmond DO;  [facility]: [facility];  Service: Thoracic    MI LAPAROSCOPY COLECTOMY PARTIAL W/ANASTOMOSIS N/A 4/13/2023    Procedure: RESECTION COLON RIGHT LAPAROSCOPIC;  Surgeon: Manny Reid MD;  [facility];  Service: Colorectal    MI NDSC WRST SURG W/RLS TRANSVRS CARPL LIGM Right 05/02/2016    Procedure: RELEASE CARPAL TUNNEL ENDOSCOPIC;  Surgeon: Alexandre Diaz MD;  Location: [facility];  Service: Orthopedics    TONSILLECTOMY       Family History   Problem Relation Age of Onset    Ovarian cancer Mother     Stroke Father         CVA    Melanoma Maternal Uncle      Social History     Socioeconomic History    Marital status: Single     Spouse name: Not on file    Number of children: Not on file    Years of education: Not on file    Highest education level: Not on file   Occupational History    Occupation: Retired   Tobacco Use    Smoking status: Never    Smokeless tobacco: Never   Vaping Use    Vaping status: Never Used   Substance and Sexual Activity    Alcohol use: Yes     Comment: rarely, social     Drug use: No    Sexual activity: Not on file   Other Topics Concern    Not on file   Social History Narrative     per [employer]      Social Drivers of Health     Financial Resource Strain: Low Risk  (9/22/2023)    Overall Financial Resource Strain (CARDIA)     Difficulty of Paying Living Expenses: Not hard at all   Food Insecurity: No Food Insecurity (9/26/2024)    Nursing - Inadequate Food Risk Classification     Worried About Running Out of Food in the Last Year: Never true     Ran Out of Food in the Last Year: Never true     Ran Out of Food in the Last Year: Not on file   Transportation Needs: No Transportation Needs (9/26/2024)    PRAPARE - Transportation     Lack of Transportation (Medical): No     Lack of Transportation (Non-Medical): No   Physical Activity: Not on file   Stress: Not on file   Social Connections: Not on file   Intimate Partner Violence: Not on  file   Housing Stability: Low Risk  (9/26/2024)    Housing Stability Vital Sign     Unable to Pay for Housing in the Last Year: No     Number of Times Moved in the Last Year: 0     Homeless in the Last Year: No       Current Outpatient Medications:     amLODIPine (NORVASC) 5 mg tablet, TAKE 1 TABLET (5 MG TOTAL) BY MOUTH DAILY., Disp: 90 tablet, Rfl: 1    ASPIRIN 81 PO, Take 1 tablet by mouth every other day, Disp: , Rfl:     atorvastatin (LIPITOR) 20 mg tablet, TAKE 1 TABLET BY MOUTH EVERY DAY, Disp: 90 tablet, Rfl: 1    Biotin 1000 MCG tablet, Take 5,000 mcg by mouth daily, Disp: , Rfl:     Calcium 500 MG tablet, Take 1 tablet by mouth 3 (three) times a week , Disp: , Rfl:     cholecalciferol (VITAMIN D3) 1,000 units tablet, Take 1 tablet by mouth daily, Disp: , Rfl:     dorzolamide-timolol (COSOPT) 22.3-6.8 MG/ML ophthalmic solution, Administer 1 drop to both eyes 2 (two) times a day, Disp: , Rfl:     latanoprost (XALATAN) 0.005 % ophthalmic solution, Administer 1 drop into the left eye daily at bedtime, Disp: , Rfl:     levothyroxine (Synthroid) 88 mcg tablet, Take 1 tablet (88 mcg total) by mouth daily, Disp: 90 tablet, Rfl: 1    lisinopril (ZESTRIL) 10 mg tablet, TAKE 1 TABLET BY MOUTH EVERY DAY, Disp: 90 tablet, Rfl: 1    metoprolol tartrate (LOPRESSOR) 25 mg tablet, TAKE 1 TABLET BY MOUTH TWICE A DAY, Disp: 180 tablet, Rfl: 1    Multiple Vitamins-Minerals (CENTRUM SILVER ADULT 50+ PO), Take by mouth., Disp: , Rfl:     Multiple Vitamins-Minerals (PRESERVISION AREDS PO), Take 1 tablet by mouth 2 (two) times a day, Disp: , Rfl:     Omega-3 Fatty Acids (FISH OIL) 1,000 mg, Take 1,000 mg by mouth daily., Disp: , Rfl:     ferrous sulfate 324 (65 Fe) mg, TAKE 1 TABLET BY MOUTH EVERY DAY BEFORE BREAKFAST (Patient not taking: Reported on 11/14/2023), Disp: 90 tablet, Rfl: 1  Allergies   Allergen Reactions    Cyclogyl [Cyclopentolate Hcl]     Phenylephrine Other (See Comments)     Eye gtts red eye infected sore     Pred Forte [Prednisolone Acetate]      Vitals:    02/10/25 1422   BP: 132/74   Pulse: 65   Resp: 18   Temp: 97.5 °F (36.4 °C)   SpO2: 99%      Pain Score: 0-No pain

## 2025-02-10 NOTE — PROGRESS NOTES
Follow-up Visit   Name: Marizol Jiménez      : 10/4/1929      MRN: [id number]  Encounter Provider: Efrem Valdes MD  Encounter Date: 2/10/2025   Encounter department[facility]  :  Assessment & Plan  Squamous cell carcinoma of left lung (HCC)  Ms. 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     Surveillance CT imaging demonstrates evolving post-treatment changes without evidence of progressive disease.  Going forward, CT chest will be repeated in 6 months and she will return at that time.  She was encouraged to call with questions/concerns in the interim.  Orders:    CT chest wo contrast; Future        History of Present Illness   Ms. Jiménez is a 89 year old with the above history.     24 Dr. Reid  Doing well 1 year 8 months post right hemicolectomy 2023, negative margins, she will not require adjuvant chemotherapy. No troubles with diet or bowel movements  PLAN:  -CEA 6months  -Office visit 9months  -Will hold on any additional abdominopelvic imaging to avoid contrast, and hold on colonoscopy to avoid prep/fall risk at age 89 unless any new alarm symptoms suggesting additional workup, as she agrees to minimize ongoing workups at her age        25 CT chest   IMPRESSION:  Interval mild decrease in size of left upper lobe malignancy with surrounding post therapy changes.        Upcomin25 Jenaette     Upon interview, she denies worsening shortness of breath, cough or chest pain.  She is without additional acute concerns.    Oncology History   Cancer Staging   Primary adenocarcinoma of ascending colon (HCC)  Staging form: Colon and Rectum, AJCC 8th Edition  - Pathologic stage from 2023: Stage IIA (pT3, pN0, cM0) - Signed by Efrem Valdes MD on 2024  Stage prefix: Initial diagnosis  Total positive nodes:  0    Squamous cell carcinoma of left lung (HCC)  Staging form: Lung, AJCC 8th Edition  - Clinical stage from 1/22/2024: Stage IA3 (cT1c, cN0, cM0) - Signed by Efrem Valdes MD on 1/22/2024  Stage prefix: Initial diagnosis  Oncology History   Primary adenocarcinoma of ascending colon (HCC)   3/7/2023 Initial Diagnosis    Primary adenocarcinoma of ascending colon (HCC)     4/13/2023 -  Cancer Staged    Staging form: Colon and Rectum, AJCC 8th Edition  - Pathologic stage from 4/13/2023: Stage IIA (pT3, pN0, cM0) - Signed by Efrem Valdes MD on 1/22/2024  Stage prefix: Initial diagnosis  Total positive nodes: 0       3/18/2024 - 3/29/2024 Radiation    Treatment:  Course: C1    Plan ID Energy Fractions Dose per Fraction (cGy) Dose Correction (cGy) Total Dose Delivered (cGy) Elapsed Days   HARRIS 6X-FFF 10 / 10 665 0 6,650 11      Treatment dates:  C1: 3/18/2024 - 3/29/2024     Squamous cell carcinoma of left lung (HCC)   1/4/2024 Initial Diagnosis    Non-small cell carcinoma of left lung (HCC)     1/4/2024 Biopsy    IR lung biopsy    A.  Lung, left upper lobe, biopsy:  Non-small cell carcinoma with squamous differentiation.      1/22/2024 -  Cancer Staged    Staging form: Lung, AJCC 8th Edition  - Clinical stage from 1/22/2024: Stage IA3 (cT1c, cN0, cM0) - Signed by Efrem Valdes MD on 1/22/2024  Stage prefix: Initial diagnosis       3/18/2024 - 3/29/2024 Radiation    Plan ID Energy Fractions Dose per Fraction (cGy) Dose Correction (cGy) Total Dose Delivered (cGy) Elapsed Days   HARRIS 6X-FFF 10 / 10 665 0 6,650 11      Treatment dates:  C1: 3/18/2024 - 3/29/2024        Review of Systems Refer to nursing note.      Current Outpatient Medications:     amLODIPine (NORVASC) 5 mg tablet, TAKE 1 TABLET (5 MG TOTAL) BY MOUTH DAILY., Disp: 90 tablet, Rfl: 1    ASPIRIN 81 PO, Take 1 tablet by mouth every other day, Disp: , Rfl:     atorvastatin (LIPITOR) 20 mg tablet, TAKE 1 TABLET BY MOUTH EVERY DAY, Disp: 90 tablet,  Rfl: 1    Biotin 1000 MCG tablet, Take 5,000 mcg by mouth daily, Disp: , Rfl:     Calcium 500 MG tablet, Take 1 tablet by mouth 3 (three) times a week , Disp: , Rfl:     cholecalciferol (VITAMIN D3) 1,000 units tablet, Take 1 tablet by mouth daily, Disp: , Rfl:     dorzolamide-timolol (COSOPT) 22.3-6.8 MG/ML ophthalmic solution, Administer 1 drop to both eyes 2 (two) times a day, Disp: , Rfl:     latanoprost (XALATAN) 0.005 % ophthalmic solution, Administer 1 drop into the left eye daily at bedtime, Disp: , Rfl:     levothyroxine (Synthroid) 88 mcg tablet, Take 1 tablet (88 mcg total) by mouth daily, Disp: 90 tablet, Rfl: 1    lisinopril (ZESTRIL) 10 mg tablet, TAKE 1 TABLET BY MOUTH EVERY DAY, Disp: 90 tablet, Rfl: 1    metoprolol tartrate (LOPRESSOR) 25 mg tablet, TAKE 1 TABLET BY MOUTH TWICE A DAY, Disp: 180 tablet, Rfl: 1    Multiple Vitamins-Minerals (CENTRUM SILVER ADULT 50+ PO), Take by mouth., Disp: , Rfl:     Multiple Vitamins-Minerals (PRESERVISION AREDS PO), Take 1 tablet by mouth 2 (two) times a day, Disp: , Rfl:     Omega-3 Fatty Acids (FISH OIL) 1,000 mg, Take 1,000 mg by mouth daily., Disp: , Rfl:     ferrous sulfate 324 (65 Fe) mg, TAKE 1 TABLET BY MOUTH EVERY DAY BEFORE BREAKFAST (Patient not taking: Reported on 11/14/2023), Disp: 90 tablet, Rfl: 1        Objective   /74   Pulse 65   Temp 97.5 °F (36.4 °C)   Resp 18   SpO2 99%     Pain Screening:  Pain Score: 0-No pain  ECOG  1  Physical Exam  HENT:      Head: Normocephalic and atraumatic.   Eyes:      General: No scleral icterus.     Extraocular Movements: Extraocular movements intact.   Cardiovascular:      Rate and Rhythm: Normal rate.   Pulmonary:      Effort: Pulmonary effort is normal.   Abdominal:      General: There is no distension.   Musculoskeletal:         General: Normal range of motion.      Cervical back: Normal range of motion.   Skin:     General: Skin is warm and dry.   Neurological:      General: No focal deficit  present.      Mental Status: She is alert.   Psychiatric:         Mood and Affect: Mood normal.            Administrative Statements   I have spent a total time of 20 minutes in caring for this patient on the day of the visit/encounter including Diagnostic results, Prognosis, Impressions, Counseling / Coordination of care, Documenting in the medical record, Reviewing / ordering tests, medicine, procedures  , and Obtaining or reviewing history  .

## 2025-04-09 ENCOUNTER — APPOINTMENT (OUTPATIENT)
Dept: LAB | Facility: HOSPITAL | Age: OVER 89
End: 2025-04-09
Payer: MEDICARE

## 2025-04-09 DIAGNOSIS — E78.5 DYSLIPIDEMIA: ICD-10-CM

## 2025-04-09 DIAGNOSIS — I10 PRIMARY HYPERTENSION: ICD-10-CM

## 2025-04-09 DIAGNOSIS — C18.2 PRIMARY ADENOCARCINOMA OF ASCENDING COLON (HCC): ICD-10-CM

## 2025-04-09 DIAGNOSIS — C34.92 SQUAMOUS CELL CARCINOMA OF LEFT LUNG (HCC): ICD-10-CM

## 2025-04-09 DIAGNOSIS — R73.01 IMPAIRED FASTING GLUCOSE: ICD-10-CM

## 2025-04-09 DIAGNOSIS — E04.1 THYROID NODULE: ICD-10-CM

## 2025-04-09 LAB
ALBUMIN SERPL BCG-MCNC: 4.1 G/DL (ref 3.5–5)
ALP SERPL-CCNC: 65 U/L (ref 34–104)
ALT SERPL W P-5'-P-CCNC: 11 U/L (ref 7–52)
ANION GAP SERPL CALCULATED.3IONS-SCNC: 4 MMOL/L (ref 4–13)
AST SERPL W P-5'-P-CCNC: 15 U/L (ref 13–39)
BASOPHILS # BLD AUTO: 0.06 THOUSANDS/ÂΜL (ref 0–0.1)
BASOPHILS NFR BLD AUTO: 1 % (ref 0–1)
BILIRUB SERPL-MCNC: 0.76 MG/DL (ref 0.2–1)
BUN SERPL-MCNC: 21 MG/DL (ref 5–25)
CALCIUM SERPL-MCNC: 9.7 MG/DL (ref 8.4–10.2)
CHLORIDE SERPL-SCNC: 106 MMOL/L (ref 96–108)
CHOLEST SERPL-MCNC: 137 MG/DL (ref ?–200)
CO2 SERPL-SCNC: 30 MMOL/L (ref 21–32)
CREAT SERPL-MCNC: 0.9 MG/DL (ref 0.6–1.3)
EOSINOPHIL # BLD AUTO: 0.27 THOUSAND/ÂΜL (ref 0–0.61)
EOSINOPHIL NFR BLD AUTO: 5 % (ref 0–6)
ERYTHROCYTE [DISTWIDTH] IN BLOOD BY AUTOMATED COUNT: 14.4 % (ref 11.6–15.1)
EST. AVERAGE GLUCOSE BLD GHB EST-MCNC: 123 MG/DL
FERRITIN SERPL-MCNC: 35 NG/ML (ref 30–307)
GFR SERPL CREATININE-BSD FRML MDRD: 54 ML/MIN/1.73SQ M
GLUCOSE P FAST SERPL-MCNC: 93 MG/DL (ref 65–99)
HBA1C MFR BLD: 5.9 %
HCT VFR BLD AUTO: 46 % (ref 34.8–46.1)
HDLC SERPL-MCNC: 42 MG/DL
HGB BLD-MCNC: 14.1 G/DL (ref 11.5–15.4)
IMM GRANULOCYTES # BLD AUTO: 0.03 THOUSAND/UL (ref 0–0.2)
IMM GRANULOCYTES NFR BLD AUTO: 1 % (ref 0–2)
IRON SATN MFR SERPL: 19 % (ref 15–50)
IRON SERPL-MCNC: 55 UG/DL (ref 50–212)
LDLC SERPL CALC-MCNC: 56 MG/DL (ref 0–100)
LYMPHOCYTES # BLD AUTO: 1.01 THOUSANDS/ÂΜL (ref 0.6–4.47)
LYMPHOCYTES NFR BLD AUTO: 17 % (ref 14–44)
MCH RBC QN AUTO: 27.8 PG (ref 26.8–34.3)
MCHC RBC AUTO-ENTMCNC: 30.7 G/DL (ref 31.4–37.4)
MCV RBC AUTO: 91 FL (ref 82–98)
MONOCYTES # BLD AUTO: 0.55 THOUSAND/ÂΜL (ref 0.17–1.22)
MONOCYTES NFR BLD AUTO: 10 % (ref 4–12)
NEUTROPHILS # BLD AUTO: 3.88 THOUSANDS/ÂΜL (ref 1.85–7.62)
NEUTS SEG NFR BLD AUTO: 66 % (ref 43–75)
NONHDLC SERPL-MCNC: 95 MG/DL
NRBC BLD AUTO-RTO: 0 /100 WBCS
PLATELET # BLD AUTO: 227 THOUSANDS/UL (ref 149–390)
PMV BLD AUTO: 11.6 FL (ref 8.9–12.7)
POTASSIUM SERPL-SCNC: 4.8 MMOL/L (ref 3.5–5.3)
PROT SERPL-MCNC: 6.8 G/DL (ref 6.4–8.4)
RBC # BLD AUTO: 5.07 MILLION/UL (ref 3.81–5.12)
SODIUM SERPL-SCNC: 140 MMOL/L (ref 135–147)
TIBC SERPL-MCNC: 284.2 UG/DL (ref 250–450)
TRANSFERRIN SERPL-MCNC: 203 MG/DL (ref 203–362)
TRIGL SERPL-MCNC: 197 MG/DL (ref ?–150)
UIBC SERPL-MCNC: 229 UG/DL (ref 155–355)
WBC # BLD AUTO: 5.8 THOUSAND/UL (ref 4.31–10.16)

## 2025-04-09 PROCEDURE — 83550 IRON BINDING TEST: CPT

## 2025-04-09 PROCEDURE — 36415 COLL VENOUS BLD VENIPUNCTURE: CPT

## 2025-04-09 PROCEDURE — 82728 ASSAY OF FERRITIN: CPT

## 2025-04-09 PROCEDURE — 80053 COMPREHEN METABOLIC PANEL: CPT

## 2025-04-09 PROCEDURE — 80061 LIPID PANEL: CPT

## 2025-04-09 PROCEDURE — 83036 HEMOGLOBIN GLYCOSYLATED A1C: CPT

## 2025-04-09 PROCEDURE — 85025 COMPLETE CBC W/AUTO DIFF WBC: CPT

## 2025-04-09 PROCEDURE — 83540 ASSAY OF IRON: CPT

## 2025-04-17 ENCOUNTER — OFFICE VISIT (OUTPATIENT)
Dept: FAMILY MEDICINE CLINIC | Facility: HOSPITAL | Age: OVER 89
End: 2025-04-17
Payer: MEDICARE

## 2025-04-17 VITALS
DIASTOLIC BLOOD PRESSURE: 63 MMHG | BODY MASS INDEX: 26.32 KG/M2 | SYSTOLIC BLOOD PRESSURE: 116 MMHG | HEART RATE: 54 BPM | OXYGEN SATURATION: 100 % | WEIGHT: 122 LBS | HEIGHT: 57 IN | TEMPERATURE: 96.5 F

## 2025-04-17 DIAGNOSIS — C18.2 PRIMARY ADENOCARCINOMA OF ASCENDING COLON (HCC): ICD-10-CM

## 2025-04-17 DIAGNOSIS — C34.92 SQUAMOUS CELL CARCINOMA OF LEFT LUNG (HCC): ICD-10-CM

## 2025-04-17 DIAGNOSIS — D50.9 IRON DEFICIENCY ANEMIA, UNSPECIFIED IRON DEFICIENCY ANEMIA TYPE: ICD-10-CM

## 2025-04-17 DIAGNOSIS — N18.30 STAGE 3 CHRONIC KIDNEY DISEASE, UNSPECIFIED WHETHER STAGE 3A OR 3B CKD (HCC): ICD-10-CM

## 2025-04-17 DIAGNOSIS — I10 PRIMARY HYPERTENSION: ICD-10-CM

## 2025-04-17 DIAGNOSIS — E78.5 DYSLIPIDEMIA: ICD-10-CM

## 2025-04-17 DIAGNOSIS — R73.01 IMPAIRED FASTING GLUCOSE: Primary | ICD-10-CM

## 2025-04-17 PROBLEM — I34.2 NONRHEUMATIC MITRAL VALVE STENOSIS: Status: ACTIVE | Noted: 2025-04-17

## 2025-04-17 PROCEDURE — G2211 COMPLEX E/M VISIT ADD ON: HCPCS | Performed by: INTERNAL MEDICINE

## 2025-04-17 PROCEDURE — 99214 OFFICE O/P EST MOD 30 MIN: CPT | Performed by: INTERNAL MEDICINE

## 2025-04-17 NOTE — ASSESSMENT & PLAN NOTE
LDL at goal, TG up a bit and HDL low - urged healthy diet and keep active, con't current statin, recheck FLP annually

## 2025-04-17 NOTE — ASSESSMENT & PLAN NOTE
Lab Results   Component Value Date    EGFR 54 04/09/2025    EGFR 46 08/28/2024    EGFR 54 06/13/2024    CREATININE 0.90 04/09/2025    CREATININE 1.04 08/28/2024    CREATININE 0.90 06/13/2024   CKD stage 3 reviewed with pt, importance of BP/BS control as well as avoiding NSAIDs and dehydration, will follow, she is on an ACE

## 2025-04-17 NOTE — ASSESSMENT & PLAN NOTE
CEA down a bit, has repeat CEA and f/u with Colorectal surgery, call with GI symptoms, will follow as well

## 2025-04-17 NOTE — ASSESSMENT & PLAN NOTE
Sugar again wnl but A1c still in low IFG range, urged to watch sweets and keep active, recheck BW annually

## 2025-04-17 NOTE — ASSESSMENT & PLAN NOTE
Iron and ferritin both nml but low nml and have trended down a bit, will keep off iron supplement for now and repeat in 4-6 mos - BW order given, will follow, call with any s/sx of GI bleeding  Orders:    Iron Panel (Includes Ferritin, Iron Sat%, Iron, and TIBC); Future    CBC and differential; Future

## 2025-05-07 DIAGNOSIS — I10 ESSENTIAL HYPERTENSION: ICD-10-CM

## 2025-05-08 RX ORDER — AMLODIPINE BESYLATE 5 MG/1
5 TABLET ORAL DAILY
Qty: 90 TABLET | Refills: 1 | Status: SHIPPED | OUTPATIENT
Start: 2025-05-08

## 2025-06-11 ENCOUNTER — APPOINTMENT (OUTPATIENT)
Dept: LAB | Facility: HOSPITAL | Age: OVER 89
End: 2025-06-11
Payer: MEDICARE

## 2025-06-11 DIAGNOSIS — C18.2 PRIMARY ADENOCARCINOMA OF ASCENDING COLON (HCC): ICD-10-CM

## 2025-06-11 LAB — CEA SERPL-MCNC: 1.7 NG/ML (ref 0–3)

## 2025-06-11 PROCEDURE — 82378 CARCINOEMBRYONIC ANTIGEN: CPT

## 2025-06-11 PROCEDURE — 36415 COLL VENOUS BLD VENIPUNCTURE: CPT

## 2025-06-13 ENCOUNTER — RESULTS FOLLOW-UP (OUTPATIENT)
Age: OVER 89
End: 2025-06-13

## 2025-06-25 DIAGNOSIS — I10 ESSENTIAL HYPERTENSION: ICD-10-CM

## 2025-06-25 RX ORDER — LISINOPRIL 10 MG/1
10 TABLET ORAL DAILY
Qty: 90 TABLET | Refills: 1 | Status: SHIPPED | OUTPATIENT
Start: 2025-06-25

## 2025-07-11 DIAGNOSIS — E78.5 DYSLIPIDEMIA: ICD-10-CM

## 2025-07-11 DIAGNOSIS — I10 ESSENTIAL HYPERTENSION: ICD-10-CM

## 2025-07-11 RX ORDER — METOPROLOL TARTRATE 25 MG/1
25 TABLET, FILM COATED ORAL 2 TIMES DAILY
Qty: 180 TABLET | Refills: 1 | Status: SHIPPED | OUTPATIENT
Start: 2025-07-11

## 2025-07-12 RX ORDER — ATORVASTATIN CALCIUM 20 MG/1
20 TABLET, FILM COATED ORAL DAILY
Qty: 90 TABLET | Refills: 1 | Status: SHIPPED | OUTPATIENT
Start: 2025-07-12

## 2025-08-08 DIAGNOSIS — E06.3 HYPOTHYROIDISM DUE TO HASHIMOTO'S THYROIDITIS: ICD-10-CM

## 2025-08-08 RX ORDER — LEVOTHYROXINE SODIUM 88 UG/1
88 TABLET ORAL DAILY
Qty: 90 TABLET | Refills: 1 | Status: SHIPPED | OUTPATIENT
Start: 2025-08-08

## 2025-08-11 ENCOUNTER — HOSPITAL ENCOUNTER (OUTPATIENT)
Dept: CT IMAGING | Facility: HOSPITAL | Age: OVER 89
Discharge: HOME | End: 2025-08-11
Attending: RADIOLOGY
Payer: MEDICARE

## 2025-08-18 ENCOUNTER — OFFICE VISIT (OUTPATIENT)
Facility: HOSPITAL | Age: OVER 89
End: 2025-08-18
Attending: RADIOLOGY
Payer: MEDICARE

## 2025-08-18 VITALS
RESPIRATION RATE: 18 BRPM | TEMPERATURE: 97.4 F | HEART RATE: 55 BPM | OXYGEN SATURATION: 95 % | SYSTOLIC BLOOD PRESSURE: 118 MMHG | DIASTOLIC BLOOD PRESSURE: 66 MMHG

## 2025-08-18 DIAGNOSIS — C34.92 SQUAMOUS CELL CARCINOMA OF LEFT LUNG (HCC): Primary | ICD-10-CM

## 2025-08-18 PROCEDURE — 99213 OFFICE O/P EST LOW 20 MIN: CPT | Performed by: RADIOLOGY

## (undated) DEVICE — SYRINGE 20ML LL

## (undated) DEVICE — ENSEAL X1 TISSUE SEALER, CURVED JAW, 37 CM SHAFT LENGTH: Brand: ENSEAL

## (undated) DEVICE — 3M™ IOBAN™ 2 ANTIMICROBIAL INCISE DRAPE 6650EZ: Brand: IOBAN™ 2

## (undated) DEVICE — PROXIMATE RELOADABLE LINEAR CUTTER WITH SAFETY LOCK-OUT, 100MM: Brand: PROXIMATE

## (undated) DEVICE — 40601 PROLONGED POSITIONING SYSTEM: Brand: 40601 PROLONGED POSITIONING SYSTEM

## (undated) DEVICE — SYRINGE 10ML SLIP TIP LF

## (undated) DEVICE — SUT VICRYL 3-0 SH 27 IN J416H

## (undated) DEVICE — TROCARS: Brand: KII® BALLOON BLUNT TIP SYSTEM

## (undated) DEVICE — FIRST STEP BEDSIDE KIT - STAND-UP POUCH, ENDOSCOPIC CLEANING PAD - 1 POUCH: Brand: FIRST STEP BEDSIDE KIT - STAND-UP POUCH, ENDOSCOPIC CLEANING PAD

## (undated) DEVICE — GLOVE INDICATOR PI UNDERGLOVE SZ 8.5 BLUE

## (undated) DEVICE — TUBING SUCTION 5MM X 12 FT

## (undated) DEVICE — MEDI-VAC YANK SUCT HNDL W/TPRD BULBOUS TIP: Brand: CARDINAL HEALTH

## (undated) DEVICE — ADAPTOR TRACH SWIVEL

## (undated) DEVICE — SINGLE USE SUCTION VALVE MAJ-209: Brand: SINGLE USE SUCTION VALVE (STERILE)

## (undated) DEVICE — INTENDED FOR TISSUE SEPARATION, AND OTHER PROCEDURES THAT REQUIRE A SHARP SURGICAL BLADE TO PUNCTURE OR CUT.: Brand: BARD-PARKER SAFETY BLADES SIZE 15, STERILE

## (undated) DEVICE — STERILE EMESIS BASIN                 070: Brand: CARDINAL HEALTH

## (undated) DEVICE — SPECIMEN CONTAINER STERILE PEEL PACK

## (undated) DEVICE — ANTIBACTERIAL UNDYED BRAIDED (POLYGLACTIN 910), SYNTHETIC ABSORBABLE SUTURE: Brand: COATED VICRYL

## (undated) DEVICE — 3000CC GUARDIAN II: Brand: GUARDIAN

## (undated) DEVICE — SINGLE USE BIOPSY VALVE MAJ-210: Brand: SINGLE USE BIOPSY VALVE (STERILE)

## (undated) DEVICE — TRAY FOLEY 16FR URIMETER SURESTEP

## (undated) DEVICE — SUT MONOCRYL 4-0 PS-2 18 IN Y496G

## (undated) DEVICE — HEAVY DUTY TABLE COVER: Brand: CONVERTORS

## (undated) DEVICE — STOPCOCK 4-WAY

## (undated) DEVICE — SYRINGE 10ML LL

## (undated) DEVICE — BETHLEHEM MAJOR GENERAL PACK: Brand: CARDINAL HEALTH

## (undated) DEVICE — GLOVE SRG BIOGEL 8

## (undated) DEVICE — ADHESIVE SKIN HIGH VISCOSITY EXOFIN 1ML

## (undated) DEVICE — UTILITY MARKER,BLACK WITH LABELS: Brand: DEVON

## (undated) DEVICE — TROCAR: Brand: KII SLEEVE

## (undated) DEVICE — PLUMEPEN PRO 10FT

## (undated) DEVICE — WOUND RETRACTOR AND PROTECTOR: Brand: ALEXIS O WOUND PROTECTOR-RETRACTOR

## (undated) DEVICE — GOWN,SLEEVE,STERILE,W/CSR WRAP,1/P: Brand: MEDLINE

## (undated) DEVICE — VISUALIZATION SYSTEM: Brand: CLEARIFY

## (undated) DEVICE — PROXIMATE LINEAR CUTTER RELOADS (STANDARD)-100MM: Brand: PROXIMATE

## (undated) DEVICE — 3M™ IOBAN™ 2 ANTIMICROBIAL INCISE DRAPE 6640EZ: Brand: IOBAN™ 2

## (undated) DEVICE — INSUFLATION TUBING INSUFLOW (LEXION)

## (undated) DEVICE — SUT PDS PLUS 1 CTX 36IN PDP371T

## (undated) DEVICE — GAUZE SPONGES,16 PLY: Brand: CURITY

## (undated) DEVICE — TUBING SMOKE EVAC W/FILTRATION DEVICE PLUMEPORT ACTIV

## (undated) DEVICE — SUT VICRYL 0 REEL 54 IN J287G

## (undated) DEVICE — Device: Brand: BALLOON

## (undated) DEVICE — SINGLE USE ASPIRATION NEEDLE: Brand: SINGLE USE ASPIRATION NEEDLE

## (undated) DEVICE — POOLE SUCTION HANDLE: Brand: CARDINAL HEALTH

## (undated) DEVICE — IV EXTENSION TUBING 33 IN